# Patient Record
Sex: MALE | Race: WHITE | NOT HISPANIC OR LATINO | Employment: FULL TIME | URBAN - METROPOLITAN AREA
[De-identification: names, ages, dates, MRNs, and addresses within clinical notes are randomized per-mention and may not be internally consistent; named-entity substitution may affect disease eponyms.]

---

## 2017-02-01 ENCOUNTER — ALLSCRIPTS OFFICE VISIT (OUTPATIENT)
Dept: OTHER | Facility: OTHER | Age: 57
End: 2017-02-01

## 2017-02-17 ENCOUNTER — GENERIC CONVERSION - ENCOUNTER (OUTPATIENT)
Dept: OTHER | Facility: OTHER | Age: 57
End: 2017-02-17

## 2017-02-17 ENCOUNTER — APPOINTMENT (OUTPATIENT)
Dept: LAB | Facility: CLINIC | Age: 57
End: 2017-02-17
Payer: COMMERCIAL

## 2017-02-17 DIAGNOSIS — I10 ESSENTIAL (PRIMARY) HYPERTENSION: ICD-10-CM

## 2017-02-17 DIAGNOSIS — E78.5 HYPERLIPIDEMIA: ICD-10-CM

## 2017-02-17 DIAGNOSIS — Z12.5 ENCOUNTER FOR SCREENING FOR MALIGNANT NEOPLASM OF PROSTATE: ICD-10-CM

## 2017-02-17 LAB
ALBUMIN SERPL BCP-MCNC: 4.3 G/DL (ref 3.5–5)
ALP SERPL-CCNC: 81 U/L (ref 46–116)
ALT SERPL W P-5'-P-CCNC: 117 U/L (ref 12–78)
ANION GAP SERPL CALCULATED.3IONS-SCNC: 8 MMOL/L (ref 4–13)
AST SERPL W P-5'-P-CCNC: 96 U/L (ref 5–45)
BILIRUB SERPL-MCNC: 0.95 MG/DL (ref 0.2–1)
BUN SERPL-MCNC: 8 MG/DL (ref 5–25)
CALCIUM SERPL-MCNC: 8.9 MG/DL (ref 8.3–10.1)
CHLORIDE SERPL-SCNC: 92 MMOL/L (ref 100–108)
CO2 SERPL-SCNC: 30 MMOL/L (ref 21–32)
CREAT SERPL-MCNC: 0.7 MG/DL (ref 0.6–1.3)
CREAT UR-MCNC: 21.5 MG/DL
GFR SERPL CREATININE-BSD FRML MDRD: >60 ML/MIN/1.73SQ M
GLUCOSE SERPL-MCNC: 119 MG/DL (ref 65–140)
LDLC SERPL DIRECT ASSAY-MCNC: 117 MG/DL (ref 0–100)
MICROALBUMIN UR-MCNC: 10 MG/L (ref 0–20)
MICROALBUMIN/CREAT 24H UR: 47 MG/G CREATININE (ref 0–30)
POTASSIUM SERPL-SCNC: 3.9 MMOL/L (ref 3.5–5.3)
PROT SERPL-MCNC: 7.6 G/DL (ref 6.4–8.2)
PSA SERPL-MCNC: 0.9 NG/ML (ref 0–4)
SODIUM SERPL-SCNC: 130 MMOL/L (ref 136–145)
TRIGL SERPL-MCNC: 149 MG/DL

## 2017-02-17 PROCEDURE — 36415 COLL VENOUS BLD VENIPUNCTURE: CPT

## 2017-02-17 PROCEDURE — 84478 ASSAY OF TRIGLYCERIDES: CPT

## 2017-02-17 PROCEDURE — 82570 ASSAY OF URINE CREATININE: CPT

## 2017-02-17 PROCEDURE — 83721 ASSAY OF BLOOD LIPOPROTEIN: CPT

## 2017-02-17 PROCEDURE — 80053 COMPREHEN METABOLIC PANEL: CPT

## 2017-02-17 PROCEDURE — G0103 PSA SCREENING: HCPCS

## 2017-02-17 PROCEDURE — 82043 UR ALBUMIN QUANTITATIVE: CPT

## 2017-02-21 ENCOUNTER — GENERIC CONVERSION - ENCOUNTER (OUTPATIENT)
Dept: OTHER | Facility: OTHER | Age: 57
End: 2017-02-21

## 2017-02-27 DIAGNOSIS — E87.1 HYPO-OSMOLALITY AND HYPONATREMIA: ICD-10-CM

## 2017-02-27 DIAGNOSIS — I10 ESSENTIAL (PRIMARY) HYPERTENSION: ICD-10-CM

## 2017-02-27 DIAGNOSIS — R74.8 ABNORMAL LEVELS OF OTHER SERUM ENZYMES: ICD-10-CM

## 2017-02-27 DIAGNOSIS — I95.9 HYPOTENSION: ICD-10-CM

## 2017-02-27 DIAGNOSIS — Z11.59 ENCOUNTER FOR SCREENING FOR OTHER VIRAL DISEASES: ICD-10-CM

## 2017-03-17 ENCOUNTER — TRANSCRIBE ORDERS (OUTPATIENT)
Dept: SLEEP CENTER | Facility: HOSPITAL | Age: 57
End: 2017-03-17

## 2017-03-17 DIAGNOSIS — G47.33 OBSTRUCTIVE SLEEP APNEA (ADULT) (PEDIATRIC): Primary | ICD-10-CM

## 2017-03-22 ENCOUNTER — APPOINTMENT (OUTPATIENT)
Dept: LAB | Facility: CLINIC | Age: 57
End: 2017-03-22
Payer: COMMERCIAL

## 2017-03-22 ENCOUNTER — ALLSCRIPTS OFFICE VISIT (OUTPATIENT)
Dept: OTHER | Facility: OTHER | Age: 57
End: 2017-03-22

## 2017-03-22 DIAGNOSIS — E87.1 HYPO-OSMOLALITY AND HYPONATREMIA: ICD-10-CM

## 2017-03-22 DIAGNOSIS — R74.8 ABNORMAL LEVELS OF OTHER SERUM ENZYMES: ICD-10-CM

## 2017-03-22 DIAGNOSIS — I95.9 HYPOTENSION: ICD-10-CM

## 2017-03-22 DIAGNOSIS — Z11.59 ENCOUNTER FOR SCREENING FOR OTHER VIRAL DISEASES: ICD-10-CM

## 2017-03-22 LAB
ALBUMIN SERPL BCP-MCNC: 4.1 G/DL (ref 3.5–5)
ALP SERPL-CCNC: 79 U/L (ref 46–116)
ALT SERPL W P-5'-P-CCNC: 85 U/L (ref 12–78)
ANION GAP SERPL CALCULATED.3IONS-SCNC: 8 MMOL/L (ref 4–13)
AST SERPL W P-5'-P-CCNC: 75 U/L (ref 5–45)
BILIRUB SERPL-MCNC: 0.6 MG/DL (ref 0.2–1)
BUN SERPL-MCNC: 13 MG/DL (ref 5–25)
CALCIUM SERPL-MCNC: 9.4 MG/DL (ref 8.3–10.1)
CHLORIDE SERPL-SCNC: 87 MMOL/L (ref 100–108)
CO2 SERPL-SCNC: 34 MMOL/L (ref 21–32)
CREAT SERPL-MCNC: 0.85 MG/DL (ref 0.6–1.3)
GFR SERPL CREATININE-BSD FRML MDRD: >60 ML/MIN/1.73SQ M
GLUCOSE P FAST SERPL-MCNC: 105 MG/DL (ref 65–99)
GLUCOSE SERPL-MCNC: 109 MG/DL
HCT VFR BLD AUTO: 47.4 % (ref 36.5–49.3)
HGB BLD-MCNC: 17.4 G/DL (ref 12–17)
POTASSIUM SERPL-SCNC: 4.2 MMOL/L (ref 3.5–5.3)
PROT SERPL-MCNC: 7.4 G/DL (ref 6.4–8.2)
SODIUM SERPL-SCNC: 129 MMOL/L (ref 136–145)

## 2017-03-22 PROCEDURE — 86803 HEPATITIS C AB TEST: CPT

## 2017-03-22 PROCEDURE — 36415 COLL VENOUS BLD VENIPUNCTURE: CPT

## 2017-03-22 PROCEDURE — 80053 COMPREHEN METABOLIC PANEL: CPT

## 2017-03-22 PROCEDURE — 85014 HEMATOCRIT: CPT

## 2017-03-22 PROCEDURE — 85018 HEMOGLOBIN: CPT

## 2017-03-23 ENCOUNTER — GENERIC CONVERSION - ENCOUNTER (OUTPATIENT)
Dept: OTHER | Facility: OTHER | Age: 57
End: 2017-03-23

## 2017-03-23 LAB — HCV AB SER QL: NORMAL

## 2017-03-29 ENCOUNTER — TRANSCRIBE ORDERS (OUTPATIENT)
Dept: LAB | Facility: CLINIC | Age: 57
End: 2017-03-29

## 2017-03-29 ENCOUNTER — APPOINTMENT (OUTPATIENT)
Dept: LAB | Facility: CLINIC | Age: 57
End: 2017-03-29
Payer: COMMERCIAL

## 2017-03-29 DIAGNOSIS — I10 ESSENTIAL (PRIMARY) HYPERTENSION: ICD-10-CM

## 2017-03-29 LAB
ALBUMIN SERPL BCP-MCNC: 3.8 G/DL (ref 3.5–5)
ALP SERPL-CCNC: 67 U/L (ref 46–116)
ALT SERPL W P-5'-P-CCNC: 79 U/L (ref 12–78)
ANION GAP SERPL CALCULATED.3IONS-SCNC: 8 MMOL/L (ref 4–13)
AST SERPL W P-5'-P-CCNC: 75 U/L (ref 5–45)
BILIRUB SERPL-MCNC: 0.48 MG/DL (ref 0.2–1)
BUN SERPL-MCNC: 8 MG/DL (ref 5–25)
CALCIUM SERPL-MCNC: 8.8 MG/DL (ref 8.3–10.1)
CHLORIDE SERPL-SCNC: 103 MMOL/L (ref 100–108)
CO2 SERPL-SCNC: 29 MMOL/L (ref 21–32)
CREAT SERPL-MCNC: 0.67 MG/DL (ref 0.6–1.3)
GFR SERPL CREATININE-BSD FRML MDRD: >60 ML/MIN/1.73SQ M
GLUCOSE SERPL-MCNC: 101 MG/DL (ref 65–140)
POTASSIUM SERPL-SCNC: 4.8 MMOL/L (ref 3.5–5.3)
PROT SERPL-MCNC: 7 G/DL (ref 6.4–8.2)
SODIUM SERPL-SCNC: 140 MMOL/L (ref 136–145)

## 2017-03-29 PROCEDURE — 36415 COLL VENOUS BLD VENIPUNCTURE: CPT

## 2017-03-29 PROCEDURE — 80053 COMPREHEN METABOLIC PANEL: CPT

## 2017-03-30 ENCOUNTER — GENERIC CONVERSION - ENCOUNTER (OUTPATIENT)
Dept: OTHER | Facility: OTHER | Age: 57
End: 2017-03-30

## 2017-04-14 ENCOUNTER — HOSPITAL ENCOUNTER (OUTPATIENT)
Dept: SLEEP CENTER | Facility: HOSPITAL | Age: 57
Discharge: HOME | End: 2017-04-14
Attending: INTERNAL MEDICINE
Payer: COMMERCIAL

## 2017-04-14 DIAGNOSIS — G47.33 OBSTRUCTIVE SLEEP APNEA (ADULT) (PEDIATRIC): ICD-10-CM

## 2017-04-14 PROCEDURE — 95811 POLYSOM 6/>YRS CPAP 4/> PARM: CPT

## 2017-05-02 ENCOUNTER — ALLSCRIPTS OFFICE VISIT (OUTPATIENT)
Dept: OTHER | Facility: OTHER | Age: 57
End: 2017-05-02

## 2017-05-02 ENCOUNTER — TRANSCRIBE ORDERS (OUTPATIENT)
Dept: SLEEP CENTER | Facility: HOSPITAL | Age: 57
End: 2017-05-02

## 2017-05-02 DIAGNOSIS — G47.33 OBSTRUCTIVE SLEEP APNEA (ADULT) (PEDIATRIC): Primary | ICD-10-CM

## 2017-05-18 ENCOUNTER — HOSPITAL ENCOUNTER (OUTPATIENT)
Dept: SLEEP CENTER | Facility: HOSPITAL | Age: 57
Discharge: HOME | End: 2017-05-18
Attending: INTERNAL MEDICINE
Payer: COMMERCIAL

## 2017-05-18 DIAGNOSIS — G47.33 OBSTRUCTIVE SLEEP APNEA (ADULT) (PEDIATRIC): ICD-10-CM

## 2017-06-30 ENCOUNTER — TRANSCRIBE ORDERS (OUTPATIENT)
Dept: LAB | Facility: CLINIC | Age: 57
End: 2017-06-30

## 2017-06-30 ENCOUNTER — APPOINTMENT (OUTPATIENT)
Dept: LAB | Facility: CLINIC | Age: 57
End: 2017-06-30
Payer: COMMERCIAL

## 2017-06-30 DIAGNOSIS — Z00.8 HEALTH EXAMINATION IN POPULATION SURVEY: ICD-10-CM

## 2017-06-30 DIAGNOSIS — Z00.8 HEALTH EXAMINATION IN POPULATION SURVEY: Primary | ICD-10-CM

## 2017-06-30 LAB
CHOLEST SERPL-MCNC: 195 MG/DL (ref 50–200)
EST. AVERAGE GLUCOSE BLD GHB EST-MCNC: 103 MG/DL
HBA1C MFR BLD: 5.2 % (ref 4.2–6.3)
HDLC SERPL-MCNC: 45 MG/DL (ref 40–60)
LDLC SERPL CALC-MCNC: 97 MG/DL (ref 0–100)
TRIGL SERPL-MCNC: 265 MG/DL

## 2017-06-30 PROCEDURE — 36415 COLL VENOUS BLD VENIPUNCTURE: CPT

## 2017-06-30 PROCEDURE — 80061 LIPID PANEL: CPT

## 2017-06-30 PROCEDURE — 83036 HEMOGLOBIN GLYCOSYLATED A1C: CPT

## 2017-07-20 ENCOUNTER — TRANSCRIBE ORDERS (OUTPATIENT)
Dept: SLEEP CENTER | Facility: HOSPITAL | Age: 57
End: 2017-07-20

## 2017-07-20 ENCOUNTER — HOSPITAL ENCOUNTER (OUTPATIENT)
Dept: SLEEP CENTER | Facility: HOSPITAL | Age: 57
Discharge: HOME | End: 2017-07-20
Attending: INTERNAL MEDICINE
Payer: COMMERCIAL

## 2017-07-20 DIAGNOSIS — G47.33 OBSTRUCTIVE SLEEP APNEA (ADULT) (PEDIATRIC): ICD-10-CM

## 2017-07-20 DIAGNOSIS — G47.33 OBSTRUCTIVE SLEEP APNEA (ADULT) (PEDIATRIC): Primary | ICD-10-CM

## 2017-09-19 ENCOUNTER — TRANSCRIBE ORDERS (OUTPATIENT)
Dept: SLEEP CENTER | Facility: HOSPITAL | Age: 57
End: 2017-09-19

## 2017-09-19 DIAGNOSIS — G47.33 OBSTRUCTIVE SLEEP APNEA (ADULT) (PEDIATRIC): Primary | ICD-10-CM

## 2017-09-21 ENCOUNTER — HOSPITAL ENCOUNTER (OUTPATIENT)
Dept: SLEEP CENTER | Facility: HOSPITAL | Age: 57
Discharge: HOME | End: 2017-09-21
Attending: INTERNAL MEDICINE
Payer: COMMERCIAL

## 2017-09-21 DIAGNOSIS — G47.33 OBSTRUCTIVE SLEEP APNEA (ADULT) (PEDIATRIC): ICD-10-CM

## 2017-09-26 ENCOUNTER — TRANSCRIBE ORDERS (OUTPATIENT)
Dept: SLEEP CENTER | Facility: HOSPITAL | Age: 57
End: 2017-09-26

## 2017-09-26 DIAGNOSIS — G47.33 OBSTRUCTIVE SLEEP APNEA (ADULT) (PEDIATRIC): Primary | ICD-10-CM

## 2017-11-13 ENCOUNTER — TRANSCRIBE ORDERS (OUTPATIENT)
Dept: LAB | Facility: CLINIC | Age: 57
End: 2017-11-13

## 2017-11-13 ENCOUNTER — APPOINTMENT (OUTPATIENT)
Dept: LAB | Facility: CLINIC | Age: 57
End: 2017-11-13
Payer: COMMERCIAL

## 2017-11-13 ENCOUNTER — GENERIC CONVERSION - ENCOUNTER (OUTPATIENT)
Dept: OTHER | Facility: OTHER | Age: 57
End: 2017-11-13

## 2017-11-13 ENCOUNTER — APPOINTMENT (OUTPATIENT)
Dept: RADIOLOGY | Facility: CLINIC | Age: 57
End: 2017-11-13
Payer: COMMERCIAL

## 2017-11-13 DIAGNOSIS — R79.9 ABNORMAL FINDING OF BLOOD CHEMISTRY: ICD-10-CM

## 2017-11-13 DIAGNOSIS — R79.89 OTHER SPECIFIED ABNORMAL FINDINGS OF BLOOD CHEMISTRY: ICD-10-CM

## 2017-11-13 DIAGNOSIS — I10 ESSENTIAL (PRIMARY) HYPERTENSION: ICD-10-CM

## 2017-11-13 DIAGNOSIS — M23.91 INTERNAL DERANGEMENT OF RIGHT KNEE: ICD-10-CM

## 2017-11-13 DIAGNOSIS — R74.8 ABNORMAL LEVELS OF OTHER SERUM ENZYMES: ICD-10-CM

## 2017-11-13 LAB
ALBUMIN SERPL BCP-MCNC: 3.8 G/DL (ref 3.5–5)
ALP SERPL-CCNC: 85 U/L (ref 46–116)
ALT SERPL W P-5'-P-CCNC: 94 U/L (ref 12–78)
ANION GAP SERPL CALCULATED.3IONS-SCNC: 6 MMOL/L (ref 4–13)
AST SERPL W P-5'-P-CCNC: 108 U/L (ref 5–45)
BASOPHILS # BLD AUTO: 0.02 THOUSANDS/ΜL (ref 0–0.1)
BASOPHILS NFR BLD AUTO: 0 % (ref 0–1)
BILIRUB SERPL-MCNC: 0.66 MG/DL (ref 0.2–1)
BUN SERPL-MCNC: 8 MG/DL (ref 5–25)
CALCIUM SERPL-MCNC: 9.2 MG/DL (ref 8.3–10.1)
CHLORIDE SERPL-SCNC: 102 MMOL/L (ref 100–108)
CO2 SERPL-SCNC: 28 MMOL/L (ref 21–32)
CREAT SERPL-MCNC: 0.76 MG/DL (ref 0.6–1.3)
EOSINOPHIL # BLD AUTO: 0.24 THOUSAND/ΜL (ref 0–0.61)
EOSINOPHIL NFR BLD AUTO: 4 % (ref 0–6)
ERYTHROCYTE [DISTWIDTH] IN BLOOD BY AUTOMATED COUNT: 12.8 % (ref 11.6–15.1)
GFR SERPL CREATININE-BSD FRML MDRD: 100 ML/MIN/1.73SQ M
GLUCOSE SERPL-MCNC: 127 MG/DL (ref 65–140)
HCT VFR BLD AUTO: 46.3 % (ref 36.5–49.3)
HGB BLD-MCNC: 16.2 G/DL (ref 12–17)
LYMPHOCYTES # BLD AUTO: 1.04 THOUSANDS/ΜL (ref 0.6–4.47)
LYMPHOCYTES NFR BLD AUTO: 18 % (ref 14–44)
MCH RBC QN AUTO: 35.3 PG (ref 26.8–34.3)
MCHC RBC AUTO-ENTMCNC: 35 G/DL (ref 31.4–37.4)
MCV RBC AUTO: 101 FL (ref 82–98)
MONOCYTES # BLD AUTO: 0.71 THOUSAND/ΜL (ref 0.17–1.22)
MONOCYTES NFR BLD AUTO: 12 % (ref 4–12)
NEUTROPHILS # BLD AUTO: 3.75 THOUSANDS/ΜL (ref 1.85–7.62)
NEUTS SEG NFR BLD AUTO: 66 % (ref 43–75)
NRBC BLD AUTO-RTO: 0 /100 WBCS
PLATELET # BLD AUTO: 150 THOUSANDS/UL (ref 149–390)
PMV BLD AUTO: 10.4 FL (ref 8.9–12.7)
POTASSIUM SERPL-SCNC: 4.6 MMOL/L (ref 3.5–5.3)
PROT SERPL-MCNC: 7.3 G/DL (ref 6.4–8.2)
RBC # BLD AUTO: 4.59 MILLION/UL (ref 3.88–5.62)
SODIUM SERPL-SCNC: 136 MMOL/L (ref 136–145)
TSH SERPL DL<=0.05 MIU/L-ACNC: 2.11 UIU/ML (ref 0.36–3.74)
WBC # BLD AUTO: 5.8 THOUSAND/UL (ref 4.31–10.16)

## 2017-11-13 PROCEDURE — 85025 COMPLETE CBC W/AUTO DIFF WBC: CPT

## 2017-11-13 PROCEDURE — 80053 COMPREHEN METABOLIC PANEL: CPT

## 2017-11-13 PROCEDURE — 84443 ASSAY THYROID STIM HORMONE: CPT

## 2017-11-13 PROCEDURE — 73562 X-RAY EXAM OF KNEE 3: CPT

## 2017-11-13 PROCEDURE — 36415 COLL VENOUS BLD VENIPUNCTURE: CPT

## 2017-11-14 ENCOUNTER — GENERIC CONVERSION - ENCOUNTER (OUTPATIENT)
Dept: OTHER | Facility: OTHER | Age: 57
End: 2017-11-14

## 2017-11-15 ENCOUNTER — GENERIC CONVERSION - ENCOUNTER (OUTPATIENT)
Dept: OTHER | Facility: OTHER | Age: 57
End: 2017-11-15

## 2017-11-29 ENCOUNTER — GENERIC CONVERSION - ENCOUNTER (OUTPATIENT)
Dept: OTHER | Facility: OTHER | Age: 57
End: 2017-11-29

## 2017-11-29 ENCOUNTER — TRANSCRIBE ORDERS (OUTPATIENT)
Dept: LAB | Facility: CLINIC | Age: 57
End: 2017-11-29

## 2017-11-29 ENCOUNTER — APPOINTMENT (OUTPATIENT)
Dept: LAB | Facility: CLINIC | Age: 57
End: 2017-11-29
Payer: COMMERCIAL

## 2017-11-29 DIAGNOSIS — R74.8 ABNORMAL LEVELS OF OTHER SERUM ENZYMES: ICD-10-CM

## 2017-11-29 LAB
FERRITIN SERPL-MCNC: 714 NG/ML (ref 8–388)
FOLATE SERPL-MCNC: >20 NG/ML (ref 3.1–17.5)
IRON SERPL-MCNC: 197 UG/DL (ref 65–175)
TIBC SERPL-MCNC: 353 UG/DL (ref 250–450)
VIT B12 SERPL-MCNC: 565 PG/ML (ref 100–900)

## 2017-11-29 PROCEDURE — 82728 ASSAY OF FERRITIN: CPT

## 2017-11-29 PROCEDURE — 86430 RHEUMATOID FACTOR TEST QUAL: CPT

## 2017-11-29 PROCEDURE — 80074 ACUTE HEPATITIS PANEL: CPT

## 2017-11-29 PROCEDURE — 83540 ASSAY OF IRON: CPT

## 2017-11-29 PROCEDURE — 82746 ASSAY OF FOLIC ACID SERUM: CPT

## 2017-11-29 PROCEDURE — 82390 ASSAY OF CERULOPLASMIN: CPT

## 2017-11-29 PROCEDURE — 82607 VITAMIN B-12: CPT

## 2017-11-29 PROCEDURE — 86038 ANTINUCLEAR ANTIBODIES: CPT

## 2017-11-29 PROCEDURE — 36415 COLL VENOUS BLD VENIPUNCTURE: CPT

## 2017-11-29 PROCEDURE — 86704 HEP B CORE ANTIBODY TOTAL: CPT

## 2017-11-29 PROCEDURE — 83550 IRON BINDING TEST: CPT

## 2017-11-30 ENCOUNTER — GENERIC CONVERSION - ENCOUNTER (OUTPATIENT)
Dept: OTHER | Facility: OTHER | Age: 57
End: 2017-11-30

## 2017-11-30 LAB
CERULOPLASMIN SERPL-MCNC: 21.7 MG/DL (ref 16–31)
HAV IGM SER QL: NORMAL
HBV CORE AB SER QL: NORMAL
HBV CORE IGM SER QL: NORMAL
HBV SURFACE AG SER QL: NORMAL
HCV AB SER QL: NORMAL
RHEUMATOID FACT SER QL LA: NEGATIVE

## 2017-12-01 ENCOUNTER — GENERIC CONVERSION - ENCOUNTER (OUTPATIENT)
Dept: OTHER | Facility: OTHER | Age: 57
End: 2017-12-01

## 2017-12-01 LAB — RYE IGE QN: NEGATIVE

## 2017-12-11 ENCOUNTER — ALLSCRIPTS OFFICE VISIT (OUTPATIENT)
Dept: OTHER | Facility: OTHER | Age: 57
End: 2017-12-11

## 2017-12-12 ENCOUNTER — APPOINTMENT (OUTPATIENT)
Dept: LAB | Facility: CLINIC | Age: 57
End: 2017-12-12
Payer: COMMERCIAL

## 2017-12-12 ENCOUNTER — TRANSCRIBE ORDERS (OUTPATIENT)
Dept: LAB | Facility: CLINIC | Age: 57
End: 2017-12-12

## 2017-12-12 DIAGNOSIS — R79.89 OTHER SPECIFIED ABNORMAL FINDINGS OF BLOOD CHEMISTRY: ICD-10-CM

## 2017-12-12 DIAGNOSIS — R74.8 ABNORMAL LEVELS OF OTHER SERUM ENZYMES: ICD-10-CM

## 2017-12-12 LAB
INR PPP: 0.99 (ref 0.86–1.16)
PROTHROMBIN TIME: 13.1 SECONDS (ref 12.1–14.4)

## 2017-12-12 PROCEDURE — 86235 NUCLEAR ANTIGEN ANTIBODY: CPT

## 2017-12-12 PROCEDURE — 85610 PROTHROMBIN TIME: CPT

## 2017-12-12 PROCEDURE — 82103 ALPHA-1-ANTITRYPSIN TOTAL: CPT

## 2017-12-12 PROCEDURE — 84165 PROTEIN E-PHORESIS SERUM: CPT

## 2017-12-12 PROCEDURE — 36415 COLL VENOUS BLD VENIPUNCTURE: CPT

## 2017-12-12 PROCEDURE — 81256 HFE GENE: CPT

## 2017-12-12 PROCEDURE — 87389 HIV-1 AG W/HIV-1&-2 AB AG IA: CPT

## 2017-12-13 LAB — A1AT SERPL-MCNC: 161 MG/DL (ref 90–200)

## 2017-12-14 ENCOUNTER — GENERIC CONVERSION - ENCOUNTER (OUTPATIENT)
Dept: OTHER | Facility: OTHER | Age: 57
End: 2017-12-14

## 2017-12-14 LAB
ACTIN IGG SERPL-ACNC: 6 UNITS (ref 0–19)
ALBUMIN SERPL ELPH-MCNC: 5.05 G/DL (ref 3.5–5)
ALBUMIN SERPL ELPH-MCNC: 62.4 % (ref 52–65)
ALPHA1 GLOB SERPL ELPH-MCNC: 0.35 G/DL (ref 0.1–0.4)
ALPHA1 GLOB SERPL ELPH-MCNC: 4.3 % (ref 2.5–5)
ALPHA2 GLOB SERPL ELPH-MCNC: 0.79 G/DL (ref 0.4–1.2)
ALPHA2 GLOB SERPL ELPH-MCNC: 9.7 % (ref 7–13)
BETA GLOB ABNORMAL SERPL ELPH-MCNC: 0.49 G/DL (ref 0.4–0.8)
BETA1 GLOB SERPL ELPH-MCNC: 6 % (ref 5–13)
BETA2 GLOB SERPL ELPH-MCNC: 4.6 % (ref 2–8)
BETA2+GAMMA GLOB SERPL ELPH-MCNC: 0.37 G/DL (ref 0.2–0.5)
GAMMA GLOB ABNORMAL SERPL ELPH-MCNC: 1.05 G/DL (ref 0.5–1.6)
GAMMA GLOB SERPL ELPH-MCNC: 13 % (ref 12–22)
HIV 1+2 AB+HIV1 P24 AG SERPL QL IA: NORMAL
IGG/ALB SER: 1.66 {RATIO} (ref 1.1–1.8)
PROT PATTERN SERPL ELPH-IMP: ABNORMAL
PROT SERPL-MCNC: 8.1 G/DL (ref 6.4–8.2)

## 2017-12-16 NOTE — CONSULTS
Assessment  1  Abnormal liver function test (790 6) (R79 89)  2  Elevated liver enzymes (790 5) (R74 8)  3  Iron overload (275 09) (E83 19)    Plan   Abnormal liver function test    · (1) SMOOTH MUSCLE ANTIBODY; Status:Resulted - Requires Verification;   Done:47Prs2986 08:23AM  Due:11Dec2018; Ordered; For:Abnormal liver function test; Ordered By:Ghislaine Cortez;   · (1) SPECIAL TEST; Status:Active; Requested for:39Xeg2376;   Perform:[facility] - CSF; Order Comments:HFE gene mutation; Due:11Dec2018; Ordered; For:Abnormal liver function test; Ordered By:Shannon Cortez;   · Follow-up visit in 3 months Evaluation and Treatment  Follow-up with doc  Status: HoldFor - Scheduling  Requested for: 64PNV8315  Ordered; For: Abnormal liver function test;  Ordered By: Tena Briceno  Performed:   Due: 81ULG4248   · *1- [facility] INTERVENTIONAL RADIOLOGY Co-Management  please do percutaneous liverbiopsy and assess for iron content; concern for hemochromatosis  Status:Active  Requested for: 21VYH5876  Ordered; For: Abnormal liver function test;  Ordered By: Tena Briceno  Performed:   Due: 96QUL6242; Last Updated By: Rafael Ratliff; 12/11/2017 1:39:09 PM  () Care Summary provided  : Yes  (1) ALPHA 1 ANTITRYPSIN; Status:Resulted - Requires Verification;   Done: 31KOA2647 12:00AM Due:11Dec2018; Ordered; For:Abnormal liver function test; Ordered By:Ghislaine Cortez;  (1) PROTEIN ELECTRO, SERUM; Status:Resulted - Requires Verification;   Done: 59PZV0000 12:00AM Due:38Sst2574; Ordered; For:Abnormal liver function test; Ordered By:Ghislaine Cortez;  (1) PT WITH INR; Status:Resulted - Requires Verification;   Done: 96HUW8074 12:00AM Due:54Lsl0083; Ordered;   For:Elevated liver enzymes; Ordered By:Shannon Cortez;  (1) HIV AG/AB COMBO, 4TH GEN; [Do Not Release]; Status:Resulted - Requires Verification;   Done: 91NIB5064 12:00AM Due:55Lys5416; Ordered;    For:Abnormal liver function test; Ordered By:Shannon Cortez; Discussion/Summary  Discussion Summary:   61-year-old male referred by primary care due to elevated LFTs and an iron saturation of over 55%1  transaminitis with an iron saturation over 55% and a ferritin elevation as well; complete liver lab work up was negative; will just add smooth muscle ab for completeness sake- I have discussed with the patient that this is likely due to an iron overload state, I recommend an HFE gene mutation and a liver biopsy, if indeed evidence of hereditary hemochromatosis confirmed on genetic testing and liver biopsy will recommend phlebotomy with a goal ferritin of 50- pt advised for alcohol cessation2  Colon polyps- last colonoscopy 4 years ago; will repeat in one yearfollow up in a few months time  Chief Complaint  Chief Complaint Free Text Note Form: pt consult for elevated liver enzymes, diarrhea, change in bowel habits, GERD, and loss of appetite  referred by Dr Marlene Lacey      History of Present Illness  HPI: 59year old male referred by Dr Artur Montalvo for elevated LFT's  He reports some fatigue as well as 40 lb weight gain int he past year  He has had an echocardiogram and has been told he is not diabetic  Mom  of melanoma in her 42's  He reports a prior colonoscopy about 4 years ago  Has had polyps in the past Denies any rectal bleeding  He does have loose stools with a BM 1-2 times daily  Review of Systems  Complete-Male GI Adult:  Constitutional: feeling poorly-- and-- feeling tired, but-- no fever,-- no recent weight gain,-- no chills-- and-- no recent weight loss  Eyes: No complaints of eye pain, no red eyes, no discharge from eyes, no itchy eyes  ENT: no complaints of earache, no hearing loss, no nosebleeds, no nasal discharge, no sore throat, no hoarseness  Cardiovascular: No complaints of slow heart rate, no fast heart rate, no chest pain, no palpitations, no leg claudication, no lower extremity    Respiratory: shortness of breath, but-- no cough,-- no orthopnea,-- no wheezing,-- no shortness of breath during exertion-- and-- no PND  Gastrointestinal: diarrhea-- and-- change in bowel habits, loss of appetite, GERD, but-- no abdominal pain,-- no nausea,-- no vomiting,-- no constipation-- and-- no blood in stools  Genitourinary: No complaints of dysuria, no incontinence, no hesitancy, no nocturia, no genital lesion, no testicular pain  Musculoskeletal: No complaints of arthralgia, no myalgias, no joint swelling or stiffness, no limb pain or swelling  Integumentary: No complaints of skin rash or skin lesions, no itching, no skin wound, no dry skin  Neurological: No compliants of headache, no confusion, no convulsions, no numbness or tingling, no dizziness or fainting, no limb weakness, no difficulty walking  Psychiatric: Is not suicidal, no sleep disturbances, no anxiety or depression, no change in personality, no emotional problems  Endocrine: No complaints of proptosis, no hot flashes, no muscle weakness, no erectile dysfunction, no deepening of the voice, no feelings of weakness  Hematologic/Lymphatic: No complaints of swollen glands, no swollen glands in the neck, does not bleed easily, no easy bruising  ROS Reviewed:   ROS reviewed  Active Problems  1  Abnormal blood chemistry (790 6) (R79 9)  2  Allergic rhinitis (477 9) (J30 9)  3  Benign colon polyp (211 3) (K63 5)  4  Benign essential hypertension (401 1) (I10)  5  Carpal tunnel syndrome, unspecified laterality (354 0) (G56 00)  6  Chronic fatigue (780 79) (R53 82)  7  COPD (chronic obstructive pulmonary disease) (496) (J44 9)  8  Edema (782 3) (R60 9)  9  Elevated liver enzymes (790 5) (R74 8)  10  Gout (274 9) (M10 9)  11  Herpes zoster (053 9) (B02 9)  12  Hyperlipidemia (272 4) (E78 5)  13  Hyponatremia (276 1) (E87 1)  14  Influenza vaccine needed (V04 81) (Z23)  15  Internal derangement of right knee (717 9) (M23 91)  16  Intervertebral disc herniation (722 2)  17   Left atrial enlargement (429 3) (I51 7)  18  Left ventricular hypertrophy (429 3) (I51 7)  19  Macrocytosis (289 89) (D75 89)  20  Mild mitral regurgitation (424 0) (I34 0)  21  Obstructive sleep apnea (327 23) (G47 33)  22  Pain syndrome, chronic (338 4) (G89 4)  23  Polyosteoarthritis (715 80) (M15 9)  24  Renal cyst, right (753 10) (N28 1)  25  Screening for prostate cancer (V76 44) (Z12 5)  26  Special screening examination for neoplasm of prostate (V76 44) (Z12 5)  27  Tobacco use (305 1) (Z72 0)  28  Tobacco use disorder (305 1) (F17 200)  29  Weight gain (783 1) (R63 5)    Past Medical History  1  History of Abnormal kidney function (593 9) (N28 9)  2  History of Abrasion Of The Right Thumb (915 0)  3  History of Cervical radiculopathy (723 4) (M54 12)  4  History of acute sinusitis (V12 69) (Z87 09)  5  History of blood loss (V12 59) (Z87 898)  6  History of hypotension (V12 59) (Z86 79)  7  History of Left leg swelling (729 81) (M79 89)  8  History of Near syncope (780 2) (R55)  9  History of Need for hepatitis C screening test (V73 89) (Z11 59)  Active Problems And Past Medical History Reviewed: The active problems and past medical history were reviewed and updated today  Surgical History  1  History of Hemorrhoidectomy  2  Denied: History Of Prior Surgery  3  History of Neuroplasty Decompression Median Nerve At Carpal Tunnel  Surgical History Reviewed: The surgical history was reviewed and updated today  Family History  Mother   1  Family history of Cancer  Family History Reviewed: The family history was reviewed and updated today  Social History   · Being A Social Drinker   · Current smoker (305 1) (F17 200)   · Marital History - Currently    · Tobacco use (305 1) (Z72 0)   · Working Full Time  Social History Reviewed: The social history was reviewed and updated today  Current Meds  1  B Complex CAPS; Therapy: (Recorded:14Gtv1945) to Recorded  2   Fish Oil 1000 MG Oral Capsule; TAKE 2 CAPSULE Twice daily; Therapy: 56Kqf7359 to (Algis Spittle)  Requested for: 19Czr6335; Last Rx:34Ehq9630 Ordered  3  Metoprolol Succinate ER 50 MG Oral Tablet Extended Release 24 Hour; TAKE 1 TABLET DAILY; Therapy: 85TWY1037 to (Prudy Duty)  Requested for: 78WOU6614; Last Rx:55Drm1389 Ordered  4  Multi Vitamin/Minerals Oral Tablet; TAKE 1 TABLET DAILY; Therapy: 09QEC6467 to (Evaluate:70Daz9780) Recorded  5  Valsartan 320 MG Oral Tablet; TAKE 1 TABLET DAILY; Therapy: 79Jxa5808 to ( Backer)  Requested for: 32ITN9620; Last Rx:97Zkl7093 Ordered  Medication List Reviewed: The medication list was reviewed and updated today  Allergies  1  No Known Drug Allergies    Vitals  Vital Signs    Recorded: 11Dec2017 12:48PM   Temperature 97 4 F, Tympanic   Heart Rate 87   Systolic 846, RUE, Sitting   Diastolic 80, RUE, Sitting   Height 5 ft 10 in   Weight 260 lb 4 0 oz   BMI Calculated 37 34   BSA Calculated 2 33   O2 Saturation 97, RA     Physical Exam   Constitutional  General appearance: Abnormal  -- dianne complexion  Eyes  Conjunctiva and lids: No swelling, erythema, or discharge  Pupils and irises: Equal, round and reactive to light  Ears, Nose, Mouth, and Throat  External inspection of ears and nose: Normal    Nasal mucosa, septum, and turbinates: Normal without edema or erythema  Oropharynx: Normal with no erythema, edema, exudate or lesions  Pulmonary  Respiratory effort: No increased work of breathing or signs of respiratory distress  Auscultation of lungs: Clear to auscultation, equal breath sounds bilaterally, no wheezes, no rales, no rhonci  Cardiovascular  Auscultation of heart: Normal rate and rhythm, normal S1 and S2, without murmurs  Examination of extremities for edema and/or varicosities: Normal    Abdomen  Abdomen: Abnormal  -- obese  Liver and spleen: No hepatomegaly or splenomegaly  Lymphatic  Palpation of lymph nodes in neck: No lymphadenopathy     Musculoskeletal  Gait and station: Normal    Digits and nails: Normal without clubbing or cyanosis  Inspection/palpation of joints, bones, and muscles: Normal    Skin  Skin and subcutaneous tissue: Normal without rashes or lesions  Psychiatric  Orientation to person, place and time: Normal    Mood and affect: Normal          Results/Data  (1) SMOOTH MUSCLE ANTIBODY 98Qsi3369 08:23AM [facility] Order Number: [id number]     Test Name Result Flag Reference   SMOOTH MUS AB 6 Units  0 - 19   Negative                     0 - 19                  Weak positive               20 - 30                  Moderate to strong positive     >30  Actin Antibodies are found in 52-85% of patients with  autoimmune hepatitis or chronic active hepatitis and  in 22% of patients with primary biliary cirrhosis  Performed at:  [address] [place] [address]  [id number] : Yusuf Leos MD, Phone:  [phone]     Diagnostic Studies Reviewed:  Lab Review  Iron sat over 55 percent  Future Appointments    Date/Time Provider Specialty Site   03/30/2018 08:30 AM MARANDA Brink   Internal Medicine [address] [facility]     Signatures   Electronically signed by : Sue Velarde DO; Dec 15 2017 10:30AM EST                       (Author)    Electronically signed by : Sue Velarde DO; Dec 15 2017 10:30AM EST                       (Author)    Electronically signed by : Sue Velarde DO; Dec 15 2017 10:30AM EST                       (Author)

## 2017-12-18 ENCOUNTER — GENERIC CONVERSION - ENCOUNTER (OUTPATIENT)
Dept: OTHER | Facility: OTHER | Age: 57
End: 2017-12-18

## 2017-12-18 ENCOUNTER — HOSPITAL ENCOUNTER (OUTPATIENT)
Dept: MRI IMAGING | Facility: HOSPITAL | Age: 57
Discharge: HOME | End: 2017-12-18
Attending: INTERNAL MEDICINE
Payer: COMMERCIAL

## 2017-12-18 ENCOUNTER — HOSPITAL ENCOUNTER (OUTPATIENT)
Dept: ULTRASOUND IMAGING | Facility: HOSPITAL | Age: 57
Discharge: HOME | End: 2017-12-18
Attending: INTERNAL MEDICINE
Payer: COMMERCIAL

## 2017-12-18 DIAGNOSIS — M23.91 INTERNAL DERANGEMENT OF RIGHT KNEE: ICD-10-CM

## 2017-12-18 DIAGNOSIS — R74.8 ABNORMAL LEVELS OF OTHER SERUM ENZYMES: ICD-10-CM

## 2017-12-18 LAB — HFE GENE MUT ANL BLD/T: NORMAL

## 2017-12-18 PROCEDURE — 73721 MRI JNT OF LWR EXTRE W/O DYE: CPT

## 2017-12-18 PROCEDURE — 76705 ECHO EXAM OF ABDOMEN: CPT

## 2017-12-21 ENCOUNTER — GENERIC CONVERSION - ENCOUNTER (OUTPATIENT)
Dept: OTHER | Facility: OTHER | Age: 57
End: 2017-12-21

## 2017-12-27 ENCOUNTER — GENERIC CONVERSION - ENCOUNTER (OUTPATIENT)
Dept: OTHER | Facility: OTHER | Age: 57
End: 2017-12-27

## 2017-12-28 ENCOUNTER — TELEPHONE (OUTPATIENT)
Dept: RADIOLOGY | Facility: HOSPITAL | Age: 57
End: 2017-12-28

## 2017-12-28 RX ORDER — SODIUM CHLORIDE 9 MG/ML
75 INJECTION, SOLUTION INTRAVENOUS CONTINUOUS
Status: CANCELLED | OUTPATIENT
Start: 2017-12-28

## 2018-01-03 ENCOUNTER — HOSPITAL ENCOUNTER (OUTPATIENT)
Dept: RADIOLOGY | Facility: HOSPITAL | Age: 58
Discharge: HOME | End: 2018-01-03
Attending: INTERNAL MEDICINE | Admitting: RADIOLOGY
Payer: COMMERCIAL

## 2018-01-03 ENCOUNTER — GENERIC CONVERSION - ENCOUNTER (OUTPATIENT)
Dept: OTHER | Facility: OTHER | Age: 58
End: 2018-01-03

## 2018-01-03 VITALS
TEMPERATURE: 98.4 F | BODY MASS INDEX: 36.4 KG/M2 | WEIGHT: 260 LBS | OXYGEN SATURATION: 95 % | HEART RATE: 76 BPM | DIASTOLIC BLOOD PRESSURE: 68 MMHG | HEIGHT: 71 IN | RESPIRATION RATE: 16 BRPM | SYSTOLIC BLOOD PRESSURE: 140 MMHG

## 2018-01-03 DIAGNOSIS — R79.89 ABNORMAL LIVER FUNCTION TESTS: ICD-10-CM

## 2018-01-03 LAB
ALBUMIN SERPL BCP-MCNC: 4.2 G/DL (ref 3.5–5)
ALP SERPL-CCNC: 70 U/L (ref 46–116)
ALT SERPL W P-5'-P-CCNC: 85 U/L (ref 12–78)
ANION GAP SERPL CALCULATED.3IONS-SCNC: 9 MMOL/L (ref 4–13)
AST SERPL W P-5'-P-CCNC: 82 U/L (ref 5–45)
BILIRUB SERPL-MCNC: 0.79 MG/DL (ref 0.2–1)
BUN SERPL-MCNC: 10 MG/DL (ref 5–25)
CALCIUM SERPL-MCNC: 9.8 MG/DL (ref 8.3–10.1)
CHLORIDE SERPL-SCNC: 100 MMOL/L (ref 100–108)
CO2 SERPL-SCNC: 28 MMOL/L (ref 21–32)
CREAT SERPL-MCNC: 0.75 MG/DL (ref 0.6–1.3)
ERYTHROCYTE [DISTWIDTH] IN BLOOD BY AUTOMATED COUNT: 12.7 % (ref 11.6–15.1)
GFR SERPL CREATININE-BSD FRML MDRD: 100 ML/MIN/1.73SQ M
GLUCOSE P FAST SERPL-MCNC: 115 MG/DL (ref 65–99)
GLUCOSE SERPL-MCNC: 115 MG/DL (ref 65–140)
HCT VFR BLD AUTO: 46.8 % (ref 36.5–49.3)
HGB BLD-MCNC: 16.8 G/DL (ref 12–17)
MCH RBC QN AUTO: 37.2 PG (ref 26.8–34.3)
MCHC RBC AUTO-ENTMCNC: 35.9 G/DL (ref 31.4–37.4)
MCV RBC AUTO: 104 FL (ref 82–98)
PLATELET # BLD AUTO: 137 THOUSANDS/UL (ref 149–390)
PMV BLD AUTO: 10.6 FL (ref 8.9–12.7)
POTASSIUM SERPL-SCNC: 4.2 MMOL/L (ref 3.5–5.3)
PROT SERPL-MCNC: 7.6 G/DL (ref 6.4–8.2)
RBC # BLD AUTO: 4.52 MILLION/UL (ref 3.88–5.62)
SODIUM SERPL-SCNC: 137 MMOL/L (ref 136–145)
WBC # BLD AUTO: 6.18 THOUSAND/UL (ref 4.31–10.16)

## 2018-01-03 PROCEDURE — 80053 COMPREHEN METABOLIC PANEL: CPT | Performed by: RADIOLOGY

## 2018-01-03 PROCEDURE — 47000 NEEDLE BIOPSY OF LIVER PERQ: CPT

## 2018-01-03 PROCEDURE — 99153 MOD SED SAME PHYS/QHP EA: CPT

## 2018-01-03 PROCEDURE — 83540 ASSAY OF IRON: CPT | Performed by: INTERNAL MEDICINE

## 2018-01-03 PROCEDURE — 85027 COMPLETE CBC AUTOMATED: CPT | Performed by: RADIOLOGY

## 2018-01-03 PROCEDURE — 88307 TISSUE EXAM BY PATHOLOGIST: CPT | Performed by: INTERNAL MEDICINE

## 2018-01-03 PROCEDURE — 88313 SPECIAL STAINS GROUP 2: CPT | Performed by: INTERNAL MEDICINE

## 2018-01-03 PROCEDURE — 99152 MOD SED SAME PHYS/QHP 5/>YRS: CPT

## 2018-01-03 RX ORDER — CHLORAL HYDRATE 500 MG
1000 CAPSULE ORAL
COMMUNITY

## 2018-01-03 RX ORDER — DIPHENOXYLATE HYDROCHLORIDE AND ATROPINE SULFATE 2.5; .025 MG/1; MG/1
1 TABLET ORAL DAILY
COMMUNITY

## 2018-01-03 RX ORDER — SODIUM CHLORIDE 9 MG/ML
75 INJECTION, SOLUTION INTRAVENOUS CONTINUOUS
Status: DISCONTINUED | OUTPATIENT
Start: 2018-01-03 | End: 2018-01-04 | Stop reason: HOSPADM

## 2018-01-03 RX ORDER — MIDAZOLAM HYDROCHLORIDE 1 MG/ML
INJECTION INTRAMUSCULAR; INTRAVENOUS CODE/TRAUMA/SEDATION MEDICATION
Status: COMPLETED | OUTPATIENT
Start: 2018-01-03 | End: 2018-01-03

## 2018-01-03 RX ORDER — VALSARTAN 320 MG/1
320 TABLET ORAL DAILY
COMMUNITY
End: 2018-07-20

## 2018-01-03 RX ORDER — FENTANYL CITRATE 50 UG/ML
INJECTION, SOLUTION INTRAMUSCULAR; INTRAVENOUS CODE/TRAUMA/SEDATION MEDICATION
Status: COMPLETED | OUTPATIENT
Start: 2018-01-03 | End: 2018-01-03

## 2018-01-03 RX ORDER — METOPROLOL SUCCINATE 50 MG/1
50 TABLET, EXTENDED RELEASE ORAL DAILY
COMMUNITY
End: 2019-01-23

## 2018-01-03 RX ADMIN — MIDAZOLAM 1 MG: 1 INJECTION INTRAMUSCULAR; INTRAVENOUS at 14:05

## 2018-01-03 RX ADMIN — MIDAZOLAM 1 MG: 1 INJECTION INTRAMUSCULAR; INTRAVENOUS at 14:21

## 2018-01-03 RX ADMIN — FENTANYL CITRATE 50 MCG: 50 INJECTION INTRAMUSCULAR; INTRAVENOUS at 14:05

## 2018-01-03 RX ADMIN — MIDAZOLAM 1 MG: 1 INJECTION INTRAMUSCULAR; INTRAVENOUS at 14:16

## 2018-01-03 RX ADMIN — FENTANYL CITRATE 50 MCG: 50 INJECTION INTRAMUSCULAR; INTRAVENOUS at 14:16

## 2018-01-03 RX ADMIN — FENTANYL CITRATE 50 MCG: 50 INJECTION INTRAMUSCULAR; INTRAVENOUS at 14:21

## 2018-01-03 NOTE — BRIEF OP NOTE (RAD/CATH)
IR IMAGE GUIDED BIOPSY/ASPIRATION LIVER  Procedure Note    PATIENT NAME: Kye Gleason  : 1960  MRN: [id number]     Pre-op Diagnosis:   1  Abnormal liver function tests      Post-op Diagnosis:   1  Abnormal liver function tests        Surgeon:   Nishi Reyes MD  Assistants:     No qualified resident was available  Estimated Blood Loss: None  Findings: Left lobe amenable  Specimens: 2 x 18 G cores of live  1 placed in metal free container and the second placed in formalin  Complications:  None      Anesthesia: Conscious sedation and Abdiel Pierre MD     Date: 1/3/2018  Time: 2:46 PM

## 2018-01-03 NOTE — DISCHARGE INSTRUCTIONS
Percutaneous Liver Biopsy   WHAT YOU NEED TO KNOW:   A PLB is a procedure to remove a sample of tissue from your liver  The sample can be sent to a lab and tested for liver disease, cancer, or infection  After the procedure you may have pain and bruising at the biopsy site  You may also have pain in your right shoulder  These symptoms should get better in 48 to 72 hours  DISCHARGE INSTRUCTIONS:     [address] patients,  Contact Interventional Radiology at [phone] PATIENTS: Contact Interventional Radiology at [phone] [address] PATIENTS: Contact Interventional Radiology at [phone] if:    · Fever greater than 101 or chills  · You have severe pain in your abdomen  · Your abdomen is larger than usual and feels hard  · Your neck is more swollen and you have trouble swallowing  · You feel weak or dizzy  · Your heart is beating faster than usual    · Your pain does not get better after you take pain medicine  · Your wound is red, swollen, or draining pus  · You have nausea or are vomiting  · Your skin is itchy, swollen, or you have a rash  · You have questions or concerns about your condition or care  Medicines:   · Acetaminophen decreases pain and fever  It is available without a doctor's order  Acetaminophen can cause liver damage if not taken correctly  · Take your home medicine as directed  · Resume your normal diet  Small sips of flat soda will help with mild nausea  Self-care:   · Rest as directed  Do not play sports, exercise, or lift anything heavier than 5 pounds for up to 1 week  · Apply firm, steady pressure if bleeding occurs  A small amount of bleeding from your wound is possible  Apply pressure with a clean gauze or towel for 5 to 10 minutes  Call 911 if bleeding becomes heavy or does not stop  · Ask your healthcare provider when to take your blood thinner or antiplatelet medicine   You may need to wait 24 to 72 hours to take your medicine  This will prevent bleeding  Follow up with your healthcare provider as directed: Write down your questions so you remember to ask them during your visits

## 2018-01-03 NOTE — PROGRESS NOTES
59 M, abnormal liver enzymes and iron overload  H&P reviewed, labs checked  Stable for liver biopsy

## 2018-01-09 LAB — SCAN RESULT: NORMAL

## 2018-01-10 NOTE — RESULT NOTES
Verified Results  (1) RHEUMATOID FACTOR SCREEN 28Wdb1696 09:01AM Gloria Dover Order Number: [id number]     Test Name Result Flag Reference   RHEUMATOID FACTOR Negative  Negative

## 2018-01-11 NOTE — RESULT NOTES
Verified Results  ECHO COMPLETE WITH CONTRAST IF INDICATED 49Pnt0400 02:44PM Viridiana Fields     Test Name Result Flag Reference   ECHO COMPLETE WITH CONTRAST IF INDICATED (Report)      [place]   [address]   [phone]     Transthoracic Echocardiogram   2D, M-mode, Doppler, and Color Doppler     Study date: 26-Aug-2016     Patient: Akin Ewing   MR number: [id number]   Account number: [de-identified]   : 1960   Age: 60 years   Gender: Male   Status: Outpatient   Location: Echo lab   Height: 69 in   Weight: 239 6 lb   BP: 146/ 84 mmHg     Diagnoses: R60 9 - Edema, unspecified     Sonographer: Silverio Slade RDCS   Primary Physician: Luis M Diaz   Referring Physician: Adrianne Sheth DO   Group: [facility]   Interpreting Physician: Tyrell Hull MD     SUMMARY     LEFT VENTRICLE:   Size was normal    Systolic function was normal  Ejection fraction was estimated to be 65 %  There were no regional wall motion abnormalities  Wall thickness was increased  There was mild concentric hypertrophy  LEFT ATRIUM:   The atrium was mildly dilated  MITRAL VALVE:   There was mild regurgitation  HISTORY: PRIOR HISTORY: HYPERTENSION, HYPERLIPIDEMIA, POSITIVE SMOKER,   OBSTRUCTIVE SLEEP APNEA     PROCEDURE: The procedure was performed in the echo lab  This was a routine   study  The transthoracic approach was used  The study included complete 2D   imaging, M-mode, complete spectral Doppler, and color Doppler  Images were   obtained from the parasternal, apical, subcostal, and suprasternal notch   acoustic windows  Image quality was adequate  LEFT VENTRICLE: Size was normal  Systolic function was normal  Ejection   fraction was estimated to be 65 %  There were no regional wall motion   abnormalities  Wall thickness was increased  There was mild concentric   hypertrophy       RIGHT VENTRICLE: The size was normal  Systolic function was normal  Wall   thickness was normal      LEFT ATRIUM: The atrium was mildly dilated  RIGHT ATRIUM: Size was normal      MITRAL VALVE: Valve structure was normal  There was normal leaflet separation  DOPPLER: The transmitral velocity was within the normal range  There was no   evidence for stenosis  There was mild regurgitation  AORTIC VALVE: The valve was trileaflet  Leaflets exhibited normal thickness and   normal cuspal separation  DOPPLER: Transaortic velocity was within the normal   range  There was no evidence for stenosis  There was no regurgitation  TRICUSPID VALVE: The valve structure was normal  There was normal leaflet   separation  DOPPLER: The transtricuspid velocity was within the normal range  There was no evidence for stenosis  There was no regurgitation  PULMONIC VALVE: Leaflets exhibited normal thickness, no calcification, and   normal cuspal separation  DOPPLER: The transpulmonic velocity was within the   normal range  There was no regurgitation  PERICARDIUM: There was no pericardial effusion  The pericardium was normal in   appearance  AORTA: The root exhibited normal size       SYSTEM MEASUREMENT TABLES     2D   %FS: 46 87 %   AV Diam: 3 46 cm   EDV(Teich): 95 89 ml   EF(Teich): 78 35 %   ESV(Teich): 20 76 ml   IVSd: 1 38 cm   LA Area: 19 69 cm2   LA Diam: 3 61 cm   LVEDV MOD A4C: 120 72 ml   LVEF MOD A4C: 60 79 %   LVESV MOD A4C: 47 33 ml   LVIDd: 4 57 cm   LVIDs: 2 43 cm   LVLd A4C: 8 87 cm   LVLs A4C: 7 47 cm   LVPWd: 1 44 cm   RA Area: 21 11 cm2   RV DIAM: 3 32 cm   SV MOD A4C: 73 39 ml   SV(Teich): 75 13 ml     CW   AV Vmax: 1 27 m/s   AV maxP 43 mmHg   PV Vmax: 1 15 m/s   PV maxP 29 mmHg     MM   TAPSE: 2 89 cm     PW   E': 0 11 m/s   E/E': 7 16   LVOT Vmax: 1 08 m/s   LVOT maxP 66 mmHg   MV A Darren: 0 67 m/s   MV Dec Dickens: 3 31 m/s2   MV DecT: 244 19 ms   MV E Darren: 0 81 m/s   MV E/A Ratio: 1 2   RVOT Vmax: 0 85 m/s   RVOT maxP 9 mmHg     [facility] Accredited Echocardiography Laboratory     Prepared and electronically signed by     Erin Esteves MD   Signed [id number] 10:29:47

## 2018-01-11 NOTE — RESULT NOTES
Verified Results  (1) COMPREHENSIVE METABOLIC PANEL 97KET6573 19:75BE Heidy Young Order Number: [id number]     Test Name Result Flag Reference   GLUCOSE,RANDM 101 mg/dL     If the patient is fasting, the ADA then defines impaired fasting glucose as > 100 mg/dL and diabetes as > or equal to 123 mg/dL  SODIUM 140 mmol/L  136-145   POTASSIUM 4 8 mmol/L  3 5-5 3   CHLORIDE 103 mmol/L  100-108   CARBON DIOXIDE 29 mmol/L  21-32   ANION GAP (CALC) 8 mmol/L  4-13   BLOOD UREA NITROGEN 8 mg/dL  5-25   CREATININE 0 67 mg/dL  0 60-1 30   Standardized to IDMS reference method   CALCIUM 8 8 mg/dL  8 3-10 1   BILI, TOTAL 0 48 mg/dL  0 20-1 00   ALK PHOSPHATAS 67 U/L     ALT (SGPT) 79 U/L H 12-78   AST(SGOT) 75 U/L H 5-45   ALBUMIN 3 8 g/dL  3 5-5 0   TOTAL PROTEIN 7 0 g/dL  6 4-8 2   eGFR Non-African American      >60 0 ml/min/1 73sq [facility] Disease Education Program recommendations are as follows:  GFR calculation is accurate only with a steady state creatinine  Chronic Kidney disease less than 60 ml/min/1 73 sq  meters  Kidney failure less than 15 ml/min/1 73 sq  meters

## 2018-01-12 ENCOUNTER — GENERIC CONVERSION - ENCOUNTER (OUTPATIENT)
Dept: OTHER | Facility: OTHER | Age: 58
End: 2018-01-12

## 2018-01-12 NOTE — RESULT NOTES
Verified Results  (1) COMPREHENSIVE METABOLIC PANEL [id number] 08:86VM Montana Beck Order Number: [id number]     Test Name Result Flag Reference   SODIUM 129 mmol/L L 136-145   POTASSIUM 4 2 mmol/L  3 5-5 3   CHLORIDE 87 mmol/L L 100-108   CARBON DIOXIDE 34 mmol/L H 21-32   ANION GAP (CALC) 8 mmol/L  4-13   BLOOD UREA NITROGEN 13 mg/dL  5-25   CREATININE 0 85 mg/dL  0 60-1 30   Standardized to IDMS reference method   CALCIUM 9 4 mg/dL  8 3-10 1   BILI, TOTAL 0 60 mg/dL  0 20-1 00   ALK PHOSPHATAS 79 U/L     ALT (SGPT) 85 U/L H 12-78   AST(SGOT) 75 U/L H 5-45   ALBUMIN 4 1 g/dL  3 5-5 0   TOTAL PROTEIN 7 4 g/dL  6 4-8 2   eGFR Non-African American      >60 0 ml/min/1 73sq [facility] Disease Education Program recommendations are as follows:  GFR calculation is accurate only with a steady state creatinine  Chronic Kidney disease less than 60 ml/min/1 73 sq  meters  Kidney failure less than 15 ml/min/1 73 sq  meters     GLUCOSE FASTING 105 mg/dL H 65-99     (1) HGB AND HCT, BLOOD 22Mar2017 08:36AM Montana Beck Order Number: [id number]     Test Name Result Flag Reference   HEMATOCRIT 47 4 %  36 5-49 3   HEMOGLOBIN 17 4 g/dL H 12 0-17 0     (1) HEP C ANTIBODY 22Mar2017 08:36AM Homeroroseanna Ebony Order Number: [id number]     Test Name Result Flag Reference   HEPATITIS C ANTIBODY Non-reactive  Non-reactive

## 2018-01-12 NOTE — RESULT NOTES
Verified Results  (1) FOLATE [id number] 09:01AM Glendy Velasquez Order Number: [id number]     Test Name Result Flag Reference   FOLATE >20 0 ng/mL H 3 1-17 5     (1) FERRITIN [id number] 09:01AM Glendy Velasquez Order Number: [id number]     Test Name Result Flag Reference   FERRITIN 714 ng/mL H 8-388     (1) IRON 24QBE8034 09:01AM Glendy Velasquez Order Number: [id number]     Test Name Result Flag Reference   IRON 197 ug/dL H    Patients treated with metal-binding drugs (ie  Deferoxamine) may have depressed iron values       (1) VITAMIN B12 05EYW9743 09:01AM Glendy Velasquez Order Number: [id number]     Test Name Result Flag Reference   VITAMIN B12 565 pg/mL  100-900     (1) TIBC [id number] 09:01AM Glendy Velasquez Order Number: [id number]     Test Name Result Flag Reference   TOTAL IRON BINDING CAPACITY 353 ug/dL  250-450     (1) CHRONIC HEPATITIS PANEL [id number] 09:01AM Glendy Velasquez Order Number: [id number]     Test Name Result Flag Reference   HEPATITIS B SURFACE ANTIGEN Non-reactive  Non-reactive, NonReactive - Confirmed   HEPATITIS C ANTIBODY Non-reactive  Non-reactive   HEPATITIS B CORE IGM ANTIBODY Non-reactive  Non-reactive   HEPATITIS B CORE TOTAL ANTIBODY Non-reactive  Non-reactive

## 2018-01-12 NOTE — RESULT NOTES
Verified Results  (1) CBC/PLT/DIFF 19Oct2016 07:32AM Margie Del Real Order Number: [id number]  TW Order Number: [id number]     Test Name Result Flag Reference   WBC COUNT 4 63 Thousand/uL  4 31-10 16   RBC COUNT 4 73 Million/uL  3 88-5 62   HEMOGLOBIN 16 4 g/dL  12 0-17 0   HEMATOCRIT 46 5 %  36 5-49 3   MCV 98 fL  82-98   MCH 34 7 pg H 26 8-34 3   MCHC 35 3 g/dL  31 4-37 4   RDW 12 5 %  11 6-15 1   MPV 11 1 fL  8 9-12 7   PLATELET COUNT 199 Thousands/uL  149-390   nRBC AUTOMATED 0 /100 WBCs     NEUTROPHILS RELATIVE PERCENT 48 %  43-75   LYMPHOCYTES RELATIVE PERCENT 29 %  14-44   MONOCYTES RELATIVE PERCENT 16 % H 4-12   EOSINOPHILS RELATIVE PERCENT 6 %  0-6   BASOPHILS RELATIVE PERCENT 1 %  0-1   NEUTROPHILS ABSOLUTE COUNT 2 27 Thousands/?L  1 85-7 62   LYMPHOCYTES ABSOLUTE COUNT 1 32 Thousands/?L  0 60-4 47   MONOCYTES ABSOLUTE COUNT 0 72 Thousand/?L  0 17-1 22   EOSINOPHILS ABSOLUTE COUNT 0 28 Thousand/?L  0 00-0 61   BASOPHILS ABSOLUTE COUNT 0 03 Thousands/?L  0 00-0 10   - Patient Instructions: This bloodwork is non-fasting  Please drink two glasses of water morning of bloodwork  - Patient Instructions: This bloodwork is non-fasting  Please drink two glasses of water morning of bloodwork  - Patient Instructions: This bloodwork is non-fasting  Please drink two glasses of water morning of bloodwork  - Patient Instructions: This bloodwork is non-fasting  Please drink two glasses of water morning of bloodwork  (1) COMPREHENSIVE METABOLIC PANEL [id number] 84:18YS Margie Del Real Order Number: [id number]  TW Order Number: [id number]     Test Name Result Flag Reference   GLUCOSE,RANDM 106 mg/dL     If the patient is fasting, the ADA then defines impaired fasting glucose as > 100 mg/dL and diabetes as > or equal to 123 mg/dL     SODIUM 135 mmol/L L 136-145   POTASSIUM 4 4 mmol/L  3 5-5 3   CHLORIDE 102 mmol/L  100-108   CARBON DIOXIDE 27 mmol/L  21-32   ANION GAP (CALC) 6 mmol/L  4-13   BLOOD UREA NITROGEN 12 mg/dL  5-25   CREATININE 0 94 mg/dL  0 60-1 30   Standardized to IDMS reference method   CALCIUM 8 4 mg/dL  8 3-10 1   BILI, TOTAL 0 59 mg/dL  0 20-1 00   ALK PHOSPHATAS 62 U/L     ALT (SGPT) 39 U/L  12-78   AST(SGOT) 32 U/L  5-45   ALBUMIN 3 8 g/dL  3 5-5 0   TOTAL PROTEIN 7 1 g/dL  6 4-8 2   eGFR Non-African American      >60 0 ml/min/1 73sq m   - Patient Instructions: This bloodwork is non-fasting  Please drink two glasses of water morning of bloodwork  - Patient Instructions: This bloodwork is non-fasting  Please drink two glasses of water morning of bloodwork  National Kidney Disease Education Program recommendations are as follows:  GFR calculation is accurate only with a steady state creatinine  Chronic Kidney disease less than 60 ml/min/1 73 sq  meters  Kidney failure less than 15 ml/min/1 73 sq  meters  (1) NT- BNP [facility], [facility]) [id number] 07:32AM [facility] Order Number: [id number]   Order Number: [id number]     Test Name Result Flag Reference   NT-PRO BNP 16 pg/mL  <125   - Patient Instructions: This bloodwork is non-fasting  Please drink two glasses of water morning of bloodwork  - Patient Instructions: This bloodwork is non-fasting  Please drink two glasses of water morning of bloodwork  (1) TSH [id number] 07:32AM [facility] Order Number: [id number]   Order Number: [id number]     Test Name Result Flag Reference   TSH 1 490 uIU/mL  0 358-3 740   - Patient Instructions: This bloodwork is non-fasting  Please drink two glasses of water morning of bloodwork  - Patient Instructions: This bloodwork is non-fasting  Please drink two glasses of water morning of bloodwork  - Patient Instructions: This bloodwork is non-fasting  Please drink two glasses of water morning of bloodwork  - Patient Instructions: This bloodwork is non-fasting  Please drink two glasses of water morning of bloodwork  Patients undergoing fluorescein dye angiography may retain small amounts of fluorescein in the body for 48-72 hours post procedure  Samples containing fluorescein can produce falsely depressed TSH values  If the patient had this procedure,a specimen should be resubmitted post fluorescein clearance  (1) URINALYSIS w URINE C/S REFLEX (will reflex a microscopy if leukocytes, occult blood, or nitrites are not within normal limits) 19Oct2016 07:32AM Rock Apa Order Number: [id number]   Order Number: [id number]     Test Name Result Flag Reference   COLOR Yellow     CLARITY Clear     PH UA 6 0  4 5-8 0   LEUKOCYTE ESTERASE UA Negative  Negative   NITRITE UA Negative  Negative   PROTEIN UA Negative mg/dl  Negative   GLUCOSE UA Negative mg/dl  Negative   KETONES UA Negative mg/dl  Negative   UROBILINOGEN UA 0 2 E U /dl  0 2, 1 0 E U /dl   BILIRUBIN UA Negative  Negative   BLOOD UA Negative  Negative   SPECIFIC GRAVITY UA 1 012  1 003-1 030     (1) D-DIMER 19Oct2016 07:32AM Grace Marin    Order Number: [id number]   Order Number: [id number]     Test Name Result Flag Reference   D-DIMER 270 ng/ml (FEU)  0-424   Reference and upper limits to exclude DVT and PE are the same  Do not use to exclude if clinical symptoms are present

## 2018-01-12 NOTE — RESULT NOTES
Verified Results  (1) URIC ACID [id number] 09:17AM [facility] Order Number: [id number]     Test Name Result Flag Reference   URIC ACID 5 0 mg/dL  4 2-8 0   Specimen collection should occur prior to Metamizole administration due to the potential for falsely depressed results  (1) BASIC METABOLIC PROFILE [id number] 09:17AM [facility] Order Number: [id number]     Test Name Result Flag Reference   GLUCOSE,RANDM 111 mg/dL     If the patient is fasting, the ADA then defines impaired fasting glucose as > 100 mg/dL and diabetes as > or equal to 123 mg/dL  SODIUM 138 mmol/L  136-145   POTASSIUM 5 3 mmol/L  3 5-5 3   CHLORIDE 101 mmol/L  100-108   CARBON DIOXIDE 30 mmol/L  21-32   ANION GAP (CALC) 7 mmol/L  4-13   BLOOD UREA NITROGEN 9 mg/dL  5-25   CREATININE 0 71 mg/dL  0 60-1 30   Standardized to IDMS reference method   CALCIUM 9 2 mg/dL  8 3-10 1   eGFR Non-African American      >60 0 ml/min/1 73sq [facility] Disease Education Program recommendations are as follows:  GFR calculation is accurate only with a steady state creatinine  Chronic Kidney disease less than 60 ml/min/1 73 sq  meters  Kidney failure less than 15 ml/min/1 73 sq  meters  (1) [id number] [facility]: [id number]     Test Name Result Flag Reference   D-DIMER 482 ng/ml (FEU) H 0-424   Reference and upper limits to exclude DVT and PE are the same  Do not use to exclude if clinical symptoms are present

## 2018-01-13 NOTE — RESULT NOTES
Verified Results  (1) CERULOPLASMIN [id number] 09:01AM Gloria Dover Order Number: [id number]     Test Name Result Flag Reference   CERULOPLASMIN 21 7 mg/dL  16 0 - 31 0   Performed at:  [address]  [id number]  : Radha Juarez MD, Phone:  [phone]     (1) HEP A AB, IGM [id number] 09:01AM Newton Berg     Test Name Result Flag Reference   HEPATITIS A IGM ANTIBODY Non-reactive  Non-reactive, Equivocal-Suggest Recollect

## 2018-01-14 VITALS
TEMPERATURE: 97.4 F | DIASTOLIC BLOOD PRESSURE: 82 MMHG | SYSTOLIC BLOOD PRESSURE: 132 MMHG | RESPIRATION RATE: 16 BRPM | WEIGHT: 249 LBS | HEART RATE: 80 BPM | HEIGHT: 70 IN | BODY MASS INDEX: 35.65 KG/M2

## 2018-01-14 VITALS
WEIGHT: 250 LBS | DIASTOLIC BLOOD PRESSURE: 100 MMHG | HEIGHT: 70 IN | HEART RATE: 84 BPM | SYSTOLIC BLOOD PRESSURE: 160 MMHG | RESPIRATION RATE: 16 BRPM | TEMPERATURE: 97.7 F | BODY MASS INDEX: 35.79 KG/M2

## 2018-01-14 VITALS
TEMPERATURE: 98.2 F | RESPIRATION RATE: 16 BRPM | BODY MASS INDEX: 35.22 KG/M2 | HEIGHT: 70 IN | HEART RATE: 84 BPM | DIASTOLIC BLOOD PRESSURE: 86 MMHG | WEIGHT: 246 LBS | SYSTOLIC BLOOD PRESSURE: 162 MMHG

## 2018-01-15 ENCOUNTER — GENERIC CONVERSION - ENCOUNTER (OUTPATIENT)
Dept: OTHER | Facility: OTHER | Age: 58
End: 2018-01-15

## 2018-01-16 NOTE — RESULT NOTES
Verified Results  * XR KNEE 3 VW RIGHT NON INJURY [id number] 08:19AM Curtis Johnson Order Number: [id number]     Test Name Result Flag Reference   XR KNEE 3 VW RIGHT (Report)     RIGHT KNEE     INDICATION: Patient reports medial knee pain and clicking  COMPARISON: None     VIEWS: 3     IMAGES: 3     FINDINGS:     There is no acute fracture or dislocation  There is no joint effusion  Mild patellofemoral degenerative changes  No lytic or blastic lesions are seen  Chronic thickening of the distal patellar tendon with osseous hypertrophy of the tibial tuberosity  IMPRESSION:     Mild patellofemoral degenerative changes         Workstation performed: [id number]     Signed by:   Vanessa Girard MD   11/15/17

## 2018-01-16 NOTE — RESULT NOTES
Verified Results  (1) COMPREHENSIVE METABOLIC PANEL [id number] 20:39RK Tomas Tanner Order Number: [id number]      National Kidney Disease Education Program recommendations are as follows:  GFR calculation is accurate only with a steady state creatinine  Chronic Kidney disease less than 60 ml/min/1 73 sq  meters  Kidney failure less than 15 ml/min/1 73 sq  meters  Test Name Result Flag Reference   GLUCOSE,RANDM 118 mg/dL     If the patient is fasting, the ADA then defines impaired fasting glucose as > 100 mg/dL and diabetes as > or equal to 123 mg/dL  SODIUM 139 mmol/L  136-145   POTASSIUM 4 7 mmol/L  3 5-5 3   CHLORIDE 101 mmol/L  100-108   CARBON DIOXIDE 25 mmol/L  21-32   ANION GAP (CALC) 13 mmol/L  4-13   BLOOD UREA NITROGEN 12 mg/dL  5-25   CREATININE 0 63 mg/dL  0 60-1 30   Standardized to IDMS reference method   CALCIUM 9 2 mg/dL  8 3-10 1   BILI, TOTAL 0 40 mg/dL  0 20-1 00   ALK PHOSPHATAS 55 U/L     ALT (SGPT) 99 U/L H 12-78   AST(SGOT) 66 U/L H 5-45   ALBUMIN 4 5 g/dL  3 5-5 0   TOTAL PROTEIN 7 4 g/dL  6 4-8 2   eGFR Non-African American      >60 0 ml/min/1 73sq m     (1) LIPID PANEL FASTING W DIRECT LDL REFLEX [id number] 09:19AM Tomas Hart Order Number: [id number]      Triglyceride:         Normal              <150 mg/dl       Borderline High    150-199 mg/dl       High               200-499 mg/dl       Very High          >499 mg/dl  Cholesterol:         Desirable        <200 mg/dl      Borderline High  200-239 mg/dl      High             >239 mg/dl  HDL Cholesterol:        High    >59 mg/dL      Low     <41 mg/dL  LDL Cholesterol:        Optimal          <100 mg/dl         Near Optimal     100-129 mg/dl        Above Optimal          Borderline High   130-159 mg/dl          High              160-189 mg/dl          Very High        >189 mg/dl  LDL CALCULATED:    This screening LDL is a calculated result    It does not have the accuracy of the Direct Measured LDL in the monitoring of patients with hyperlipidemia and/or statin therapy  Direct Measure LDL (UJI013) must be ordered separately in these patients  Test Name Result Flag Reference   CHOLESTEROL 205 mg/dL H    LDL CHOLESTEROL CALCULATED 120 mg/dL H 0-100   TRIGLYCERIDES 60 mg/dL  <=150   HDL,DIRECT 73 mg/dL H 40-60     (1) TSH [id number] 09:19AM Alondra Silva Order Number: [id number]    Patients undergoing fluorescein dye angiography may retain small amounts of fluorescein in the body for 48-72 hours post procedure  Samples containing fluorescein can produce falsely depressed TSH values  If the patient had this procedure,a specimen should be resubmitted post fluorescein clearance       Test Name Result Flag Reference   TSH 0 999 uIU/mL  0 358-3 740     (1) URIC ACID [id number] 09:19AM Alondra Silva Order Number: [id number]     Test Name Result Flag Reference   URIC ACID 4 9 mg/dL  4 2-8 0     (1) CBC/PLT/DIFF [id number] 09:19AM Alondra Silva Order Number: [id number]     Order Number: [id number]     Test Name Result Flag Reference   WBC COUNT 3 44 Thousand/uL L 4 31-10 16   RBC COUNT 4 66 Million/uL  3 88-5 62   HEMOGLOBIN 16 0 g/dL  12 0-17 0   HEMATOCRIT 45 8 %  36 5-49 3   MCV 98 fL  82-98   MCH 34 3 pg  26 8-34 3   MCHC 34 9 g/dL  31 4-37 4   RDW 13 2 %  11 6-15 1   MPV 10 6 fL  8 9-12 7   PLATELET COUNT 108 Thousands/uL  149-390   NEUTROPHILS RELATIVE PERCENT 63 %  43-75   LYMPHOCYTES RELATIVE PERCENT 18 %  14-44   MONOCYTES RELATIVE PERCENT 15 % H 4-12   EOSINOPHILS RELATIVE PERCENT 3 %  0-6   BASOPHILS RELATIVE PERCENT 1 %  0-1   NEUTROPHILS ABSOLUTE COUNT 2 16 Thousands/µL  1 85-7 62   LYMPHOCYTES ABSOLUTE COUNT 0 63 Thousands/µL  0 60-4 47   MONOCYTES ABSOLUTE COUNT 0 51 Thousand/µL  0 17-1 22   EOSINOPHILS ABSOLUTE COUNT 0 11 Thousand/µL  0 00-0 61   BASOPHILS ABSOLUTE COUNT 0 03 Thousands/µL  0 00-0 10     (1) MICROALBUMIN CREATININE RATIO, RANDOM URINE [id number] 09:19AM Abrahan Mac Bachelor Order Number: [id number]     Test Name Result Flag Reference   MICROALBUMIN/ CREAT R 67 mg/g creatinine H 0-30   MICROALBUMIN,URINE 67 2 mg/L H 0 0-20 0   CREATININE URINE 99 7 mg/dL       (1) PSA (SCREEN) (Dx V76 44 Screen for Prostate Cancer) [id number] 09:19AM Renettamaggie Farley Order Number: [id number]     Order Number: [id number]     Test Name Result Flag Reference   PROSTATE SPECIFIC ANTIGEN 1 1 ng/mL  0 0-4 0       Plan  Health Maintenance    · (1) PSA (SCREEN) (Dx V76 44 Screen for Prostate Cancer) ; every 1 year;  Last  [id number]; Status:Active

## 2018-01-17 NOTE — RESULT NOTES
Verified Results  (1) HEPATIC FUNCTION PANEL [id number] 07:06AM Art Hilary Order Number: [id number]   Order Number: [id number]     Test Name Result Flag Reference   ALBUMIN 3 9 g/dL  3 5-5 0   ALK PHOSPHATAS 64 U/L     ALT (SGPT) 43 U/L  12-78   AST(SGOT) 36 U/L  5-45   BILI, DIRECT 0 13 mg/dL  0 00-0 20   BILI, TOTAL 0 50 mg/dL  0 20-1 00   TOTAL PROTEIN 6 9 g/dL  6 4-8 2

## 2018-01-17 NOTE — RESULT NOTES
Verified Results  (1) COMPREHENSIVE METABOLIC PANEL [id number] 18:78ZM Heidy Urbanean Order Number: [id number]     Test Name Result Flag Reference   GLUCOSE,RANDM 119 mg/dL     If the patient is fasting, the ADA then defines impaired fasting glucose as > 100 mg/dL and diabetes as > or equal to 123 mg/dL  SODIUM 130 mmol/L L 136-145   POTASSIUM 3 9 mmol/L  3 5-5 3   CHLORIDE 92 mmol/L L 100-108   CARBON DIOXIDE 30 mmol/L  21-32   ANION GAP (CALC) 8 mmol/L  4-13   BLOOD UREA NITROGEN 8 mg/dL  5-25   CREATININE 0 70 mg/dL  0 60-1 30   Standardized to IDMS reference method   CALCIUM 8 9 mg/dL  8 3-10 1   BILI, TOTAL 0 95 mg/dL  0 20-1 00   ALK PHOSPHATAS 81 U/L     ALT (SGPT) 117 U/L H 12-78   AST(SGOT) 96 U/L H 5-45   ALBUMIN 4 3 g/dL  3 5-5 0   TOTAL PROTEIN 7 6 g/dL  6 4-8 2   eGFR Non-African American      >60 0 ml/min/1 73sq m   - Patient Instructions: This is a fasting blood test  Water,black tea or black  coffee only after 9:00pm the night before test Drink 2 glasses of water the morning of test   National Kidney Disease Education Program recommendations are as follows:  GFR calculation is accurate only with a steady state creatinine  Chronic Kidney disease less than 60 ml/min/1 73 sq  meters  Kidney failure less than 15 ml/min/1 73 sq  meters  (1) MICROALBUMIN CREATININE RATIO, RANDOM URINE [id number] 08:06AM Heidy Mclean Order Number: [id number]     Test Name Result Flag Reference   MICROALBUMIN/ CREAT R 47 mg/g creatinine H 0-30   MICROALBUMIN,URINE 10 0 mg/L  0 0-20 0   CREATININE URINE 21 5 mg/dL       (1) LDL,DIRECT [id number] 08:06AM Heidy Mclean Order Number: [id number]     Test Name Result Flag Reference   LDL, DIRECT 117 mg/dl H 0-100   - Patient Instructions:  This is a fasting blood test  Water,black tea or black  coffee only after 9:00pm the night before test   Drink 2 glasses of water the morning of test     - Patient Instructions: This is a fasting blood test  Water,black tea or black  coffee only after 9:00pm the night before test Drink 2 glasses of water the morning of test   LDL Cholesterol:        Optimal          <100 mg/dl        Near Optimal     100-129 mg/dl        Above Optimal          Borderline High   130-159 mg/dl          High              160-189 mg/dl          Very High        >189 mg/dl     (1) TRIGLYCERIDE [id number] 08:06AM [facility] Order Number: [id number]     Test Name Result Flag Reference   TRIGLYCERIDES 149 mg/dL  <=150   Specimen collection should occur prior to N-Acetylcysteine or Metamizole administration due to the potential for falsely depressed results  - Patient Instructions: This is a fasting blood test  Water,black tea or black  coffee only after 9:00pm the night before test Drink 2 glasses of water the morning of test   Triglyceride:         Normal              <150 mg/dl       Borderline High    150-199 mg/dl       High               200-499 mg/dl       Very High          >499 mg/dl     (1) PSA (SCREEN) (Dx V76 44 Screen for Prostate Cancer) [id number] 08:06AM [facility] Order Number: [id number]     Test Name Result Flag Reference   PROSTATE SPECIFIC ANTIGEN 0 9 ng/mL  0 0-4 0   American Urological Association Guidelines define biochemical recurrence of prostate cancer as a detectable or rising PSA value post-radical prostatectomy that is greater than or equal to 0 2 ng/mL with a second confirmatory level of greater than or equal to 0 2 ng/mL  - Patient Instructions: This test is non-fasting  Please drink two glasses of water morning of bloodwork  - Patient Instructions: This test is non-fasting  Please drink two glasses of water morning of bloodwork  Plan  Health Maintenance    · (1) PSA (SCREEN) (Dx V76 44 Screen for Prostate Cancer) ; every 1 year;  Last  [id number]; Status:Active

## 2018-01-17 NOTE — RESULT NOTES
Verified Results  VAS LOWER LIMB VENOUS DUPLEX STUDY, UNILATERAL/LIMITED [id number] 03:42PM Donya Carpio Order Number: [id number]    - Patient Instructions: To schedule this appointment, please contact Central Scheduling at [phone]   Order Number: [id number]    - Patient Instructions: To schedule this appointment, please contact Central Scheduling at [phone]   Order Number: [id number]    - Patient Instructions: To schedule this appointment, please contact Central Scheduling at [phone]  Test Name Result Flag Reference   VAS LOWER LIMB VENOUS DUPLEX STUDY, UNILATERAL/LIMITED (Report)     [facility] REPORT   CLINICAL:   Indications: Left Localized edema [R60 0]  Patient has had unexplained swelling of his left ankle for about 1 week  Risk Factors: The patient has history of smoking (current) 1 ppd  He has no   history of DVT, limb trauma or malignancy  Clinical:   Left Lower Limb   There is edema  There is no complaint of pain  FINDINGS:      Segment Right      Left          Impression    Impression       CFV   Normal (Patent) Normal (Patent)             CONCLUSION:      Impression:   RIGHT LOWER LIMB LIMITED: NORMAL   Evaluation shows no evidence of thrombus in the common femoral vein  Doppler evaluation shows a normal response to augmentation maneuvers  LEFT LOWER LIMB: NORMAL   No evidence of acute or chronic deep vein thrombosis   No evidence of superficial thrombophlebitis noted  Doppler evaluation shows a normal response to augmentation maneuvers  Popliteal, posterior tibial and anterior tibial arterial Doppler waveforms are   triphasic        SIGNATURE:   Electronically Signed by: Audie Torres on 2016-08-23 05:36:26 PM

## 2018-01-18 NOTE — RESULT NOTES
Verified Results  (1) CBC/PLT/DIFF [id number] 08:20AM Sandee Butler Order Number: [id number]     Test Name Result Flag Reference   WBC COUNT 5 80 Thousand/uL  4 31-10 16   RBC COUNT 4 59 Million/uL  3 88-5 62   HEMOGLOBIN 16 2 g/dL  12 0-17 0   HEMATOCRIT 46 3 %  36 5-49 3    fL H 82-98   MCH 35 3 pg H 26 8-34 3   MCHC 35 0 g/dL  31 4-37 4   RDW 12 8 %  11 6-15 1   MPV 10 4 fL  8 9-12 7   PLATELET COUNT 287 Thousands/uL  149-390   nRBC AUTOMATED 0 /100 WBCs     NEUTROPHILS RELATIVE PERCENT 66 %  43-75   LYMPHOCYTES RELATIVE PERCENT 18 %  14-44   MONOCYTES RELATIVE PERCENT 12 %  4-12   EOSINOPHILS RELATIVE PERCENT 4 %  0-6   BASOPHILS RELATIVE PERCENT 0 %  0-1   NEUTROPHILS ABSOLUTE COUNT 3 75 Thousands/? ??L  1 85-7 62   LYMPHOCYTES ABSOLUTE COUNT 1 04 Thousands/? ??L  0 60-4 47   MONOCYTES ABSOLUTE COUNT 0 71 Thousand/? ??L  0 17-1 22   EOSINOPHILS ABSOLUTE COUNT 0 24 Thousand/? ??L  0 00-0 61   BASOPHILS ABSOLUTE COUNT 0 02 Thousands/? ??L  0 00-0 10     (1) COMPREHENSIVE METABOLIC PANEL [id number] 81:88UZ Sandee Butler Order Number: [id number]     Test Name Result Flag Reference   GLUCOSE,RANDM 127 mg/dL     If the patient is fasting, the ADA then defines impaired fasting glucose as > 100 mg/dL and diabetes as > or equal to 123 mg/dL  Specimen collection should occur prior to Sulfasalazine administration due to the potential for falsely depressed results  Specimen collection should occur prior to Sulfapyridine administration due to the potential for falsely elevated results     SODIUM 136 mmol/L  136-145   POTASSIUM 4 6 mmol/L  3 5-5 3   CHLORIDE 102 mmol/L  100-108   CARBON DIOXIDE 28 mmol/L  21-32   ANION GAP (CALC) 6 mmol/L  4-13   BLOOD UREA NITROGEN 8 mg/dL  5-25   CREATININE 0 76 mg/dL  0 60-1 30   Standardized to IDMS reference method   CALCIUM 9 2 mg/dL  8 3-10 1   BILI, TOTAL 0 66 mg/dL  0 20-1 00   ALK PHOSPHATAS 85 U/L     ALT (SGPT) 94 U/L H 12-78 Specimen collection should occur prior to Sulfasalazine and/or Sulfapyridine administration due to the potential for falsely depressed results  AST(SGOT) 108 U/L H 5-45   Specimen collection should occur prior to Sulfasalazine administration due to the potential for falsely depressed results  ALBUMIN 3 8 g/dL  3 5-5 0   TOTAL PROTEIN 7 3 g/dL  6 4-8 2   eGFR 100 ml/min/1 73sq m     National Kidney Disease Education Program recommendations are as follows:  GFR calculation is accurate only with a steady state creatinine  Chronic Kidney disease less than 60 ml/min/1 73 sq  meters  Kidney failure less than 15 ml/min/1 73 sq  meters  (1) TSH [id number] 08:20AM March Fast Order Number: [id number]     Test Name Result Flag Reference   TSH 2 110 uIU/mL  0 358-3 740   Patients undergoing fluorescein dye angiography may retain small amounts of fluorescein in the body for 48-72 hours post procedure  Samples containing fluorescein can produce falsely depressed TSH values  If the patient had this procedure,a specimen should be resubmitted post fluorescein clearance

## 2018-01-19 ENCOUNTER — GENERIC CONVERSION - ENCOUNTER (OUTPATIENT)
Dept: OTHER | Facility: OTHER | Age: 58
End: 2018-01-19

## 2018-01-22 VITALS
BODY MASS INDEX: 37.51 KG/M2 | WEIGHT: 262 LBS | OXYGEN SATURATION: 99 % | HEART RATE: 96 BPM | HEIGHT: 70 IN | SYSTOLIC BLOOD PRESSURE: 152 MMHG | TEMPERATURE: 99 F | RESPIRATION RATE: 16 BRPM | DIASTOLIC BLOOD PRESSURE: 86 MMHG

## 2018-01-22 VITALS
TEMPERATURE: 98.1 F | SYSTOLIC BLOOD PRESSURE: 138 MMHG | RESPIRATION RATE: 18 BRPM | WEIGHT: 260 LBS | DIASTOLIC BLOOD PRESSURE: 80 MMHG | BODY MASS INDEX: 37.22 KG/M2 | HEART RATE: 86 BPM | HEIGHT: 70 IN

## 2018-01-22 VITALS
HEIGHT: 70 IN | DIASTOLIC BLOOD PRESSURE: 80 MMHG | OXYGEN SATURATION: 97 % | WEIGHT: 260.25 LBS | HEART RATE: 87 BPM | BODY MASS INDEX: 37.26 KG/M2 | TEMPERATURE: 97.4 F | SYSTOLIC BLOOD PRESSURE: 146 MMHG

## 2018-01-23 NOTE — RESULT NOTES
Verified Results  (1) RACHEL SCREEN W/REFLEX TO TITER/PATTERN [id number] 09:01AM Tamie Belts Order Number: [id number]     Test Name Result Flag Reference   RACHEL SCREEN   Negative  Negative

## 2018-01-23 NOTE — RESULT NOTES
Verified Results  * MRI KNEE RIGHT  WO CONTRAST [id number] 07:33AM [facility] Order Number: [id number]    - Patient Instructions: To schedule this appointment, please contact Central Scheduling at [phone]  Test Name Result Flag Reference   MRI KNEE RIGHT  WO CONTRAST (Report)     MRI RIGHT KNEE     INDICATION: Status post injury with medial right knee pain for 2 months  COMPARISON: None  TECHNIQUE:  MR sequences were obtained of the right knee including: Localizer, axial T2 fat sat, coronal T1/T2 fat sat, sagittal PD/T2 fat sat  Images were acquired on a 1 5 Marilee unit  Gadolinium was not used  FINDINGS:     SUBCUTANEOUS TISSUES: Normal     JOINT EFFUSION: There is a trace joint effusion  There is a small medial patellar plica  BAKER'S CYST: None  MENISCI: There is no evidence of meniscal tear  Patient has a medial oblique meniscomeniscal ligament, which connects the anterior horn of the medial meniscus to the posterior horn of the lateral meniscus  This is a normal variant  (Series 6 images 6    through 13 )     CRUCIATE LIGAMENTS: Intact  EXTENSOR APPARATUS: Intact  COLLATERAL LIGAMENTS: Intact  ARTICULAR SURFACES: There is mild patellofemoral compartment osteoarthritis, with full-thickness cartilage loss over the superior aspect of the medial patellar facet (series 2 image 5 )     BONE MARROW: Normal      MUSCULATURE: Intact  IMPRESSION:     Mild patellofemoral compartment osteoarthritis  There is no evidence of meniscal tear         Workstation performed: [id number]     Signed by:   Estella Cm MD   12/18/17

## 2018-01-23 NOTE — MISCELLANEOUS
Message  GI Reminder Recall Donal Lovell:   Date: 12/27/2017   Dear Debby Ly:     Review of our records shows you are due for the following: Our office has tried to contact you  Please call us at [phone] for your lab results  Please call the following office to schedule your appointment:     We look forward to hearing from you!      Sincerely,     [facility]      Signatures   Electronically signed by : Olivier Mosley, ; Dec 27 2017 12:16PM EST                       (Author)

## 2018-01-23 NOTE — MISCELLANEOUS
Message  GI Reminder Recall [facility]:   Date: 01/19/2018   Dear Jackie Cool:     Review of our records shows you are due for the following: Follow Up Visit  Please call the following office to schedule your appointment:   [address], [address], [place], [place], [address] [facility] [phone]  We look forward to hearing from you!      Sincerely,     [facility]      Signatures   Electronically signed by : Nathan Gallo, ; Jan 19 2018  9:13AM EST                       (Author)

## 2018-01-23 NOTE — RESULT NOTES
Discussion/Summary   labs are normal     Verified Results  (1) ALPHA 1 ANTITRYPSIN [id number] 08:23AM Fan Mccabea Order Number: [id number]     Test Name Result Flag Reference   ALPHA 1 ANTITRY 161 mg/dL  90 - 200   Performed at:  [facility] [place] [address] [place] [address]  [id number]  : Deniz Velázquez MD, Phone:  [phone]

## 2018-01-23 NOTE — RESULT NOTES
Verified Results  US LIVER 47Kxs2736 07:33AM Robe Jalloh Order Number: [id number]    - Patient Instructions: To schedule this appointment, please contact Central Scheduling at [phone]  Test Name Result Flag Reference   US LIVER (Report)     RIGHT UPPER QUADRANT ULTRASOUND     INDICATION: R74 8: Abnormal levels of other serum enzymes  History taken directly from the electronic ordering system  Elevated iron levels  COMPARISON: None  TECHNIQUE:  Real-time ultrasound of the right upper quadrant was performed with a curvilinear transducer with both volumetric sweeps and still imaging techniques  FINDINGS:     PANCREAS: Visualized portions of the pancreas are within normal limits  AORTA AND IVC: Visualized portions are normal for patient age  LIVER:   Size: Moderately enlarged  The liver measures 23 1 cm in the midclavicular line  Contour: Surface contour is smooth  Parenchyma: There is moderate diffuse increased echogenicity with smooth echotexture and acoustic beam attenuation  Most consistent with moderate hepatic steatosis  No evidence of suspicious mass  Limited imaging of the main portal vein shows it to be patent and hepatopetal       BILIARY:   The gallbladder is normal in caliber  No wall thickening or pericholecystic fluid  No stones or sludge identified  No sonographic Galvin's sign  No intrahepatic biliary dilatation  CBD measures 2 mm  No choledocholithiasis  KIDNEY:    Right kidney measures 13 4 x 5 5 cm  Within normal limits  ASCITES:  None  IMPRESSION:     Hepatomegaly and hepatic steatosis  Workstation performed: [id number]     Signed by:   Maddie Lopez MD   12/18/17

## 2018-01-23 NOTE — CONSULTS
Chief Complaint  pt consult for elevated liver enzymes, diarrhea, change in bowel habits, GERD, and loss of appetite  referred by Dr Jessica Nolasco      History of Present Illness  59year old male referred by Dr Cezar De Oliveira for elevated LFT's  He reports some fatigue as well as 40 lb weight gain int he past year  He has had an echocardiogram and has been told he is not diabetic  Mom  of melanoma in her 42's  He reports a prior colonoscopy about 4 years ago  Has had polyps in the past     Denies any rectal bleeding  He does have loose stools with a BM 1-2 times daily  Review of Systems    Constitutional: feeling poorly and feeling tired, but no fever, no recent weight gain, no chills and no recent weight loss  Eyes: No complaints of eye pain, no red eyes, no discharge from eyes, no itchy eyes  ENT: no complaints of earache, no hearing loss, no nosebleeds, no nasal discharge, no sore throat, no hoarseness  Cardiovascular: No complaints of slow heart rate, no fast heart rate, no chest pain, no palpitations, no leg claudication, no lower extremity  Respiratory: shortness of breath, but no cough, no orthopnea, no wheezing, no shortness of breath during exertion and no PND  Gastrointestinal: diarrhea and change in bowel habits, loss of appetite, GERD, but no abdominal pain, no nausea, no vomiting, no constipation and no blood in stools  Genitourinary: No complaints of dysuria, no incontinence, no hesitancy, no nocturia, no genital lesion, no testicular pain  Musculoskeletal: No complaints of arthralgia, no myalgias, no joint swelling or stiffness, no limb pain or swelling  Integumentary: No complaints of skin rash or skin lesions, no itching, no skin wound, no dry skin  Neurological: No compliants of headache, no confusion, no convulsions, no numbness or tingling, no dizziness or fainting, no limb weakness, no difficulty walking     Psychiatric: Is not suicidal, no sleep disturbances, no anxiety or depression, no change in personality, no emotional problems  Endocrine: No complaints of proptosis, no hot flashes, no muscle weakness, no erectile dysfunction, no deepening of the voice, no feelings of weakness  Hematologic/Lymphatic: No complaints of swollen glands, no swollen glands in the neck, does not bleed easily, no easy bruising  ROS reviewed  Active Problems    1  Abnormal blood chemistry (790 6) (R79 9)   2  Allergic rhinitis (477 9) (J30 9)   3  Benign colon polyp (211 3) (K63 5)   4  Benign essential hypertension (401 1) (I10)   5  Carpal tunnel syndrome, unspecified laterality (354 0) (G56 00)   6  Chronic fatigue (780 79) (R53 82)   7  COPD (chronic obstructive pulmonary disease) (496) (J44 9)   8  Edema (782 3) (R60 9)   9  Elevated liver enzymes (790 5) (R74 8)   10  Gout (274 9) (M10 9)   11  Herpes zoster (053 9) (B02 9)   12  Hyperlipidemia (272 4) (E78 5)   13  Hyponatremia (276 1) (E87 1)   14  Influenza vaccine needed (V04 81) (Z23)   15  Internal derangement of right knee (717 9) (M23 91)   16  Intervertebral disc herniation (722 2)   17  Left atrial enlargement (429 3) (I51 7)   18  Left ventricular hypertrophy (429 3) (I51 7)   19  Macrocytosis (289 89) (D75 89)   20  Mild mitral regurgitation (424 0) (I34 0)   21  Obstructive sleep apnea (327 23) (G47 33)   22  Pain syndrome, chronic (338 4) (G89 4)   23  Polyosteoarthritis (715 80) (M15 9)   24  Renal cyst, right (753 10) (N28 1)   25  Screening for prostate cancer (V76 44) (Z12 5)   26  Special screening examination for neoplasm of prostate (V76 44) (Z12 5)   27  Tobacco use (305 1) (Z72 0)   28  Tobacco use disorder (305 1) (F17 200)   29   Weight gain (783 1) (R63 5)    Past Medical History    · History of Abnormal kidney function (593 9) (N28 9)   · History of Abrasion Of The Right Thumb (915 0)   · History of Cervical radiculopathy (723 4) (M54 12)   · History of acute sinusitis (V12 69) (Z87 09)   · History of blood loss (V12 59) (Z87 898)   · History of hypotension (V12 59) (Z86 79)   · History of Left leg swelling (729 81) (M79 89)   · History of Near syncope (780 2) (R55)   · History of Need for hepatitis C screening test (V73 89) (Z11 59)    The active problems and past medical history were reviewed and updated today  Surgical History    · History of Hemorrhoidectomy   · Denied: History Of Prior Surgery   · History of Neuroplasty Decompression Median Nerve At Carpal Tunnel    The surgical history was reviewed and updated today  Family History    · Family history of Cancer    The family history was reviewed and updated today  Social History    · Being A Social Drinker   · Current smoker (305 1) (F17 200)   · Marital History - Currently    · Tobacco use (305 1) (Z72 0)   · Given [employer] "how to quit " info  · Working Full Time  The social history was reviewed and updated today  Current Meds   1  B Complex CAPS; Therapy: (Recorded:15Djr1435) to Recorded   2  Fish Oil 1000 MG Oral Capsule; TAKE 2 CAPSULE Twice daily; Therapy: 12Wtl2479 to (Shakira Cohn)  Requested for: 61Rfv1552; Last   Rx:32Nra3049 Ordered   3  Metoprolol Succinate ER 50 MG Oral Tablet Extended Release 24 Hour; TAKE 1 TABLET   DAILY; Therapy: 33VZD3011 to (Julissa Thompson)  Requested for: 51SQM6216; Last   Rx:12Eqs2691 Ordered   4  Multi Vitamin/Minerals Oral Tablet; TAKE 1 TABLET DAILY; Therapy: 63YKK0200 to (Evaluate:50Bsw4979) Recorded   5  Valsartan 320 MG Oral Tablet; TAKE 1 TABLET DAILY; Therapy: 71Pyu7287 to (Joey Lopez)  Requested for: 34DBH9218; Last   Rx:94Lsk7495 Ordered    The medication list was reviewed and updated today  Allergies    1   No Known Drug Allergies    Vitals   Recorded: 58XEC8674 12:48PM   Temperature 97 4 F, Tympanic   Heart Rate 87   Systolic 149, RUE, Sitting   Diastolic 80, RUE, Sitting   Height 5 ft 10 in   Weight 260 lb 4 0 oz   BMI Calculated 37 34   BSA Calculated 2 33   O2 Saturation 97, RA     Physical Exam    Constitutional   General appearance: Abnormal   dianne complexion  Eyes   Conjunctiva and lids: No swelling, erythema, or discharge  Pupils and irises: Equal, round and reactive to light  Ears, Nose, Mouth, and Throat   External inspection of ears and nose: Normal     Nasal mucosa, septum, and turbinates: Normal without edema or erythema  Oropharynx: Normal with no erythema, edema, exudate or lesions  Pulmonary   Respiratory effort: No increased work of breathing or signs of respiratory distress  Auscultation of lungs: Clear to auscultation, equal breath sounds bilaterally, no wheezes, no rales, no rhonci  Cardiovascular   Auscultation of heart: Normal rate and rhythm, normal S1 and S2, without murmurs  Examination of extremities for edema and/or varicosities: Normal     Abdomen   Abdomen: Abnormal   obese  Liver and spleen: No hepatomegaly or splenomegaly  Lymphatic   Palpation of lymph nodes in neck: No lymphadenopathy  Musculoskeletal   Gait and station: Normal     Digits and nails: Normal without clubbing or cyanosis  Inspection/palpation of joints, bones, and muscles: Normal     Skin   Skin and subcutaneous tissue: Normal without rashes or lesions  Psychiatric   Orientation to person, place and time: Normal     Mood and affect: Normal          Results/Data  (1) SMOOTH MUSCLE ANTIBODY 28Udm7979 08:23AM [facility] Order Number: [id number]     Test Name Result Flag Reference   SMOOTH MUS AB 6 Units  0 - 19   Negative                     0 - 19                   Weak positive               20 - 30                   Moderate to strong positive     >30   Actin Antibodies are found in 52-85% of patients with   autoimmune hepatitis or chronic active hepatitis and   in 22% of patients with primary biliary cirrhosis      Performed at:  [address] [place] [address]  [id number]  Lab Director: Hiram Saba MD, Phone:  [phone]       Lab Review  Iron sat over 55 percent  Assessment    1  Abnormal liver function test (790 6) (R79 89)   2  Elevated liver enzymes (790 5) (R74 8)   3  Iron overload (275 09) (E83 19)    Plan   Abnormal liver function test    · (1) SMOOTH MUSCLE ANTIBODY; Status:Resulted - Requires Verification;   Done:  64ZZS0660 08:23AM   Due:78Amh9082; Ordered; For:Abnormal liver function test; Ordered By:Ghislaine Cortez;   · (1) SPECIAL TEST; Status:Active; Requested for:84Hwb9873;    Perform:[facility]; Order Comments:HFE gene mutation; Due:37Lmg2172; Ordered; For:Abnormal liver function test; Ordered By:Bob Cortez;   · Follow-up visit in 3 months Evaluation and Treatment  Follow-up with doc  Status: Hold  For - Scheduling  Requested for: 33KJT0399   Ordered; For: Abnormal liver function test; Ordered By: Vivian Mendez Performed:  Due: 93QCD4553   · *1- SL INTERVENTIONAL RADIOLOGY Co-Management  please do percutaneous liver  biopsy and assess for iron content; concern for hemochromatosis  Status:  Active  Requested for: 62IKL0332   Ordered; For: Abnormal liver function test; Ordered By: Vivian Mendez Performed:  Due: 47DMB9393; Last Updated By: Christine Andres; 12/11/2017 1:39:09 PM  Care Summary provided  : Yes    (1) ALPHA 1 ANTITRYPSIN; Status:Resulted - Requires Verification;   Done: 30SFQ6790 12:00AM  Due:00Ofh1950; Ordered; For:Abnormal liver function test; Ordered By:Ghislaine Cortez;   (1) PROTEIN ELECTRO, SERUM; Status:Resulted - Requires Verification;   Done: 70QDN5289 12:00AM  Due:68Nyt4747; Ordered; For:Abnormal liver function test; Ordered By:Ghislaine Cortez;   (1) PT WITH INR; Status:Resulted - Requires Verification;   Done: 86MIO7816 12:00AM  Due:19Zct4117; Ordered;    For:Elevated liver enzymes; Ordered By:Bob Cortez;   (1) HIV AG/AB COMBO, 4TH GEN; [Do Not Release];  Status:Resulted - Requires Verification;   Done: 14LQE9960 12:00AM  Due:26Few7809; Ordered; For:Abnormal liver function test; Ordered By:Antonieta Cortez; Discussion/Summary    22-year-old male referred by primary care due to elevated LFTs and an iron saturation of over 55%    1  transaminitis with an iron saturation over 55% and a ferritin elevation as well; complete liver lab work up was negative; will just add smooth muscle ab for completeness sake  - I have discussed with the patient that this is likely due to an iron overload state, I recommend an HFE gene mutation and a liver biopsy, if indeed evidence of hereditary hemochromatosis confirmed on genetic testing and liver biopsy will recommend phlebotomy with a goal ferritin of 50   - pt advised for alcohol cessation     2  Colon polyps- last colonoscopy 4 years ago; will repeat in one year    follow up in a few months time        Future Appointments    Signatures   Electronically signed by : Louretta Apgar, DO; Dec 15 2017 10:30AM EST                       (Author)

## 2018-01-23 NOTE — RESULT NOTES
Discussion/Summary   discussed biopsy results with pt, plan is to stop alcohol     Verified Results  (1) TISSUE EXAM 09VIH7602 02:36PM Sammy Calle     Test Name Result Flag Reference   LAB AP CASE REPORT (Report)     Surgical Pathology Report             Case: [id number]                   Authorizing Provider: Kat Wood DO    Collected:      01/03/2018 1436        Ordering Location:   [facility]    Received:      01/03/2018 [address] [place] [address] Interventional                                        Radiology                                   Pathologist:      Georgette Banuelos MD                                 Specimen:  Liver   LAB AP FINAL DIAGNOSIS (Report)     A  Liver (core needle biopsy):  - Steatohepatitis   - Zone 3 perisinusoidal fibrosis    Comment: The patient's history of alcohol use is noted  The features are   compatible with alcoholic steatohepatitis  No significant iron deposition   is noted on iron stain  Suggest correlation with quantitative iron   studies, noted to have been submitted  Electronically signed by Georgette Banuelos MD on 1/8/2018 at 8:29 AM   LAB AP MICROSCOPIC DESCRIPTION (Report)     Histologic sections show one (1) liver core with alternating portal tracts   and central veins  Mild small and large droplet steatosis comprising   10-20% of the cores is present  Greater than 10 portal tracts are present   for evaluation, each demonstrating a minimal mixed infiltrate of   neutrophils and lymphocytes with interspersed eosinophils which spill over   the limiting plate in a minority of portal tracts to involve adjacent   hepatocytes  Bile ducts are identified in the majority of portal tracts   and show no destructive lesions or cholestasis  The majority of the   inflammatory infiltrate is present in lobules with associated balloon   degeneration of hepatocytes as well as [place] bodies  The iron stain   shows no significant hemosiderin deposition   The trichrome stain shows   zone 3 perisinusoidal fibrosis  The reticulin stain shows hepatic plates   of 1-2 cells in thickness  LAB AP SURGICAL ADDITIONAL INFORMATION (Report)     All controls performed with the immunohistochemical stains reported above   reacted appropriately  These tests were developed and their performance   characteristics determined by [facility] or   [facility]  They may not be cleared or approved by the [place]  Food and Drug Administration  The FDA has determined that such clearance   or approval is not necessary  These tests are used for clinical purposes  They should not be regarded as investigational or for research  This   laboratory has been approved by [facility], designated as a high-complexity   laboratory and is qualified to perform these tests  LAB AP GROSS DESCRIPTION (Report)     A  The specimen is received in formalin, labeled with the patient's name   and medical record number, and is designated liver biopsy  The specimen   consists of a single tan to colorless soft tissue core measuring 2   centimeters in length by less than 0 1 centimeters in diameter  Entirely   submitted  One cassette  Note: The estimated total formalin fixation time based upon information   provided by the submitting clinician and the standard processing schedule   is under 72 hours      MCrites   LAB AP CLINICAL INFORMATION      abnormal LFTs, iron overload, alcohol use, smoker

## 2018-01-23 NOTE — RESULT NOTES
Discussion/Summary   genetic test for iron overload state called hemochromatosis was negative, good news, but I still recommend liver biopsy     Verified Results  (1) HEMOCHROMATOSIS MUTATION 93Yvd6391 08:23AM Chon Cortez Hunter     Test Name Result Flag Reference   HEREDITARY HEMOCHROMATOSIS Comment     NO MUTATION IDENTIFIED  Interpretation: This patient's sample was analyzed for the hereditary  hemochromatosis (HH) mutations C282Y, H63D, S65C  No  mutation was identified  The mutations analyzed by [facility]  are most common in the  population, and up to 90%  of affected Caucasians will have a positive test result  Because this panel does not identify rare HH mutations or  HH mutations found in other ethnic groups, there are a  small number of people who may have a negative test but may  actually be affected  The diagnosis of HH should include  clinical findings and other test results such as  transferrin-iron saturation and/or serum ferritin studies  and/or liver biopsy  If this patient has a history of HH,  in many cases a specific carrier risk can be determined  based on this negative result  Methodology:  DNA Analysis of the HFE gene was performed by PCR  amplification followed by restriction enzyme digestion  analyses  Reference:  Day Mathias and Isidro HOOD  (2000)  Robyn Test 4:  [place] et al  (1999)  AM J Prev Med 16:134-140  Bojustina BARROW (2002)  Lancet 360(1340):1670-56  Greygonzalo Veloz et al  (2002)  Blood Cells, Molecules  and  Diseases   293):418-432  Rossi LEVIN et al  (2003)  Robyn Med  5(1):1-8  Samia JHA et al  (2003)  Robyn Med  5(4):304-10  This test was developed and its performance characteristics determined  by [employer]  It has not been cleared or approved by the Food and Drug  Administration  Genetic counselors are available for health care providers to discuss  results at [phone]    Darlin Benson, PhD, Juan M Winter, PhD, [facility] PhD, MARANDA Betancourt S , PhD, Suraj Jiménez, PhD, Ani Bay, PhD, Sobeida Roy, PhD, [facility], [facility]   Performed at:  [address] [place] [address]  [id number]  : Marisela Oden MD, Phone:  [phone]

## 2018-02-26 NOTE — MISCELLANEOUS
Message  GI Reminder Recall [facility]:   Date: 01/12/2018   Dear Tramaine Garay:     Review of our records shows you are due for the following: Follow Up Visit  Please call the following office to schedule your appointment:   [address], [address], [place], [place], [address] [facility] [phone]  We look forward to hearing from you! Sincerely,     [facility]      Signatures   Electronically signed by :  Latoya Nava, ; Jan 12 2018  2:09PM EST                       (Author)

## 2018-03-29 PROBLEM — R79.89 ABNORMAL LIVER FUNCTION TEST: Status: ACTIVE | Noted: 2017-12-11

## 2018-03-29 PROBLEM — R53.82 CHRONIC FATIGUE: Status: ACTIVE | Noted: 2017-11-29

## 2018-03-29 PROBLEM — E87.1 HYPONATREMIA: Status: ACTIVE | Noted: 2017-03-22

## 2018-03-29 PROBLEM — J44.9 COPD (CHRONIC OBSTRUCTIVE PULMONARY DISEASE) (HCC): Status: ACTIVE | Noted: 2017-02-21

## 2018-03-29 PROBLEM — F17.200 TOBACCO USE DISORDER: Status: ACTIVE | Noted: 2017-11-13

## 2018-03-29 PROBLEM — E83.19 IRON OVERLOAD: Status: ACTIVE | Noted: 2017-12-11

## 2018-03-30 ENCOUNTER — OFFICE VISIT (OUTPATIENT)
Dept: INTERNAL MEDICINE CLINIC | Facility: CLINIC | Age: 58
End: 2018-03-30
Payer: COMMERCIAL

## 2018-03-30 VITALS
SYSTOLIC BLOOD PRESSURE: 148 MMHG | HEIGHT: 71 IN | WEIGHT: 263.4 LBS | OXYGEN SATURATION: 98 % | HEART RATE: 83 BPM | TEMPERATURE: 98.6 F | BODY MASS INDEX: 36.88 KG/M2 | RESPIRATION RATE: 18 BRPM | DIASTOLIC BLOOD PRESSURE: 78 MMHG

## 2018-03-30 DIAGNOSIS — E78.2 MIXED HYPERLIPIDEMIA: ICD-10-CM

## 2018-03-30 DIAGNOSIS — I10 BENIGN ESSENTIAL HYPERTENSION: Primary | ICD-10-CM

## 2018-03-30 DIAGNOSIS — M17.11 PRIMARY OSTEOARTHRITIS OF RIGHT KNEE: ICD-10-CM

## 2018-03-30 DIAGNOSIS — F17.200 TOBACCO USE DISORDER: ICD-10-CM

## 2018-03-30 DIAGNOSIS — R79.89 ABNORMAL LIVER FUNCTION TEST: ICD-10-CM

## 2018-03-30 DIAGNOSIS — J44.9 CHRONIC OBSTRUCTIVE PULMONARY DISEASE, UNSPECIFIED COPD TYPE (HCC): ICD-10-CM

## 2018-03-30 PROCEDURE — 99214 OFFICE O/P EST MOD 30 MIN: CPT | Performed by: INTERNAL MEDICINE

## 2018-03-30 NOTE — PATIENT INSTRUCTIONS

## 2018-03-30 NOTE — PROGRESS NOTES
Assessment/Plan:    No problem-specific Assessment & Plan notes found for this encounter  Diagnoses and all orders for this visit:    Benign essential hypertension  -     Comprehensive metabolic panel; Future  -     Microalbumin / creatinine urine ratio; Future    Abnormal liver function test    Chronic obstructive pulmonary disease, unspecified COPD type ([facility] [address] )    Mixed hyperlipidemia  -     LDL cholesterol, direct; Future  -     Triglycerides; Future    Tobacco use disorder    Primary osteoarthritis of right knee  -     Ambulatory referral to Orthopedic Surgery; Future      A/P: Doing better  BP is up, but reports just taking his meds and OP readings are good  Check labs  Wean tobacco  Will refer to ortho to see if pt a candidate for injections  Work on the [employer]  Continue current treatment otherwise  RTC four months for routine  Subjective:      Patient ID: Pooja Montana is a 59 y o  male  WM RTC for f/u htn, COPD, etc  Doing ok and only c/o is continued right knee pain with recent xray showing DJD  Little relief from NSAID's  Still smoking  Due for labs  Had a liver bx due to increase iron and LFT's and results were negative  Remains as active as possible  No falls  Colon cancer screen and vaccines are up to date  The following portions of the patient's history were reviewed and updated as appropriate:   He  has a past medical history of Abnormal kidney function; Allergic rhinitis; Benign colon polyp; Carpal tunnel syndrome; Cervical radiculopathy; Chronic fatigue; Chronic pain disorder; COPD (chronic obstructive pulmonary disease) (HCC); CPAP (continuous positive airway pressure) dependence; Edema; Elevated liver enzymes; Gout; Hyperlipidemia; Hypertension; Hypotension; Left ventricular hypertrophy; and Sleep apnea    He   Patient Active Problem List    Diagnosis Date Noted    Abnormal liver function test 12/11/2017    Iron overload 12/11/2017    Chronic fatigue 11/29/2017    Tobacco use disorder 11/13/2017    Hyponatremia 03/22/2017    COPD (chronic obstructive pulmonary disease) ([facility] [address] ) 02/21/2017    Renal cyst, right 10/12/2016    Left atrial enlargement 09/06/2016    Left ventricular hypertrophy 09/06/2016    Mild mitral regurgitation 09/06/2016    Pain syndrome, chronic 02/11/2015    Allergic rhinitis 04/23/2014    Benign colon polyp 09/17/2012    Hyperlipidemia 09/17/2012    Herniation of intervertebral disc 09/17/2012    Benign essential hypertension 08/30/2012    Gout 08/30/2012    Macrocytosis 08/30/2012    Obstructive sleep apnea 08/30/2012    Edema 06/27/2012     He  has a past surgical history that includes Hemorrhoid surgery; Carpal tunnel release (Bilateral); and Liver biopsy  His family history includes Cancer in his mother; No Known Problems in his brother, father, maternal grandfather, maternal grandmother, paternal grandfather, paternal grandmother, and sister  He  reports that he has been smoking  He has been smoking about 1 00 pack per day  He has never used smokeless tobacco  He reports that he drinks about 3 6 oz of alcohol per week   He reports that he does not use drugs  Current Outpatient Prescriptions   Medication Sig Dispense Refill    metoprolol succinate (TOPROL-XL) 50 mg 24 hr tablet Take 50 mg by mouth daily      multivitamin (THERAGRAN) TABS Take 1 tablet by mouth daily      Omega-3 Fatty Acids (FISH OIL) 1,000 mg Take 1,000 mg by mouth daily      valsartan (DIOVAN) 320 MG tablet Take 320 mg by mouth daily       No current facility-administered medications for this visit        Current Outpatient Prescriptions on File Prior to Visit   Medication Sig    metoprolol succinate (TOPROL-XL) 50 mg 24 hr tablet Take 50 mg by mouth daily    multivitamin (THERAGRAN) TABS Take 1 tablet by mouth daily    Omega-3 Fatty Acids (FISH OIL) 1,000 mg Take 1,000 mg by mouth daily    valsartan (DIOVAN) 320 MG tablet Take 320 mg by mouth daily     No current facility-administered medications on file prior to visit  He has No Known Allergies       Review of Systems   Constitutional: Negative for activity change, chills, diaphoresis, fatigue and fever  HENT: Positive for congestion, postnasal drip and rhinorrhea  Respiratory: Negative for cough, chest tightness, shortness of breath and wheezing  Cardiovascular: Negative for chest pain, palpitations and leg swelling  Gastrointestinal: Negative for abdominal pain, constipation, diarrhea, nausea and vomiting  Genitourinary: Negative for difficulty urinating, dysuria and frequency  Musculoskeletal: Positive for arthralgias and gait problem  Negative for myalgias  Neurological: Negative for light-headedness and headaches  Psychiatric/Behavioral: Negative for confusion and dysphoric mood  The patient is not nervous/anxious  Objective:      /78 (BP Location: Left arm, Patient Position: Sitting, Cuff Size: Standard)   Pulse 83   Temp 98 6 °F (37 °C) (Tympanic)   Resp 18   Ht 5' 10 5" (1 791 m)   Wt 119 kg (263 lb 6 4 oz)   SpO2 98%   BMI 37 26 kg/m²          Physical Exam   Constitutional: He is oriented to person, place, and time  He appears well-developed and well-nourished  No distress  HENT:   Head: Normocephalic and atraumatic  Mouth/Throat: Oropharynx is clear and moist    Eyes: Conjunctivae and EOM are normal  Pupils are equal, round, and reactive to light  Neck: Neck supple  No JVD present  Cardiovascular: Normal rate, regular rhythm and normal heart sounds  Pulmonary/Chest: Effort normal  He has no wheezes  Abdominal: Soft  Bowel sounds are normal  There is no tenderness  Musculoskeletal: Normal range of motion  He exhibits no edema  Neurological: He is alert and oriented to person, place, and time  Psychiatric: He has a normal mood and affect  His behavior is normal  Judgment and thought content normal    Nursing note and vitals reviewed

## 2018-07-20 ENCOUNTER — TELEPHONE (OUTPATIENT)
Dept: INTERNAL MEDICINE CLINIC | Facility: CLINIC | Age: 58
End: 2018-07-20

## 2018-07-20 DIAGNOSIS — I10 BENIGN ESSENTIAL HYPERTENSION: Primary | ICD-10-CM

## 2018-07-20 RX ORDER — OLMESARTAN MEDOXOMIL 20 MG/1
20 TABLET ORAL DAILY
Qty: 90 TABLET | Refills: 0 | Status: SHIPPED | OUTPATIENT
Start: 2018-07-20 | End: 2018-10-18 | Stop reason: SDUPTHER

## 2018-07-20 NOTE — TELEPHONE ENCOUNTER
Patient called stating that his valsartan is being recalled and needs a replacement sent over to his pharmacy, [facility]

## 2018-07-27 ENCOUNTER — OFFICE VISIT (OUTPATIENT)
Dept: INTERNAL MEDICINE CLINIC | Facility: CLINIC | Age: 58
End: 2018-07-27
Payer: COMMERCIAL

## 2018-07-27 VITALS
DIASTOLIC BLOOD PRESSURE: 80 MMHG | WEIGHT: 266 LBS | HEART RATE: 76 BPM | TEMPERATURE: 98.2 F | BODY MASS INDEX: 37.24 KG/M2 | SYSTOLIC BLOOD PRESSURE: 138 MMHG | HEIGHT: 71 IN | RESPIRATION RATE: 18 BRPM

## 2018-07-27 DIAGNOSIS — Z23 ENCOUNTER FOR VACCINATION: ICD-10-CM

## 2018-07-27 DIAGNOSIS — E78.2 MIXED HYPERLIPIDEMIA: ICD-10-CM

## 2018-07-27 DIAGNOSIS — F17.200 TOBACCO USE DISORDER: ICD-10-CM

## 2018-07-27 DIAGNOSIS — J44.9 CHRONIC OBSTRUCTIVE PULMONARY DISEASE, UNSPECIFIED COPD TYPE (HCC): ICD-10-CM

## 2018-07-27 DIAGNOSIS — I10 BENIGN ESSENTIAL HYPERTENSION: Primary | ICD-10-CM

## 2018-07-27 DIAGNOSIS — M1A.9XX0 CHRONIC GOUT WITHOUT TOPHUS, UNSPECIFIED CAUSE, UNSPECIFIED SITE: ICD-10-CM

## 2018-07-27 DIAGNOSIS — Z12.5 SCREENING FOR PROSTATE CANCER: ICD-10-CM

## 2018-07-27 DIAGNOSIS — E83.19 IRON OVERLOAD: ICD-10-CM

## 2018-07-27 DIAGNOSIS — Z02.89 ENCOUNTER FOR PHYSICAL EXAMINATION RELATED TO EMPLOYMENT: ICD-10-CM

## 2018-07-27 PROCEDURE — 99214 OFFICE O/P EST MOD 30 MIN: CPT | Performed by: INTERNAL MEDICINE

## 2018-07-27 PROCEDURE — 3075F SYST BP GE 130 - 139MM HG: CPT | Performed by: INTERNAL MEDICINE

## 2018-07-27 PROCEDURE — 90715 TDAP VACCINE 7 YRS/> IM: CPT

## 2018-07-27 PROCEDURE — 3008F BODY MASS INDEX DOCD: CPT | Performed by: INTERNAL MEDICINE

## 2018-07-27 PROCEDURE — 90471 IMMUNIZATION ADMIN: CPT | Performed by: INTERNAL MEDICINE

## 2018-07-27 PROCEDURE — 3079F DIAST BP 80-89 MM HG: CPT | Performed by: INTERNAL MEDICINE

## 2018-07-27 NOTE — PROGRESS NOTES
Assessment/Plan:    No problem-specific Assessment & Plan notes found for this encounter  Diagnoses and all orders for this visit:    Benign essential hypertension  -     CBC and differential; Future  -     Comprehensive metabolic panel; Future  -     Microalbumin / creatinine urine ratio; Future    Chronic obstructive pulmonary disease, unspecified COPD type ([facility] [address] )    Mixed hyperlipidemia  -     Lipid Panel with Direct LDL reflex; Future  -     TSH, 3rd generation; Future    Chronic gout without tophus, unspecified cause, unspecified site    Iron overload  -     Ferritin; Future  -     Iron; Future  -     TIBC; Future    Tobacco use disorder    Encounter for physical examination related to employment  -     Hemoglobin A1C; Future    Screening for prostate cancer  -     PSA Total, Diagnostic; Future    Encounter for vaccination  -     TDAP VACCINE GREATER THAN OR EQUAL TO 6YO IM      A/P: Doing ok  Suspect DE LEÓN is multifactorial due to wt gain, deconditioning, COPD, and smoking  Defers CXR  Will check labs and if negative, consider MDI if pt allows  Update Td  Wean tobacco  Continue current treatment and RTC four months for routine  Subjective:      Patient ID: Merlyn Oliveira is a 59 y o  male  WM RTC for f/u htn, hyperlipidemia, etc  Doing well and no new issues  Remains active w/o difficulty and no falls reported  Still smoking  Edema unchanged  Some DE LEÓN w/o any associated CP or palpitations  No gouty attacks  Due for labs and vaccines  No wheezing and sleep is stable  The following portions of the patient's history were reviewed and updated as appropriate:   He  has a past medical history of Abnormal kidney function; Allergic rhinitis; Benign colon polyp;  Carpal tunnel syndrome; Cervical radiculopathy; Chronic fatigue; Chronic pain disorder; COPD (chronic obstructive pulmonary disease) (HCC); CPAP (continuous positive airway pressure) dependence; Edema; Elevated liver enzymes; Gout; Hyperlipidemia; Hypertension; Hypotension; Left ventricular hypertrophy; and Sleep apnea  He   Patient Active Problem List    Diagnosis Date Noted    Abnormal liver function test 12/11/2017    Iron overload 12/11/2017    Chronic fatigue 11/29/2017    Tobacco use disorder 11/13/2017    Hyponatremia 03/22/2017    COPD (chronic obstructive pulmonary disease) ([facility] [address] ) 02/21/2017    Renal cyst, right 10/12/2016    Left atrial enlargement 09/06/2016    Left ventricular hypertrophy 09/06/2016    Mild mitral regurgitation 09/06/2016    Pain syndrome, chronic 02/11/2015    Allergic rhinitis 04/23/2014    Benign colon polyp 09/17/2012    Hyperlipidemia 09/17/2012    Herniation of intervertebral disc 09/17/2012    Benign essential hypertension 08/30/2012    Gout 08/30/2012    Macrocytosis 08/30/2012    Obstructive sleep apnea 08/30/2012    Edema 06/27/2012     He  has a past surgical history that includes Hemorrhoid surgery; Carpal tunnel release (Bilateral); and Liver biopsy  His family history includes Cancer in his mother; No Known Problems in his brother, father, maternal grandfather, maternal grandmother, paternal grandfather, paternal grandmother, and sister  He  reports that he has been smoking  He has been smoking about 1 00 pack per day  He has never used smokeless tobacco  He reports that he drinks about 3 6 oz of alcohol per week   He reports that he does not use drugs  Current Outpatient Prescriptions   Medication Sig Dispense Refill    metoprolol succinate (TOPROL-XL) 50 mg 24 hr tablet Take 50 mg by mouth daily      multivitamin (THERAGRAN) TABS Take 1 tablet by mouth daily      olmesartan (BENICAR) 20 mg tablet Take 1 tablet (20 mg total) by mouth daily 90 tablet 0    Omega-3 Fatty Acids (FISH OIL) 1,000 mg Take 1,000 mg by mouth daily       No current facility-administered medications for this visit        Current Outpatient Prescriptions on File Prior to Visit   Medication Sig    metoprolol succinate (TOPROL-XL) 50 mg 24 hr tablet Take 50 mg by mouth daily    multivitamin (THERAGRAN) TABS Take 1 tablet by mouth daily    olmesartan (BENICAR) 20 mg tablet Take 1 tablet (20 mg total) by mouth daily    Omega-3 Fatty Acids (FISH OIL) 1,000 mg Take 1,000 mg by mouth daily     No current facility-administered medications on file prior to visit  He has No Known Allergies       Review of Systems   Constitutional: Negative for activity change, chills, diaphoresis, fatigue and fever  HENT: Negative  Eyes: Negative for visual disturbance  Respiratory: Positive for shortness of breath  Negative for cough, chest tightness and wheezing  Cardiovascular: Positive for leg swelling  Negative for chest pain and palpitations  Gastrointestinal: Negative for abdominal pain, constipation, diarrhea, nausea and vomiting  Endocrine: Negative for cold intolerance and heat intolerance  Genitourinary: Negative for difficulty urinating, dysuria and frequency  Musculoskeletal: Negative for arthralgias, gait problem and myalgias  Neurological: Negative for light-headedness and headaches  Psychiatric/Behavioral: Negative for confusion, dysphoric mood and sleep disturbance  The patient is not nervous/anxious  Objective:      /80   Pulse 76   Temp 98 2 °F (36 8 °C) (Tympanic)   Resp 18   Ht 5' 10 5" (1 791 m)   Wt 121 kg (266 lb)   BMI 37 63 kg/m²          Physical Exam   Constitutional: He is oriented to person, place, and time  He appears well-developed and well-nourished  No distress  HENT:   Head: Normocephalic and atraumatic  Mouth/Throat: Oropharynx is clear and moist    Eyes: Conjunctivae and EOM are normal  Pupils are equal, round, and reactive to light  Neck: Neck supple  No JVD present  Cardiovascular: Normal rate, regular rhythm and normal heart sounds  No murmur heard  Pulmonary/Chest: Effort normal and breath sounds normal  No respiratory distress   He has no wheezes  Abdominal: Soft  Bowel sounds are normal  He exhibits no distension  There is no tenderness  Musculoskeletal: He exhibits edema (bilat legs 2/4 edema  )  Neurological: He is alert and oriented to person, place, and time  Psychiatric: He has a normal mood and affect  His behavior is normal  Judgment and thought content normal    Nursing note and vitals reviewed

## 2018-07-27 NOTE — PATIENT INSTRUCTIONS

## 2018-08-11 ENCOUNTER — TRANSCRIBE ORDERS (OUTPATIENT)
Dept: ADMINISTRATIVE | Facility: HOSPITAL | Age: 58
End: 2018-08-11

## 2018-08-11 ENCOUNTER — APPOINTMENT (OUTPATIENT)
Dept: LAB | Facility: HOSPITAL | Age: 58
End: 2018-08-11
Payer: COMMERCIAL

## 2018-08-11 ENCOUNTER — APPOINTMENT (OUTPATIENT)
Dept: LAB | Facility: HOSPITAL | Age: 58
End: 2018-08-11
Attending: INTERNAL MEDICINE
Payer: COMMERCIAL

## 2018-08-11 DIAGNOSIS — I10 BENIGN ESSENTIAL HYPERTENSION: ICD-10-CM

## 2018-08-11 DIAGNOSIS — Z00.8 HEALTH EXAMINATION IN POPULATION SURVEYS: ICD-10-CM

## 2018-08-11 DIAGNOSIS — Z00.8 HEALTH EXAMINATION IN POPULATION SURVEYS: Primary | ICD-10-CM

## 2018-08-11 DIAGNOSIS — Z12.5 SCREENING FOR PROSTATE CANCER: ICD-10-CM

## 2018-08-11 DIAGNOSIS — E83.19 IRON OVERLOAD: ICD-10-CM

## 2018-08-11 LAB
CHOLEST SERPL-MCNC: 189 MG/DL (ref 50–200)
CREAT UR-MCNC: 111 MG/DL
EST. AVERAGE GLUCOSE BLD GHB EST-MCNC: 108 MG/DL
HBA1C MFR BLD: 5.4 % (ref 4.2–6.3)
HDLC SERPL-MCNC: 44 MG/DL (ref 40–60)
LDLC SERPL CALC-MCNC: 118 MG/DL (ref 0–100)
MICROALBUMIN UR-MCNC: 12.8 MG/L (ref 0–20)
MICROALBUMIN/CREAT 24H UR: 12 MG/G CREATININE (ref 0–30)
NONHDLC SERPL-MCNC: 145 MG/DL
PSA SERPL-MCNC: 0.9 NG/ML (ref 0–4)
TIBC SERPL-MCNC: 383 UG/DL (ref 250–450)
TRIGL SERPL-MCNC: 137 MG/DL

## 2018-08-11 PROCEDURE — 84153 ASSAY OF PSA TOTAL: CPT

## 2018-08-11 PROCEDURE — 36415 COLL VENOUS BLD VENIPUNCTURE: CPT

## 2018-08-11 PROCEDURE — 82043 UR ALBUMIN QUANTITATIVE: CPT

## 2018-08-11 PROCEDURE — 80061 LIPID PANEL: CPT

## 2018-08-11 PROCEDURE — 83550 IRON BINDING TEST: CPT

## 2018-08-11 PROCEDURE — 83036 HEMOGLOBIN GLYCOSYLATED A1C: CPT

## 2018-08-11 PROCEDURE — 82570 ASSAY OF URINE CREATININE: CPT

## 2018-08-22 ENCOUNTER — TELEPHONE (OUTPATIENT)
Dept: INTERNAL MEDICINE CLINIC | Facility: CLINIC | Age: 58
End: 2018-08-22

## 2018-08-22 NOTE — TELEPHONE ENCOUNTER
----- Message from Liza Palacios sent at 8/22/2018  1:39 PM EDT -----  Lm to call back  ----- Message -----  From: Brenna Orr DO  Sent: 7/27/2018   7:43 AM  To: [address] Clinical    Call pt in three weeks and get update on the DE LEÓN  See if he wants to try an inhaler

## 2018-09-20 ENCOUNTER — OFFICE VISIT (OUTPATIENT)
Dept: SLEEP CENTER | Facility: CLINIC | Age: 58
End: 2018-09-20
Attending: INTERNAL MEDICINE
Payer: COMMERCIAL

## 2018-09-20 VITALS
WEIGHT: 262.2 LBS | DIASTOLIC BLOOD PRESSURE: 86 MMHG | HEIGHT: 71 IN | SYSTOLIC BLOOD PRESSURE: 140 MMHG | BODY MASS INDEX: 36.71 KG/M2 | OXYGEN SATURATION: 98 %

## 2018-09-20 DIAGNOSIS — I10 BENIGN ESSENTIAL HYPERTENSION: ICD-10-CM

## 2018-09-20 DIAGNOSIS — J42 CHRONIC BRONCHITIS, UNSPECIFIED CHRONIC BRONCHITIS TYPE (HCC): ICD-10-CM

## 2018-09-20 DIAGNOSIS — F17.200 TOBACCO USE DISORDER: ICD-10-CM

## 2018-09-20 DIAGNOSIS — J31.0 CHRONIC RHINITIS: ICD-10-CM

## 2018-09-20 DIAGNOSIS — G47.33 OBSTRUCTIVE SLEEP APNEA: Primary | ICD-10-CM

## 2018-09-20 DIAGNOSIS — E66.9 OBESITY (BMI 30-39.9): ICD-10-CM

## 2018-09-20 PROCEDURE — 99214 OFFICE O/P EST MOD 30 MIN: CPT | Performed by: INTERNAL MEDICINE

## 2018-09-20 NOTE — PROGRESS NOTES
Review of Systems      Genitourinary none   Cardiology ankle/leg swelling   Gastrointestinal none   Neurology none   Constitutional none   Integumentary none   Psychiatry none   Musculoskeletal none   Pulmonary snoring   ENT none   Endocrine none   Hematological none

## 2018-09-20 NOTE — PROGRESS NOTES
Follow-Up Note - Sleep Center   Sarah Randhawa  59 y o  male  :1960  EKP:[id number]    CC: I saw this patient for follow-up in clinic today for his Sleep Disordered Breathing, Coexisting Sleep and Medical Problems  PFSH, Problem List, Medications & Allergies were reviewed in EMR  Interval changes: [none reported ]   He  has a past medical history of Abnormal kidney function; Allergic rhinitis; Benign colon polyp; Carpal tunnel syndrome; Cervical radiculopathy; Chronic fatigue; Chronic pain disorder; COPD (chronic obstructive pulmonary disease) (HCC); CPAP (continuous positive airway pressure) dependence; Edema; Elevated liver enzymes; Gout; Hyperlipidemia; Hypertension; Hypotension; Left ventricular hypertrophy; Obesity (BMI 30-39 9) (2018); and Sleep apnea  He has a current medication list which includes the following prescription(s): multivitamin, fish oil, metoprolol succinate, and olmesartan  ROS: Reviewed (see attached)  He has nasal symptoms that are worse on awakening  He reported no other respiratory or cardiac symptoms  HPI:  Aletha Moody reports  · no  difficulty tolerating PAP;   · no  adverse effects:   He is no longer experiencing aerophagia  · Benefitting from use: sleeping better and no longer snoring / having breathing difficulties   With respect to compliance, data download shows:  using PAP > 4 hours/night 84% of the time  JOSE (estimated) 2 9/hour at [90th percentile] pressure of 18 5cm H2O  Sleep Routine: He reports getting 7 hr sleep on week nights and more on weekends; he has no difficulty initiating or maintaining sleep   He awakens spontaneously feeling refreshed  He denied excessive drowsiness[ ]  He rated himself at Total score: 4 /24 on the [place] sleepiness scale  [  ]  Habits:[ reports that he has been smoking  He has been smoking about 1 00 pack per day   He has never used smokeless tobacco ], [ reports that he drinks about 3 6 oz of alcohol per week  ], [ reports that he does not use drugs  ], Caffeine use: limited[ ], Exercise routine: none[ ]  EXAM: /86   Ht 5' 10 5" (1 791 m)   Wt 119 kg (262 lb 3 2 oz)   SpO2 98%   BMI 37 09 kg/m²      Patient is alert, orientated, cooperative [and in no distress]  Mental state [appears normal]  Craniofacial anatomy normal  There are [no] facial pressure marks or rashes  Neck Circumference: 52 cm  There are no abnormal neck masses  Nasal airway is [patent ]  Mucous membranes appeared normal  The oral airway [is crowded ] [Apart from truncal obesity,] the rest of exam (Heart, Lungs, Abdomen, CNS and Musculoskeletal systems) was unremarkable   IMPRESSION: Primary/secondary Sleep conditions (to Medical or psychiatric) & comorbidities   1  Obstructive sleep apnea  Sleep F/U  - established patient    PAP DME Resupply/Reorder   2  Chronic bronchitis, unspecified chronic bronchitis type ([facility] [address] )     3  Chronic rhinitis     4  Tobacco use disorder     5  Benign essential hypertension     6  Obesity (BMI 30-39  9)         PLAN:  1  Treatment with  PAP is medically necessary and Rodolfo Navarro is agreable to continue use  2  We discussed methods to improve comfort using PAP, care of equipment, and importance of compliance with therapy  3  Pressure setting:  Continue 17-20 cm H2O     4  Rx provided to replace supplies and Care coordinated with DME provider  5  Strategies for weight reduction were discussed  6  He has reduced his tobacco intake and is trying to stop smoking  7  Follow-up is advised in [1 Tio Hartman [or sooner if needed] [to monitor progress, compliance and to adjust therapy]  Thank you for allowing me to participate in the care of this patient      Sincerely,    Authenticated electronically by Calista Bobby MD on 95/64/05   Board Certified Specialist

## 2018-10-18 DIAGNOSIS — I10 BENIGN ESSENTIAL HYPERTENSION: ICD-10-CM

## 2018-10-18 RX ORDER — OLMESARTAN MEDOXOMIL 20 MG/1
20 TABLET ORAL DAILY
Qty: 90 TABLET | Refills: 1 | Status: SHIPPED | OUTPATIENT
Start: 2018-10-18 | End: 2018-10-23 | Stop reason: SDUPTHER

## 2018-10-23 DIAGNOSIS — I10 BENIGN ESSENTIAL HYPERTENSION: ICD-10-CM

## 2018-10-23 RX ORDER — OLMESARTAN MEDOXOMIL 20 MG/1
20 TABLET ORAL DAILY
Qty: 90 TABLET | Refills: 3 | Status: SHIPPED | OUTPATIENT
Start: 2018-10-23 | End: 2019-03-04

## 2019-01-23 ENCOUNTER — APPOINTMENT (OUTPATIENT)
Dept: LAB | Facility: CLINIC | Age: 59
End: 2019-01-23
Payer: COMMERCIAL

## 2019-01-23 ENCOUNTER — OFFICE VISIT (OUTPATIENT)
Dept: INTERNAL MEDICINE CLINIC | Facility: CLINIC | Age: 59
End: 2019-01-23
Payer: COMMERCIAL

## 2019-01-23 VITALS
SYSTOLIC BLOOD PRESSURE: 140 MMHG | DIASTOLIC BLOOD PRESSURE: 74 MMHG | RESPIRATION RATE: 18 BRPM | HEART RATE: 80 BPM | WEIGHT: 258 LBS | BODY MASS INDEX: 36.12 KG/M2 | TEMPERATURE: 98.1 F | HEIGHT: 71 IN

## 2019-01-23 DIAGNOSIS — F17.200 TOBACCO USE DISORDER: ICD-10-CM

## 2019-01-23 DIAGNOSIS — I10 BENIGN ESSENTIAL HYPERTENSION: ICD-10-CM

## 2019-01-23 DIAGNOSIS — E66.9 OBESITY (BMI 30-39.9): ICD-10-CM

## 2019-01-23 DIAGNOSIS — I10 BENIGN ESSENTIAL HYPERTENSION: Primary | ICD-10-CM

## 2019-01-23 DIAGNOSIS — E78.2 MIXED HYPERLIPIDEMIA: ICD-10-CM

## 2019-01-23 DIAGNOSIS — Z23 ENCOUNTER FOR VACCINATION: ICD-10-CM

## 2019-01-23 DIAGNOSIS — G47.33 OBSTRUCTIVE SLEEP APNEA: ICD-10-CM

## 2019-01-23 DIAGNOSIS — J42 CHRONIC BRONCHITIS, UNSPECIFIED CHRONIC BRONCHITIS TYPE (HCC): ICD-10-CM

## 2019-01-23 DIAGNOSIS — M1A.9XX0 CHRONIC GOUT WITHOUT TOPHUS, UNSPECIFIED CAUSE, UNSPECIFIED SITE: ICD-10-CM

## 2019-01-23 LAB
ALBUMIN SERPL BCP-MCNC: 4.1 G/DL (ref 3.5–5)
ALP SERPL-CCNC: 77 U/L (ref 46–116)
ALT SERPL W P-5'-P-CCNC: 100 U/L (ref 12–78)
ANION GAP SERPL CALCULATED.3IONS-SCNC: 6 MMOL/L (ref 4–13)
AST SERPL W P-5'-P-CCNC: 124 U/L (ref 5–45)
BILIRUB SERPL-MCNC: 0.72 MG/DL (ref 0.2–1)
BUN SERPL-MCNC: 8 MG/DL (ref 5–25)
CALCIUM SERPL-MCNC: 8.9 MG/DL (ref 8.3–10.1)
CHLORIDE SERPL-SCNC: 100 MMOL/L (ref 100–108)
CO2 SERPL-SCNC: 27 MMOL/L (ref 21–32)
CREAT SERPL-MCNC: 0.68 MG/DL (ref 0.6–1.3)
GFR SERPL CREATININE-BSD FRML MDRD: 104 ML/MIN/1.73SQ M
GLUCOSE P FAST SERPL-MCNC: 129 MG/DL (ref 65–99)
LDLC SERPL DIRECT ASSAY-MCNC: 117 MG/DL (ref 0–100)
POTASSIUM SERPL-SCNC: 4 MMOL/L (ref 3.5–5.3)
PROT SERPL-MCNC: 7.4 G/DL (ref 6.4–8.2)
SODIUM SERPL-SCNC: 133 MMOL/L (ref 136–145)
TRIGL SERPL-MCNC: 91 MG/DL

## 2019-01-23 PROCEDURE — 80053 COMPREHEN METABOLIC PANEL: CPT

## 2019-01-23 PROCEDURE — 90686 IIV4 VACC NO PRSV 0.5 ML IM: CPT | Performed by: INTERNAL MEDICINE

## 2019-01-23 PROCEDURE — 84478 ASSAY OF TRIGLYCERIDES: CPT

## 2019-01-23 PROCEDURE — 36415 COLL VENOUS BLD VENIPUNCTURE: CPT

## 2019-01-23 PROCEDURE — 99214 OFFICE O/P EST MOD 30 MIN: CPT | Performed by: INTERNAL MEDICINE

## 2019-01-23 PROCEDURE — 3008F BODY MASS INDEX DOCD: CPT | Performed by: INTERNAL MEDICINE

## 2019-01-23 PROCEDURE — 90471 IMMUNIZATION ADMIN: CPT | Performed by: INTERNAL MEDICINE

## 2019-01-23 PROCEDURE — 83721 ASSAY OF BLOOD LIPOPROTEIN: CPT

## 2019-01-23 NOTE — PATIENT INSTRUCTIONS

## 2019-01-23 NOTE — PROGRESS NOTES
Assessment/Plan:    No problem-specific Assessment & Plan notes found for this encounter  Diagnoses and all orders for this visit:    Benign essential hypertension  -     Comprehensive metabolic panel; Future    Chronic bronchitis, unspecified chronic bronchitis type ([address] )    Obstructive sleep apnea    Mixed hyperlipidemia  -     LDL cholesterol, direct; Future  -     Triglycerides; Future    Chronic gout without tophus, unspecified cause, unspecified site    Obesity (BMI 30-39  9)    Tobacco use disorder    Encounter for vaccination  -     influenza vaccine, 3729-1309, quadrivalent, 0 5 mL, preservative-free (SYRINGE, SINGLE-DOSE VIAL), for adult and pediatric patients 3 yr+ (AFLURIA, FLUARIX, FLULAVAL, FLUZONE)      A/P: Doing well and will check labs  Update the flu vaccine  Wean tobacco  Suspect AM cough due to tobacco, COPD, and allergies  If loose stools worsen, consider colonoscopy  Continue current treatment and RTC four months for routine  Subjective:      Patient ID: Tha Pearson is a 59 y o  male  WM RTC for f/u htn, COPD, etc  Doing well and no new issues  Remains active w/o difficulty and no falls  Still smoking  Weight down and so is his edema  Breathing no worse  AM cough unchanged  Using CPAP daily  Due for labs and vaccines  The following portions of the patient's history were reviewed and updated as appropriate:   He  has a past medical history of Abnormal kidney function; Allergic rhinitis; Benign colon polyp; Carpal tunnel syndrome; Cervical radiculopathy; Chronic fatigue; Chronic pain disorder; COPD (chronic obstructive pulmonary disease) (HCC); CPAP (continuous positive airway pressure) dependence; Edema; Elevated liver enzymes; Gout; Hyperlipidemia; Hypertension; Hypotension; Left ventricular hypertrophy; Obesity (BMI 30-39 9) (9/20/2018); and Sleep apnea    He   Patient Active Problem List    Diagnosis Date Noted    Obesity (BMI 30-39 9) 09/20/2018    Chronic rhinitis 09/20/2018    Abnormal liver function test 12/11/2017    Iron overload 12/11/2017    Chronic fatigue 11/29/2017    Tobacco use disorder 11/13/2017    Hyponatremia 03/22/2017    COPD (chronic obstructive pulmonary disease) ([facility] [address] ) 02/21/2017    Renal cyst, right 10/12/2016    Left atrial enlargement 09/06/2016    Left ventricular hypertrophy 09/06/2016    Mild mitral regurgitation 09/06/2016    Pain syndrome, chronic 02/11/2015    Allergic rhinitis 04/23/2014    Benign colon polyp 09/17/2012    Hyperlipidemia 09/17/2012    Herniation of intervertebral disc 09/17/2012    Benign essential hypertension 08/30/2012    Gout 08/30/2012    Macrocytosis 08/30/2012    Obstructive sleep apnea 08/30/2012    Edema 06/27/2012     He  has a past surgical history that includes Hemorrhoid surgery; Carpal tunnel release (Bilateral); and Liver biopsy  His family history includes Cancer in his mother; No Known Problems in his brother, father, maternal grandfather, maternal grandmother, paternal grandfather, paternal grandmother, and sister  He  reports that he has been smoking  He has been smoking about 1 00 pack per day  He has never used smokeless tobacco  He reports that he drinks about 3 6 oz of alcohol per week   He reports that he does not use drugs  Current Outpatient Prescriptions   Medication Sig Dispense Refill    multivitamin (THERAGRAN) TABS Take 1 tablet by mouth daily      olmesartan (BENICAR) 20 mg tablet Take 1 tablet (20 mg total) by mouth daily 90 tablet 3    Omega-3 Fatty Acids (FISH OIL) 1,000 mg Take 1,000 mg by mouth daily       No current facility-administered medications for this visit        Current Outpatient Prescriptions on File Prior to Visit   Medication Sig    multivitamin (THERAGRAN) TABS Take 1 tablet by mouth daily    olmesartan (BENICAR) 20 mg tablet Take 1 tablet (20 mg total) by mouth daily    Omega-3 Fatty Acids (FISH OIL) 1,000 mg Take 1,000 mg by mouth daily    [DISCONTINUED] metoprolol succinate (TOPROL-XL) 50 mg 24 hr tablet Take 50 mg by mouth daily     No current facility-administered medications on file prior to visit  He has No Known Allergies       Review of Systems   Constitutional: Negative for activity change, chills, diaphoresis, fatigue and fever  HENT: Negative  Eyes: Negative for visual disturbance  Respiratory: Positive for cough  Negative for chest tightness, shortness of breath and wheezing  Cardiovascular: Negative for chest pain, palpitations and leg swelling  Gastrointestinal: Negative for abdominal pain, constipation, diarrhea, nausea and vomiting  Loose stools  Endocrine: Negative for cold intolerance and heat intolerance  Genitourinary: Negative for difficulty urinating, dysuria and frequency  Musculoskeletal: Negative for arthralgias, gait problem and myalgias  Neurological: Negative for dizziness, seizures, syncope, weakness, light-headedness and headaches  Psychiatric/Behavioral: Negative for confusion, dysphoric mood and sleep disturbance  The patient is not nervous/anxious  Objective:      /74   Pulse 80   Temp 98 1 °F (36 7 °C) (Tympanic)   Resp 18   Ht 5' 10 5" (1 791 m)   Wt 117 kg (258 lb)   BMI 36 50 kg/m²          Physical Exam   Constitutional: He is oriented to person, place, and time  He appears well-developed and well-nourished  No distress  HENT:   Head: Normocephalic and atraumatic  Mouth/Throat: Oropharynx is clear and moist    Eyes: Pupils are equal, round, and reactive to light  Conjunctivae and EOM are normal    Neck: Neck supple  No JVD present  Cardiovascular: Normal rate, regular rhythm and normal heart sounds  No murmur heard  Pulmonary/Chest: Effort normal and breath sounds normal  No respiratory distress  He has no wheezes  He has no rales  Abdominal: Soft  Bowel sounds are normal  He exhibits no distension  There is no tenderness  There is no rebound  Musculoskeletal: He exhibits no edema  Neurological: He is alert and oriented to person, place, and time  Psychiatric: He has a normal mood and affect  His behavior is normal  Judgment and thought content normal    Nursing note and vitals reviewed

## 2019-01-24 DIAGNOSIS — R73.9 ELEVATED BLOOD SUGAR LEVEL: ICD-10-CM

## 2019-01-24 DIAGNOSIS — R79.89 ELEVATED LFTS: Primary | ICD-10-CM

## 2019-03-04 ENCOUNTER — TELEPHONE (OUTPATIENT)
Dept: INTERNAL MEDICINE CLINIC | Facility: CLINIC | Age: 59
End: 2019-03-04

## 2019-03-04 DIAGNOSIS — I10 BENIGN ESSENTIAL HYPERTENSION: Primary | ICD-10-CM

## 2019-03-04 RX ORDER — LISINOPRIL 10 MG/1
10 TABLET ORAL DAILY
Qty: 90 TABLET | Refills: 3 | Status: SHIPPED | OUTPATIENT
Start: 2019-03-04 | End: 2019-06-05

## 2019-03-04 NOTE — TELEPHONE ENCOUNTER
Patient called stating that he recently received an rx for olmesartan for his bp  Patient has always been on brand necessary "Benicar" but recently received generic and now his ankles are swelling up  Olmesartan is on back order indefinitely though, so im not sure if you want to prescribe something different, or what you would like to do?

## 2019-06-05 ENCOUNTER — TELEPHONE (OUTPATIENT)
Dept: INTERNAL MEDICINE CLINIC | Facility: CLINIC | Age: 59
End: 2019-06-05

## 2019-06-05 DIAGNOSIS — I10 BENIGN ESSENTIAL HYPERTENSION: ICD-10-CM

## 2019-06-05 DIAGNOSIS — I10 BENIGN ESSENTIAL HYPERTENSION: Primary | ICD-10-CM

## 2019-06-05 RX ORDER — OLMESARTAN MEDOXOMIL 20 MG/1
20 TABLET ORAL DAILY
Qty: 90 TABLET | Refills: 1 | Status: SHIPPED | OUTPATIENT
Start: 2019-06-05 | End: 2019-06-05 | Stop reason: SDUPTHER

## 2019-06-05 RX ORDER — OLMESARTAN MEDOXOMIL 20 MG/1
20 TABLET ORAL DAILY
Qty: 90 TABLET | Refills: 3 | Status: SHIPPED | OUTPATIENT
Start: 2019-06-05 | End: 2020-01-23

## 2019-06-26 ENCOUNTER — OFFICE VISIT (OUTPATIENT)
Dept: INTERNAL MEDICINE CLINIC | Facility: CLINIC | Age: 59
End: 2019-06-26
Payer: COMMERCIAL

## 2019-06-26 VITALS
TEMPERATURE: 98 F | DIASTOLIC BLOOD PRESSURE: 76 MMHG | BODY MASS INDEX: 36.54 KG/M2 | SYSTOLIC BLOOD PRESSURE: 138 MMHG | HEIGHT: 71 IN | HEART RATE: 80 BPM | RESPIRATION RATE: 18 BRPM | WEIGHT: 261 LBS

## 2019-06-26 DIAGNOSIS — H61.23 BILATERAL IMPACTED CERUMEN: Primary | ICD-10-CM

## 2019-06-26 DIAGNOSIS — E66.9 OBESITY (BMI 35.0-39.9 WITHOUT COMORBIDITY): ICD-10-CM

## 2019-06-26 PROCEDURE — 3008F BODY MASS INDEX DOCD: CPT | Performed by: INTERNAL MEDICINE

## 2019-06-26 PROCEDURE — 99212 OFFICE O/P EST SF 10 MIN: CPT | Performed by: INTERNAL MEDICINE

## 2019-06-26 PROCEDURE — 69209 REMOVE IMPACTED EAR WAX UNI: CPT | Performed by: INTERNAL MEDICINE

## 2019-07-23 ENCOUNTER — OFFICE VISIT (OUTPATIENT)
Dept: INTERNAL MEDICINE CLINIC | Facility: CLINIC | Age: 59
End: 2019-07-23
Payer: COMMERCIAL

## 2019-07-23 VITALS
HEIGHT: 71 IN | BODY MASS INDEX: 36.75 KG/M2 | DIASTOLIC BLOOD PRESSURE: 80 MMHG | OXYGEN SATURATION: 98 % | WEIGHT: 262.5 LBS | SYSTOLIC BLOOD PRESSURE: 126 MMHG | HEART RATE: 81 BPM | TEMPERATURE: 98 F

## 2019-07-23 DIAGNOSIS — E78.2 MIXED HYPERLIPIDEMIA: ICD-10-CM

## 2019-07-23 DIAGNOSIS — Z86.010 HISTORY OF COLON POLYPS: ICD-10-CM

## 2019-07-23 DIAGNOSIS — Z12.11 SCREEN FOR COLON CANCER: ICD-10-CM

## 2019-07-23 DIAGNOSIS — I10 BENIGN ESSENTIAL HYPERTENSION: Primary | ICD-10-CM

## 2019-07-23 DIAGNOSIS — Z13.1 SCREENING FOR DIABETES MELLITUS (DM): ICD-10-CM

## 2019-07-23 DIAGNOSIS — M1A.9XX0 CHRONIC GOUT WITHOUT TOPHUS, UNSPECIFIED CAUSE, UNSPECIFIED SITE: ICD-10-CM

## 2019-07-23 DIAGNOSIS — E83.19 IRON OVERLOAD: ICD-10-CM

## 2019-07-23 DIAGNOSIS — Z12.11 SCREENING FOR COLON CANCER: ICD-10-CM

## 2019-07-23 DIAGNOSIS — F17.200 TOBACCO USE DISORDER: ICD-10-CM

## 2019-07-23 DIAGNOSIS — Z13.29 SCREENING FOR THYROID DISORDER: ICD-10-CM

## 2019-07-23 DIAGNOSIS — J42 CHRONIC BRONCHITIS, UNSPECIFIED CHRONIC BRONCHITIS TYPE (HCC): ICD-10-CM

## 2019-07-23 DIAGNOSIS — G47.33 OBSTRUCTIVE SLEEP APNEA: ICD-10-CM

## 2019-07-23 PROBLEM — R79.89 ABNORMAL LIVER FUNCTION TEST: Status: RESOLVED | Noted: 2017-12-11 | Resolved: 2019-07-23

## 2019-07-23 PROCEDURE — 3074F SYST BP LT 130 MM HG: CPT | Performed by: INTERNAL MEDICINE

## 2019-07-23 PROCEDURE — 3008F BODY MASS INDEX DOCD: CPT | Performed by: INTERNAL MEDICINE

## 2019-07-23 PROCEDURE — 99214 OFFICE O/P EST MOD 30 MIN: CPT | Performed by: INTERNAL MEDICINE

## 2019-07-23 NOTE — PROGRESS NOTES
Assessment/Plan:  Problem List Items Addressed This Visit        Respiratory    COPD (chronic obstructive pulmonary disease) ([facility] )    Obstructive sleep apnea       Cardiovascular and Mediastinum    Benign essential hypertension - Primary    Relevant Orders    CBC and differential    Comprehensive metabolic panel    Microalbumin / creatinine urine ratio       Other    Gout    Hyperlipidemia    Relevant Orders    Lipid Panel with Direct LDL reflex    TSH, 3rd generation with Free T4 reflex    Iron overload    Relevant Orders    CBC and differential    Ferritin    Iron    TIBC    Tobacco use disorder      Other Visit Diagnoses     Screening for diabetes mellitus (DM)        Relevant Orders    Hemoglobin A1C    Screening for thyroid disorder        Relevant Orders    TSH, 3rd generation with Free T4 reflex    Screening for colon cancer        Relevant Orders    Ambulatory referral to Gastroenterology    History of colon polyps        Relevant Orders    Ambulatory referral to Gastroenterology    Screen for colon cancer               Diagnoses and all orders for this visit:    Benign essential hypertension  -     CBC and differential; Future  -     Comprehensive metabolic panel; Future  -     Microalbumin / creatinine urine ratio    Chronic bronchitis, unspecified chronic bronchitis type ([facility] )    Obstructive sleep apnea    Chronic gout without tophus, unspecified cause, unspecified site    Mixed hyperlipidemia  -     Lipid Panel with Direct LDL reflex; Future  -     TSH, 3rd generation with Free T4 reflex; Future    Iron overload  -     CBC and differential; Future  -     Ferritin; Future  -     Iron; Future  -     TIBC; Future    Tobacco use disorder    Screening for diabetes mellitus (DM)  -     Hemoglobin A1C; Future    Screening for thyroid disorder  -     TSH, 3rd generation with Free T4 reflex; Future    Screening for colon cancer  -     Ambulatory referral to Gastroenterology;  Future    History of colon polyps  - Ambulatory referral to Gastroenterology; Future    Screen for colon cancer        No problem-specific Assessment & Plan notes found for this encounter  A/P: Doing well and will check labs  Order colonoscopy  Continue current treatment and RTC six months for routine  Subjective:      Patient ID: Unruly Lynch is a 59 y o  male  WM RTC for f/u htn, hyperlipidemia, etc  Doing well and no new issues  Remains active w/o difficulty and no falls  Breathing is good and continues to use CPAP  Still smoking  No gout attacks and chronic pain is controlled  Due for labs and CRC  The following portions of the patient's history were reviewed and updated as appropriate:   He has a past medical history of Abnormal kidney function, Allergic rhinitis, Benign colon polyp, Carpal tunnel syndrome, Cervical radiculopathy, Chronic fatigue, Chronic pain disorder, COPD (chronic obstructive pulmonary disease) ([facility] ), CPAP (continuous positive airway pressure) dependence, Edema, Elevated liver enzymes, Gout, Hyperlipidemia, Hypertension, Hypotension, Left ventricular hypertrophy, Obesity (BMI 30-39 9) (9/20/2018), and Sleep apnea  ,  does not have any pertinent problems on file  ,   has a past surgical history that includes Hemorrhoid surgery; Carpal tunnel release (Bilateral); and Liver biopsy  ,  family history includes Cancer in his mother; No Known Problems in his brother, father, maternal grandfather, maternal grandmother, paternal grandfather, paternal grandmother, and sister  ,   reports that he has been smoking  He has been smoking about 1 00 pack per day  He has never used smokeless tobacco  He reports that he drinks about 6 0 standard drinks of alcohol per week  He reports that he does not use drugs  ,  has No Known Allergies     Current Outpatient Medications   Medication Sig Dispense Refill    multivitamin (THERAGRAN) TABS Take 1 tablet by mouth daily      olmesartan (BENICAR) 20 mg tablet Take 1 tablet (20 mg total) by mouth daily 90 tablet 3    Omega-3 Fatty Acids (FISH OIL) 1,000 mg Take 1,000 mg by mouth daily       No current facility-administered medications for this visit  Review of Systems   Constitutional: Negative for activity change, chills, diaphoresis, fatigue and fever  HENT: Negative  Eyes: Negative for visual disturbance  Respiratory: Negative for cough, chest tightness, shortness of breath and wheezing  Cardiovascular: Negative for chest pain, palpitations and leg swelling  Gastrointestinal: Negative for abdominal pain, constipation, diarrhea, nausea and vomiting  Endocrine: Negative for cold intolerance and heat intolerance  Genitourinary: Negative for difficulty urinating, dysuria and frequency  Musculoskeletal: Negative for arthralgias, gait problem and myalgias  Neurological: Negative for dizziness, seizures, syncope, weakness, light-headedness and headaches  Psychiatric/Behavioral: Negative for confusion and dysphoric mood  The patient is not nervous/anxious  Objective:  Vitals:    07/23/19 0736   BP: 126/80   Pulse: 81   Temp: 98 °F (36 7 °C)   SpO2: 98%   Weight: 119 kg (262 lb 8 oz)   Height: 5' 10 5" (1 791 m)     Body mass index is 37 13 kg/m²  Physical Exam   Constitutional: He is oriented to person, place, and time  He appears well-developed and well-nourished  No distress  HENT:   Head: Normocephalic and atraumatic  Mouth/Throat: Oropharynx is clear and moist    Eyes: Pupils are equal, round, and reactive to light  Conjunctivae and EOM are normal    Neck: Neck supple  No JVD present  Cardiovascular: Normal rate, regular rhythm and normal heart sounds  Pulmonary/Chest: Effort normal and breath sounds normal  No respiratory distress  He has no wheezes  He has no rales  Abdominal: Soft  Bowel sounds are normal  He exhibits no distension  There is no tenderness  Musculoskeletal: He exhibits edema (bilat leg edema 1/4)     Neurological: He is alert and oriented to person, place, and time  Psychiatric: He has a normal mood and affect  His behavior is normal  Judgment and thought content normal    Nursing note and vitals reviewed

## 2019-07-23 NOTE — PATIENT INSTRUCTIONS

## 2019-08-06 ENCOUNTER — APPOINTMENT (OUTPATIENT)
Dept: LAB | Facility: CLINIC | Age: 59
End: 2019-08-06
Payer: COMMERCIAL

## 2019-08-06 DIAGNOSIS — E83.19 IRON OVERLOAD: ICD-10-CM

## 2019-08-06 DIAGNOSIS — I10 BENIGN ESSENTIAL HYPERTENSION: ICD-10-CM

## 2019-08-06 DIAGNOSIS — E78.2 MIXED HYPERLIPIDEMIA: ICD-10-CM

## 2019-08-06 DIAGNOSIS — Z13.1 SCREENING FOR DIABETES MELLITUS (DM): ICD-10-CM

## 2019-08-06 DIAGNOSIS — Z13.29 SCREENING FOR THYROID DISORDER: ICD-10-CM

## 2019-08-06 LAB
ALBUMIN SERPL BCP-MCNC: 4.1 G/DL (ref 3.5–5)
ALP SERPL-CCNC: 70 U/L (ref 46–116)
ALT SERPL W P-5'-P-CCNC: 66 U/L (ref 12–78)
ANION GAP SERPL CALCULATED.3IONS-SCNC: 7 MMOL/L (ref 4–13)
AST SERPL W P-5'-P-CCNC: 89 U/L (ref 5–45)
BASOPHILS # BLD AUTO: 0.04 THOUSANDS/ΜL (ref 0–0.1)
BASOPHILS NFR BLD AUTO: 1 % (ref 0–1)
BILIRUB SERPL-MCNC: 0.7 MG/DL (ref 0.2–1)
BUN SERPL-MCNC: 4 MG/DL (ref 5–25)
CALCIUM SERPL-MCNC: 8.9 MG/DL (ref 8.3–10.1)
CHLORIDE SERPL-SCNC: 100 MMOL/L (ref 100–108)
CHOLEST SERPL-MCNC: 182 MG/DL (ref 50–200)
CO2 SERPL-SCNC: 27 MMOL/L (ref 21–32)
CREAT SERPL-MCNC: 0.66 MG/DL (ref 0.6–1.3)
CREAT UR-MCNC: 91.9 MG/DL
EOSINOPHIL # BLD AUTO: 0.24 THOUSAND/ΜL (ref 0–0.61)
EOSINOPHIL NFR BLD AUTO: 6 % (ref 0–6)
ERYTHROCYTE [DISTWIDTH] IN BLOOD BY AUTOMATED COUNT: 13 % (ref 11.6–15.1)
EST. AVERAGE GLUCOSE BLD GHB EST-MCNC: 114 MG/DL
FERRITIN SERPL-MCNC: 213 NG/ML (ref 8–388)
GFR SERPL CREATININE-BSD FRML MDRD: 105 ML/MIN/1.73SQ M
GLUCOSE P FAST SERPL-MCNC: 118 MG/DL (ref 65–99)
HBA1C MFR BLD: 5.6 % (ref 4.2–6.3)
HCT VFR BLD AUTO: 48.6 % (ref 36.5–49.3)
HDLC SERPL-MCNC: 43 MG/DL (ref 40–60)
HGB BLD-MCNC: 16.4 G/DL (ref 12–17)
IMM GRANULOCYTES # BLD AUTO: 0.03 THOUSAND/UL (ref 0–0.2)
IMM GRANULOCYTES NFR BLD AUTO: 1 % (ref 0–2)
IRON SERPL-MCNC: 164 UG/DL (ref 65–175)
LDLC SERPL CALC-MCNC: 104 MG/DL (ref 0–100)
LYMPHOCYTES # BLD AUTO: 1.31 THOUSANDS/ΜL (ref 0.6–4.47)
LYMPHOCYTES NFR BLD AUTO: 33 % (ref 14–44)
MCH RBC QN AUTO: 35.3 PG (ref 26.8–34.3)
MCHC RBC AUTO-ENTMCNC: 33.7 G/DL (ref 31.4–37.4)
MCV RBC AUTO: 105 FL (ref 82–98)
MICROALBUMIN UR-MCNC: 58.9 MG/L (ref 0–20)
MICROALBUMIN/CREAT 24H UR: 64 MG/G CREATININE (ref 0–30)
MONOCYTES # BLD AUTO: 0.51 THOUSAND/ΜL (ref 0.17–1.22)
MONOCYTES NFR BLD AUTO: 13 % (ref 4–12)
NEUTROPHILS # BLD AUTO: 1.87 THOUSANDS/ΜL (ref 1.85–7.62)
NEUTS SEG NFR BLD AUTO: 46 % (ref 43–75)
NRBC BLD AUTO-RTO: 0 /100 WBCS
PLATELET # BLD AUTO: 132 THOUSANDS/UL (ref 149–390)
PMV BLD AUTO: 11 FL (ref 8.9–12.7)
POTASSIUM SERPL-SCNC: 4.1 MMOL/L (ref 3.5–5.3)
PROT SERPL-MCNC: 7.5 G/DL (ref 6.4–8.2)
RBC # BLD AUTO: 4.64 MILLION/UL (ref 3.88–5.62)
SODIUM SERPL-SCNC: 134 MMOL/L (ref 136–145)
TIBC SERPL-MCNC: 358 UG/DL (ref 250–450)
TRIGL SERPL-MCNC: 173 MG/DL
TSH SERPL DL<=0.05 MIU/L-ACNC: 1.93 UIU/ML (ref 0.36–3.74)
WBC # BLD AUTO: 4 THOUSAND/UL (ref 4.31–10.16)

## 2019-08-06 PROCEDURE — 83550 IRON BINDING TEST: CPT

## 2019-08-06 PROCEDURE — 36415 COLL VENOUS BLD VENIPUNCTURE: CPT

## 2019-08-06 PROCEDURE — 82728 ASSAY OF FERRITIN: CPT

## 2019-08-06 PROCEDURE — 82570 ASSAY OF URINE CREATININE: CPT | Performed by: INTERNAL MEDICINE

## 2019-08-06 PROCEDURE — 80061 LIPID PANEL: CPT

## 2019-08-06 PROCEDURE — 83540 ASSAY OF IRON: CPT

## 2019-08-06 PROCEDURE — 82043 UR ALBUMIN QUANTITATIVE: CPT | Performed by: INTERNAL MEDICINE

## 2019-08-06 PROCEDURE — 84443 ASSAY THYROID STIM HORMONE: CPT

## 2019-08-06 PROCEDURE — 80053 COMPREHEN METABOLIC PANEL: CPT

## 2019-08-06 PROCEDURE — 85025 COMPLETE CBC W/AUTO DIFF WBC: CPT

## 2019-08-06 PROCEDURE — 83036 HEMOGLOBIN GLYCOSYLATED A1C: CPT

## 2020-01-18 PROBLEM — Z72.0 TOBACCO USE: Status: ACTIVE | Noted: 2017-11-13

## 2020-01-23 ENCOUNTER — OFFICE VISIT (OUTPATIENT)
Dept: INTERNAL MEDICINE CLINIC | Facility: CLINIC | Age: 60
End: 2020-01-23
Payer: COMMERCIAL

## 2020-01-23 VITALS
HEART RATE: 104 BPM | HEIGHT: 71 IN | SYSTOLIC BLOOD PRESSURE: 150 MMHG | OXYGEN SATURATION: 96 % | DIASTOLIC BLOOD PRESSURE: 82 MMHG | BODY MASS INDEX: 38.67 KG/M2 | WEIGHT: 276.2 LBS | TEMPERATURE: 98.3 F | RESPIRATION RATE: 18 BRPM

## 2020-01-23 DIAGNOSIS — Z12.5 SCREENING FOR PROSTATE CANCER: ICD-10-CM

## 2020-01-23 DIAGNOSIS — Z13.1 SCREENING FOR DIABETES MELLITUS (DM): ICD-10-CM

## 2020-01-23 DIAGNOSIS — Z72.0 TOBACCO USE: ICD-10-CM

## 2020-01-23 DIAGNOSIS — Z13.29 SCREENING FOR THYROID DISORDER: ICD-10-CM

## 2020-01-23 DIAGNOSIS — E78.2 MIXED HYPERLIPIDEMIA: ICD-10-CM

## 2020-01-23 DIAGNOSIS — Z13.220 SCREENING FOR LIPID DISORDERS: ICD-10-CM

## 2020-01-23 DIAGNOSIS — E53.8 VITAMIN B12 DEFICIENCY: ICD-10-CM

## 2020-01-23 DIAGNOSIS — M1A.9XX0 CHRONIC GOUT WITHOUT TOPHUS, UNSPECIFIED CAUSE, UNSPECIFIED SITE: ICD-10-CM

## 2020-01-23 DIAGNOSIS — Z13.0 SCREENING FOR DEFICIENCY ANEMIA: ICD-10-CM

## 2020-01-23 DIAGNOSIS — E66.01 SEVERE OBESITY (BMI 35.0-39.9) WITH COMORBIDITY (HCC): ICD-10-CM

## 2020-01-23 DIAGNOSIS — J42 CHRONIC BRONCHITIS, UNSPECIFIED CHRONIC BRONCHITIS TYPE (HCC): ICD-10-CM

## 2020-01-23 DIAGNOSIS — I10 BENIGN ESSENTIAL HYPERTENSION: Primary | ICD-10-CM

## 2020-01-23 DIAGNOSIS — L91.8 SKIN TAGS, MULTIPLE ACQUIRED: ICD-10-CM

## 2020-01-23 PROCEDURE — 3008F BODY MASS INDEX DOCD: CPT | Performed by: INTERNAL MEDICINE

## 2020-01-23 PROCEDURE — 17110 DESTRUCTION B9 LES UP TO 14: CPT | Performed by: INTERNAL MEDICINE

## 2020-01-23 PROCEDURE — 99214 OFFICE O/P EST MOD 30 MIN: CPT | Performed by: INTERNAL MEDICINE

## 2020-01-23 PROCEDURE — 4004F PT TOBACCO SCREEN RCVD TLK: CPT | Performed by: INTERNAL MEDICINE

## 2020-01-23 PROCEDURE — 11200 RMVL SKIN TAGS UP TO&INC 15: CPT | Performed by: INTERNAL MEDICINE

## 2020-01-23 RX ORDER — OLMESARTAN MEDOXOMIL 40 MG/1
40 TABLET, FILM COATED ORAL DAILY
Qty: 90 TABLET | Refills: 1 | Status: SHIPPED | OUTPATIENT
Start: 2020-01-23 | End: 2020-08-03 | Stop reason: SDUPTHER

## 2020-01-23 RX ORDER — OLMESARTAN MEDOXOMIL 20 MG/1
20 TABLET, FILM COATED ORAL DAILY
Qty: 90 TABLET | Refills: 1 | Status: SHIPPED | OUTPATIENT
Start: 2020-01-23 | End: 2020-01-23

## 2020-01-23 NOTE — PROGRESS NOTES
BMI Counseling: There is no height or weight on file to calculate BMI  The BMI is above normal  Nutrition recommendations include decreasing portion sizes, increasing intake of lean protein and reducing intake of saturated and trans fat  Exercise recommendations include moderate physical activity 150 minutes/week  No pharmacotherapy was ordered  Tobacco Cessation Counseling: Tobacco cessation counseling was provided  The patient is sincerely urged to quit consumption of tobacco  He is not ready to quit tobacco  Medication options discussed  Side effects of medication not discussed  Patient refused medication  Assessment/Plan:  Problem List Items Addressed This Visit        Respiratory    COPD (chronic obstructive pulmonary disease) ([facility] )       Cardiovascular and Mediastinum    Benign essential hypertension - Primary    Relevant Medications    BENICAR 40 MG tablet    Other Relevant Orders    Comprehensive metabolic panel       Other    Gout    Relevant Orders    Uric acid    Hyperlipidemia    Relevant Orders    LDL cholesterol, direct    Triglycerides    Tobacco use      Other Visit Diagnoses     Screening for prostate cancer        Relevant Orders    PSA, Total Screen    Screening for lipid disorders        Screening for diabetes mellitus (DM)        Screening for thyroid disorder        Screening for deficiency anemia        Severe obesity (BMI 35 0-39  9) with comorbidity (HCC)        BMI 37 0-37 9, adult        Vitamin B12 deficiency        Relevant Orders    Folate    Vitamin B12    Skin tags, multiple acquired        Relevant Orders    Skin tag removal (Completed)           Diagnoses and all orders for this visit:    Benign essential hypertension  -     Comprehensive metabolic panel; Future  -     Discontinue: BENICAR 20 MG tablet; Take 1 tablet (20 mg total) by mouth daily  -     BENICAR 40 MG tablet;  Take 1 tablet (40 mg total) by mouth daily    Chronic bronchitis, unspecified chronic bronchitis type ([facility] )    Chronic gout without tophus, unspecified cause, unspecified site  -     Uric acid; Future    Mixed hyperlipidemia  -     LDL cholesterol, direct; Future  -     Triglycerides; Future    Tobacco use    Screening for prostate cancer  -     PSA, Total Screen; Future    Screening for lipid disorders    Screening for diabetes mellitus (DM)    Screening for thyroid disorder    Screening for deficiency anemia    Severe obesity (BMI 35 0-39  9) with comorbidity (HCC)    BMI 37 0-37 9, adult    Vitamin B12 deficiency  -     Folate; Future  -     Vitamin B12; Future    Skin tags, multiple acquired  -     Skin tag removal        No problem-specific Assessment & Plan notes found for this encounter  Tobacco Cessation Counseling: Tobacco cessation counseling and education was provided  The patient is sincerely urged to quit consumption of tobacco  He is not ready to quit tobacco  The numerous health risks of tobacco consumption were discussed  If he decides to quit, there are a number of helpful adjunctive aids, and he can see me to discuss nicotine replacement therapy, chantix, or bupropion anytime in the future  A/P: Doing well, but BP is elevated and will increase the ARB  Will check labs  Discussed BMI and will give information on diet and exercise  Had his FLu vaccine  Several skin tags frozen  Wean tobacco  Continue current treatment and RTC six months for routine  Subjective:      Patient ID: Israel Barry is a 62 y o  male  WM RTC for f/u htn, COPD, etc  Doing well and no new issues  Remains active w/o difficulty and no falls  Still smoking  Breathing is ok  No gout attacks  Due for labs and vaccines  Has several neck lesions increasing in size with intermittent bleeding due to shaving and getting stuck on jewelry        The following portions of the patient's history were reviewed and updated as appropriate:   He has a past medical history of Abnormal kidney function, Allergic rhinitis, Benign colon polyp, Carpal tunnel syndrome, Cervical radiculopathy, Chronic fatigue, Chronic pain disorder, COPD (chronic obstructive pulmonary disease) (HCC), CPAP (continuous positive airway pressure) dependence, Edema, Elevated liver enzymes, Gout, Hyperlipidemia, Hypertension, Hypotension, Left ventricular hypertrophy, Obesity (BMI 30-39 9) (9/20/2018), and Sleep apnea  ,  does not have any pertinent problems on file  ,   has a past surgical history that includes Hemorrhoid surgery; Carpal tunnel release (Bilateral); and Liver biopsy  ,  family history includes Cancer in his mother; No Known Problems in his brother, father, maternal grandfather, maternal grandmother, paternal grandfather, paternal grandmother, and sister  ,   reports that he has been smoking  He has been smoking about 1 00 pack per day  He has never used smokeless tobacco  He reports that he drinks about 6 0 standard drinks of alcohol per week  He reports that he does not use drugs  ,  has No Known Allergies     Current Outpatient Medications   Medication Sig Dispense Refill    multivitamin (THERAGRAN) TABS Take 1 tablet by mouth daily      Omega-3 Fatty Acids (FISH OIL) 1,000 mg Take 1,000 mg by mouth daily      BENICAR 40 MG tablet Take 1 tablet (40 mg total) by mouth daily 90 tablet 1     No current facility-administered medications for this visit  Review of Systems   Constitutional: Negative for activity change, chills, diaphoresis, fatigue and fever  HENT: Negative  Eyes: Negative for visual disturbance  Respiratory: Negative for cough, chest tightness, shortness of breath and wheezing  Cardiovascular: Negative for chest pain, palpitations and leg swelling  Gastrointestinal: Negative for abdominal pain, constipation, diarrhea, nausea and vomiting  Endocrine: Negative for cold intolerance and heat intolerance  Genitourinary: Negative for difficulty urinating, dysuria and frequency     Musculoskeletal: Negative for arthralgias, gait problem and myalgias  Neurological: Negative for dizziness, seizures, syncope, weakness, light-headedness and headaches  Psychiatric/Behavioral: Negative for confusion, dysphoric mood and sleep disturbance  The patient is not nervous/anxious  PHQ-9 Depression Screening    PHQ-9:    Frequency of the following problems over the past two weeks:             Objective:  Vitals:    01/23/20 0747   BP: 150/82   BP Location: Left arm   Patient Position: Sitting   Cuff Size: Large   Pulse: 104   Resp: 18   Temp: 98 3 °F (36 8 °C)   SpO2: 96%   Weight: 125 kg (276 lb 3 2 oz)   Height: 5' 10 5" (1 791 m)     Body mass index is 39 07 kg/m²  Physical Exam   Constitutional: He is oriented to person, place, and time  He appears well-developed and well-nourished  No distress  HENT:   Head: Normocephalic and atraumatic  Mouth/Throat: Oropharynx is clear and moist    Eyes: Pupils are equal, round, and reactive to light  Conjunctivae and EOM are normal    Neck: Neck supple  No JVD present  Cardiovascular: Normal rate, regular rhythm and normal heart sounds  Pulmonary/Chest: Effort normal and breath sounds normal  No respiratory distress  He has no wheezes  He has no rales  Abdominal: Soft  Bowel sounds are normal  He exhibits no distension  There is no tenderness  Musculoskeletal: He exhibits edema (bilat leg edema 2/4)  Neurological: He is alert and oriented to person, place, and time  Skin:   Several pedunculated pigmented lesion around the front base of the neck  Psychiatric: He has a normal mood and affect  His behavior is normal  Judgment and thought content normal    Nursing note and vitals reviewed  BMI Counseling: Body mass index is 39 07 kg/m²  The BMI is above normal  Nutrition recommendations include reducing portion sizes, decreasing overall calorie intake, reducing intake of saturated fat and trans fat and reducing intake of cholesterol   Exercise recommendations include moderate aerobic physical activity for 150 minutes/week  Skin tag removal  Date/Time: 1/23/2020 8:06 AM  Performed by: Taiwo Barber DO  Authorized by: Taiwo Barber DO     Procedure Details - Skin Tag Destruction:     Up to 15      Body area:  [address]    Head/neck location:  Neck    Malignancy: benign lesion      Destruction method: cryotherapy    Lesion 6:      Nine similar lesions cryo'd

## 2020-01-23 NOTE — PATIENT INSTRUCTIONS
Chronic Hypertension   AMBULATORY CARE:   Hypertension  is high blood pressure (BP)  Your BP is the force of your blood moving against the walls of your arteries  Normal BP is less than 120/80  Prehypertension is between 120/80 and 139/89  Hypertension is 140/90 or higher  Hypertension causes your BP to get so high that your heart has to work much harder than normal  This can damage your heart  Chronic hypertension is a long-term condition that you can control with a healthy lifestyle or medicines  A controlled blood pressure helps protect your organs, such as your heart, lungs, brain, and kidneys  Common symptoms include the following:   · Headache     · Blurred vision    · Chest pain     · Dizziness or weakness     · Trouble breathing     · Nosebleeds  Call 911 for any of the following:   · You have discomfort in your chest that feels like squeezing, pressure, fullness, or pain  · You become confused or have difficulty speaking  · You suddenly feel lightheaded or have trouble breathing  · You have pain or discomfort in your back, neck, jaw, stomach, or arm  Seek care immediately if:   · You have a severe headache or vision loss  · You have weakness in an arm or leg  Contact your healthcare provider if:   · You feel faint, dizzy, confused, or drowsy  · You have been taking your BP medicine and your BP is still higher than your healthcare provider says it should be  · You have questions or concerns about your condition or care  Treatment for chronic hypertension  may include medicine to lower your BP and lower your cholesterol level  A low cholesterol level helps prevent heart disease and makes it easier to control your blood pressure  Heart disease can make your blood pressure harder to control  You may also need to make lifestyle changes  Take your medicine exactly as directed    Manage chronic hypertension:  Talk with your healthcare provider about these and other ways to manage hypertension:  · Take your BP at home  Sit and rest for 5 minutes before you take your BP  Extend your arm and support it on a flat surface  Your arm should be at the same level as your heart  Follow the directions that came with your BP monitor  If possible, take at least 2 BP readings each time  Take your BP at least twice a day at the same times each day, such as morning and evening  Keep a record of your BP readings and bring it to your follow-up visits  Ask your healthcare provider what your blood pressure should be  · Limit sodium (salt) as directed  Too much sodium can affect your fluid balance  Check labels to find low-sodium or no-salt-added foods  Some low-sodium foods use potassium salts for flavor  Too much potassium can also cause health problems  Your healthcare provider will tell you how much sodium and potassium are safe for you to have in a day  He or she may recommend that you limit sodium to 2,300 mg a day  · Follow the meal plan recommended by your healthcare provider  A dietitian or your provider can give you more information on low-sodium plans or the DASH (Dietary Approaches to Stop Hypertension) eating plan  The DASH plan is low in sodium, unhealthy fats, and total fat  It is high in potassium, calcium, and fiber  · Exercise to maintain a healthy weight  Exercise at least 30 minutes per day, on most days of the week  This will help decrease your blood pressure  Ask about the best exercise plan for you  · Decrease stress  This may help lower your BP  Learn ways to relax, such as deep breathing or listening to music  · Limit alcohol  Women should limit alcohol to 1 drink a day  Men should limit alcohol to 2 drinks a day  A drink of alcohol is 12 ounces of beer, 5 ounces of wine, or 1½ ounces of liquor  · Do not smoke  Nicotine and other chemicals in cigarettes and cigars can increase your BP and also cause lung damage   Ask your healthcare provider for information if you currently smoke and need help to quit  E-cigarettes or smokeless tobacco still contain nicotine  Talk to your healthcare provider before you use these products  Follow up with your healthcare provider as directed: You will need to return to have your BP checked and to have other lab tests done  Write down your questions so you remember to ask them during your visits  © 2017 [address] [employer] Information is for End User's use only and may not be sold, redistributed or otherwise used for commercial purposes  All illustrations and images included in CareNotes® are the copyrighted property of A D A M [employer] Inc  or Dmitri Arroyo  The above information is an  only  It is not intended as medical advice for individual conditions or treatments  Talk to your doctor, nurse or pharmacist before following any medical regimen to see if it is safe and effective for you  Cigarette Smoking and Your Health   AMBULATORY CARE:   Risks to your health if you smoke:  Nicotine and other chemicals found in tobacco damage every cell in your body  Even if you are a light smoker, you have an increased risk for cancer, heart disease, and lung disease  If you are pregnant or have diabetes, smoking increases your risk for complications  Benefits to your health if you stop smoking:   · You decrease respiratory symptoms such as coughing, wheezing, and shortness of breath  · You reduce your risk for cancers of the lung, mouth, throat, kidney, bladder, pancreas, stomach, and cervix  If you already have cancer, you increase the benefits of chemotherapy  You also reduce your risk for cancer returning or a second cancer from developing  · You reduce your risk for heart disease, blood clots, heart attack, and stroke  · You reduce your risk for lung infections, and diseases such as pneumonia, asthma, chronic bronchitis, and emphysema  · Your circulation improves   More oxygen can be delivered to your body  If you have diabetes, you lower your risk for complications, such as kidney, artery, and eye diseases  You also lower your risk for nerve damage  Nerve damage can lead to amputations, poor vision, and blindness  · You improve your body's ability to heal and to fight infections  Benefits to the health of others if you stop smoking:  Tobacco is harmful to nonsmokers who breathe in your secondhand smoke  The following are ways the health of others around you may improve when you stop smoking:  · You lower the risks for lung cancer and heart disease in nonsmoking adults  · If you are pregnant, you lower the risk for miscarriage, early delivery, low birth weight, and stillbirth  You also lower your baby's risk for SIDS, obesity, developmental delay, and neurobehavioral problems, such as ADHD  · If you have children, you lower their risk for ear infections, colds, pneumonia, bronchitis, and asthma  For more information and support to stop smoking:   · [employer]  Phone: 7- 935 - 116-9901  Web Address: www eMar  Follow up with your healthcare provider as directed:  Write down your questions so you remember to ask them during your visits  © 2017 [address]  Information is for End User's use only and may not be sold, redistributed or otherwise used for commercial purposes  All illustrations and images included in CareNotes® are the copyrighted property of [facility] [employer] [facility] [employer]  or Dmitri Arroyo  The above information is an  only  It is not intended as medical advice for individual conditions or treatments  Talk to your doctor, nurse or pharmacist before following any medical regimen to see if it is safe and effective for you  Obesity   AMBULATORY CARE:   Obesity  is when your body mass index (BMI) is greater than 30  Your healthcare provider will use your height and weight to measure your BMI    The risks of obesity include many health problems, such as injuries or physical disability  You may need tests to check for the following:  · Diabetes     · High blood pressure or high cholesterol     · Heart disease     · Gallbladder or liver disease     · Cancer of the colon, breast, prostate, liver, or kidney     · Sleep apnea     · Arthritis or gout  Seek care immediately if:   · You have a severe headache, confusion, or difficulty speaking  · You have weakness on one side of your body  · You have chest pain, sweating, or shortness of breath  Contact your healthcare provider if:   · You have symptoms of gallbladder or liver disease, such as pain in your upper abdomen  · You have knee or hip pain and discomfort while walking  · You have symptoms of diabetes, such as intense hunger and thirst, and frequent urination  · You have symptoms of sleep apnea, such as snoring or daytime sleepiness  · You have questions or concerns about your condition or care  Treatment for obesity  focuses on helping you lose weight to improve your health  Even a small decrease in BMI can reduce the risk for many health problems  Your healthcare provider will help you set a weight-loss goal   · Lifestyle changes  are the first step in treating obesity  These include making healthy food choices and getting regular physical activity  Your healthcare provider may suggest a weight-loss program that involves coaching, education, and therapy  · Medicine  may help you lose weight when it is used with a healthy diet and physical activity  · Surgery  can help you lose weight if you are very obese and have other health problems  There are several types of weight-loss surgery  Ask your healthcare provider for more information  Be successful losing weight:   · Set small, realistic goals  An example of a small goal is to walk for 20 minutes 5 days a week  Anther goal is to lose 5% of your body weight      · Tell friends, family members, and coworkers about your goals  and ask for their support  Ask a friend to lose weight with you, or join a weight-loss support group  · Identify foods or triggers that may cause you to overeat , and find ways to avoid them  Remove tempting high-calorie foods from your home and workplace  Place a bowl of fresh fruit on your kitchen counter  If stress causes you to eat, then find other ways to cope with stress  · Keep a diary to track what you eat and drink  Also write down how many minutes of physical activity you do each day  Weigh yourself once a week and record it in your diary  Eating changes: You will need to eat 500 to 1,000 fewer calories each day than you currently eat to lose 1 to 2 pounds a week  The following changes will help you cut calories:  · Eat smaller portions  Use small plates, no larger than 9 inches in diameter  Fill your plate half full of fruits and vegetables  Measure your food using measuring cups until you know what a serving size looks like  · Eat 3 meals and 1 or 2 snacks each day  Plan your meals in advance  Candido Zhu and eat at home most of the time  Eat slowly  · Eat fruits and vegetables at every meal   They are low in calories and high in fiber, which makes you feel full  Do not add butter, margarine, or cream sauce to vegetables  Use herbs to season steamed vegetables  · Eat less fat and fewer fried foods  Eat more baked or grilled chicken and fish  These protein sources are lower in calories and fat than red meat  Limit fast food  Dress your salads with olive oil and vinegar instead of bottled dressing  · Limit the amount of sugar you eat  Do not drink sugary beverages  Limit alcohol  Activity changes:  Physical activity is good for your body in many ways  It helps you burn calories and build strong muscles  It decreases stress and depression, and improves your mood  It can also help you sleep better   Talk to your healthcare provider before you begin an exercise program   · Exercise for at least 30 minutes 5 days a week  Start slowly  Set aside time each day for physical activity that you enjoy and that is convenient for you  It is best to do both weight training and an activity that increases your heart rate, such as walking, bicycling, or swimming  · Find ways to be more active  Do yard work and housecleaning  Walk up the stairs instead of using elevators  Spend your leisure time going to events that require walking, such as outdoor festivals or fairs  This extra physical activity can help you lose weight and keep it off  Follow up with your healthcare provider as directed: You may need to meet with a dietitian  Write down your questions so you remember to ask them during your visits  © 2017 [address] [facility] Information is for End User's use only and may not be sold, redistributed or otherwise used for commercial purposes  All illustrations and images included in CareNotes® are the copyrighted property of A D A M [employer] Inc  or Dmitri Arroyo  The above information is an  only  It is not intended as medical advice for individual conditions or treatments  Talk to your doctor, nurse or pharmacist before following any medical regimen to see if it is safe and effective for you  Low Fat Diet   AMBULATORY CARE:   A low-fat diet  is an eating plan that is low in total fat, unhealthy fat, and cholesterol  You may need to follow a low-fat diet if you have trouble digesting or absorbing fat  You may also need to follow this diet if you have high cholesterol  You can also lower your cholesterol by increasing the amount of fiber in your diet  Soluble fiber is a type of fiber that helps to decrease cholesterol levels  Different types of fat in food:   · Limit unhealthy fats  A diet that is high in cholesterol, saturated fat, and trans fat may cause unhealthy cholesterol levels   Unhealthy cholesterol levels increase your risk of heart disease  ¨ Cholesterol:  Limit intake of cholesterol to less than 200 mg per day  Cholesterol is found in meat, eggs, and dairy  ¨ Saturated fat:  Limit saturated fat to less than 7% of your total daily calories  Ask your dietitian how many calories you need each day  Saturated fat is found in butter, cheese, ice cream, whole milk, and palm oil  Saturated fat is also found in meat, such as beef, pork, chicken skin, and processed meats  Processed meats include sausage, hot dogs, and bologna  ¨ Trans fat:  Avoid trans fat as much as possible  Trans fat is used in fried and baked foods  Foods that say trans fat free on the label may still have up to 0 5 grams of trans fat per serving  · Include healthy fats  Replace foods that are high in saturated and trans fat with foods high in healthy fats  This may help to decrease high cholesterol levels  ¨ Monounsaturated fats: These are found in avocados, nuts, and vegetable oils, such as olive, canola, and sunflower oil  ¨ Polyunsaturated fats: These can be found in vegetable oils, such as soybean or corn oil  Omega-3 fats can help to decrease the risk of heart disease  Omega-3 fats are found in fish, such as salmon, herring, trout, and tuna  Omega-3 fats can also be found in plant foods, such as walnuts, flaxseed, soybeans, and canola oil    Foods to limit or avoid:   · Grains:      ¨ Snacks that are made with partially hydrogenated oils, such as chips, regular crackers, and butter-flavored popcorn    ¨ High-fat baked goods, such as biscuits, croissants, doughnuts, pies, cookies, and pastries    · Dairy:      ¨ Whole milk, 2% milk, and yogurt and ice cream made with whole milk    ¨ Half and half creamer, heavy cream, and whipping cream    ¨ Cheese, cream cheese, and sour cream    · Meats and proteins:      ¨ High-fat cuts of meat (T-bone steak, regular hamburger, and ribs)    ¨ WhenSoon, poultry (turkey and chicken), and fish    [employer] (chicken and turkey) with skin    ¨ Cold cuts (salami or bologna), hot dogs, stone, and sausage    ¨ Whole eggs and egg yolks    · Vegetables and fruits with added fat:      ¨ Fried vegetables or vegetables in butter or high-fat sauces, such as cream or cheese sauces    ¨ Fried fruit or fruit served with butter or cream    · Fats:      ¨ Butter, stick margarine, and shortening    ¨ Coconut, palm oil, and palm kernel oil  Foods to include:   · Grains:      ¨ Whole-grain breads, cereals, pasta, and brown rice    ¨ Low-fat crackers and pretzels    · Vegetables and fruits:      ¨ Fresh, frozen, or canned vegetables (no salt or low-sodium)    ¨ Fresh, frozen, dried, or canned fruit (canned in light syrup or fruit juice)    ¨ Avocado    · Low-fat dairy products:      ¨ Nonfat (skim) or 1% milk    ¨ Nonfat or low-fat cheese, yogurt, and cottage cheese    · Meats and proteins:      ¨ Chicken or turkey with no skin    ¨ Baked or broiled fish    ¨ Lean beef and pork (loin, round, extra lean hamburger)    ¨ Beans and peas, unsalted nuts, soy products    ¨ Egg whites and substitutes    ¨ Seeds and nuts    · Fats:      ¨ Unsaturated oil, such as canola, olive, peanut, soybean, or sunflower oil    ¨ Soft or liquid margarine and vegetable oil spread    ¨ Low-fat salad dressing  Other ways to decrease fat:   · Read food labels before you buy foods  Choose foods that have less than 30% of calories from fat  Choose low-fat or fat-free dairy products  Remember that fat free does not mean calorie free  These foods still contain calories, and too many calories can lead to weight gain  · Trim fat from meat and avoid fried food  Trim all visible fat from meat before you cook it  Remove the skin from poultry  Do not guzman meat, fish, or poultry  Bake, roast, boil, or broil these foods instead  Avoid fried foods  Eat a baked potato instead of [place] fries  Steam vegetables instead of sautéing them in butter  · Add less fat to foods  Use imitation stnoe bits on salads and baked potatoes instead of regular stone bits  Use fat-free or low-fat salad dressings instead of regular dressings  Use low-fat or nonfat butter-flavored topping instead of regular butter or margarine on popcorn and other foods  Ways to decrease fat in recipes:  Replace high-fat ingredients with low-fat or nonfat ones  This may cause baked goods to be drier than usual  You may need to use nonfat cooking spray on pans to prevent food from sticking  You also may need to change the amount of other ingredients, such as water, in the recipe  Try the following:  · Use low-fat or light margarine instead of regular margarine or shortening  · Use lean ground turkey breast or chicken, or lean ground beef (less than 5% fat) instead of hamburger  · Add 1 teaspoon of canola oil to 8 ounces of skim milk instead of using cream or half and half  · Use grated zucchini, carrots, or apples in breads instead of coconut  · Use blenderized, low-fat cottage cheese, plain tofu, or low-fat ricotta cheese instead of cream cheese  · Use 1 egg white and 1 teaspoon of canola oil, or use ¼ cup (2 ounces) of fat-free egg substitute instead of a whole egg  · Replace half of the oil that is called for in a recipe with applesauce when you bake  Use 3 tablespoons of cocoa powder and 1 tablespoon of canola oil instead of a square of baking chocolate  How to increase fiber:  Eat enough high-fiber foods to get 20 to 30 grams of fiber every day  Slowly increase your fiber intake to avoid stomach cramps, gas, and other problems  · Eat 3 ounces of whole-grain foods each day  An ounce is about 1 slice of bread  Eat whole-grain breads, such as whole-wheat bread  Whole wheat, whole-wheat flour, or other whole grains should be listed as the first ingredient on the food label  Replace white flour with whole-grain flour or use half of each in recipes   Whole-grain flour is heavier than white flour, so you may have to add more yeast or baking powder  · Eat a high-fiber cereal for breakfast   Oatmeal is a good source of soluble fiber  Look for cereals that have bran or fiber in the name  Choose whole-grain products, such as brown rice, barley, and whole-wheat pasta  · Eat more beans, peas, and lentils  For example, add beans to soups or salads  Eat at least 5 cups of fruits and vegetables each day  Eat fruits and vegetables with the peel because the peel is high in fiber  © 2017 [address] Information is for End User's use only and may not be sold, redistributed or otherwise used for commercial purposes  All illustrations and images included in CareNotes® are the copyrighted property of [employer]  or Dmitri Arroyo  The above information is an  only  It is not intended as medical advice for individual conditions or treatments  Talk to your doctor, nurse or pharmacist before following any medical regimen to see if it is safe and effective for you  Heart Healthy Diet   AMBULATORY CARE:   A heart healthy diet  is an eating plan low in total fat, unhealthy fats, and sodium (salt)  A heart healthy diet helps decrease your risk for heart disease and stroke  Limit the amount of fat you eat to 25% to 35% of your total daily calories  Limit sodium to less than 2,300 mg each day  Healthy fats:  Healthy fats can help improve cholesterol levels  The risk for heart disease is decreased when cholesterol levels are normal  Choose healthy fats, such as the following:  · Unsaturated fat  is found in foods such as soybean, canola, olive, corn, and safflower oils  It is also found in soft tub margarine that is made with liquid vegetable oil  · Omega-3 fat  is found in certain fish, such as salmon, tuna, and trout, and in walnuts and flaxseed    Unhealthy fats:  Unhealthy fats can cause unhealthy cholesterol levels in your blood and increase your risk of heart disease  Limit unhealthy fats, such as the following:  · Cholesterol  is found in animal foods, such as eggs and lobster, and in dairy products made from whole milk  Limit cholesterol to less than 300 milligrams (mg) each day  You may need to limit cholesterol to 200 mg each day if you have heart disease  · Saturated fat  is found in meats, such as stone and hamburger  It is also found in chicken or turkey skin, whole milk, and butter  Limit saturated fat to less than 7% of your total daily calories  Limit saturated fat to less than 6% if you have heart disease or are at increased risk for it  · Trans fat  is found in packaged foods, such as potato chips and cookies  It is also in hard margarine, some fried foods, and shortening  Avoid trans fats as much as possible    Heart healthy foods and drinks to include:  Ask your dietitian or healthcare provider how many servings to have from each of the following food groups:  · Grains:      ¨ Whole-wheat breads, cereals, and pastas, and brown rice    ¨ Low-fat, low-sodium crackers and chips    · Vegetables:      ¨ Broccoli, green beans, green peas, and spinach    ¨ Collards, kale, and lima beans    ¨ Carrots, sweet potatoes, tomatoes, and peppers    ¨ Canned vegetables with no salt added    · Fruits:      ¨ Bananas, peaches, pears, and pineapple    ¨ Grapes, raisins, and dates    ¨ Oranges, tangerines, grapefruit, orange juice, and grapefruit juice    ¨ Apricots, mangoes, melons, and papaya    ¨ Raspberries and strawberries    ¨ Canned fruit with no added sugar    · Low-fat dairy products:      ¨ Nonfat (skim) milk, 1% milk, and low-fat almond, cashew, or soy milks fortified with calcium    ¨ Low-fat cheese, regular or frozen yogurt, and cottage cheese    · Meats and proteins , such as lean cuts of beef and pork (loin, leg, round), skinless chicken and turkey, legumes, soy products, egg whites, and nuts  Foods and drinks to limit or avoid:  Ask your dietitian or healthcare provider about these and other foods that are high in unhealthy fat, sodium, and sugar:  · Snack or packaged foods , such as frozen dinners, cookies, macaroni and cheese, and cereals with more than 300 mg of sodium per serving    · Canned or dry mixes  for cakes, soups, sauces, or gravies    · Vegetables with added sodium , such as instant potatoes, vegetables with added sauces, or regular canned vegetables    · Other foods high in sodium , such as ketchup, barbecue sauce, salad dressing, pickles, olives, soy sauce, and miso    · High-fat dairy foods  such as whole or 2% milk, cream cheese, or sour cream, and cheeses     · High-fat protein foods  such as high-fat cuts of beef (T-bone steaks, ribs), chicken or turkey with skin, and organ meats, such as liver    · Cured or smoked meats , such as hot dogs, stone, and sausage    · Unhealthy fats and oils , such as butter, stick margarine, shortening, and cooking oils such as coconut or palm oil    · Food and drinks high in sugar , such as soft drinks (soda), sports drinks, sweetened tea, candy, cake, cookies, pies, and doughnuts  Other diet guidelines to follow:   · Eat more foods containing omega-3 fats  Eat fish high in omega-3 fats at least 2 times a week  · Limit alcohol  Too much alcohol can damage your heart and raise your blood pressure  Women should limit alcohol to 1 drink a day  Men should limit alcohol to 2 drinks a day  A drink of alcohol is 12 ounces of beer, 5 ounces of wine, or 1½ ounces of liquor  · Choose low-sodium foods  High-sodium foods can lead to high blood pressure  Add little or no salt to food you prepare  Use herbs and spices in place of salt  · Eat more fiber  to help lower cholesterol levels  Eat at least 5 servings of fruits and vegetables each day  Eat 3 ounces of whole-grain foods each day  Legumes (beans) are also a good source of fiber  Lifestyle guidelines:   · Do not smoke    Nicotine and other chemicals in cigarettes and cigars can cause lung and heart damage  Ask your healthcare provider for information if you currently smoke and need help to quit  E-cigarettes or smokeless tobacco still contain nicotine  Talk to your healthcare provider before you use these products  · Exercise regularly  to help you maintain a healthy weight and improve your blood pressure and cholesterol levels  Ask your healthcare provider about the best exercise plan for you  Do not start an exercise program without asking your healthcare provider  Follow up with your healthcare provider as directed:  Write down your questions so you remember to ask them during your visits  © 2017 [facility] Information is for End User's use only and may not be sold, redistributed or otherwise used for commercial purposes  All illustrations and images included in CareNotes® are the copyrighted property of A D A M , Inc  or Dmitri Arroyo  The above information is an  only  It is not intended as medical advice for individual conditions or treatments  Talk to your doctor, nurse or pharmacist before following any medical regimen to see if it is safe and effective for you

## 2020-02-04 ENCOUNTER — APPOINTMENT (OUTPATIENT)
Dept: LAB | Facility: CLINIC | Age: 60
End: 2020-02-04
Payer: COMMERCIAL

## 2020-02-04 DIAGNOSIS — E53.8 VITAMIN B12 DEFICIENCY: ICD-10-CM

## 2020-02-04 DIAGNOSIS — Z12.5 SCREENING FOR PROSTATE CANCER: ICD-10-CM

## 2020-02-04 DIAGNOSIS — I10 BENIGN ESSENTIAL HYPERTENSION: ICD-10-CM

## 2020-02-04 DIAGNOSIS — M1A.9XX0 CHRONIC GOUT WITHOUT TOPHUS, UNSPECIFIED CAUSE, UNSPECIFIED SITE: ICD-10-CM

## 2020-02-04 DIAGNOSIS — E78.2 MIXED HYPERLIPIDEMIA: ICD-10-CM

## 2020-02-04 LAB
ALBUMIN SERPL BCP-MCNC: 4 G/DL (ref 3.5–5)
ALP SERPL-CCNC: 74 U/L (ref 46–116)
ALT SERPL W P-5'-P-CCNC: 70 U/L (ref 12–78)
ANION GAP SERPL CALCULATED.3IONS-SCNC: 5 MMOL/L (ref 4–13)
AST SERPL W P-5'-P-CCNC: 70 U/L (ref 5–45)
BILIRUB SERPL-MCNC: 0.66 MG/DL (ref 0.2–1)
BUN SERPL-MCNC: 9 MG/DL (ref 5–25)
CALCIUM SERPL-MCNC: 9.4 MG/DL (ref 8.3–10.1)
CHLORIDE SERPL-SCNC: 107 MMOL/L (ref 100–108)
CO2 SERPL-SCNC: 27 MMOL/L (ref 21–32)
CREAT SERPL-MCNC: 0.78 MG/DL (ref 0.6–1.3)
FOLATE SERPL-MCNC: >20 NG/ML (ref 3.1–17.5)
GFR SERPL CREATININE-BSD FRML MDRD: 97 ML/MIN/1.73SQ M
GLUCOSE P FAST SERPL-MCNC: 134 MG/DL (ref 65–99)
LDLC SERPL DIRECT ASSAY-MCNC: 154 MG/DL (ref 0–100)
POTASSIUM SERPL-SCNC: 4.3 MMOL/L (ref 3.5–5.3)
PROT SERPL-MCNC: 7.5 G/DL (ref 6.4–8.2)
PSA SERPL-MCNC: 1.1 NG/ML (ref 0–4)
SODIUM SERPL-SCNC: 139 MMOL/L (ref 136–145)
TRIGL SERPL-MCNC: 201 MG/DL
URATE SERPL-MCNC: 7 MG/DL (ref 4.2–8)
VIT B12 SERPL-MCNC: 505 PG/ML (ref 100–900)

## 2020-02-04 PROCEDURE — G0103 PSA SCREENING: HCPCS

## 2020-02-04 PROCEDURE — 84550 ASSAY OF BLOOD/URIC ACID: CPT

## 2020-02-04 PROCEDURE — 84478 ASSAY OF TRIGLYCERIDES: CPT

## 2020-02-04 PROCEDURE — 82746 ASSAY OF FOLIC ACID SERUM: CPT

## 2020-02-04 PROCEDURE — 82607 VITAMIN B-12: CPT

## 2020-02-04 PROCEDURE — 36415 COLL VENOUS BLD VENIPUNCTURE: CPT

## 2020-02-04 PROCEDURE — 83721 ASSAY OF BLOOD LIPOPROTEIN: CPT

## 2020-02-04 PROCEDURE — 80053 COMPREHEN METABOLIC PANEL: CPT

## 2020-02-05 DIAGNOSIS — R73.9 ELEVATED BLOOD SUGAR LEVEL: Primary | ICD-10-CM

## 2020-05-23 ENCOUNTER — OFFICE VISIT (OUTPATIENT)
Dept: URGENT CARE | Facility: CLINIC | Age: 60
End: 2020-05-23
Payer: COMMERCIAL

## 2020-05-23 VITALS
DIASTOLIC BLOOD PRESSURE: 60 MMHG | RESPIRATION RATE: 18 BRPM | HEART RATE: 87 BPM | BODY MASS INDEX: 38.64 KG/M2 | WEIGHT: 276 LBS | TEMPERATURE: 98 F | OXYGEN SATURATION: 99 % | SYSTOLIC BLOOD PRESSURE: 130 MMHG | HEIGHT: 71 IN

## 2020-05-23 DIAGNOSIS — S61.452A CAT BITE OF LEFT HAND, INITIAL ENCOUNTER: Primary | ICD-10-CM
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
## 2020-05-23 PROCEDURE — G0382 LEV 3 HOSP TYPE B ED VISIT: HCPCS | Performed by: PHYSICIAN ASSISTANT

## 2020-05-23 RX ORDER — AMOXICILLIN AND CLAVULANATE POTASSIUM 875; 125 MG/1; MG/1
1 TABLET, FILM COATED ORAL EVERY 12 HOURS SCHEDULED
Qty: 20 TABLET | Refills: 0 | Status: SHIPPED | OUTPATIENT
Start: 2020-05-23 | End: 2020-06-02

## 2020-07-23 ENCOUNTER — OFFICE VISIT (OUTPATIENT)
Dept: INTERNAL MEDICINE CLINIC | Facility: CLINIC | Age: 60
End: 2020-07-23
Payer: COMMERCIAL

## 2020-07-23 ENCOUNTER — APPOINTMENT (OUTPATIENT)
Dept: LAB | Facility: CLINIC | Age: 60
End: 2020-07-23
Payer: COMMERCIAL

## 2020-07-23 VITALS
OXYGEN SATURATION: 97 % | SYSTOLIC BLOOD PRESSURE: 130 MMHG | RESPIRATION RATE: 18 BRPM | WEIGHT: 274.6 LBS | DIASTOLIC BLOOD PRESSURE: 80 MMHG | BODY MASS INDEX: 38.44 KG/M2 | TEMPERATURE: 98.4 F | HEART RATE: 86 BPM | HEIGHT: 71 IN

## 2020-07-23 DIAGNOSIS — E83.19 IRON OVERLOAD: ICD-10-CM

## 2020-07-23 DIAGNOSIS — I10 BENIGN ESSENTIAL HYPERTENSION: ICD-10-CM

## 2020-07-23 DIAGNOSIS — L91.8 SKIN TAGS, MULTIPLE ACQUIRED: ICD-10-CM

## 2020-07-23 DIAGNOSIS — D75.89 MACROCYTOSIS: ICD-10-CM

## 2020-07-23 DIAGNOSIS — E78.2 MIXED HYPERLIPIDEMIA: ICD-10-CM

## 2020-07-23 DIAGNOSIS — M1A.9XX0 CHRONIC GOUT WITHOUT TOPHUS, UNSPECIFIED CAUSE, UNSPECIFIED SITE: ICD-10-CM

## 2020-07-23 DIAGNOSIS — R73.9 ELEVATED BLOOD SUGAR LEVEL: ICD-10-CM

## 2020-07-23 DIAGNOSIS — J42 CHRONIC BRONCHITIS, UNSPECIFIED CHRONIC BRONCHITIS TYPE (HCC): ICD-10-CM

## 2020-07-23 DIAGNOSIS — Z72.0 TOBACCO USE: ICD-10-CM

## 2020-07-23 DIAGNOSIS — Z12.11 SCREEN FOR COLON CANCER: ICD-10-CM

## 2020-07-23 DIAGNOSIS — I10 BENIGN ESSENTIAL HYPERTENSION: Primary | ICD-10-CM

## 2020-07-23 LAB
ALBUMIN SERPL BCP-MCNC: 3.9 G/DL (ref 3.5–5)
ALP SERPL-CCNC: 77 U/L (ref 46–116)
ALT SERPL W P-5'-P-CCNC: 100 U/L (ref 12–78)
ANION GAP SERPL CALCULATED.3IONS-SCNC: 7 MMOL/L (ref 4–13)
AST SERPL W P-5'-P-CCNC: 136 U/L (ref 5–45)
BASOPHILS # BLD AUTO: 0.04 THOUSANDS/ΜL (ref 0–0.1)
BASOPHILS NFR BLD AUTO: 1 % (ref 0–1)
BILIRUB SERPL-MCNC: 0.68 MG/DL (ref 0.2–1)
BUN SERPL-MCNC: 6 MG/DL (ref 5–25)
CALCIUM SERPL-MCNC: 9.8 MG/DL (ref 8.3–10.1)
CHLORIDE SERPL-SCNC: 101 MMOL/L (ref 100–108)
CHOLEST SERPL-MCNC: 166 MG/DL (ref 50–200)
CO2 SERPL-SCNC: 25 MMOL/L (ref 21–32)
CREAT SERPL-MCNC: 0.7 MG/DL (ref 0.6–1.3)
CREAT UR-MCNC: 258 MG/DL
EOSINOPHIL # BLD AUTO: 0.25 THOUSAND/ΜL (ref 0–0.61)
EOSINOPHIL NFR BLD AUTO: 5 % (ref 0–6)
ERYTHROCYTE [DISTWIDTH] IN BLOOD BY AUTOMATED COUNT: 13.3 % (ref 11.6–15.1)
EST. AVERAGE GLUCOSE BLD GHB EST-MCNC: 120 MG/DL
FERRITIN SERPL-MCNC: 562 NG/ML (ref 8–388)
GFR SERPL CREATININE-BSD FRML MDRD: 102 ML/MIN/1.73SQ M
GLUCOSE P FAST SERPL-MCNC: 110 MG/DL (ref 65–99)
HBA1C MFR BLD: 5.8 %
HCT VFR BLD AUTO: 42.9 % (ref 36.5–49.3)
HDLC SERPL-MCNC: 27 MG/DL
HGB BLD-MCNC: 14.9 G/DL (ref 12–17)
IMM GRANULOCYTES # BLD AUTO: 0.02 THOUSAND/UL (ref 0–0.2)
IMM GRANULOCYTES NFR BLD AUTO: 0 % (ref 0–2)
IRON SATN MFR SERPL: 37 %
IRON SERPL-MCNC: 113 UG/DL (ref 65–175)
LDLC SERPL CALC-MCNC: 104 MG/DL (ref 0–100)
LYMPHOCYTES # BLD AUTO: 1.21 THOUSANDS/ΜL (ref 0.6–4.47)
LYMPHOCYTES NFR BLD AUTO: 25 % (ref 14–44)
MCH RBC QN AUTO: 36 PG (ref 26.8–34.3)
MCHC RBC AUTO-ENTMCNC: 34.7 G/DL (ref 31.4–37.4)
MCV RBC AUTO: 104 FL (ref 82–98)
MICROALBUMIN UR-MCNC: 201 MG/L (ref 0–20)
MICROALBUMIN/CREAT 24H UR: 78 MG/G CREATININE (ref 0–30)
MONOCYTES # BLD AUTO: 0.51 THOUSAND/ΜL (ref 0.17–1.22)
MONOCYTES NFR BLD AUTO: 11 % (ref 4–12)
NEUTROPHILS # BLD AUTO: 2.83 THOUSANDS/ΜL (ref 1.85–7.62)
NEUTS SEG NFR BLD AUTO: 58 % (ref 43–75)
NRBC BLD AUTO-RTO: 0 /100 WBCS
PLATELET # BLD AUTO: 113 THOUSANDS/UL (ref 149–390)
PMV BLD AUTO: 10.8 FL (ref 8.9–12.7)
POTASSIUM SERPL-SCNC: 3.9 MMOL/L (ref 3.5–5.3)
PROT SERPL-MCNC: 7.3 G/DL (ref 6.4–8.2)
RBC # BLD AUTO: 4.14 MILLION/UL (ref 3.88–5.62)
SODIUM SERPL-SCNC: 133 MMOL/L (ref 136–145)
TIBC SERPL-MCNC: 304 UG/DL (ref 250–450)
TRIGL SERPL-MCNC: 175 MG/DL
TSH SERPL DL<=0.05 MIU/L-ACNC: 2.45 UIU/ML (ref 0.36–3.74)
URATE SERPL-MCNC: 6.4 MG/DL (ref 4.2–8)
WBC # BLD AUTO: 4.86 THOUSAND/UL (ref 4.31–10.16)

## 2020-07-23 PROCEDURE — 82570 ASSAY OF URINE CREATININE: CPT | Performed by: INTERNAL MEDICINE

## 2020-07-23 PROCEDURE — 3075F SYST BP GE 130 - 139MM HG: CPT | Performed by: INTERNAL MEDICINE

## 2020-07-23 PROCEDURE — 4004F PT TOBACCO SCREEN RCVD TLK: CPT | Performed by: INTERNAL MEDICINE

## 2020-07-23 PROCEDURE — 84550 ASSAY OF BLOOD/URIC ACID: CPT

## 2020-07-23 PROCEDURE — 85025 COMPLETE CBC W/AUTO DIFF WBC: CPT

## 2020-07-23 PROCEDURE — 80053 COMPREHEN METABOLIC PANEL: CPT

## 2020-07-23 PROCEDURE — 82728 ASSAY OF FERRITIN: CPT

## 2020-07-23 PROCEDURE — 84443 ASSAY THYROID STIM HORMONE: CPT

## 2020-07-23 PROCEDURE — 3079F DIAST BP 80-89 MM HG: CPT | Performed by: INTERNAL MEDICINE

## 2020-07-23 PROCEDURE — 83550 IRON BINDING TEST: CPT

## 2020-07-23 PROCEDURE — 83036 HEMOGLOBIN GLYCOSYLATED A1C: CPT

## 2020-07-23 PROCEDURE — 80061 LIPID PANEL: CPT

## 2020-07-23 PROCEDURE — 3008F BODY MASS INDEX DOCD: CPT | Performed by: INTERNAL MEDICINE

## 2020-07-23 PROCEDURE — 82043 UR ALBUMIN QUANTITATIVE: CPT | Performed by: INTERNAL MEDICINE

## 2020-07-23 PROCEDURE — 83540 ASSAY OF IRON: CPT

## 2020-07-23 PROCEDURE — 36415 COLL VENOUS BLD VENIPUNCTURE: CPT

## 2020-07-23 PROCEDURE — 99214 OFFICE O/P EST MOD 30 MIN: CPT | Performed by: INTERNAL MEDICINE

## 2020-07-23 NOTE — PATIENT INSTRUCTIONS

## 2020-07-23 NOTE — PROGRESS NOTES
Assessment/Plan:  Problem List Items Addressed This Visit        Respiratory    COPD (chronic obstructive pulmonary disease) ([facility] [address] )       Cardiovascular and Mediastinum    Benign essential hypertension - Primary    Relevant Orders    Comprehensive metabolic panel (Completed)    CBC and differential (Completed)    Microalbumin / creatinine urine ratio (Completed)       Other    Gout    Relevant Orders    Uric acid (Completed)    Hyperlipidemia    Relevant Orders    TSH, 3rd generation with Free T4 reflex (Completed)    Lipid Panel with Direct LDL reflex (Completed)    Iron overload    Relevant Orders    Iron Panel (Includes Ferritin, Iron Sat%, Iron, and TIBC) (Completed)    Macrocytosis    Tobacco use      Other Visit Diagnoses     Screen for colon cancer        Relevant Orders    Cologuard    Skin tags, multiple acquired               Diagnoses and all orders for this visit:    Benign essential hypertension  -     Comprehensive metabolic panel; Future  -     CBC and differential; Future  -     Microalbumin / creatinine urine ratio    Chronic bronchitis, unspecified chronic bronchitis type (HCC)    Chronic gout without tophus, unspecified cause, unspecified site  -     Uric acid; Future    Mixed hyperlipidemia  -     TSH, 3rd generation with Free T4 reflex; Future  -     Lipid Panel with Direct LDL reflex; Future    Iron overload  -     Iron Panel (Includes Ferritin, Iron Sat%, Iron, and TIBC); Future    Macrocytosis    Tobacco use    Screen for colon cancer  -     Cologuard    Skin tags, multiple acquired        No problem-specific Assessment & Plan notes found for this encounter  A/P: Doing well and will check labs  Wean tobacco  Discussed CRC  Continue current treatment  RTC four months for routine  Addendum 8/26: Pt is w/o his meds for htn as the insurance company denied Brand Necessary  Pt has tried generic ARB's and has experienced significant edema of the LE  Tolerated the brand w/o problems  Subjective:      Patient ID: Elena Barber is a 62 y o  male  WM RTC for f/u htn, hyperlipidemia, etc  Doing well and no new issues  Remains active w/o diffculty and no falls  Breathing is good  Still smoking  No gout attacks  Chronid pain is manageable  Due for labs and CRC  The following portions of the patient's history were reviewed and updated as appropriate:   He has a past medical history of Abnormal kidney function, Allergic rhinitis, Benign colon polyp, Carpal tunnel syndrome, Cervical radiculopathy, Chronic fatigue, Chronic pain disorder, COPD (chronic obstructive pulmonary disease) ([facility] [address] ), CPAP (continuous positive airway pressure) dependence, Edema, Elevated liver enzymes, Gout, Hyperlipidemia, Hypertension, Hypotension, Left ventricular hypertrophy, Obesity (BMI 30-39 9) (9/20/2018), and Sleep apnea  ,  does not have any pertinent problems on file  ,   has a past surgical history that includes Hemorrhoid surgery; Carpal tunnel release (Bilateral); and Liver biopsy  ,  family history includes Cancer in his mother; No Known Problems in his brother, father, maternal grandfather, maternal grandmother, paternal grandfather, paternal grandmother, and sister  ,   reports that he has been smoking  He has been smoking about 1 00 pack per day  He has never used smokeless tobacco  He reports current alcohol use of about 6 0 standard drinks of alcohol per week  He reports that he does not use drugs  ,  has No Known Allergies     Current Outpatient Medications   Medication Sig Dispense Refill    multivitamin (THERAGRAN) TABS Take 1 tablet by mouth daily      Omega-3 Fatty Acids (FISH OIL) 1,000 mg Take 1,000 mg by mouth daily      Benicar 40 MG tablet Take 1 tablet (40 mg total) by mouth daily 14 tablet 0     No current facility-administered medications for this visit  Review of Systems   Constitutional: Negative for activity change, chills, diaphoresis, fatigue and fever  HENT: Negative      Eyes: Negative for visual disturbance  Respiratory: Negative for cough, chest tightness, shortness of breath and wheezing  Cardiovascular: Negative for chest pain, palpitations and leg swelling  Gastrointestinal: Negative for abdominal pain, constipation, diarrhea, nausea and vomiting  Endocrine: Negative for cold intolerance and heat intolerance  Genitourinary: Negative for difficulty urinating, dysuria and frequency  Musculoskeletal: Negative for arthralgias, gait problem and myalgias  Neurological: Negative for dizziness, seizures, syncope, weakness, light-headedness and headaches  Psychiatric/Behavioral: Negative for confusion, dysphoric mood and sleep disturbance  The patient is not nervous/anxious  PHQ-9 Depression Screening    PHQ-9:    Frequency of the following problems over the past two weeks:       Little interest or pleasure in doing things:  0 - not at all  Feeling down, depressed, or hopeless:  0 - not at all  PHQ-2 Score:  0        Objective:  Vitals:    07/23/20 0739   BP: 130/80   BP Location: Left arm   Patient Position: Sitting   Cuff Size: Large   Pulse: 86   Resp: 18   Temp: 98 4 °F (36 9 °C)   SpO2: 97%   Weight: 125 kg (274 lb 9 6 oz)   Height: 5' 10 5" (1 791 m)     Body mass index is 38 84 kg/m²  Physical Exam   Constitutional: He is oriented to person, place, and time  He appears well-developed and well-nourished  No distress  HENT:   Head: Normocephalic and atraumatic  Mouth/Throat: Oropharynx is clear and moist    Eyes: Pupils are equal, round, and reactive to light  Conjunctivae and EOM are normal    Neck: Neck supple  No JVD present  Cardiovascular: Normal rate, regular rhythm and normal heart sounds  Pulmonary/Chest: Effort normal and breath sounds normal  No respiratory distress  He has no wheezes  He has no rales  Abdominal: Soft  Bowel sounds are normal  He exhibits no distension  There is no tenderness     Musculoskeletal: He exhibits edema (bilat LE edema 2/4)  Neurological: He is alert and oriented to person, place, and time  Psychiatric: He has a normal mood and affect  His behavior is normal  Judgment and thought content normal    Nursing note reviewed

## 2020-07-24 DIAGNOSIS — R79.9 ABNORMAL BLOOD CHEMISTRY LEVEL: Primary | ICD-10-CM

## 2020-08-03 DIAGNOSIS — I10 BENIGN ESSENTIAL HYPERTENSION: ICD-10-CM

## 2020-08-03 RX ORDER — OLMESARTAN MEDOXOMIL 40 MG/1
40 TABLET, FILM COATED ORAL DAILY
Qty: 90 TABLET | Refills: 1 | Status: SHIPPED | OUTPATIENT
Start: 2020-08-03 | End: 2020-08-25 | Stop reason: SDUPTHER

## 2020-08-25 DIAGNOSIS — I10 BENIGN ESSENTIAL HYPERTENSION: ICD-10-CM

## 2020-08-25 RX ORDER — OLMESARTAN MEDOXOMIL 40 MG/1
40 TABLET, FILM COATED ORAL DAILY
Qty: 14 TABLET | Refills: 0 | Status: SHIPPED | OUTPATIENT
Start: 2020-08-25 | End: 2020-08-28 | Stop reason: SDUPTHER

## 2020-08-25 NOTE — TELEPHONE ENCOUNTER
Put in a new rx for 14 days to [address] [place] [address]  We did not receive the authorization results back as of yet

## 2020-08-25 NOTE — TELEPHONE ENCOUNTER
Pt's wife called re the rx for his blood pressure med-he needs the brand benicar-she asked about the prior author-if it went thru  He has been out for 2 wks-they are still waiting-    She wants to know if we can send in a 2 wks supply to [facility] because he is taking nothing  His legs are both swollen-from the foot all the way up to his knee      Please call her with any Jerl Manifold

## 2020-08-26 ENCOUNTER — TELEPHONE (OUTPATIENT)
Dept: INTERNAL MEDICINE CLINIC | Facility: CLINIC | Age: 60
End: 2020-08-26

## 2020-08-26 NOTE — TELEPHONE ENCOUNTER
Patients insurance company has denied the patients Brand Necessary Benicar twice now because he has never tried at least two of the generic medications with include: olmesartan,losartan,valsartan,telmisartan or irbesartan  He has tried Olmesartan but it caused his legs to swell  Also there is no documentation in his office notes that state patient has tried generic but made legs swell, must be on brand necessary  Maybe you can addend his last office visit note to put that in? He has now been out of this medication for two weeks and his legs are starting to swell

## 2020-08-28 DIAGNOSIS — I10 BENIGN ESSENTIAL HYPERTENSION: ICD-10-CM

## 2020-08-28 RX ORDER — OLMESARTAN MEDOXOMIL 40 MG/1
40 TABLET, FILM COATED ORAL DAILY
Qty: 90 TABLET | Refills: 3 | Status: SHIPPED | OUTPATIENT
Start: 2020-08-28 | End: 2021-01-28 | Stop reason: SDUPTHER

## 2020-09-03 ENCOUNTER — APPOINTMENT (OUTPATIENT)
Dept: LAB | Facility: CLINIC | Age: 60
End: 2020-09-03
Payer: COMMERCIAL

## 2020-09-03 DIAGNOSIS — R79.9 ABNORMAL BLOOD CHEMISTRY LEVEL: ICD-10-CM

## 2020-09-03 LAB
ALBUMIN SERPL BCP-MCNC: 3.8 G/DL (ref 3.5–5)
ALP SERPL-CCNC: 73 U/L (ref 46–116)
ALT SERPL W P-5'-P-CCNC: 68 U/L (ref 12–78)
AST SERPL W P-5'-P-CCNC: 109 U/L (ref 5–45)
BILIRUB DIRECT SERPL-MCNC: 0.33 MG/DL (ref 0–0.2)
BILIRUB SERPL-MCNC: 0.91 MG/DL (ref 0.2–1)
FOLATE SERPL-MCNC: >20 NG/ML (ref 3.1–17.5)
PROT SERPL-MCNC: 7.5 G/DL (ref 6.4–8.2)
VIT B12 SERPL-MCNC: 683 PG/ML (ref 100–900)

## 2020-09-03 PROCEDURE — 82607 VITAMIN B-12: CPT

## 2020-09-03 PROCEDURE — 82746 ASSAY OF FOLIC ACID SERUM: CPT

## 2020-09-03 PROCEDURE — 36415 COLL VENOUS BLD VENIPUNCTURE: CPT

## 2020-09-03 PROCEDURE — 80076 HEPATIC FUNCTION PANEL: CPT

## 2021-01-04 ENCOUNTER — NURSE TRIAGE (OUTPATIENT)
Dept: OTHER | Facility: OTHER | Age: 61
End: 2021-01-04

## 2021-01-04 NOTE — TELEPHONE ENCOUNTER
Pt didn't want care advice he just wanted an appointment and was angry when I couldn't make one  He will call the office

## 2021-01-04 NOTE — TELEPHONE ENCOUNTER
Regarding: Bile in Stool  ----- Message from [employer] sent at 1/4/2021  6:53 AM EST -----  "I have bile in my stool, no desire to eat and am consistently gaining weight "

## 2021-01-05 ENCOUNTER — TELEMEDICINE (OUTPATIENT)
Dept: INTERNAL MEDICINE CLINIC | Facility: CLINIC | Age: 61
End: 2021-01-05
Payer: COMMERCIAL

## 2021-01-05 VITALS — TEMPERATURE: 97.3 F | WEIGHT: 285 LBS | BODY MASS INDEX: 39.9 KG/M2 | HEIGHT: 71 IN

## 2021-01-05 DIAGNOSIS — R19.5 ABNORMAL STOOLS: Primary | ICD-10-CM

## 2021-01-05 DIAGNOSIS — R19.7 DIARRHEA, UNSPECIFIED TYPE: ICD-10-CM

## 2021-01-05 PROCEDURE — 99214 OFFICE O/P EST MOD 30 MIN: CPT | Performed by: PHYSICIAN ASSISTANT

## 2021-01-05 NOTE — PROGRESS NOTES
Virtual Regular Visit      Assessment/Plan:    Problem List Items Addressed This Visit        Other    Abnormal stools - Primary     Pt with soft stools with bile x 6 months and worsening  Hx of heavy ETOH use  Concern about pancreatic or liver issue  Order complete labs  Colonoscopy ordered as well  Treat according to results  Decrease alcohol use  Relevant Orders    Ambulatory referral to Gastroenterology      Other Visit Diagnoses     Diarrhea, unspecified type        Relevant Orders    Lipase    Amylase    Comprehensive metabolic panel    CBC and differential    Celiac Disease Antibody Profile               Reason for visit is   Chief Complaint   Patient presents with    Follow-up     Pt c/o bile in stool x 6 months intermittently, but becoming worse, increased weight gain   depression screen up date   Virtual Regular Visit        Encounter provider Emerald Paul PA-C    Provider located at [address] [place] [id number]      Recent Visits  No visits were found meeting these conditions  Showing recent visits within past 7 days and meeting all other requirements     Today's Visits  Date Type Provider Dept   01/05/21 Telemedicine ELIAZAR Luis Pg today's visits and meeting all other requirements     Future Appointments  No visits were found meeting these conditions  Showing future appointments within next 150 days and meeting all other requirements        The patient was identified by name and date of birth  Rashel Barakat was informed that this is a telemedicine visit and that the visit is being conducted through [facility] and patient was informed that this is a secure, HIPAA-compliant platform  He agrees to proceed     My office door was closed  No one else was in the room  He acknowledged consent and understanding of privacy and security of the video platform   The patient has agreed to participate and understands they can discontinue the visit at any time  Patient is aware this is a billable service  Subjective  Gold Rahman is a 56 y o  male    Diarrhea   This is a chronic problem  The current episode started more than 1 month ago  The problem occurs 2 to 4 times per day  The problem has been gradually worsening  The stool consistency is described as mucous  The patient states that diarrhea does not awaken him from sleep  Pertinent negatives include no abdominal pain, arthralgias, chills, coughing, fever, headaches, myalgias or vomiting  Nothing aggravates the symptoms  Risk factors: alcohol use  He has tried nothing for the symptoms  Past Medical History:   Diagnosis Date    Abnormal kidney function     last assessed: Feb 25, 2016    Allergic rhinitis     Benign colon polyp     Carpal tunnel syndrome     Cervical radiculopathy     last assessed: Feb 11, 2015    Chronic fatigue     Chronic pain disorder     COPD (chronic obstructive pulmonary disease) (HCC)     CPAP (continuous positive airway pressure) dependence     Edema     Elevated liver enzymes     Gout     Hyperlipidemia     Hypertension     Hypotension     last assessed: March 22, 2017    Left ventricular hypertrophy     Obesity (BMI 30-39 9) 9/20/2018    Sleep apnea        Past Surgical History:   Procedure Laterality Date    CARPAL TUNNEL RELEASE Bilateral     HEMORRHOID SURGERY      LIVER BIOPSY         Current Outpatient Medications   Medication Sig Dispense Refill    Benicar 40 MG tablet Take 1 tablet (40 mg total) by mouth daily 90 tablet 3    multivitamin (THERAGRAN) TABS Take 1 tablet by mouth daily      Omega-3 Fatty Acids (FISH OIL) 1,000 mg Take 1,000 mg by mouth daily       No current facility-administered medications for this visit  No Known Allergies    Review of Systems   Constitutional: Negative for chills and fever     HENT: Negative for congestion, ear pain, hearing loss, postnasal drip, rhinorrhea, sinus pressure, sinus pain, sore throat and trouble swallowing  Eyes: Negative for pain and visual disturbance  Respiratory: Negative for cough, chest tightness, shortness of breath and wheezing  Cardiovascular: Negative  Negative for chest pain, palpitations and leg swelling  Gastrointestinal: Positive for diarrhea  Negative for abdominal pain, blood in stool, constipation, nausea and vomiting  Endocrine: Negative for cold intolerance, heat intolerance, polydipsia, polyphagia and polyuria  Genitourinary: Negative for difficulty urinating, dysuria, flank pain and urgency  Musculoskeletal: Negative for arthralgias, back pain, gait problem and myalgias  Skin: Negative for rash  Allergic/Immunologic: Negative  Neurological: Negative for dizziness, weakness, light-headedness and headaches  Hematological: Negative  Psychiatric/Behavioral: Negative for behavioral problems, dysphoric mood and sleep disturbance  The patient is not nervous/anxious  Video Exam    Vitals:    01/05/21 1430   Temp: (!) 97 3 °F (36 3 °C)   Weight: 129 kg (285 lb)   Height: 5' 10 5" (1 791 m)       Physical Exam  Constitutional:       Appearance: He is well-developed  HENT:      Nose: Nose normal    Eyes:      Conjunctiva/sclera: Conjunctivae normal    Neck:      Musculoskeletal: Normal range of motion  Pulmonary:      Effort: Pulmonary effort is normal    Musculoskeletal: Normal range of motion  Neurological:      Mental Status: He is alert  Psychiatric:         Behavior: Behavior normal          Thought Content: Thought content normal          Judgment: Judgment normal           I spent 15 minutes directly with the patient during this visit      VIRTUAL VISIT DISCLAIMER    Rashel Barakat acknowledges that he has consented to an online visit or consultation   He understands that the online visit is based solely on information provided by him, and that, in the absence of a face-to-face physical evaluation by the physician, the diagnosis he receives is both limited and provisional in terms of accuracy and completeness  This is not intended to replace a full medical face-to-face evaluation by the physician  Linda Mckeon understands and accepts these terms

## 2021-01-05 NOTE — ASSESSMENT & PLAN NOTE
Pt with soft stools with bile x 6 months and worsening  Hx of heavy ETOH use  Concern about pancreatic or liver issue  Order complete labs  Colonoscopy ordered as well  Treat according to results  Decrease alcohol use

## 2021-01-06 ENCOUNTER — LAB (OUTPATIENT)
Dept: LAB | Facility: CLINIC | Age: 61
End: 2021-01-06
Payer: COMMERCIAL

## 2021-01-06 DIAGNOSIS — R19.7 DIARRHEA, UNSPECIFIED TYPE: ICD-10-CM

## 2021-01-06 LAB
ALBUMIN SERPL BCP-MCNC: 4 G/DL (ref 3.5–5)
ALP SERPL-CCNC: 111 U/L (ref 46–116)
ALT SERPL W P-5'-P-CCNC: 65 U/L (ref 12–78)
AMYLASE SERPL-CCNC: 51 IU/L (ref 25–115)
ANION GAP SERPL CALCULATED.3IONS-SCNC: 5 MMOL/L (ref 4–13)
AST SERPL W P-5'-P-CCNC: 110 U/L (ref 5–45)
BASOPHILS # BLD AUTO: 0.08 THOUSANDS/ΜL (ref 0–0.1)
BASOPHILS NFR BLD AUTO: 1 % (ref 0–1)
BILIRUB SERPL-MCNC: 1.61 MG/DL (ref 0.2–1)
BUN SERPL-MCNC: 10 MG/DL (ref 5–25)
CALCIUM SERPL-MCNC: 9.4 MG/DL (ref 8.3–10.1)
CHLORIDE SERPL-SCNC: 104 MMOL/L (ref 100–108)
CO2 SERPL-SCNC: 27 MMOL/L (ref 21–32)
CREAT SERPL-MCNC: 0.74 MG/DL (ref 0.6–1.3)
EOSINOPHIL # BLD AUTO: 0.28 THOUSAND/ΜL (ref 0–0.61)
EOSINOPHIL NFR BLD AUTO: 3 % (ref 0–6)
ERYTHROCYTE [DISTWIDTH] IN BLOOD BY AUTOMATED COUNT: 14.2 % (ref 11.6–15.1)
GFR SERPL CREATININE-BSD FRML MDRD: 99 ML/MIN/1.73SQ M
GLUCOSE P FAST SERPL-MCNC: 122 MG/DL (ref 65–99)
HCT VFR BLD AUTO: 42.8 % (ref 36.5–49.3)
HGB BLD-MCNC: 14.2 G/DL (ref 12–17)
IMM GRANULOCYTES # BLD AUTO: 0.04 THOUSAND/UL (ref 0–0.2)
IMM GRANULOCYTES NFR BLD AUTO: 0 % (ref 0–2)
LIPASE SERPL-CCNC: 358 U/L (ref 73–393)
LYMPHOCYTES # BLD AUTO: 1.55 THOUSANDS/ΜL (ref 0.6–4.47)
LYMPHOCYTES NFR BLD AUTO: 16 % (ref 14–44)
MCH RBC QN AUTO: 35.1 PG (ref 26.8–34.3)
MCHC RBC AUTO-ENTMCNC: 33.2 G/DL (ref 31.4–37.4)
MCV RBC AUTO: 106 FL (ref 82–98)
MONOCYTES # BLD AUTO: 1.18 THOUSAND/ΜL (ref 0.17–1.22)
MONOCYTES NFR BLD AUTO: 12 % (ref 4–12)
NEUTROPHILS # BLD AUTO: 6.4 THOUSANDS/ΜL (ref 1.85–7.62)
NEUTS SEG NFR BLD AUTO: 68 % (ref 43–75)
NRBC BLD AUTO-RTO: 0 /100 WBCS
PLATELET # BLD AUTO: 156 THOUSANDS/UL (ref 149–390)
PMV BLD AUTO: 11 FL (ref 8.9–12.7)
POTASSIUM SERPL-SCNC: 4.6 MMOL/L (ref 3.5–5.3)
PROT SERPL-MCNC: 7.7 G/DL (ref 6.4–8.2)
RBC # BLD AUTO: 4.04 MILLION/UL (ref 3.88–5.62)
SODIUM SERPL-SCNC: 136 MMOL/L (ref 136–145)
WBC # BLD AUTO: 9.53 THOUSAND/UL (ref 4.31–10.16)

## 2021-01-06 PROCEDURE — 85025 COMPLETE CBC W/AUTO DIFF WBC: CPT

## 2021-01-06 PROCEDURE — 86255 FLUORESCENT ANTIBODY SCREEN: CPT

## 2021-01-06 PROCEDURE — 83690 ASSAY OF LIPASE: CPT

## 2021-01-06 PROCEDURE — 80053 COMPREHEN METABOLIC PANEL: CPT

## 2021-01-06 PROCEDURE — 82784 ASSAY IGA/IGD/IGG/IGM EACH: CPT

## 2021-01-06 PROCEDURE — 83516 IMMUNOASSAY NONANTIBODY: CPT

## 2021-01-06 PROCEDURE — 36415 COLL VENOUS BLD VENIPUNCTURE: CPT

## 2021-01-06 PROCEDURE — 82150 ASSAY OF AMYLASE: CPT

## 2021-01-08 LAB
ENDOMYSIUM IGA SER QL: NEGATIVE
GLIADIN PEPTIDE IGA SER-ACNC: 10 UNITS (ref 0–19)
GLIADIN PEPTIDE IGG SER-ACNC: 12 UNITS (ref 0–19)
IGA SERPL-MCNC: 380 MG/DL (ref 61–437)
TTG IGA SER-ACNC: <2 U/ML (ref 0–3)
TTG IGG SER-ACNC: <2 U/ML (ref 0–5)

## 2021-01-28 ENCOUNTER — OFFICE VISIT (OUTPATIENT)
Dept: INTERNAL MEDICINE CLINIC | Facility: CLINIC | Age: 61
End: 2021-01-28
Payer: COMMERCIAL

## 2021-01-28 VITALS
TEMPERATURE: 98.7 F | WEIGHT: 275.2 LBS | DIASTOLIC BLOOD PRESSURE: 76 MMHG | HEIGHT: 71 IN | RESPIRATION RATE: 14 BRPM | HEART RATE: 95 BPM | OXYGEN SATURATION: 97 % | BODY MASS INDEX: 38.53 KG/M2 | SYSTOLIC BLOOD PRESSURE: 138 MMHG

## 2021-01-28 DIAGNOSIS — R19.5 CHANGE IN STOOL: ICD-10-CM

## 2021-01-28 DIAGNOSIS — I10 BENIGN ESSENTIAL HYPERTENSION: Primary | ICD-10-CM

## 2021-01-28 DIAGNOSIS — Z12.5 SCREENING FOR PROSTATE CANCER: ICD-10-CM

## 2021-01-28 DIAGNOSIS — E78.2 MIXED HYPERLIPIDEMIA: ICD-10-CM

## 2021-01-28 DIAGNOSIS — M1A.9XX0 CHRONIC GOUT WITHOUT TOPHUS, UNSPECIFIED CAUSE, UNSPECIFIED SITE: ICD-10-CM

## 2021-01-28 DIAGNOSIS — Z13.1 SCREENING FOR DIABETES MELLITUS (DM): ICD-10-CM

## 2021-01-28 DIAGNOSIS — Z72.0 TOBACCO USE: ICD-10-CM

## 2021-01-28 DIAGNOSIS — J42 CHRONIC BRONCHITIS, UNSPECIFIED CHRONIC BRONCHITIS TYPE (HCC): ICD-10-CM

## 2021-01-28 DIAGNOSIS — Z13.220 SCREENING FOR LIPID DISORDERS: ICD-10-CM

## 2021-01-28 PROCEDURE — 99214 OFFICE O/P EST MOD 30 MIN: CPT | Performed by: INTERNAL MEDICINE

## 2021-01-28 RX ORDER — OLMESARTAN MEDOXOMIL 40 MG/1
40 TABLET, FILM COATED ORAL DAILY
Qty: 90 TABLET | Refills: 3 | Status: SHIPPED | OUTPATIENT
Start: 2021-01-28 | End: 2021-02-16 | Stop reason: SDUPTHER

## 2021-01-28 NOTE — PATIENT INSTRUCTIONS
Chronic Hypertension   AMBULATORY CARE:   Hypertension  is high blood pressure  Your blood pressure is the force of your blood moving against the walls of your arteries  Hypertension causes your blood pressure to get so high that your heart has to work much harder than normal  This can damage your heart  Even if you have hypertension for years, lifestyle changes, medicines, or both can help bring your blood pressure to normal   Call 911 for any of the following:   · You have chest pain  · You have any of the following signs of a heart attack:      ? Squeezing, pressure, or pain in your chest    ? You may  also have any of the following:     § Discomfort or pain in your back, neck, jaw, stomach, or arm    § Shortness of breath    § Nausea or vomiting    § Lightheadedness or a sudden cold sweat    · You become confused or have difficulty speaking  · You suddenly feel lightheaded or have trouble breathing  Seek care immediately if:   · You have a severe headache or vision loss  · You have weakness in an arm or leg  Contact your healthcare provider if:   · You feel faint, dizzy, confused, or drowsy  · You have been taking your blood pressure medicine but your pressure is higher than your provider says it should be  · You have questions or concerns about your condition or care  Treatment for chronic hypertension  may include medicine to lower your blood pressure and cholesterol levels  A low cholesterol level helps prevent heart disease and makes it easier to control your blood pressure  Heart disease can make your blood pressure harder to control  You may also need to make lifestyle changes  What you need to know about the stages of hypertension:       · Normal blood pressure is 119/79 or lower   Your healthcare provider may only check your blood pressure each year if it stays at a normal level  · Elevated blood pressure is 120/79 to 129/79   This is sometimes called prehypertension   Your healthcare provider may suggest lifestyle changes to help lower your blood pressure to a normal level  He or she may then check it again in 3 to 6 months  · Stage 1 hypertension is 130/80  to 139/89   Your provider may recommend lifestyle changes, medication, and checks every 3 to 6 months until your blood pressure is controlled  · Stage 2 hypertension is 140/90 or higher   Your provider will recommend lifestyle changes and have you take 2 kinds of hypertension medicines  You will also need to have your blood pressure checked monthly until it is controlled  Manage chronic hypertension:   · Check your blood pressure at home  Avoid smoking, caffeine, and exercise at least 30 minutes before checking your blood pressure  Sit and rest for 5 minutes before you take your blood pressure  Extend your arm and support it on a flat surface  Your arm should be at the same level as your heart  Follow the directions that came with your blood pressure monitor  Check your blood pressure 2 times, 1 minute apart, before you take your medicine in the morning  Also check your blood pressure before your evening meal  Keep a record of your readings and bring it to your follow-up visits  Ask your healthcare provider what your blood pressure should be  · Manage any other health conditions you have  Health conditions such as diabetes can increase your risk for hypertension  Follow your healthcare provider's instructions and take all your medicines as directed  Talk to your healthcare provider about any new health conditions you have recently developed  · Ask about all medicines  Certain medicines can increase your blood pressure  Examples include oral birth control pills, decongestants, herbal supplements, and NSAIDs, such as ibuprofen  Your healthcare provider can tell you which medicines are safe for you to take  This includes prescription and over-the-counter medicines      Lifestyle changes you can make to lower your blood pressure: Your provider may want you to make more lifestyle changes if you are having trouble controlling your blood pressure  This may feel difficult over time, especially if you think you are making good changes but your pressure is still high  It might help to focus on one new change at a time  For example, try to add 1 more day of exercise, or exercise for an extra 10 minutes on 2 days  Small changes can make a big difference  Your healthcare provider can also refer you to specialists such as a dietitian who can help you make small changes  · Limit sodium (salt) as directed  Too much sodium can affect your fluid balance  Check labels to find low-sodium or no-salt-added foods  Some low-sodium foods use potassium salts for flavor  Too much potassium can also cause health problems  Your healthcare provider will tell you how much sodium and potassium are safe for you to have in a day  He or she may recommend that you limit sodium to 2,300 mg a day  · Follow the meal plan recommended by your healthcare provider  A dietitian or your provider can give you more information on low-sodium plans or the DASH (Dietary Approaches to Stop Hypertension) eating plan  The DASH plan is low in sodium, unhealthy fats, and total fat  It is high in potassium, calcium, and fiber  · Exercise to maintain a healthy weight  Exercise at least 30 minutes per day, on most days of the week  This will help decrease your blood pressure  Ask your healthcare provider about the best exercise plan for you  · Decrease stress  This may help lower your blood pressure  Learn ways to relax, such as deep breathing or listening to music  · Limit alcohol as directed  Alcohol can increase your blood pressure  A drink of alcohol is 12 ounces of beer, 5 ounces of wine, or 1½ ounces of liquor  · Do not smoke    Nicotine and other chemicals in cigarettes and cigars can increase your blood pressure and also cause lung damage  Ask your healthcare provider for information if you currently smoke and need help to quit  E-cigarettes or smokeless tobacco still contain nicotine  Talk to your healthcare provider before you use these products  Follow up with your healthcare provider as directed: You will need to return to have your blood pressure checked and to have other lab tests done  Write down your questions so you remember to ask them during your visits  © Copyright [address] Information is for End User's use only and may not be sold, redistributed or otherwise used for commercial purposes  All illustrations and images included in CareNotes® are the copyrighted property of [employer] M [employer]  or [employer]   The above information is an  only  It is not intended as medical advice for individual conditions or treatments  Talk to your doctor, nurse or pharmacist before following any medical regimen to see if it is safe and effective for you  Obesity   AMBULATORY CARE:   Obesity  is when your body mass index (BMI) is greater than 30  Your healthcare provider will use your height and weight to measure your BMI  The risks of obesity include  many health problems, such as injuries or physical disability  You may need tests to check for the following:  · Diabetes    · High blood pressure or high cholesterol    · Heart disease    · Gallbladder or liver disease    · Cancer of the colon, breast, prostate, liver, or kidney    · Sleep apnea    · Arthritis or gout    Seek care immediately if:   · You have a severe headache, confusion, or difficulty speaking  · You have weakness on one side of your body  · You have chest pain, sweating, or shortness of breath  Contact your healthcare provider if:   · You have symptoms of gallbladder or liver disease, such as pain in your upper abdomen  · You have knee or hip pain and discomfort while walking      · You have symptoms of diabetes, such as intense hunger and thirst, and frequent urination  · You have symptoms of sleep apnea, such as snoring or daytime sleepiness  · You have questions or concerns about your condition or care  Treatment for obesity  focuses on helping you lose weight to improve your health  Even a small decrease in BMI can reduce the risk for many health problems  Your healthcare provider will help you set a weight-loss goal   · Lifestyle changes  are the first step in treating obesity  These include making healthy food choices and getting regular physical activity  Your healthcare provider may suggest a weight-loss program that involves coaching, education, and therapy  · Medicine  may help you lose weight when it is used with a healthy diet and physical activity  · Surgery  can help you lose weight if you are very obese and have other health problems  There are several types of weight-loss surgery  Ask your healthcare provider for more information  Be successful losing weight:   · Set small, realistic goals  An example of a small goal is to walk for 20 minutes 5 days a week  Anther goal is to lose 5% of your body weight  · Tell friends, family members, and coworkers about your goals  and ask for their support  Ask a friend to lose weight with you, or join a weight-loss support group  · Identify foods or triggers that may cause you to overeat , and find ways to avoid them  Remove tempting high-calorie foods from your home and workplace  Place a bowl of fresh fruit on your kitchen counter  If stress causes you to eat, then find other ways to cope with stress  · Keep a diary to track what you eat and drink  Also write down how many minutes of physical activity you do each day  Weigh yourself once a week and record it in your diary  Eating changes: You will need to eat 500 to 1,000 fewer calories each day than you currently eat to lose 1 to 2 pounds a week   The following changes will help you cut calories:  · Eat smaller portions  Use small plates, no larger than 9 inches in diameter  Fill your plate half full of fruits and vegetables  Measure your food using measuring cups until you know what a serving size looks like  · Eat 3 meals and 1 or 2 snacks each day  Plan your meals in advance  Gerardo Johnson and eat at home most of the time  Eat slowly  Do not skip meals  Skipping meals can lead to overeating later in the day  This can make it harder for you to lose weight  Talk with a dietitian to help you make a meal plan and schedule that is right for you  · Eat fruits and vegetables at every meal   They are low in calories and high in fiber, which makes you feel full  Do not add butter, margarine, or cream sauce to vegetables  Use herbs to season steamed vegetables  · Eat less fat and fewer fried foods  Eat more baked or grilled chicken and fish  These protein sources are lower in calories and fat than red meat  Limit fast food  Dress your salads with olive oil and vinegar instead of bottled dressing  · Limit the amount of sugar you eat  Do not drink sugary beverages  Limit alcohol  Activity changes:  Physical activity is good for your body in many ways  It helps you burn calories and build strong muscles  It decreases stress and depression, and improves your mood  It can also help you sleep better  Talk to your healthcare provider before you begin an exercise program   · Exercise for at least 30 minutes 5 days a week  Start slowly  Set aside time each day for physical activity that you enjoy and that is convenient for you  It is best to do both weight training and an activity that increases your heart rate, such as walking, bicycling, or swimming  · Find ways to be more active  Do yard work and housecleaning  Walk up the stairs instead of using elevators  Spend your leisure time going to events that require walking, such as outdoor festivals or fairs   This extra physical activity can help you lose weight and keep it off  Follow up with your healthcare provider as directed: You may need to meet with a dietitian  Write down your questions so you remember to ask them during your visits  © Copyright [address] Information is for End User's use only and may not be sold, redistributed or otherwise used for commercial purposes  All illustrations and images included in CareNotes® are the copyrighted property of [employer]  or [facility] [place] [facility] [place]   The above information is an  only  It is not intended as medical advice for individual conditions or treatments  Talk to your doctor, nurse or pharmacist before following any medical regimen to see if it is safe and effective for you  Low Fat Diet   AMBULATORY CARE:   A low-fat diet  is an eating plan that is low in total fat, unhealthy fat, and cholesterol  You may need to follow a low-fat diet if you have trouble digesting or absorbing fat  You may also need to follow this diet if you have high cholesterol  You can also lower your cholesterol by increasing the amount of fiber in your diet  Soluble fiber is a type of fiber that helps to decrease cholesterol levels  Different types of fat in food:   · Limit unhealthy fats  A diet that is high in cholesterol, saturated fat, and trans fat may cause unhealthy cholesterol levels  Unhealthy cholesterol levels increase your risk of heart disease  ? Cholesterol:  Limit intake of cholesterol to less than 200 mg per day  Cholesterol is found in meat, eggs, and dairy  ? Saturated fat:  Limit saturated fat to less than 7% of your total daily calories  Ask your dietitian how many calories you need each day  Saturated fat is found in butter, cheese, ice cream, whole milk, and palm oil  Saturated fat is also found in meat, such as beef, pork, chicken skin, and processed meats  Processed meats include sausage, hot dogs, and bologna  ? Trans fat:  Avoid trans fat as much as possible   Trans fat is used in fried and baked foods  Foods that say trans fat free on the label may still have up to 0 5 grams of trans fat per serving  · Include healthy fats  Replace foods that are high in saturated and trans fat with foods high in healthy fats  This may help to decrease high cholesterol levels  ? Monounsaturated fats: These are found in avocados, nuts, and vegetable oils, such as olive, canola, and sunflower oil  ? Polyunsaturated fats: These can be found in vegetable oils, such as soybean or corn oil  Omega-3 fats can help to decrease the risk of heart disease  Omega-3 fats are found in fish, such as salmon, herring, trout, and tuna  Omega-3 fats can also be found in plant foods, such as walnuts, flaxseed, soybeans, and canola oil  Foods to limit or avoid:   · Grains:      ? Snacks that are made with partially hydrogenated oils, such as chips, regular crackers, and butter-flavored popcorn    ? High-fat baked goods, such as biscuits, croissants, doughnuts, pies, cookies, and pastries    · Dairy:      ? Whole milk, 2% milk, and yogurt and ice cream made with whole milk    ? Half and half creamer, heavy cream, and whipping cream    ? Cheese, cream cheese, and sour cream    · Meats and proteins:      ? High-fat cuts of meat (T-bone steak, regular hamburger, and ribs)    ? Fried meat, poultry (turkey and chicken), and fish    ? Poultry (chicken and turkey) with skin    ? Cold cuts (salami or bologna), hot dogs, stone, and sausage    ? Whole eggs and egg yolks    · Vegetables and fruits with added fat:      ? Fried vegetables or vegetables in butter or high-fat sauces, such as cream or cheese sauces    ? Fried fruit or fruit served with butter or cream    · Fats:      ? Butter, stick margarine, and shortening    ? Coconut, palm oil, and palm kernel oil    Foods to include:   · Grains:      ? Whole-grain breads, cereals, pasta, and brown rice    ?  Low-fat crackers and pretzels    · Vegetables and fruits:      ? Fresh, frozen, or canned vegetables (no salt or low-sodium)    ? Fresh, frozen, dried, or canned fruit (canned in light syrup or fruit juice)    ? Avocado    · Low-fat dairy products:      ? Nonfat (skim) or 1% milk    ? Nonfat or low-fat cheese, yogurt, and cottage cheese    · Meats and proteins:      ? Chicken or turkey with no skin    ? Baked or broiled fish    ? Lean beef and pork (loin, round, extra lean hamburger)    ? Beans and peas, unsalted nuts, soy products    ? Egg whites and substitutes    ? Seeds and nuts    · Fats:      ? Unsaturated oil, such as canola, olive, peanut, soybean, or sunflower oil    ? Soft or liquid margarine and vegetable oil spread    ? Low-fat salad dressing    Other ways to decrease fat:   · Read food labels before you buy foods  Choose foods that have less than 30% of calories from fat  Choose low-fat or fat-free dairy products  Remember that fat free does not mean calorie free  These foods still contain calories, and too many calories can lead to weight gain  · Trim fat from meat and avoid fried food  Trim all visible fat from meat before you cook it  Remove the skin from poultry  Do not guzman meat, fish, or poultry  Bake, roast, boil, or broil these foods instead  Avoid fried foods  Eat a baked potato instead of [place] fries  Steam vegetables instead of sautéing them in butter  · Add less fat to foods  Use imitation stone bits on salads and baked potatoes instead of regular stone bits  Use fat-free or low-fat salad dressings instead of regular dressings  Use low-fat or nonfat butter-flavored topping instead of regular butter or margarine on popcorn and other foods  Ways to decrease fat in recipes:  Replace high-fat ingredients with low-fat or nonfat ones  This may cause baked goods to be drier than usual  You may need to use nonfat cooking spray on pans to prevent food from sticking   You also may need to change the amount of other ingredients, such as water, in the recipe  Try the following:  · Use low-fat or light margarine instead of regular margarine or shortening  · Use lean ground turkey breast or chicken, or lean ground beef (less than 5% fat) instead of hamburger  · Add 1 teaspoon of canola oil to 8 ounces of skim milk instead of using cream or half and half  · Use grated zucchini, carrots, or apples in breads instead of coconut  · Use blenderized, low-fat cottage cheese, plain tofu, or low-fat ricotta cheese instead of cream cheese  · Use 1 egg white and 1 teaspoon of canola oil, or use ¼ cup (2 ounces) of fat-free egg substitute instead of a whole egg  · Replace half of the oil that is called for in a recipe with applesauce when you bake  Use 3 tablespoons of cocoa powder and 1 tablespoon of canola oil instead of a square of baking chocolate  How to increase fiber:  Eat enough high-fiber foods to get 20 to 30 grams of fiber every day  Slowly increase your fiber intake to avoid stomach cramps, gas, and other problems  · Eat 3 ounces of whole-grain foods each day  An ounce is about 1 slice of bread  Eat whole-grain breads, such as whole-wheat bread  Whole wheat, whole-wheat flour, or other whole grains should be listed as the first ingredient on the food label  Replace white flour with whole-grain flour or use half of each in recipes  Whole-grain flour is heavier than white flour, so you may have to add more yeast or baking powder  · Eat a high-fiber cereal for breakfast   Oatmeal is a good source of soluble fiber  Look for cereals that have bran or fiber in the name  Choose whole-grain products, such as brown rice, barley, and whole-wheat pasta  · Eat more beans, peas, and lentils  For example, add beans to soups or salads  Eat at least 5 cups of fruits and vegetables each day  Eat fruits and vegetables with the peel because the peel is high in fiber      © Copyright [employer] 2020 Information is for End User's use only and may not be sold, redistributed or otherwise used for commercial purposes  All illustrations and images included in CareNotes® are the copyrighted property of [employer] M [employer] Inc  or Jose Christianson  The above information is an  only  It is not intended as medical advice for individual conditions or treatments  Talk to your doctor, nurse or pharmacist before following any medical regimen to see if it is safe and effective for you  Heart Healthy Diet   AMBULATORY CARE:   A heart healthy diet  is an eating plan low in unhealthy fats and sodium (salt)  The plan is high in healthy fats and fiber  A heart healthy diet helps improve your cholesterol levels and lowers your risk for heart disease and stroke  A dietitian will teach you how to read and understand food labels  Heart healthy diet guidelines to follow:   · Choose foods that contain healthy fats  ? Unsaturated fats  include monounsaturated and polyunsaturated fats  Unsaturated fat is found in foods such as soybean, canola, olive, corn, and safflower oils  It is also found in soft tub margarine that is made with liquid vegetable oil  ? Omega-3 fat  is found in certain fish, such as salmon, tuna, and trout, and in walnuts and flaxseed  Eat fish high in omega-3 fats at least 2 times a week  · Get 20 to 30 grams of fiber each day  Fruits, vegetables, whole-grain foods, and legumes (cooked beans) are good sources of fiber  · Limit or do not have unhealthy fats  ? Cholesterol  is found in animal foods, such as eggs and lobster, and in dairy products made from whole milk  Limit cholesterol to less than 200 mg each day  ? Saturated fat  is found in meats, such as stone and hamburger  It is also found in chicken or turkey skin, whole milk, and butter  Limit saturated fat to less than 7% of your total daily calories  ? Trans fat  is found in packaged foods, such as potato chips and cookies   It is also in hard margarine, some fried foods, and shortening  Do not eat foods that contain trans fats  · Limit sodium as directed  You may be told to limit sodium to 2,000 to 2,300 mg each day  Choose low-sodium or no-salt-added foods  Add little or no salt to food you prepare  Use herbs and spices in place of salt  Include the following in your heart healthy plan:  Ask your dietitian or healthcare provider how many servings to have from each of the following food groups:  · Grains:      ? Whole-wheat breads, cereals, and pastas, and brown rice    ? Low-fat, low-sodium crackers and chips    · Vegetables:      ? Broccoli, green beans, green peas, and spinach    ? Collards, kale, and lima beans    ? Carrots, sweet potatoes, tomatoes, and peppers    ? Canned vegetables with no salt added    · Fruits:      ? Bananas, peaches, pears, and pineapple    ? Grapes, raisins, and dates    ? Oranges, tangerines, grapefruit, orange juice, and grapefruit juice    ? Apricots, mangoes, melons, and papaya    ? Raspberries and strawberries    ? Canned fruit with no added sugar    · Low-fat dairy:      ? Nonfat (skim) milk, 1% milk, and low-fat almond, cashew, or soy milks fortified with calcium    ? Low-fat cheese, regular or frozen yogurt, and cottage cheese    · Meats and proteins:      ? Lean cuts of beef and pork (loin, leg, round), skinless chicken and turkey    ? Legumes, soy products, egg whites, or nuts    Limit or do not include the following in your heart healthy plan:   · Unhealthy fats and oils:      ? Whole or 2% milk, cream cheese, sour cream, or cheese    ? High-fat cuts of beef (T-bone steaks, ribs), chicken or turkey with skin, and organ meats such as liver    ?  Butter, stick margarine, shortening, and cooking oils such as coconut or palm oil    · Foods and liquids high in sodium:      ? Packaged foods, such as frozen dinners, cookies, macaroni and cheese, and cereals with more than 300 mg of sodium per serving    ? Vegetables with added sodium, such as instant potatoes, vegetables with added sauces, or regular canned vegetables    ? Cured or smoked meats, such as hot dogs, stone, and sausage    ? High-sodium ketchup, barbecue sauce, salad dressing, pickles, olives, soy sauce, or miso    · Foods and liquids high in sugar:      ? Candy, cake, cookies, pies, or doughnuts    ? Soft drinks (soda), sports drinks, or sweetened tea    ? Canned or dry mixes for cakes, soups, sauces, or gravies    Other healthy heart guidelines:   · Do not smoke  Nicotine and other chemicals in cigarettes and cigars can cause lung and heart damage  Ask your healthcare provider for information if you currently smoke and need help to quit  E-cigarettes or smokeless tobacco still contain nicotine  Talk to your healthcare provider before you use these products  · Limit or do not drink alcohol as directed  Alcohol can damage your heart and raise your blood pressure  Your healthcare provider may give you specific daily and weekly limits  The general recommended limit is 1 drink a day for women 21 or older and for men 72 or older  Do not have more than 3 drinks in a day or 7 in a week  The recommended limit is 2 drinks a day for men 24to 59years of age  Do not have more than 4 drinks in a day or 14 in a week  A drink of alcohol is 12 ounces of beer, 5 ounces of wine, or 1½ ounces of liquor  · Exercise regularly  Exercise can help you maintain a healthy weight and improve your blood pressure and cholesterol levels  Regular exercise can also decrease your risk for heart problems  Ask your healthcare provider about the best exercise plan for you  Do not start an exercise program without asking your healthcare provider  Follow up with your doctor or cardiologist as directed:  Write down your questions so you remember to ask them during your visits    © Copyright [employer] 2020 Information is for End User's use only and may not be sold, redistributed or otherwise used for commercial purposes  All illustrations and images included in CareNotes® are the copyrighted property of A D A M [employer]  or Jose Christianson  The above information is an  only  It is not intended as medical advice for individual conditions or treatments  Talk to your doctor, nurse or pharmacist before following any medical regimen to see if it is safe and effective for you

## 2021-01-28 NOTE — PROGRESS NOTES
BMI Counseling: Body mass index is 38 93 kg/m²  The BMI is above normal  Nutrition recommendations include decreasing portion sizes and encouraging healthy choices of fruits and vegetables  Exercise recommendations include moderate physical activity 150 minutes/week  No pharmacotherapy was ordered  Assessment/Plan:  Problem List Items Addressed This Visit        Respiratory    COPD (chronic obstructive pulmonary disease) ([facility] )       Cardiovascular and Mediastinum    Benign essential hypertension - Primary    Relevant Medications    Benicar 40 MG tablet       Other    Gout    Relevant Orders    Uric acid    Hyperlipidemia    Relevant Orders    LDL cholesterol, direct    Triglycerides    Tobacco use      Other Visit Diagnoses     Screening for prostate cancer        Relevant Orders    PSA, Total Screen    Screening for lipid disorders        Screening for diabetes mellitus (DM)        Change in stool               Diagnoses and all orders for this visit:    Benign essential hypertension  -     Benicar 40 MG tablet; Take 1 tablet (40 mg total) by mouth daily    Chronic bronchitis, unspecified chronic bronchitis type ([facility] )    Chronic gout without tophus, unspecified cause, unspecified site  -     Uric acid; Future    Mixed hyperlipidemia  -     LDL cholesterol, direct; Future  -     Triglycerides; Future    Tobacco use    Screening for prostate cancer  -     PSA, Total Screen; Future    Screening for lipid disorders    Screening for diabetes mellitus (DM)    Change in stool        No problem-specific Assessment & Plan notes found for this encounter  A/P: Doing ok except for the loose stools  ??cause  Recent labs ok  Has a colonoscopy coming up in two weeks  Will hold on US and obs til after the colonocopy  ??GB disease  ?lactose intolerance  Check labs  Discussed BMI and will give information on diet and exercise  Continue current treatment and RTC four months for routine       Subjective:      Patient ID: Delano Gold is a 56 y o  male   RTC for f/u HTN, COPD, etc  Doing well and no new issues, but is having loose stools  Remains active w/o difficulty and no falls  Breathing is good  Still smoking  No gout attacks  Due for labs  The following portions of the patient's history were reviewed and updated as appropriate:   He has a past medical history of Abnormal kidney function, Allergic rhinitis, Benign colon polyp, Carpal tunnel syndrome, Cervical radiculopathy, Chronic fatigue, Chronic pain disorder, COPD (chronic obstructive pulmonary disease) ([address] ), CPAP (continuous positive airway pressure) dependence, Edema, Elevated liver enzymes, Gout, Hyperlipidemia, Hypertension, Hypotension, Left ventricular hypertrophy, Obesity (BMI 30-39 9) (9/20/2018), and Sleep apnea  ,  does not have any pertinent problems on file  ,   has a past surgical history that includes Hemorrhoid surgery; Carpal tunnel release (Bilateral); and Liver biopsy  ,  family history includes Cancer in his mother; No Known Problems in his brother, father, maternal grandfather, maternal grandmother, paternal grandfather, paternal grandmother, and sister  ,   reports that he has been smoking cigarettes  He has a 40 00 pack-year smoking history  He has never used smokeless tobacco  He reports current alcohol use of about 6 0 standard drinks of alcohol per week  He reports that he does not use drugs  ,  has No Known Allergies     Current Outpatient Medications   Medication Sig Dispense Refill    Benicar 40 MG tablet Take 1 tablet (40 mg total) by mouth daily 90 tablet 3    multivitamin (THERAGRAN) TABS Take 1 tablet by mouth daily      Omega-3 Fatty Acids (FISH OIL) 1,000 mg Take 1,000 mg by mouth daily       No current facility-administered medications for this visit  Review of Systems   Constitutional: Negative for activity change, chills, diaphoresis, fatigue and fever  HENT: Negative  Eyes: Negative for visual disturbance  Respiratory: Negative for cough, chest tightness, shortness of breath and wheezing  Cardiovascular: Negative for chest pain, palpitations and leg swelling  Gastrointestinal: Negative for abdominal pain, constipation, diarrhea, nausea and vomiting  Loose stools  Endocrine: Negative for cold intolerance and heat intolerance  Genitourinary: Negative for difficulty urinating, dysuria and frequency  Musculoskeletal: Negative for arthralgias, gait problem and myalgias  Neurological: Negative for dizziness, seizures, syncope, weakness, light-headedness and headaches  Psychiatric/Behavioral: Negative for confusion, dysphoric mood and sleep disturbance  The patient is not nervous/anxious  PHQ-9 Depression Screening    PHQ-9:   Frequency of the following problems over the past two weeks:      Little interest or pleasure in doing things: 0 - not at all  Feeling down, depressed, or hopeless: 0 - not at all  PHQ-2 Score: 0        Objective:  Vitals:    01/28/21 0739   BP: 138/76   BP Location: Left arm   Patient Position: Sitting   Cuff Size: Standard   Pulse: 95   Resp: 14   Temp: 98 7 °F (37 1 °C)   SpO2: 97%   Weight: 125 kg (275 lb 3 2 oz)   Height: 5' 10 5" (1 791 m)     Body mass index is 38 93 kg/m²  Physical Exam  Vitals signs and nursing note reviewed  Constitutional:       General: He is not in acute distress  Appearance: Normal appearance  He is not ill-appearing  HENT:      Head: Normocephalic and atraumatic  Mouth/Throat:      Mouth: Mucous membranes are moist    Eyes:      Extraocular Movements: Extraocular movements intact  Conjunctiva/sclera: Conjunctivae normal       Pupils: Pupils are equal, round, and reactive to light  Neck:      Musculoskeletal: Neck supple  Vascular: No carotid bruit  Cardiovascular:      Rate and Rhythm: Normal rate and regular rhythm  Heart sounds: Normal heart sounds     Pulmonary:      Effort: Pulmonary effort is normal  No respiratory distress  Breath sounds: Normal breath sounds  No wheezing or rales  Abdominal:      General: Bowel sounds are normal  There is no distension  Palpations: Abdomen is soft  Tenderness: There is no abdominal tenderness  Musculoskeletal:      Right lower leg: No edema  Left lower leg: No edema  Neurological:      General: No focal deficit present  Mental Status: He is alert and oriented to person, place, and time  Mental status is at baseline  Psychiatric:         Mood and Affect: Mood normal          Behavior: Behavior normal          Thought Content: Thought content normal          Judgment: Judgment normal          BMI Counseling: Body mass index is 38 93 kg/m²  The BMI is above normal  Nutrition recommendations include reducing portion sizes, decreasing overall calorie intake, reducing intake of saturated fat and trans fat and reducing intake of cholesterol  Exercise recommendations include moderate aerobic physical activity for 150 minutes/week

## 2021-02-09 ENCOUNTER — LAB (OUTPATIENT)
Dept: LAB | Facility: CLINIC | Age: 61
End: 2021-02-09
Payer: COMMERCIAL

## 2021-02-09 DIAGNOSIS — Z12.5 SCREENING FOR PROSTATE CANCER: ICD-10-CM

## 2021-02-09 DIAGNOSIS — E78.2 MIXED HYPERLIPIDEMIA: ICD-10-CM

## 2021-02-09 DIAGNOSIS — M1A.9XX0 CHRONIC GOUT WITHOUT TOPHUS, UNSPECIFIED CAUSE, UNSPECIFIED SITE: ICD-10-CM

## 2021-02-09 LAB
LDLC SERPL DIRECT ASSAY-MCNC: 115 MG/DL (ref 0–100)
PSA SERPL-MCNC: 1 NG/ML (ref 0–4)
TRIGL SERPL-MCNC: 136 MG/DL
URATE SERPL-MCNC: 6.2 MG/DL (ref 4.2–8)

## 2021-02-09 PROCEDURE — 36415 COLL VENOUS BLD VENIPUNCTURE: CPT

## 2021-02-09 PROCEDURE — 84478 ASSAY OF TRIGLYCERIDES: CPT

## 2021-02-09 PROCEDURE — 83721 ASSAY OF BLOOD LIPOPROTEIN: CPT

## 2021-02-09 PROCEDURE — G0103 PSA SCREENING: HCPCS

## 2021-02-09 PROCEDURE — 84550 ASSAY OF BLOOD/URIC ACID: CPT

## 2021-02-10 ENCOUNTER — ANESTHESIA EVENT (OUTPATIENT)
Dept: GASTROENTEROLOGY | Facility: HOSPITAL | Age: 61
End: 2021-02-10

## 2021-02-10 ENCOUNTER — ANESTHESIA (OUTPATIENT)
Dept: GASTROENTEROLOGY | Facility: HOSPITAL | Age: 61
End: 2021-02-10

## 2021-02-10 ENCOUNTER — HOSPITAL ENCOUNTER (OUTPATIENT)
Dept: GASTROENTEROLOGY | Facility: HOSPITAL | Age: 61
Setting detail: OUTPATIENT SURGERY
Discharge: HOME | End: 2021-02-10
Attending: COLON & RECTAL SURGERY
Payer: COMMERCIAL

## 2021-02-10 VITALS
HEART RATE: 78 BPM | DIASTOLIC BLOOD PRESSURE: 85 MMHG | SYSTOLIC BLOOD PRESSURE: 132 MMHG | OXYGEN SATURATION: 98 % | TEMPERATURE: 97.5 F | RESPIRATION RATE: 18 BRPM | BODY MASS INDEX: 37.1 KG/M2 | HEIGHT: 71 IN | WEIGHT: 265 LBS

## 2021-02-10 VITALS — HEART RATE: 78 BPM

## 2021-02-10 DIAGNOSIS — Z12.11 COLON CANCER SCREENING: ICD-10-CM

## 2021-02-10 DIAGNOSIS — K63.5 BENIGN COLON POLYP: ICD-10-CM

## 2021-02-10 PROCEDURE — 45385 COLONOSCOPY W/LESION REMOVAL: CPT | Performed by: COLON & RECTAL SURGERY

## 2021-02-10 PROCEDURE — 99213 OFFICE O/P EST LOW 20 MIN: CPT | Performed by: COLON & RECTAL SURGERY

## 2021-02-10 PROCEDURE — 88305 TISSUE EXAM BY PATHOLOGIST: CPT | Performed by: PATHOLOGY

## 2021-02-10 RX ORDER — LIDOCAINE HYDROCHLORIDE 10 MG/ML
INJECTION, SOLUTION EPIDURAL; INFILTRATION; INTRACAUDAL; PERINEURAL AS NEEDED
Status: DISCONTINUED | OUTPATIENT
Start: 2021-02-10 | End: 2021-02-10

## 2021-02-10 RX ORDER — PROPOFOL 10 MG/ML
INJECTION, EMULSION INTRAVENOUS AS NEEDED
Status: DISCONTINUED | OUTPATIENT
Start: 2021-02-10 | End: 2021-02-10

## 2021-02-10 RX ORDER — SODIUM CHLORIDE 9 MG/ML
INJECTION, SOLUTION INTRAVENOUS CONTINUOUS PRN
Status: DISCONTINUED | OUTPATIENT
Start: 2021-02-10 | End: 2021-02-10

## 2021-02-10 RX ADMIN — PROPOFOL 50 MG: 10 INJECTION, EMULSION INTRAVENOUS at 09:36

## 2021-02-10 RX ADMIN — PROPOFOL 50 MG: 10 INJECTION, EMULSION INTRAVENOUS at 09:29

## 2021-02-10 RX ADMIN — LIDOCAINE HYDROCHLORIDE 50 MG: 10 INJECTION, SOLUTION EPIDURAL; INFILTRATION; INTRACAUDAL; PERINEURAL at 09:08

## 2021-02-10 RX ADMIN — PROPOFOL 100 MG: 10 INJECTION, EMULSION INTRAVENOUS at 09:08

## 2021-02-10 RX ADMIN — SODIUM CHLORIDE: 0.9 INJECTION, SOLUTION INTRAVENOUS at 09:03

## 2021-02-10 RX ADMIN — PROPOFOL 50 MG: 10 INJECTION, EMULSION INTRAVENOUS at 09:23

## 2021-02-10 RX ADMIN — PROPOFOL 50 MG: 10 INJECTION, EMULSION INTRAVENOUS at 09:32

## 2021-02-10 RX ADMIN — PROPOFOL 50 MG: 10 INJECTION, EMULSION INTRAVENOUS at 09:39

## 2021-02-10 RX ADMIN — PROPOFOL 50 MG: 10 INJECTION, EMULSION INTRAVENOUS at 09:12

## 2021-02-10 RX ADMIN — PROPOFOL 50 MG: 10 INJECTION, EMULSION INTRAVENOUS at 09:25

## 2021-02-10 RX ADMIN — PROPOFOL 50 MG: 10 INJECTION, EMULSION INTRAVENOUS at 09:09

## 2021-02-10 RX ADMIN — PROPOFOL 50 MG: 10 INJECTION, EMULSION INTRAVENOUS at 09:19

## 2021-02-10 RX ADMIN — PROPOFOL 50 MG: 10 INJECTION, EMULSION INTRAVENOUS at 09:42

## 2021-02-10 RX ADMIN — PROPOFOL 50 MG: 10 INJECTION, EMULSION INTRAVENOUS at 09:15

## 2021-02-10 NOTE — ANESTHESIA PREPROCEDURE EVALUATION
Procedure:  COLONOSCOPY    Relevant Problems   CARDIO   (+) Benign essential hypertension   (+) Hyperlipidemia   (+) Mild mitral regurgitation      /RENAL   (+) Renal cyst, right      MUSCULOSKELETAL   (+) Gout      NEURO/PSYCH   (+) Pain syndrome, chronic      PULMONARY   (+) COPD (chronic obstructive pulmonary disease) (HCC)   (+) Obstructive sleep apnea      Normal biventricular systolic function       Anesthesia Plan  ASA Score- 3     Anesthesia Type- IV sedation with anesthesia with ASA Monitors  Additional Monitors:   Airway Plan:     Comment: IV sedation,  standard ASA monitors  Risks and benefits discussed with patient; patient consented and agrees to proceed  I saw and evaluated the patient  If seen with CRNA, we have discussed the anesthetic plan and I am in agreement that the plan is appropriate for the patient          Plan Factors-    Induction- intravenous  Postoperative Plan-     Informed Consent- Anesthetic plan and risks discussed with patient  I personally reviewed this patient with the CRNA  Discussed and agreed on the Anesthesia Plan with the CRNA  Darletta Pac

## 2021-02-10 NOTE — H&P
History and Physical   Colon and Rectal Surgery   Octavio Dumont 56 y o  male MRN: [id number]  Unit/Bed#:  Encounter: [id number]  02/10/21   9:08 AM      CC:  History of polyps  History of Present Illness   HPI:  Octavio Dumont is a 56 y o  male with no GI symptoms or added colon cancer risk  Historical Information   Past Medical History:   Diagnosis Date    Abnormal kidney function     last assessed: Feb 25, 2016    Allergic rhinitis     Benign colon polyp     Carpal tunnel syndrome     Cervical radiculopathy     last assessed: Feb 11, 2015    Chronic fatigue     Chronic pain disorder     COPD (chronic obstructive pulmonary disease) (HCC)     CPAP (continuous positive airway pressure) dependence     Edema     Elevated liver enzymes     Gout     Hyperlipidemia     Hypertension     Hypotension     last assessed: March 22, 2017    Left ventricular hypertrophy     Obesity (BMI 30-39 9) 9/20/2018    Sleep apnea      Past Surgical History:   Procedure Laterality Date    CARPAL TUNNEL RELEASE Bilateral     HEMORRHOID SURGERY      LIVER BIOPSY         Meds/Allergies     (Not in a hospital admission)        Current Outpatient Medications:     Benicar 40 MG tablet, Take 1 tablet (40 mg total) by mouth daily, Disp: 90 tablet, Rfl: 3    multivitamin (THERAGRAN) TABS, Take 1 tablet by mouth daily, Disp: , Rfl:     Omega-3 Fatty Acids (FISH OIL) 1,000 mg, Take 1,000 mg by mouth daily, Disp: , Rfl:   No current facility-administered medications for this encounter       Facility-Administered Medications Ordered in Other Encounters:     sodium chloride 0 9 % infusion, , Intravenous, Continuous PRN, Karolyn Luz CRNA, New Bag at 02/10/21 1646    No Known Allergies      Social History   Social History     Substance and Sexual Activity   Alcohol Use Yes    Alcohol/week: 6 0 standard drinks    Types: 6 Cans of beer per week    Comment: social      Social History     Substance and Sexual Activity   Drug Use No     Social History     Tobacco Use   Smoking Status Current Every Day Smoker    Packs/day: 1 00    Years: 40 00    Pack years: 40 00    Types: Cigarettes   Smokeless Tobacco Never Used   Tobacco Comment    Given [employer] "how to quit" info          Family History:   Family History   Problem Relation Age of Onset    Cancer Mother     No Known Problems Father     No Known Problems Sister     No Known Problems Brother     No Known Problems Maternal Grandmother     No Known Problems Maternal Grandfather     No Known Problems Paternal Grandmother     No Known Problems Paternal Grandfather          Objective     Current Vitals:   Blood Pressure: 146/75 (02/10/21 0821)  Pulse: 89 (02/10/21 0821)  Temperature: 97 9 °F (36 6 °C) (02/10/21 0821)  Temp Source: Tympanic (02/10/21 0821)  Respirations: 18 (02/10/21 0821)  Height: 5' 10 5" (179 1 cm) (02/10/21 9005)  Weight - Scale: 120 kg (265 lb) (02/10/21 0821)  SpO2: 98 % (02/10/21 0821)  No intake or output data in the 24 hours ending 02/10/21 0908    Physical Exam:  General:  Well nourished, no distress  Neuro: Alert and oriented  Eyes:Sclera anicteric, conjunctiva pink  Pulm: Clear to auscultation bilaterally  No respiratory Distress  CV:  Regular rate and rhythm  No murmurs  Abdomen:  Soft, flat, non-tender, without masses or hepatosplenomegaly  Obesity limits the exam     Lab Results:       ASSESSMENT:  Sandy Ramirez is a 56 y o  male for surveillance  PLAN:  Colonoscopy  Risks , including, but not limited to, bleeding, perforation, missed lesions, and potential need for surgery, were reviewed  Alternatives to colonoscopy were discussed    Mayela Do MD

## 2021-02-10 NOTE — ANESTHESIA POSTPROCEDURE EVALUATION
Post-Op Assessment Note    CV Status:  Stable  Pain Score: 0    Pain management: adequate     Mental Status:  Alert and awake   Hydration Status:  Stable   PONV Controlled:  None   Airway Patency:  Patent      Post Op Vitals Reviewed: Yes      Staff: CRNA   Comments: Pt  to Pacu  Report given  Course uneventful; Airway patent  No c/o cp, dyspnea, or PONV  No complications documented      /58 (02/10/21 0958)    Temp 97 5 °F (36 4 °C) (02/10/21 0955)    Pulse 78 (02/10/21 0958)   Resp 20 (02/10/21 0958)    SpO2 96 % (02/10/21 0958)

## 2021-02-16 DIAGNOSIS — I10 BENIGN ESSENTIAL HYPERTENSION: ICD-10-CM

## 2021-02-16 RX ORDER — OLMESARTAN MEDOXOMIL 40 MG/1
40 TABLET, FILM COATED ORAL DAILY
Qty: 90 TABLET | Refills: 3 | Status: SHIPPED | OUTPATIENT
Start: 2021-02-16 | End: 2021-06-03

## 2021-03-10 DIAGNOSIS — Z23 ENCOUNTER FOR IMMUNIZATION: ICD-10-CM

## 2021-03-19 ENCOUNTER — IMMUNIZATIONS (OUTPATIENT)
Dept: FAMILY MEDICINE CLINIC | Facility: HOSPITAL | Age: 61
End: 2021-03-19

## 2021-03-19 DIAGNOSIS — Z23 ENCOUNTER FOR IMMUNIZATION: Primary | ICD-10-CM

## 2021-03-19 PROCEDURE — 0011A SARS-COV-2 / COVID-19 MRNA VACCINE (MODERNA) 100 MCG: CPT

## 2021-03-19 PROCEDURE — 91301 SARS-COV-2 / COVID-19 MRNA VACCINE (MODERNA) 100 MCG: CPT

## 2021-04-21 ENCOUNTER — IMMUNIZATIONS (OUTPATIENT)
Dept: FAMILY MEDICINE CLINIC | Facility: HOSPITAL | Age: 61
End: 2021-04-21

## 2021-04-21 DIAGNOSIS — Z23 ENCOUNTER FOR IMMUNIZATION: Primary | ICD-10-CM

## 2021-04-21 PROCEDURE — 91301 SARS-COV-2 / COVID-19 MRNA VACCINE (MODERNA) 100 MCG: CPT

## 2021-04-21 PROCEDURE — 0012A SARS-COV-2 / COVID-19 MRNA VACCINE (MODERNA) 100 MCG: CPT

## 2021-05-27 ENCOUNTER — TELEPHONE (OUTPATIENT)
Dept: INTERNAL MEDICINE CLINIC | Facility: CLINIC | Age: 61
End: 2021-05-27

## 2021-05-27 ENCOUNTER — OFFICE VISIT (OUTPATIENT)
Dept: INTERNAL MEDICINE CLINIC | Facility: CLINIC | Age: 61
End: 2021-05-27
Payer: COMMERCIAL

## 2021-05-27 VITALS
WEIGHT: 274.8 LBS | HEART RATE: 88 BPM | SYSTOLIC BLOOD PRESSURE: 132 MMHG | OXYGEN SATURATION: 97 % | TEMPERATURE: 99 F | DIASTOLIC BLOOD PRESSURE: 64 MMHG | HEIGHT: 71 IN | RESPIRATION RATE: 14 BRPM | BODY MASS INDEX: 38.47 KG/M2

## 2021-05-27 DIAGNOSIS — G89.4 PAIN SYNDROME, CHRONIC: ICD-10-CM

## 2021-05-27 DIAGNOSIS — I10 BENIGN ESSENTIAL HYPERTENSION: Primary | ICD-10-CM

## 2021-05-27 DIAGNOSIS — D75.89 MACROCYTOSIS: ICD-10-CM

## 2021-05-27 DIAGNOSIS — J42 CHRONIC BRONCHITIS, UNSPECIFIED CHRONIC BRONCHITIS TYPE (HCC): ICD-10-CM

## 2021-05-27 DIAGNOSIS — M1A.9XX0 CHRONIC GOUT WITHOUT TOPHUS, UNSPECIFIED CAUSE, UNSPECIFIED SITE: ICD-10-CM

## 2021-05-27 DIAGNOSIS — E78.2 MIXED HYPERLIPIDEMIA: ICD-10-CM

## 2021-05-27 DIAGNOSIS — Z72.0 TOBACCO USE: ICD-10-CM

## 2021-05-27 DIAGNOSIS — E83.19 IRON OVERLOAD: ICD-10-CM

## 2021-05-27 PROCEDURE — 99214 OFFICE O/P EST MOD 30 MIN: CPT | Performed by: INTERNAL MEDICINE

## 2021-05-27 NOTE — PROGRESS NOTES
Assessment/Plan:  Problem List Items Addressed This Visit        Respiratory    COPD (chronic obstructive pulmonary disease) ([facility] )       Cardiovascular and Mediastinum    Benign essential hypertension - Primary       Other    Tobacco use    Pain syndrome, chronic    Macrocytosis    Iron overload    Hyperlipidemia    Gout           Diagnoses and all orders for this visit:    Benign essential hypertension    Chronic bronchitis, unspecified chronic bronchitis type (HCC)    Chronic gout without tophus, unspecified cause, unspecified site    Mixed hyperlipidemia    Iron overload    Macrocytosis    Tobacco use    Pain syndrome, chronic        No problem-specific Assessment & Plan notes found for this encounter  A/P: Doing ok and labs are up to date  Recent colonoscopy was ok  Will try some pro biotics and see if it helps his stools  Wean tobacco  Continue current treatment and RTC four months for routine  Subjective:      Patient ID: Swetha Be is a 56 y o  male  WM RTC for f/u htn, hyperlipidemia, etc  Doing well and no new issues except for some foul smelling stools  No dietary or med changes    Remains active w/o difficulty and no falls  Breathing is good and limited rescue MDI use  Still smoking  Chronic pain is manageable  No gout attacks  Labs are up to date  The following portions of the patient's history were reviewed and updated as appropriate:   He has a past medical history of Abnormal kidney function, Allergic rhinitis, Benign colon polyp, Carpal tunnel syndrome, Cervical radiculopathy, Chronic fatigue, Chronic pain disorder, COPD (chronic obstructive pulmonary disease) ([facility] ), CPAP (continuous positive airway pressure) dependence, Edema, Elevated liver enzymes, Gout, Hyperlipidemia, Hypertension, Hypotension, Left ventricular hypertrophy, Obesity (BMI 30-39 9) (9/20/2018), and Sleep apnea  ,  does not have any pertinent problems on file  ,   has a past surgical history that includes Hemorrhoid surgery; Carpal tunnel release (Bilateral); and Liver biopsy  ,  family history includes Cancer in his mother; No Known Problems in his brother, father, maternal grandfather, maternal grandmother, paternal grandfather, paternal grandmother, and sister  ,   reports that he has been smoking cigarettes  He has a 40 00 pack-year smoking history  He has never used smokeless tobacco  He reports current alcohol use of about 6 0 standard drinks of alcohol per week  He reports that he does not use drugs  ,  has No Known Allergies     Current Outpatient Medications   Medication Sig Dispense Refill    Benicar 40 MG tablet Take 1 tablet (40 mg total) by mouth daily 90 tablet 3    multivitamin (THERAGRAN) TABS Take 1 tablet by mouth daily      Omega-3 Fatty Acids (FISH OIL) 1,000 mg Take 1,000 mg by mouth daily       No current facility-administered medications for this visit  Review of Systems   Constitutional: Negative for activity change, chills, diaphoresis, fatigue and fever  HENT: Negative  Eyes: Negative for visual disturbance  Respiratory: Negative for cough, chest tightness, shortness of breath and wheezing  Cardiovascular: Negative for chest pain, palpitations and leg swelling  Gastrointestinal: Negative for abdominal pain, constipation, diarrhea, nausea and vomiting  Endocrine: Negative for cold intolerance and heat intolerance  Genitourinary: Negative for difficulty urinating, dysuria and frequency  Musculoskeletal: Negative for arthralgias, gait problem and myalgias  Neurological: Negative for dizziness, seizures, syncope, weakness, light-headedness and headaches  Psychiatric/Behavioral: Negative for confusion, dysphoric mood and sleep disturbance  The patient is not nervous/anxious          PHQ-9 Depression Screening    PHQ-9:   Frequency of the following problems over the past two weeks:            Objective:  Vitals:    05/27/21 0728   BP: 132/64   BP Location: Left arm Patient Position: Sitting   Cuff Size: Large   Pulse: 88   Resp: 14   Temp: 99 °F (37 2 °C)   SpO2: 97%   Weight: 125 kg (274 lb 12 8 oz)   Height: 5' 10 5" (1 791 m)     Body mass index is 38 87 kg/m²  Physical Exam  Vitals signs and nursing note reviewed  Constitutional:       General: He is not in acute distress  Appearance: Normal appearance  He is not ill-appearing  HENT:      Head: Normocephalic and atraumatic  Mouth/Throat:      Mouth: Mucous membranes are moist    Eyes:      Extraocular Movements: Extraocular movements intact  Conjunctiva/sclera: Conjunctivae normal       Pupils: Pupils are equal, round, and reactive to light  Neck:      Musculoskeletal: Neck supple  Vascular: No carotid bruit  Cardiovascular:      Rate and Rhythm: Normal rate and regular rhythm  Heart sounds: Normal heart sounds  Pulmonary:      Effort: Pulmonary effort is normal  No respiratory distress  Breath sounds: Normal breath sounds  No wheezing or rales  Abdominal:      General: Bowel sounds are normal  There is no distension  Palpations: Abdomen is soft  Tenderness: There is no abdominal tenderness  Musculoskeletal:      Right lower leg: No edema  Left lower leg: No edema  Neurological:      General: No focal deficit present  Mental Status: He is alert and oriented to person, place, and time  Mental status is at baseline  Psychiatric:         Mood and Affect: Mood normal          Behavior: Behavior normal          Thought Content:  Thought content normal          Judgment: Judgment normal

## 2021-06-03 DIAGNOSIS — I10 BENIGN ESSENTIAL HYPERTENSION: Primary | ICD-10-CM

## 2021-06-03 RX ORDER — LOSARTAN POTASSIUM 100 MG/1
100 TABLET ORAL DAILY
Qty: 90 TABLET | Refills: 1 | Status: SHIPPED | OUTPATIENT
Start: 2021-06-03 | End: 2021-07-15 | Stop reason: SDUPTHER

## 2021-07-14 ENCOUNTER — TELEPHONE (OUTPATIENT)
Dept: INTERNAL MEDICINE CLINIC | Facility: CLINIC | Age: 61
End: 2021-07-14

## 2021-07-14 DIAGNOSIS — I10 BENIGN ESSENTIAL HYPERTENSION: ICD-10-CM

## 2021-07-14 NOTE — TELEPHONE ENCOUNTER
Pt states his ankles are swelling, he thinks its because of the change in medication, he was given the generic cozzar  He prefers brand, but would need a prior auth on it

## 2021-07-15 RX ORDER — LOSARTAN POTASSIUM 100 MG/1
100 TABLET ORAL DAILY
Qty: 90 TABLET | Refills: 1 | Status: SHIPPED | OUTPATIENT
Start: 2021-07-15 | End: 2021-07-26 | Stop reason: SDUPTHER

## 2021-07-15 NOTE — TELEPHONE ENCOUNTER
Pt was called backed, he does not want an appointment, he said its because he was given generic cozaar, so I called in name brand for him, he denies any other s/s  Besides swollen ankles

## 2021-07-26 ENCOUNTER — TELEPHONE (OUTPATIENT)
Dept: INTERNAL MEDICINE CLINIC | Facility: CLINIC | Age: 61
End: 2021-07-26

## 2021-07-26 DIAGNOSIS — I10 BENIGN ESSENTIAL HYPERTENSION: ICD-10-CM

## 2021-07-26 RX ORDER — LOSARTAN POTASSIUM 100 MG/1
100 TABLET ORAL DAILY
Qty: 90 TABLET | Refills: 1 | Status: SHIPPED | OUTPATIENT
Start: 2021-07-26 | End: 2021-08-27

## 2021-08-27 ENCOUNTER — OFFICE VISIT (OUTPATIENT)
Dept: INTERNAL MEDICINE CLINIC | Facility: CLINIC | Age: 61
End: 2021-08-27
Payer: COMMERCIAL

## 2021-08-27 VITALS
BODY MASS INDEX: 38.54 KG/M2 | HEART RATE: 89 BPM | SYSTOLIC BLOOD PRESSURE: 132 MMHG | HEIGHT: 71 IN | WEIGHT: 275.31 LBS | DIASTOLIC BLOOD PRESSURE: 74 MMHG | TEMPERATURE: 98.6 F | OXYGEN SATURATION: 97 %

## 2021-08-27 DIAGNOSIS — M20.41 HAMMERTOE OF RIGHT FOOT: ICD-10-CM

## 2021-08-27 DIAGNOSIS — E83.19 IRON OVERLOAD: ICD-10-CM

## 2021-08-27 DIAGNOSIS — K59.00 CONSTIPATION, UNSPECIFIED CONSTIPATION TYPE: ICD-10-CM

## 2021-08-27 DIAGNOSIS — E66.9 OBESITY (BMI 30-39.9): ICD-10-CM

## 2021-08-27 DIAGNOSIS — J42 CHRONIC BRONCHITIS, UNSPECIFIED CHRONIC BRONCHITIS TYPE (HCC): ICD-10-CM

## 2021-08-27 DIAGNOSIS — G47.33 OBSTRUCTIVE SLEEP APNEA: ICD-10-CM

## 2021-08-27 DIAGNOSIS — I10 BENIGN ESSENTIAL HYPERTENSION: Primary | ICD-10-CM

## 2021-08-27 DIAGNOSIS — R60.0 LOCALIZED EDEMA: ICD-10-CM

## 2021-08-27 DIAGNOSIS — Z72.0 TOBACCO USE: ICD-10-CM

## 2021-08-27 DIAGNOSIS — E78.2 MIXED HYPERLIPIDEMIA: ICD-10-CM

## 2021-08-27 DIAGNOSIS — D75.89 MACROCYTOSIS: ICD-10-CM

## 2021-08-27 DIAGNOSIS — G89.4 PAIN SYNDROME, CHRONIC: ICD-10-CM

## 2021-08-27 DIAGNOSIS — M1A.9XX0 CHRONIC GOUT WITHOUT TOPHUS, UNSPECIFIED CAUSE, UNSPECIFIED SITE: ICD-10-CM

## 2021-08-27 PROCEDURE — 99214 OFFICE O/P EST MOD 30 MIN: CPT | Performed by: INTERNAL MEDICINE

## 2021-08-27 RX ORDER — FUROSEMIDE 20 MG/1
20 TABLET ORAL DAILY
Qty: 90 TABLET | Refills: 3 | Status: SHIPPED | OUTPATIENT
Start: 2021-08-27

## 2021-08-27 RX ORDER — SACCHAROMYCES BOULARDII 250 MG
250 CAPSULE ORAL 2 TIMES DAILY
Qty: 180 CAPSULE | Refills: 3 | Status: SHIPPED | OUTPATIENT
Start: 2021-08-27

## 2021-08-27 RX ORDER — LOSARTAN POTASSIUM 100 MG
100 TABLET ORAL DAILY
Qty: 90 TABLET | Refills: 3 | Status: SHIPPED | OUTPATIENT
Start: 2021-08-27 | End: 2022-01-14 | Stop reason: SDUPTHER

## 2021-08-27 NOTE — PATIENT INSTRUCTIONS
Chronic Hypertension   AMBULATORY CARE:   Hypertension  is high blood pressure  Your blood pressure is the force of your blood moving against the walls of your arteries  Hypertension causes your blood pressure to get so high that your heart has to work much harder than normal  This can damage your heart  Even if you have hypertension for years, lifestyle changes, medicines, or both can help bring your blood pressure to normal   Call your local emergency number (911 in the [address] [facility]) or have someone call if:   · You have chest pain  · You have any of the following signs of a heart attack:      ? Squeezing, pressure, or pain in your chest    ? You may  also have any of the following:     § Discomfort or pain in your back, neck, jaw, stomach, or arm    § Shortness of breath    § Nausea or vomiting    § Lightheadedness or a sudden cold sweat    · You become confused or have difficulty speaking  · You suddenly feel lightheaded or have trouble breathing  Seek care immediately if:   · You have a severe headache or vision loss  · You have weakness in an arm or leg  Call your doctor if:   · You feel faint, dizzy, confused, or drowsy  · You have been taking your blood pressure medicine but your pressure is higher than your provider says it should be  · You have questions or concerns about your condition or care  Treatment for chronic hypertension  may include medicine to lower your blood pressure and cholesterol levels  A low cholesterol level helps prevent heart disease and makes it easier to control your blood pressure  Heart disease can make your blood pressure harder to control  You may also need to make lifestyle changes  What you need to know about the stages of hypertension:       · Normal blood pressure is 119/79 or lower   Your healthcare provider may only check your blood pressure each year if it stays at a normal level  · Elevated blood pressure is 120/79 to 129/79    This is sometimes called prehypertension  Your healthcare provider may suggest lifestyle changes to help lower your blood pressure to a normal level  He or she may then check it again in 3 to 6 months  · Stage 1 hypertension is 130/80  to 139/89   Your provider may recommend lifestyle changes, medication, and checks every 3 to 6 months until your blood pressure is controlled  · Stage 2 hypertension is 140/90 or higher   Your provider will recommend lifestyle changes and have you take 2 kinds of hypertension medicines  You will also need to have your blood pressure checked monthly until it is controlled  Manage chronic hypertension:   · Check your blood pressure at home  Avoid smoking, caffeine, and exercise at least 30 minutes before checking your blood pressure  Sit and rest for 5 minutes before you take your blood pressure  Extend your arm and support it on a flat surface  Your arm should be at the same level as your heart  Follow the directions that came with your blood pressure monitor  Check your blood pressure 2 times, 1 minute apart, before you take your medicine in the morning  Also check your blood pressure before your evening meal  Keep a record of your readings and bring it to your follow-up visits  Ask your healthcare provider what your blood pressure should be  · Manage any other health conditions you have  Health conditions such as diabetes can increase your risk for hypertension  Follow your healthcare provider's instructions and take all your medicines as directed  Talk to your healthcare provider about any new health conditions you have recently developed  · Ask about all medicines  Certain medicines can increase your blood pressure  Examples include oral birth control pills, decongestants, herbal supplements, and NSAIDs, such as ibuprofen  Your healthcare provider can tell you which medicines are safe for you to take  This includes prescription and over-the-counter medicines      Lifestyle changes you can make to lower your blood pressure: Your provider may want you to make more lifestyle changes if you are having trouble controlling your blood pressure  This may feel difficult over time, especially if you think you are making good changes but your pressure is still high  It might help to focus on one new change at a time  For example, try to add 1 more day of exercise, or exercise for an extra 10 minutes on 2 days  Small changes can make a big difference  Your healthcare provider can also refer you to specialists such as a dietitian who can help you make small changes  Your family members may be included in helping you learn to create lifestyle changes, such as the following:  · Limit sodium (salt) as directed  Too much sodium can affect your fluid balance  Check labels to find low-sodium or no-salt-added foods  Some low-sodium foods use potassium salts for flavor  Too much potassium can also cause health problems  Your healthcare provider will tell you how much sodium and potassium are safe for you to have in a day  He or she may recommend that you limit sodium to 2,300 mg a day  · Follow the meal plan recommended by your healthcare provider  A dietitian or your provider can give you more information on low-sodium plans or the DASH (Dietary Approaches to Stop Hypertension) eating plan  The DASH plan is low in sodium, processed sugar, unhealthy fats, and total fat  It is high in potassium, calcium, and fiber  These can be found in vegetables, fruit, and whole-grain foods  · Be physically active throughout the day  Physical activity, such as exercise, can help control your blood pressure and your weight  Be physically active for at least 30 minutes per day, on most days of the week  Include aerobic activity, such as walking or riding a bicycle  Also include strength training at least 2 times each week  Your healthcare providers can help you create a physical activity plan  · Decrease stress  This may help lower your blood pressure  Learn ways to relax, such as deep breathing or listening to music  · Limit alcohol as directed  Alcohol can increase your blood pressure  A drink of alcohol is 12 ounces of beer, 5 ounces of wine, or 1½ ounces of liquor  · Do not smoke  Nicotine and other chemicals in cigarettes and cigars can increase your blood pressure and also cause lung damage  Ask your healthcare provider for information if you currently smoke and need help to quit  E-cigarettes or smokeless tobacco still contain nicotine  Talk to your healthcare provider before you use these products  Follow up with your doctor as directed: You will need to return to have your blood pressure checked and to have other lab tests done  Write down your questions so you remember to ask them during your visits  © Copyright [address] 2021 Information is for End User's use only and may not be sold, redistributed or otherwise used for commercial purposes  All illustrations and images included in CareNotes® are the copyrighted property of [employer]  or [facility] [employer]   The above information is an  only  It is not intended as medical advice for individual conditions or treatments  Talk to your doctor, nurse or pharmacist before following any medical regimen to see if it is safe and effective for you

## 2021-08-27 NOTE — PROGRESS NOTES
Assessment/Plan:  Problem List Items Addressed This Visit        Respiratory    Obstructive sleep apnea    COPD (chronic obstructive pulmonary disease) (HCC)       Cardiovascular and Mediastinum    Benign essential hypertension - Primary    Relevant Medications    Cozaar 100 MG tablet    furosemide (LASIX) 20 mg tablet    Other Relevant Orders    CBC and differential    Comprehensive metabolic panel    Microalbumin / creatinine urine ratio       Other    Tobacco use    Relevant Orders    CT lung screening program    Pain syndrome, chronic    Obesity (BMI 30-39  9)    Macrocytosis    Iron overload    Relevant Orders    Iron Panel (Includes Ferritin, Iron Sat%, Iron, and TIBC)    Hyperlipidemia    Relevant Orders    Lipid Panel with Direct LDL reflex    TSH, 3rd generation with Free T4 reflex    Gout    Relevant Orders    Uric acid    Edema    Relevant Medications    furosemide (LASIX) 20 mg tablet    Other Relevant Orders    TSH, 3rd generation with Free T4 reflex    UA w Reflex to Microscopic w Reflex to Culture      Other Visit Diagnoses     Constipation, unspecified constipation type        Relevant Medications    saccharomyces boulardii (Florastor) 250 mg capsule    Hammertoe of right foot        Relevant Orders    Ambulatory referral to Podiatry           Diagnoses and all orders for this visit:    Benign essential hypertension  -     CBC and differential; Future  -     Comprehensive metabolic panel; Future  -     Microalbumin / creatinine urine ratio  -     Cozaar 100 MG tablet; Take 1 tablet (100 mg total) by mouth daily  -     furosemide (LASIX) 20 mg tablet; Take 1 tablet (20 mg total) by mouth daily    Chronic bronchitis, unspecified chronic bronchitis type ([address] )    Obstructive sleep apnea    Obesity (BMI 30-39  9)    Localized edema  -     TSH, 3rd generation with Free T4 reflex; Future  -     UA w Reflex to Microscopic w Reflex to Culture  -     furosemide (LASIX) 20 mg tablet;  Take 1 tablet (20 mg total) by mouth daily    Chronic gout without tophus, unspecified cause, unspecified site  -     Uric acid; Future    Mixed hyperlipidemia  -     Lipid Panel with Direct LDL reflex; Future  -     TSH, 3rd generation with Free T4 reflex; Future    Iron overload  -     Iron Panel (Includes Ferritin, Iron Sat%, Iron, and TIBC); Future    Macrocytosis    Pain syndrome, chronic    Tobacco use  -     CT lung screening program; Future    Constipation, unspecified constipation type  -     saccharomyces boulardii (Florastor) 250 mg capsule; Take 1 capsule (250 mg total) by mouth 2 (two) times a day    Hammertoe of right foot  -     Ambulatory referral to Podiatry; Future        No problem-specific Assessment & Plan notes found for this encounter  A/P: Doing ok, but edema is worse  ??cause  No s/s of CHF  Past work up and treatments w/o relief  Wt is up and could be contributing  Echo five years ago and may need to be repeated as he continues to smoke, has htn, and COPD  Will check labs for routine, but r/o other causing factors  Will retry lasix  Now wants to try probiotics for the constipation  Wean tobacco and discussed lung screening  Pt agreeable, but wants to see if his insurance will cover it  Will refer to [place] for hammertoe  Encouraged wt loss and wean tobacco further  Continue current treatment and RTC one month for f/u edema, labs, smoking, constipation, etc      Subjective:      Patient ID: Jonathon Victor is a 56 y o  male  WM RTC for f/u HTN, COPD, etd  Doing ok, but chronic edema is worse  No new meds or dietary changes  No CP, SOB, or palpitations  Remains active w/o difficulty and no falls  Breathing is stable, but still smoking  Slight chronic cough  Some constipation yet  Chronic pain is controlled  Due for labs         The following portions of the patient's history were reviewed and updated as appropriate:   He has a past medical history of Abnormal kidney function, Allergic rhinitis, Benign colon polyp, Carpal tunnel syndrome, Cervical radiculopathy, Chronic fatigue, Chronic pain disorder, COPD (chronic obstructive pulmonary disease) (HCC), CPAP (continuous positive airway pressure) dependence, Edema, Elevated liver enzymes, Gout, Hyperlipidemia, Hypertension, Hypotension, Left ventricular hypertrophy, Obesity (BMI 30-39 9) (9/20/2018), and Sleep apnea  ,  does not have any pertinent problems on file  ,   has a past surgical history that includes Hemorrhoid surgery; Carpal tunnel release (Bilateral); and Liver biopsy  ,  family history includes Cancer in his mother; No Known Problems in his brother, father, maternal grandfather, maternal grandmother, paternal grandfather, paternal grandmother, and sister  ,   reports that he has been smoking cigarettes  He has a 40 00 pack-year smoking history  He has never used smokeless tobacco  He reports current alcohol use of about 6 0 standard drinks of alcohol per week  He reports that he does not use drugs  ,  has No Known Allergies     Current Outpatient Medications   Medication Sig Dispense Refill    Cozaar 100 MG tablet Take 1 tablet (100 mg total) by mouth daily 90 tablet 3    furosemide (LASIX) 20 mg tablet Take 1 tablet (20 mg total) by mouth daily 90 tablet 3    multivitamin (THERAGRAN) TABS Take 1 tablet by mouth daily      Omega-3 Fatty Acids (FISH OIL) 1,000 mg Take 1,000 mg by mouth daily      saccharomyces boulardii (Florastor) 250 mg capsule Take 1 capsule (250 mg total) by mouth 2 (two) times a day 180 capsule 3     No current facility-administered medications for this visit  Review of Systems   Constitutional: Positive for activity change  Negative for chills, diaphoresis, fatigue and fever  HENT: Negative  Eyes: Negative for visual disturbance  Respiratory: Positive for cough  Negative for chest tightness, shortness of breath and wheezing  Cardiovascular: Positive for leg swelling  Negative for chest pain and palpitations     Gastrointestinal: Negative for abdominal pain, constipation, diarrhea, nausea and vomiting  Endocrine: Negative for cold intolerance and heat intolerance  Genitourinary: Negative for difficulty urinating, dysuria and frequency  Musculoskeletal: Negative for arthralgias, gait problem and myalgias  Neurological: Negative for dizziness, seizures, syncope, weakness, light-headedness and headaches  Psychiatric/Behavioral: Negative for confusion, dysphoric mood and sleep disturbance  The patient is not nervous/anxious  PHQ-9 Depression Screening    PHQ-9:   Frequency of the following problems over the past two weeks:            Objective:  Vitals:    08/27/21 1457   BP: 132/74   Pulse: 89   Temp: 98 6 °F (37 °C)   SpO2: 97%   Weight: 125 kg (275 lb 5 oz)   Height: 5' 10 5" (1 791 m)     Body mass index is 38 94 kg/m²  Physical Exam  Constitutional:       General: He is not in acute distress  Appearance: Normal appearance  He is not ill-appearing  HENT:      Head: Normocephalic and atraumatic  Mouth/Throat:      Mouth: Mucous membranes are moist    Eyes:      Extraocular Movements: Extraocular movements intact  Conjunctiva/sclera: Conjunctivae normal       Pupils: Pupils are equal, round, and reactive to light  Neck:      Vascular: No carotid bruit  Cardiovascular:      Rate and Rhythm: Normal rate and regular rhythm  Heart sounds: Normal heart sounds  Comments: No HJR/JVD  Pulmonary:      Effort: Pulmonary effort is normal  No respiratory distress  Breath sounds: Normal breath sounds  No wheezing or rales  Abdominal:      General: Bowel sounds are normal  There is no distension  Palpations: Abdomen is soft  Tenderness: There is no abdominal tenderness  Musculoskeletal:      Cervical back: Neck supple  Right lower leg: Edema present  Left lower leg: Edema present  Comments: bilat LE 2-3/4   Neurological:      General: No focal deficit present        Mental Status: He is alert and oriented to person, place, and time  Mental status is at baseline  Psychiatric:         Mood and Affect: Mood normal          Behavior: Behavior normal          Thought Content: Thought content normal          Judgment: Judgment normal      Tobacco Cessation Counseling: Tobacco cessation counseling and education was provided  The patient is sincerely urged to quit consumption of tobacco  He is not ready to quit tobacco  The numerous health risks of tobacco consumption were discussed  If he decides to quit, there are a number of helpful adjunctive aids, and he can see me to discuss nicotine replacement therapy, chantix, or bupropion anytime in the future

## 2021-09-01 ENCOUNTER — APPOINTMENT (OUTPATIENT)
Dept: LAB | Facility: CLINIC | Age: 61
End: 2021-09-01
Payer: COMMERCIAL

## 2021-09-01 DIAGNOSIS — M1A.9XX0 CHRONIC GOUT WITHOUT TOPHUS, UNSPECIFIED CAUSE, UNSPECIFIED SITE: ICD-10-CM

## 2021-09-01 DIAGNOSIS — R60.0 LOCALIZED EDEMA: ICD-10-CM

## 2021-09-01 DIAGNOSIS — E78.2 MIXED HYPERLIPIDEMIA: ICD-10-CM

## 2021-09-01 DIAGNOSIS — E83.19 IRON OVERLOAD: ICD-10-CM

## 2021-09-01 DIAGNOSIS — R73.01 ELEVATED FASTING BLOOD SUGAR: ICD-10-CM

## 2021-09-01 DIAGNOSIS — I10 BENIGN ESSENTIAL HYPERTENSION: ICD-10-CM

## 2021-09-01 LAB
ALBUMIN SERPL BCP-MCNC: 4.2 G/DL (ref 3.5–5)
ALP SERPL-CCNC: 77 U/L (ref 46–116)
ALT SERPL W P-5'-P-CCNC: 80 U/L (ref 12–78)
ANION GAP SERPL CALCULATED.3IONS-SCNC: 4 MMOL/L (ref 4–13)
AST SERPL W P-5'-P-CCNC: 110 U/L (ref 5–45)
BASOPHILS # BLD AUTO: 0.08 THOUSANDS/ΜL (ref 0–0.1)
BASOPHILS NFR BLD AUTO: 1 % (ref 0–1)
BILIRUB SERPL-MCNC: 1.1 MG/DL (ref 0.2–1)
BILIRUB UR QL STRIP: NEGATIVE
BUN SERPL-MCNC: 8 MG/DL (ref 5–25)
CALCIUM SERPL-MCNC: 9.4 MG/DL (ref 8.3–10.1)
CHLORIDE SERPL-SCNC: 103 MMOL/L (ref 100–108)
CHOLEST SERPL-MCNC: 185 MG/DL (ref 50–200)
CLARITY UR: NORMAL
CO2 SERPL-SCNC: 29 MMOL/L (ref 21–32)
COLOR UR: YELLOW
CREAT SERPL-MCNC: 0.69 MG/DL (ref 0.6–1.3)
CREAT UR-MCNC: 61.7 MG/DL
EOSINOPHIL # BLD AUTO: 0.33 THOUSAND/ΜL (ref 0–0.61)
EOSINOPHIL NFR BLD AUTO: 6 % (ref 0–6)
ERYTHROCYTE [DISTWIDTH] IN BLOOD BY AUTOMATED COUNT: 13.2 % (ref 11.6–15.1)
FERRITIN SERPL-MCNC: 222 NG/ML (ref 8–388)
GFR SERPL CREATININE-BSD FRML MDRD: 102 ML/MIN/1.73SQ M
GLUCOSE P FAST SERPL-MCNC: 132 MG/DL (ref 65–99)
GLUCOSE UR STRIP-MCNC: NEGATIVE MG/DL
HCT VFR BLD AUTO: 47.2 % (ref 36.5–49.3)
HDLC SERPL-MCNC: 31 MG/DL
HGB BLD-MCNC: 16.5 G/DL (ref 12–17)
HGB UR QL STRIP.AUTO: NEGATIVE
IMM GRANULOCYTES # BLD AUTO: 0.02 THOUSAND/UL (ref 0–0.2)
IMM GRANULOCYTES NFR BLD AUTO: 0 % (ref 0–2)
IRON SATN MFR SERPL: 53 %
IRON SERPL-MCNC: 189 UG/DL (ref 65–175)
KETONES UR STRIP-MCNC: NEGATIVE MG/DL
LDLC SERPL CALC-MCNC: 126 MG/DL (ref 0–100)
LEUKOCYTE ESTERASE UR QL STRIP: NEGATIVE
LYMPHOCYTES # BLD AUTO: 1.81 THOUSANDS/ΜL (ref 0.6–4.47)
LYMPHOCYTES NFR BLD AUTO: 30 % (ref 14–44)
MCH RBC QN AUTO: 35.9 PG (ref 26.8–34.3)
MCHC RBC AUTO-ENTMCNC: 35 G/DL (ref 31.4–37.4)
MCV RBC AUTO: 103 FL (ref 82–98)
MICROALBUMIN UR-MCNC: 19.7 MG/L (ref 0–20)
MICROALBUMIN/CREAT 24H UR: 32 MG/G CREATININE (ref 0–30)
MONOCYTES # BLD AUTO: 0.67 THOUSAND/ΜL (ref 0.17–1.22)
MONOCYTES NFR BLD AUTO: 11 % (ref 4–12)
NEUTROPHILS # BLD AUTO: 3.11 THOUSANDS/ΜL (ref 1.85–7.62)
NEUTS SEG NFR BLD AUTO: 52 % (ref 43–75)
NITRITE UR QL STRIP: NEGATIVE
NRBC BLD AUTO-RTO: 0 /100 WBCS
PH UR STRIP.AUTO: 6.5 [PH]
PLATELET # BLD AUTO: 127 THOUSANDS/UL (ref 149–390)
PMV BLD AUTO: 11.3 FL (ref 8.9–12.7)
POTASSIUM SERPL-SCNC: 4.8 MMOL/L (ref 3.5–5.3)
PROT SERPL-MCNC: 8.1 G/DL (ref 6.4–8.2)
PROT UR STRIP-MCNC: NEGATIVE MG/DL
RBC # BLD AUTO: 4.6 MILLION/UL (ref 3.88–5.62)
SODIUM SERPL-SCNC: 136 MMOL/L (ref 136–145)
SP GR UR STRIP.AUTO: 1.01 (ref 1–1.03)
TIBC SERPL-MCNC: 357 UG/DL (ref 250–450)
TRIGL SERPL-MCNC: 140 MG/DL
TSH SERPL DL<=0.05 MIU/L-ACNC: 3.24 UIU/ML (ref 0.36–3.74)
UROBILINOGEN UR QL STRIP.AUTO: 1 E.U./DL
WBC # BLD AUTO: 6.02 THOUSAND/UL (ref 4.31–10.16)

## 2021-09-01 PROCEDURE — 82570 ASSAY OF URINE CREATININE: CPT | Performed by: INTERNAL MEDICINE

## 2021-09-01 PROCEDURE — 83540 ASSAY OF IRON: CPT

## 2021-09-01 PROCEDURE — 80053 COMPREHEN METABOLIC PANEL: CPT

## 2021-09-01 PROCEDURE — 83550 IRON BINDING TEST: CPT

## 2021-09-01 PROCEDURE — 82043 UR ALBUMIN QUANTITATIVE: CPT | Performed by: INTERNAL MEDICINE

## 2021-09-01 PROCEDURE — 82728 ASSAY OF FERRITIN: CPT

## 2021-09-01 PROCEDURE — 84443 ASSAY THYROID STIM HORMONE: CPT

## 2021-09-01 PROCEDURE — 85025 COMPLETE CBC W/AUTO DIFF WBC: CPT

## 2021-09-01 PROCEDURE — 80061 LIPID PANEL: CPT

## 2021-09-01 PROCEDURE — 36415 COLL VENOUS BLD VENIPUNCTURE: CPT

## 2021-09-01 PROCEDURE — 83036 HEMOGLOBIN GLYCOSYLATED A1C: CPT

## 2021-09-01 PROCEDURE — 81003 URINALYSIS AUTO W/O SCOPE: CPT | Performed by: INTERNAL MEDICINE

## 2021-09-02 DIAGNOSIS — R73.01 ELEVATED FASTING BLOOD SUGAR: Primary | ICD-10-CM

## 2021-09-02 LAB
EST. AVERAGE GLUCOSE BLD GHB EST-MCNC: 117 MG/DL
HBA1C MFR BLD: 5.7 %

## 2021-09-27 ENCOUNTER — HOSPITAL ENCOUNTER (OUTPATIENT)
Dept: CT IMAGING | Facility: HOSPITAL | Age: 61
Discharge: HOME | End: 2021-09-27
Attending: INTERNAL MEDICINE
Payer: COMMERCIAL

## 2021-09-27 DIAGNOSIS — Z72.0 TOBACCO USE: ICD-10-CM

## 2021-09-27 PROCEDURE — 71271 CT THORAX LUNG CANCER SCR C-: CPT

## 2021-09-28 ENCOUNTER — OFFICE VISIT (OUTPATIENT)
Dept: INTERNAL MEDICINE CLINIC | Facility: CLINIC | Age: 61
End: 2021-09-28
Payer: COMMERCIAL

## 2021-09-28 VITALS
WEIGHT: 272.13 LBS | TEMPERATURE: 98.1 F | HEIGHT: 71 IN | DIASTOLIC BLOOD PRESSURE: 90 MMHG | HEART RATE: 81 BPM | OXYGEN SATURATION: 98 % | SYSTOLIC BLOOD PRESSURE: 160 MMHG | BODY MASS INDEX: 38.1 KG/M2

## 2021-09-28 DIAGNOSIS — K59.00 CONSTIPATION, UNSPECIFIED CONSTIPATION TYPE: ICD-10-CM

## 2021-09-28 DIAGNOSIS — R60.0 LOCALIZED EDEMA: Primary | ICD-10-CM

## 2021-09-28 DIAGNOSIS — E83.19 IRON OVERLOAD: ICD-10-CM

## 2021-09-28 DIAGNOSIS — Z23 ENCOUNTER FOR VACCINATION: ICD-10-CM

## 2021-09-28 DIAGNOSIS — R79.89 ELEVATED LFTS: ICD-10-CM

## 2021-09-28 DIAGNOSIS — M20.41 HAMMERTOE OF RIGHT FOOT: ICD-10-CM

## 2021-09-28 DIAGNOSIS — R80.9 MICROALBUMINURIA: ICD-10-CM

## 2021-09-28 DIAGNOSIS — I10 BENIGN ESSENTIAL HYPERTENSION: ICD-10-CM

## 2021-09-28 DIAGNOSIS — D75.89 MACROCYTOSIS: ICD-10-CM

## 2021-09-28 PROCEDURE — 90682 RIV4 VACC RECOMBINANT DNA IM: CPT | Performed by: INTERNAL MEDICINE

## 2021-09-28 PROCEDURE — 90471 IMMUNIZATION ADMIN: CPT | Performed by: INTERNAL MEDICINE

## 2021-09-28 PROCEDURE — 99214 OFFICE O/P EST MOD 30 MIN: CPT | Performed by: INTERNAL MEDICINE

## 2021-09-28 NOTE — PROGRESS NOTES
Assessment/Plan:  Problem List Items Addressed This Visit        Cardiovascular and Mediastinum    Benign essential hypertension       Other    Microalbuminuria    Macrocytosis    Relevant Orders    Ambulatory referral to Gastroenterology    Iron overload    Relevant Orders    Ambulatory referral to Gastroenterology    Edema - Primary    Relevant Orders    Ambulatory referral to Gastroenterology      Other Visit Diagnoses     Constipation, unspecified constipation type        Hammertoe of right foot        Relevant Orders    Ambulatory referral to Podiatry    Elevated LFTs        Relevant Orders    Ambulatory referral to Gastroenterology    Encounter for vaccination        Relevant Orders    influenza vaccine, quadrivalent, recombinant, PF, 0 5 mL           Diagnoses and all orders for this visit:    Localized edema  -     Ambulatory referral to Gastroenterology; Future    Constipation, unspecified constipation type    Hammertoe of right foot  -     Ambulatory referral to Podiatry; Future    Macrocytosis  -     Ambulatory referral to Gastroenterology; Future    Iron overload  -     Ambulatory referral to Gastroenterology; Future    Microalbuminuria    Elevated LFTs  -     Ambulatory referral to Gastroenterology; Future    Benign essential hypertension    Encounter for vaccination  -     influenza vaccine, quadrivalent, recombinant, PF, 0 5 mL        No problem-specific Assessment & Plan notes found for this encounter  A/P: Doing better and tolerating the meds  BP is up, but reports OP readings have been good until the past two days  Is asymptomatic  Will hold on med change and pt to check as an OP and if remains elevated adjust meds  Discussed labs  Still concerned about hemochromatosis  Will refer back to GI  Will re refer to [facility]  Will up date his flu vaccine  Continue current treatment and RTC in two months for routine and will need a BMP  Will call in two weeks for BP       Subjective:      Patient ID: Faith Joseph Mariann Valdovinos is a 56 y o  male  WM RTC for f/u labs, worsening LE edema after restarting his lasix, and constipation  Labs with elevated iron and MCV  Microalbinuria again seen, but better  LFT's remain elevated, but mildly and no change  Seen by GI several years ago with a liver bx due to concerns about hemochromatosis  Tolerating the lasix  Edema is better, but not gone  Back to baseline  Has not seen DPM yet due to not getting the referral        The following portions of the patient's history were reviewed and updated as appropriate:   He has a past medical history of Abnormal kidney function, Allergic rhinitis, Benign colon polyp, Carpal tunnel syndrome, Cervical radiculopathy, Chronic fatigue, Chronic pain disorder, COPD (chronic obstructive pulmonary disease) ([address] ), CPAP (continuous positive airway pressure) dependence, Edema, Elevated liver enzymes, Gout, Hyperlipidemia, Hypertension, Hypotension, Left ventricular hypertrophy, Obesity (BMI 30-39 9) (9/20/2018), and Sleep apnea  ,  does not have any pertinent problems on file  ,   has a past surgical history that includes Hemorrhoid surgery; Carpal tunnel release (Bilateral); and Liver biopsy  ,  family history includes Cancer in his mother; No Known Problems in his brother, father, maternal grandfather, maternal grandmother, paternal grandfather, paternal grandmother, and sister  ,   reports that he has been smoking cigarettes  He has a 40 00 pack-year smoking history  He has never used smokeless tobacco  He reports current alcohol use of about 6 0 standard drinks of alcohol per week  He reports that he does not use drugs  ,  has No Known Allergies     Current Outpatient Medications   Medication Sig Dispense Refill    Cozaar 100 MG tablet Take 1 tablet (100 mg total) by mouth daily 90 tablet 3    furosemide (LASIX) 20 mg tablet Take 1 tablet (20 mg total) by mouth daily 90 tablet 3    multivitamin (THERAGRAN) TABS Take 1 tablet by mouth daily      Omega-3 Fatty Acids (FISH OIL) 1,000 mg Take 1,000 mg by mouth daily      saccharomyces boulardii (Florastor) 250 mg capsule Take 1 capsule (250 mg total) by mouth 2 (two) times a day 180 capsule 3     No current facility-administered medications for this visit  Review of Systems   Constitutional: Negative for activity change, chills, diaphoresis, fatigue and fever  Respiratory: Negative for cough, chest tightness, shortness of breath and wheezing  Cardiovascular: Positive for leg swelling  Negative for chest pain and palpitations  Gastrointestinal: Negative for abdominal pain, constipation, diarrhea, nausea and vomiting  Genitourinary: Negative for difficulty urinating, dysuria and frequency  Musculoskeletal: Negative for arthralgias, gait problem and myalgias  Neurological: Negative for dizziness, seizures, syncope, weakness, light-headedness and headaches  Psychiatric/Behavioral: Negative for confusion  The patient is not nervous/anxious  PHQ-9 Depression Screening    PHQ-9:   Frequency of the following problems over the past two weeks:      Little interest or pleasure in doing things: 0 - not at all  Feeling down, depressed, or hopeless: 0 - not at all  PHQ-2 Score: 0        Objective:  Vitals:    09/28/21 1501   BP: 160/90   Pulse: 81   Temp: 98 1 °F (36 7 °C)   SpO2: 98%   Weight: 123 kg (272 lb 2 oz)   Height: 5' 10 5" (1 791 m)     Body mass index is 38 49 kg/m²  Physical Exam  Vitals and nursing note reviewed  Constitutional:       General: He is not in acute distress  Appearance: Normal appearance  He is not ill-appearing  HENT:      Head: Normocephalic and atraumatic  Mouth/Throat:      Mouth: Mucous membranes are moist    Eyes:      Extraocular Movements: Extraocular movements intact  Conjunctiva/sclera: Conjunctivae normal       Pupils: Pupils are equal, round, and reactive to light  Cardiovascular:      Rate and Rhythm: Regular rhythm        Heart sounds: Normal heart sounds  Pulmonary:      Effort: Pulmonary effort is normal  No respiratory distress  Breath sounds: Normal breath sounds  No wheezing  Abdominal:      General: Bowel sounds are normal  There is no distension  Palpations: Abdomen is soft  Tenderness: There is no abdominal tenderness  Musculoskeletal:      Right lower leg: Edema present  Left lower leg: Edema present  Comments: bilat LE edema 1/4   Neurological:      Mental Status: He is alert  Psychiatric:         Mood and Affect: Mood normal          Behavior: Behavior normal          Thought Content:  Thought content normal          Judgment: Judgment normal

## 2021-10-18 ENCOUNTER — TELEPHONE (OUTPATIENT)
Dept: INTERNAL MEDICINE CLINIC | Facility: CLINIC | Age: 61
End: 2021-10-18

## 2021-10-18 ENCOUNTER — CONSULT (OUTPATIENT)
Dept: GASTROENTEROLOGY | Facility: CLINIC | Age: 61
End: 2021-10-18
Payer: COMMERCIAL

## 2021-10-18 VITALS
WEIGHT: 272.2 LBS | TEMPERATURE: 99 F | BODY MASS INDEX: 38.97 KG/M2 | RESPIRATION RATE: 16 BRPM | HEART RATE: 82 BPM | HEIGHT: 70 IN | DIASTOLIC BLOOD PRESSURE: 90 MMHG | SYSTOLIC BLOOD PRESSURE: 148 MMHG

## 2021-10-18 DIAGNOSIS — Z86.010 HISTORY OF COLON POLYPS: Primary | ICD-10-CM

## 2021-10-18 DIAGNOSIS — R79.89 ELEVATED LFTS: ICD-10-CM

## 2021-10-18 DIAGNOSIS — E83.19 IRON OVERLOAD: ICD-10-CM

## 2021-10-18 PROCEDURE — 99243 OFF/OP CNSLTJ NEW/EST LOW 30: CPT | Performed by: STUDENT IN AN ORGANIZED HEALTH CARE EDUCATION/TRAINING PROGRAM

## 2021-11-22 ENCOUNTER — IMMUNIZATIONS (OUTPATIENT)
Dept: FAMILY MEDICINE CLINIC | Facility: HOSPITAL | Age: 61
End: 2021-11-22

## 2021-11-22 DIAGNOSIS — Z23 ENCOUNTER FOR IMMUNIZATION: Primary | ICD-10-CM

## 2021-11-22 PROCEDURE — 0064A COVID-19 MODERNA VACC 0.25 ML BOOSTER: CPT

## 2021-11-22 PROCEDURE — 91306 COVID-19 MODERNA VACC 0.25 ML BOOSTER: CPT

## 2021-11-23 ENCOUNTER — OFFICE VISIT (OUTPATIENT)
Dept: INTERNAL MEDICINE CLINIC | Facility: CLINIC | Age: 61
End: 2021-11-23
Payer: COMMERCIAL

## 2021-11-23 VITALS
TEMPERATURE: 98.9 F | OXYGEN SATURATION: 98 % | HEIGHT: 70 IN | RESPIRATION RATE: 14 BRPM | BODY MASS INDEX: 39.14 KG/M2 | WEIGHT: 273.4 LBS | HEART RATE: 86 BPM | SYSTOLIC BLOOD PRESSURE: 136 MMHG | DIASTOLIC BLOOD PRESSURE: 72 MMHG

## 2021-11-23 DIAGNOSIS — R80.9 MICROALBUMINURIA: ICD-10-CM

## 2021-11-23 DIAGNOSIS — D75.89 MACROCYTOSIS: ICD-10-CM

## 2021-11-23 DIAGNOSIS — R20.2 PARESTHESIA OF BOTH FEET: ICD-10-CM

## 2021-11-23 DIAGNOSIS — R60.0 LOCALIZED EDEMA: ICD-10-CM

## 2021-11-23 DIAGNOSIS — J42 CHRONIC BRONCHITIS, UNSPECIFIED CHRONIC BRONCHITIS TYPE (HCC): ICD-10-CM

## 2021-11-23 DIAGNOSIS — M1A.9XX0 CHRONIC GOUT WITHOUT TOPHUS, UNSPECIFIED CAUSE, UNSPECIFIED SITE: ICD-10-CM

## 2021-11-23 DIAGNOSIS — E66.9 OBESITY (BMI 30-39.9): ICD-10-CM

## 2021-11-23 DIAGNOSIS — R79.89 ELEVATED LFTS: ICD-10-CM

## 2021-11-23 DIAGNOSIS — Z23 ENCOUNTER FOR VACCINATION: ICD-10-CM

## 2021-11-23 DIAGNOSIS — I10 BENIGN ESSENTIAL HYPERTENSION: Primary | ICD-10-CM

## 2021-11-23 DIAGNOSIS — E83.19 IRON OVERLOAD: ICD-10-CM

## 2021-11-23 PROCEDURE — 99214 OFFICE O/P EST MOD 30 MIN: CPT | Performed by: INTERNAL MEDICINE

## 2021-12-03 ENCOUNTER — APPOINTMENT (OUTPATIENT)
Dept: LAB | Facility: CLINIC | Age: 61
End: 2021-12-03
Payer: COMMERCIAL

## 2021-12-03 DIAGNOSIS — R79.89 ELEVATED LFTS: ICD-10-CM

## 2021-12-03 DIAGNOSIS — E83.19 IRON OVERLOAD: ICD-10-CM

## 2021-12-03 DIAGNOSIS — I10 BENIGN ESSENTIAL HYPERTENSION: ICD-10-CM

## 2021-12-03 DIAGNOSIS — R20.2 PARESTHESIA OF BOTH FEET: ICD-10-CM

## 2021-12-03 LAB
ALBUMIN SERPL BCP-MCNC: 3.9 G/DL (ref 3.5–5)
ALP SERPL-CCNC: 68 U/L (ref 46–116)
ALT SERPL W P-5'-P-CCNC: 68 U/L (ref 12–78)
ANION GAP SERPL CALCULATED.3IONS-SCNC: 6 MMOL/L (ref 4–13)
AST SERPL W P-5'-P-CCNC: 82 U/L (ref 5–45)
BASOPHILS # BLD AUTO: 0.06 THOUSANDS/ΜL (ref 0–0.1)
BASOPHILS NFR BLD AUTO: 1 % (ref 0–1)
BILIRUB DIRECT SERPL-MCNC: 0.26 MG/DL (ref 0–0.2)
BILIRUB SERPL-MCNC: 1.03 MG/DL (ref 0.2–1)
BUN SERPL-MCNC: 10 MG/DL (ref 5–25)
CALCIUM SERPL-MCNC: 9.8 MG/DL (ref 8.3–10.1)
CHLORIDE SERPL-SCNC: 105 MMOL/L (ref 100–108)
CO2 SERPL-SCNC: 25 MMOL/L (ref 21–32)
CREAT SERPL-MCNC: 0.76 MG/DL (ref 0.6–1.3)
EOSINOPHIL # BLD AUTO: 0.27 THOUSAND/ΜL (ref 0–0.61)
EOSINOPHIL NFR BLD AUTO: 4 % (ref 0–6)
ERYTHROCYTE [DISTWIDTH] IN BLOOD BY AUTOMATED COUNT: 13.2 % (ref 11.6–15.1)
FERRITIN SERPL-MCNC: 286 NG/ML (ref 8–388)
FOLATE SERPL-MCNC: >20 NG/ML (ref 3.1–17.5)
GFR SERPL CREATININE-BSD FRML MDRD: 97 ML/MIN/1.73SQ M
GLUCOSE P FAST SERPL-MCNC: 112 MG/DL (ref 65–99)
HCT VFR BLD AUTO: 45.8 % (ref 36.5–49.3)
HGB BLD-MCNC: 15.6 G/DL (ref 12–17)
IMM GRANULOCYTES # BLD AUTO: 0.03 THOUSAND/UL (ref 0–0.2)
IMM GRANULOCYTES NFR BLD AUTO: 1 % (ref 0–2)
IRON SATN MFR SERPL: 28 % (ref 20–50)
IRON SERPL-MCNC: 103 UG/DL (ref 65–175)
LYMPHOCYTES # BLD AUTO: 1.74 THOUSANDS/ΜL (ref 0.6–4.47)
LYMPHOCYTES NFR BLD AUTO: 27 % (ref 14–44)
MCH RBC QN AUTO: 35.2 PG (ref 26.8–34.3)
MCHC RBC AUTO-ENTMCNC: 34.1 G/DL (ref 31.4–37.4)
MCV RBC AUTO: 103 FL (ref 82–98)
MONOCYTES # BLD AUTO: 0.65 THOUSAND/ΜL (ref 0.17–1.22)
MONOCYTES NFR BLD AUTO: 10 % (ref 4–12)
NEUTROPHILS # BLD AUTO: 3.63 THOUSANDS/ΜL (ref 1.85–7.62)
NEUTS SEG NFR BLD AUTO: 57 % (ref 43–75)
NRBC BLD AUTO-RTO: 0 /100 WBCS
PLATELET # BLD AUTO: 123 THOUSANDS/UL (ref 149–390)
PMV BLD AUTO: 11.2 FL (ref 8.9–12.7)
POTASSIUM SERPL-SCNC: 4.6 MMOL/L (ref 3.5–5.3)
PROT SERPL-MCNC: 8.1 G/DL (ref 6.4–8.2)
RBC # BLD AUTO: 4.43 MILLION/UL (ref 3.88–5.62)
SODIUM SERPL-SCNC: 136 MMOL/L (ref 136–145)
TIBC SERPL-MCNC: 373 UG/DL (ref 250–450)
TSH SERPL DL<=0.05 MIU/L-ACNC: 3.07 UIU/ML (ref 0.36–3.74)
URATE SERPL-MCNC: 5.2 MG/DL (ref 4.2–8)
VIT B12 SERPL-MCNC: 597 PG/ML (ref 100–900)
WBC # BLD AUTO: 6.38 THOUSAND/UL (ref 4.31–10.16)

## 2021-12-03 PROCEDURE — 86038 ANTINUCLEAR ANTIBODIES: CPT

## 2021-12-03 PROCEDURE — 82248 BILIRUBIN DIRECT: CPT

## 2021-12-03 PROCEDURE — 80053 COMPREHEN METABOLIC PANEL: CPT

## 2021-12-03 PROCEDURE — 84443 ASSAY THYROID STIM HORMONE: CPT

## 2021-12-03 PROCEDURE — 86617 LYME DISEASE ANTIBODY: CPT

## 2021-12-03 PROCEDURE — 86430 RHEUMATOID FACTOR TEST QUAL: CPT

## 2021-12-03 PROCEDURE — 85025 COMPLETE CBC W/AUTO DIFF WBC: CPT

## 2021-12-03 PROCEDURE — 86592 SYPHILIS TEST NON-TREP QUAL: CPT

## 2021-12-03 PROCEDURE — 82607 VITAMIN B-12: CPT

## 2021-12-03 PROCEDURE — 36415 COLL VENOUS BLD VENIPUNCTURE: CPT

## 2021-12-03 PROCEDURE — 84550 ASSAY OF BLOOD/URIC ACID: CPT

## 2021-12-03 PROCEDURE — 83540 ASSAY OF IRON: CPT

## 2021-12-03 PROCEDURE — 82746 ASSAY OF FOLIC ACID SERUM: CPT

## 2021-12-03 PROCEDURE — 86431 RHEUMATOID FACTOR QUANT: CPT

## 2021-12-03 PROCEDURE — 83550 IRON BINDING TEST: CPT

## 2021-12-03 PROCEDURE — 86618 LYME DISEASE ANTIBODY: CPT

## 2021-12-03 PROCEDURE — 82728 ASSAY OF FERRITIN: CPT

## 2021-12-04 LAB — B BURGDOR IGG+IGM SER-ACNC: 120

## 2021-12-06 DIAGNOSIS — R76.8 RHEUMATOID FACTOR POSITIVE: Primary | ICD-10-CM

## 2021-12-06 LAB
CRYOGLOB RF SER-ACNC: ABNORMAL [IU]/ML
RHEUMATOID FACT SER QL LA: POSITIVE
RPR SER QL: NORMAL
RYE IGE QN: NEGATIVE

## 2021-12-07 LAB
B BURGDOR IGG PATRN SER IB-IMP: NEGATIVE
B BURGDOR IGM PATRN SER IB-IMP: NEGATIVE
B BURGDOR18KD IGG SER QL IB: NORMAL
B BURGDOR23KD IGG SER QL IB: NORMAL
B BURGDOR23KD IGM SER QL IB: NORMAL
B BURGDOR28KD IGG SER QL IB: NORMAL
B BURGDOR30KD IGG SER QL IB: NORMAL
B BURGDOR39KD IGG SER QL IB: NORMAL
B BURGDOR39KD IGM SER QL IB: NORMAL
B BURGDOR41KD IGG SER QL IB: NORMAL
B BURGDOR41KD IGM SER QL IB: NORMAL
B BURGDOR45KD IGG SER QL IB: NORMAL
B BURGDOR58KD IGG SER QL IB: NORMAL
B BURGDOR66KD IGG SER QL IB: NORMAL
B BURGDOR93KD IGG SER QL IB: NORMAL

## 2021-12-14 ENCOUNTER — TELEPHONE (OUTPATIENT)
Dept: INTERNAL MEDICINE CLINIC | Facility: CLINIC | Age: 61
End: 2021-12-14

## 2021-12-14 DIAGNOSIS — R20.2 PARESTHESIA OF BOTH FEET: Primary | ICD-10-CM

## 2021-12-14 RX ORDER — GABAPENTIN 300 MG/1
CAPSULE ORAL
Qty: 270 CAPSULE | Refills: 1 | Status: SHIPPED | OUTPATIENT
Start: 2021-12-14

## 2022-01-14 ENCOUNTER — TELEPHONE (OUTPATIENT)
Dept: OTHER | Facility: OTHER | Age: 62
End: 2022-01-14

## 2022-01-14 DIAGNOSIS — I10 BENIGN ESSENTIAL HYPERTENSION: ICD-10-CM

## 2022-01-17 RX ORDER — LOSARTAN POTASSIUM 100 MG
100 TABLET ORAL DAILY
Qty: 90 TABLET | Refills: 0 | Status: SHIPPED | OUTPATIENT
Start: 2022-01-17 | End: 2022-05-24

## 2022-01-25 DIAGNOSIS — I10 BENIGN ESSENTIAL HYPERTENSION: Primary | ICD-10-CM

## 2022-01-25 RX ORDER — LOSARTAN POTASSIUM 100 MG/1
100 TABLET ORAL DAILY
Qty: 90 TABLET | Refills: 3 | Status: SHIPPED | OUTPATIENT
Start: 2022-01-25 | End: 2022-07-22 | Stop reason: SDUPTHER

## 2022-04-20 ENCOUNTER — APPOINTMENT (OUTPATIENT)
Dept: LAB | Facility: CLINIC | Age: 62
End: 2022-04-20

## 2022-04-20 DIAGNOSIS — Z00.8 ENCOUNTER FOR OTHER GENERAL EXAMINATION: ICD-10-CM

## 2022-04-20 DIAGNOSIS — Z00.8 HEALTH EXAMINATION IN POPULATION SURVEY: ICD-10-CM

## 2022-04-20 LAB
CHOLEST SERPL-MCNC: 184 MG/DL
EST. AVERAGE GLUCOSE BLD GHB EST-MCNC: 123 MG/DL
HBA1C MFR BLD: 5.9 %
HDLC SERPL-MCNC: 29 MG/DL
LDLC SERPL CALC-MCNC: 103 MG/DL (ref 0–100)
NONHDLC SERPL-MCNC: 155 MG/DL
TRIGL SERPL-MCNC: 262 MG/DL

## 2022-04-20 PROCEDURE — 80061 LIPID PANEL: CPT

## 2022-04-20 PROCEDURE — 36415 COLL VENOUS BLD VENIPUNCTURE: CPT

## 2022-04-20 PROCEDURE — 83036 HEMOGLOBIN GLYCOSYLATED A1C: CPT

## 2022-05-10 ENCOUNTER — APPOINTMENT (OUTPATIENT)
Dept: RADIOLOGY | Facility: MEDICAL CENTER | Age: 62
End: 2022-05-10
Payer: COMMERCIAL

## 2022-05-10 ENCOUNTER — OFFICE VISIT (OUTPATIENT)
Dept: RHEUMATOLOGY | Facility: CLINIC | Age: 62
End: 2022-05-10
Payer: COMMERCIAL

## 2022-05-10 ENCOUNTER — APPOINTMENT (OUTPATIENT)
Dept: LAB | Facility: MEDICAL CENTER | Age: 62
End: 2022-05-10
Payer: COMMERCIAL

## 2022-05-10 VITALS
HEIGHT: 70 IN | HEART RATE: 79 BPM | DIASTOLIC BLOOD PRESSURE: 83 MMHG | SYSTOLIC BLOOD PRESSURE: 159 MMHG | BODY MASS INDEX: 39.37 KG/M2 | WEIGHT: 275 LBS

## 2022-05-10 DIAGNOSIS — M19.90 INFLAMMATORY ARTHRITIS: ICD-10-CM

## 2022-05-10 DIAGNOSIS — R76.8 RHEUMATOID FACTOR POSITIVE: Primary | ICD-10-CM

## 2022-05-10 DIAGNOSIS — R79.89 LOW VITAMIN D LEVEL: ICD-10-CM

## 2022-05-10 LAB
25(OH)D3 SERPL-MCNC: 35.8 NG/ML (ref 30–100)
CRP SERPL QL: <3 MG/L
ERYTHROCYTE [SEDIMENTATION RATE] IN BLOOD: 11 MM/HOUR (ref 0–19)

## 2022-05-10 PROCEDURE — 82306 VITAMIN D 25 HYDROXY: CPT | Performed by: INTERNAL MEDICINE

## 2022-05-10 PROCEDURE — 85652 RBC SED RATE AUTOMATED: CPT | Performed by: INTERNAL MEDICINE

## 2022-05-10 PROCEDURE — 99244 OFF/OP CNSLTJ NEW/EST MOD 40: CPT | Performed by: INTERNAL MEDICINE

## 2022-05-10 PROCEDURE — 86200 CCP ANTIBODY: CPT | Performed by: INTERNAL MEDICINE

## 2022-05-10 PROCEDURE — 73130 X-RAY EXAM OF HAND: CPT

## 2022-05-10 PROCEDURE — 86140 C-REACTIVE PROTEIN: CPT | Performed by: INTERNAL MEDICINE

## 2022-05-10 PROCEDURE — 36415 COLL VENOUS BLD VENIPUNCTURE: CPT | Performed by: INTERNAL MEDICINE

## 2022-05-10 NOTE — PROGRESS NOTES
Assessment and Plan:   Fawn Corcoran is a 59 y o   male who presents as a Rheumatology consult referred by Isidra Horton DO for evaluation of possible inflammatory arthritis, but explained to patient that a RF is only slightly positive and more likely a false positive  Has significant osteoarthritis on hand x-rays likely contributing to his joint symptoms  Do labs  Do hand x-rays  Try diclofenac gel as needed for joint pain    Return to clinic in 6 months    Plan:  Diagnoses and all orders for this visit:    Rheumatoid factor positive  -     Ambulatory referral to Rheumatology    Inflammatory arthritis  -     Cyclic citrul peptide antibody, IgG  -     Sedimentation rate, automated  -     C-reactive protein  -     XR hand 3+ vw left; Future  -     XR hand 3+ vw right; Future  -     Diclofenac Sodium (VOLTAREN) 1 %; Apply 2 g topically 4 (four) times a day As needed for joint pain    Low vitamin D level  -     Vitamin D 25 hydroxy  Follow-up plan: RTC in 6 months        HPI  Fawn Corcoran is a 59 y o   male who presents as a Rheumatology consult referred by Isidra Horton DO for evaluation of possible inflammatory arthritis  Left thumb started swelling and becoming painful a couple of weeks ago, resolved on own  Wasn't able to move it  Feet have been numb and tingly for the past year  Right wrist had become swollen and painful a month ago, and improved on own; still feels pain when pushes on it  Smokes 1 PPD of cigarettes for 40 years  Had one gout attack in left hand 10 years ago, and didn't have recurrence since  Drinks 1-2 beers a day on average  Had carpal tunnel surgeries on both sides 7-8 years ago  Review of Systems  Review of Systems   Constitutional: Positive for fatigue  Negative for fever and unexpected weight change  HENT: Positive for sinus pressure and sinus pain  Negative for mouth sores  Eyes: Positive for discharge  Respiratory: Negative for cough and shortness of breath  Cardiovascular: Positive for leg swelling  Negative for chest pain  Gastrointestinal: Positive for constipation  Negative for abdominal pain and diarrhea  Musculoskeletal: Positive for arthralgias, back pain, joint swelling and neck pain  Negative for myalgias  Skin: Negative for color change and rash  Allergic/Immunologic: Positive for environmental allergies  Neurological: Positive for numbness  Negative for weakness  Hematological: Negative for adenopathy  Psychiatric/Behavioral: Negative for sleep disturbance  Reviewed and agree  Allergies  No Known Allergies    Home Medications    Current Outpatient Medications:     furosemide (LASIX) 20 mg tablet, Take 1 tablet (20 mg total) by mouth daily, Disp: 90 tablet, Rfl: 3    gabapentin (Neurontin) 300 mg capsule, One po q AM X 1 then one po bid x1 and then tid (Patient taking differently: 2 (two) times a day), Disp: 270 capsule, Rfl: 1    losartan (Cozaar) 100 MG tablet, Take 1 tablet (100 mg total) by mouth daily, Disp: 90 tablet, Rfl: 3    saccharomyces boulardii (Florastor) 250 mg capsule, Take 1 capsule (250 mg total) by mouth 2 (two) times a day, Disp: 180 capsule, Rfl: 3    sildenafil (VIAGRA) 100 mg tablet, Take 1 tablet (100 mg total) by mouth as needed in the morning for erectile dysfunction  , Disp: 10 tablet, Rfl: 0    Diclofenac Sodium (VOLTAREN) 1 %, Apply 2 g topically 4 (four) times a day As needed for joint pain, Disp: 100 g, Rfl: 6    multivitamin (THERAGRAN) TABS, Take 1 tablet by mouth daily, Disp: , Rfl:     Omega-3 Fatty Acids (FISH OIL) 1,000 mg, Take 1,000 mg by mouth Take one capsule twice a week, Disp: , Rfl:     Past Medical History  Past Medical History:   Diagnosis Date    Abnormal kidney function     last assessed: Feb 25, 2016    Allergic rhinitis     Benign colon polyp     Carpal tunnel syndrome     Cervical radiculopathy     last assessed: Feb 11, 2015    Chronic fatigue     Chronic pain disorder  COPD (chronic obstructive pulmonary disease) (HCC)     CPAP (continuous positive airway pressure) dependence     Edema     Elevated liver enzymes     Gout     Hyperlipidemia     Hypertension     Hypotension     last assessed: March 22, 2017    Left ventricular hypertrophy     Obesity (BMI 30-39 9) 9/20/2018    Sleep apnea        Past Surgical History   Past Surgical History:   Procedure Laterality Date    CARPAL TUNNEL RELEASE Bilateral     HEMORRHOID SURGERY      LIVER BIOPSY         Family History    Family History   Problem Relation Age of Onset    Cancer Mother     No Known Problems Father     No Known Problems Sister     No Known Problems Brother     No Known Problems Maternal Grandmother     No Known Problems Maternal Grandfather     No Known Problems Paternal Grandmother     No Known Problems Paternal Grandfather      No known family history of autoimmune or inflammatory diseases  Social History  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[address] , works with hands a lot  Social History     Substance and Sexual Activity   Alcohol Use Yes    Alcohol/week: 6 0 standard drinks    Types: 6 Cans of beer per week    Comment: social      Social History     Substance and Sexual Activity   Drug Use No     Social History     Tobacco Use   Smoking Status Current Every Day Smoker    Packs/day: 1 00    Years: 40 00    Pack years: 40 00    Types: Cigarettes   Smokeless Tobacco Never Used   Tobacco Comment    Given [facility] "how to quit" info        Objective:  Vitals:    05/10/22 0905   BP: 159/83   Pulse: 79   Weight: 125 kg (275 lb)   Height: 5' 10" (1 778 m)       Physical Exam  Constitutional:       General: He is not in acute distress  HENT:      Head: Normocephalic and atraumatic  Eyes:      Conjunctiva/sclera: Conjunctivae normal    Cardiovascular:      Rate and Rhythm: Normal rate and regular rhythm  Heart sounds: S1 normal and S2 normal      No friction rub     Pulmonary: Effort: Pulmonary effort is normal  No respiratory distress  Breath sounds: Normal breath sounds  No wheezing, rhonchi or rales  Musculoskeletal:      Cervical back: Neck supple  Skin:     General: Skin is warm and dry  Coloration: Skin is not pale  Findings: No rash  Neurological:      Mental Status: He is alert  Mental status is at baseline  Psychiatric:         Mood and Affect: Mood normal          Behavior: Behavior normal        Reviewed labs and imaging  Imaging:   CT lung screening problem 9/27/21   Noncalcified pulmonary nodules on the order of between 1 and 4 mm in size which are likely to be benign  And hepatomegaly and hepatic steatosis  Bilateral gynecomastia  Lung-RADS2, benign appearance or behavior  Continue annual screening with LDCT in 12 months  Labs:   Office Visit on 05/10/2022   Component Date Value Ref Range Status    Cyclic Citrullinated Peptide Ab  05/10/2022 1 5  See comment Final    Sed Rate 05/10/2022 11  0 - 19 mm/hour Final    CRP 05/10/2022 <3 0  <3 0 mg/L Final    Vit D, 25-Hydroxy 05/10/2022 35 8  30 0 - 100 0 ng/mL Final   Appointment on 04/20/2022   Component Date Value Ref Range Status    Hemoglobin A1C 04/20/2022 5 9 (A) Normal 3 8-5 6%; PreDiabetic 5 7-6 4%;  Diabetic >=6 5%; Glycemic control for adults with diabetes <7 0% % Final    EAG 04/20/2022 123  mg/dl Final    Cholesterol 04/20/2022 184  See Comment mg/dL Final    Cholesterol:         Pediatric <18 Years        Desirable          <170 mg/dL      Borderline High    170-199 mg/dL      High               >=200 mg/dL        Adult >=18 Years            Desirable         <200 mg/dL      Borderline High   200-239 mg/dL      High              >239 mg/dL      Triglycerides 04/20/2022 262 (A) See Comment mg/dL Final    Triglyceride:     0-9Y            <75mg/dL     10Y-17Y         <90 mg/dL       >=18Y     Normal          <150 mg/dL     Borderline High 150-199 mg/dL     High 200-499 mg/dL        Very High       >499 mg/dL    Specimen collection should occur prior to N-Acetylcysteine or Metamizole administration due to the potential for falsely depressed results   HDL, Direct 04/20/2022 29 (A) >=40 mg/dL Final    Specimen collection should occur prior to Metamizole administration due to the potential for falsley depressed results   LDL Calculated 04/20/2022 103 (A) 0 - 100 mg/dL Final    LDL Cholesterol:     Optimal           <100 mg/dl     Near Optimal      100-129 mg/dl     Above Optimal       Borderline High 130-159 mg/dl       High            160-189 mg/dl       Very High       >189 mg/dl         This screening LDL is a calculated result  It does not have the accuracy of the Direct Measured LDL in the monitoring of patients with hyperlipidemia and/or statin therapy  Direct Measure LDL (VZO949) must be ordered separately in these patients   Non-HDL-Chol (CHOL-HDL) 04/20/2022 155  mg/dl Final   Appointment on 12/03/2021   Component Date Value Ref Range Status    Sodium 12/03/2021 136  136 - 145 mmol/L Final    Potassium 12/03/2021 4 6  3 5 - 5 3 mmol/L Final    Chloride 12/03/2021 105  100 - 108 mmol/L Final    CO2 12/03/2021 25  21 - 32 mmol/L Final    ANION GAP 12/03/2021 6  4 - 13 mmol/L Final    BUN 12/03/2021 10  5 - 25 mg/dL Final    Creatinine 12/03/2021 0 76  0 60 - 1 30 mg/dL Final    Standardized to IDMS reference method    Glucose, Fasting 12/03/2021 112 (A) 65 - 99 mg/dL Final    Specimen collection should occur prior to Sulfasalazine administration due to the potential for falsely depressed results  Specimen collection should occur prior to Sulfapyridine administration due to the potential for falsely elevated results   Calcium 12/03/2021 9 8  8 3 - 10 1 mg/dL Final    AST 12/03/2021 82 (A) 5 - 45 U/L Final    Specimen collection should occur prior to Sulfasalazine administration due to the potential for falsely depressed results       ALT 12/03/2021 68  12 - 78 U/L Final    Specimen collection should occur prior to Sulfasalazine and/or Sulfapyridine administration due to the potential for falsely depressed results   Alkaline Phosphatase 12/03/2021 68  46 - 116 U/L Final    Total Protein 12/03/2021 8 1  6 4 - 8 2 g/dL Final    Albumin 12/03/2021 3 9  3 5 - 5 0 g/dL Final    Total Bilirubin 12/03/2021 1 03 (A) 0 20 - 1 00 mg/dL Final    Use of this assay is not recommended for patients undergoing treatment with eltrombopag due to the potential for falsely elevated results      eGFR 12/03/2021 97  ml/min/1 73sq m Final    RACHEL 12/03/2021 Negative  Negative Final    WBC 12/03/2021 6 38  4 31 - 10 16 Thousand/uL Final    RBC 12/03/2021 4 43  3 88 - 5 62 Million/uL Final    Hemoglobin 12/03/2021 15 6  12 0 - 17 0 g/dL Final    Hematocrit 12/03/2021 45 8  36 5 - 49 3 % Final    MCV 12/03/2021 103 (A) 82 - 98 fL Final    MCH 12/03/2021 35 2 (A) 26 8 - 34 3 pg Final    MCHC 12/03/2021 34 1  31 4 - 37 4 g/dL Final    RDW 12/03/2021 13 2  11 6 - 15 1 % Final    MPV 12/03/2021 11 2  8 9 - 12 7 fL Final    Platelets 52/38/5822 123 (A) 149 - 390 Thousands/uL Final    nRBC 12/03/2021 0  /100 WBCs Final    Neutrophils Relative 12/03/2021 57  43 - 75 % Final    Immat GRANS % 12/03/2021 1  0 - 2 % Final    Lymphocytes Relative 12/03/2021 27  14 - 44 % Final    Monocytes Relative 12/03/2021 10  4 - 12 % Final    Eosinophils Relative 12/03/2021 4  0 - 6 % Final    Basophils Relative 12/03/2021 1  0 - 1 % Final    Neutrophils Absolute 12/03/2021 3 63  1 85 - 7 62 Thousands/µL Final    Immature Grans Absolute 12/03/2021 0 03  0 00 - 0 20 Thousand/uL Final    Lymphocytes Absolute 12/03/2021 1 74  0 60 - 4 47 Thousands/µL Final    Monocytes Absolute 12/03/2021 0 65  0 17 - 1 22 Thousand/µL Final    Eosinophils Absolute 12/03/2021 0 27  0 00 - 0 61 Thousand/µL Final    Basophils Absolute 12/03/2021 0 06  0 00 - 0 10 Thousands/µL Final    Folate 12/03/2021 >20 0 (A) 3 1 - 17 5 ng/mL Final    E521    Vitamin B-12 12/03/2021 597  100 - 900 pg/mL Final    Rheumatoid Factor 12/03/2021 Positive (A) Negative Final    See RF Quantitation    TSH 3RD GENERATON 12/03/2021 3 070  0 358 - 3 740 uIU/mL Final    Lyme total antibody 12/03/2021 120 (A) 0 00 - 119 Final    Borderline (120-159) Current testing guidelines recommend that all borderline samples be supplemented by further testing  Sample forwarded to reference lab for Western blot assay   RPR 12/03/2021 Non-Reactive  Non-Reactive Final    Iron Saturation 12/03/2021 28  20 - 50 % Final    TIBC 12/03/2021 373  250 - 450 ug/dL Final    Iron 12/03/2021 103  65 - 175 ug/dL Final    Patients treated with metal-binding drugs (ie  Deferoxamine) may have depressed iron values   Ferritin 12/03/2021 286  8 - 388 ng/mL Final    Bilirubin, Direct 12/03/2021 0 26 (A) 0 00 - 0 20 mg/dL Final    Lyme 18 kD IgG 12/03/2021 Absent   Final    Lyme 23 kD IgG 12/03/2021 Absent   Final    Lyme 28 kD IgG 12/03/2021 Absent   Final    Lyme 30 kD IgG 12/03/2021 Absent   Final    Lyme 39 kD IgG 12/03/2021 Absent   Final    Lyme 41 kD IgG 12/03/2021 Absent   Final    Lyme 45 kD IgG 12/03/2021 Absent   Final    Lyme 58 kD IgG 12/03/2021 Absent   Final    Lyme 66 kD IgG 12/03/2021 Absent   Final    Lyme 93 kD IgG 12/03/2021 Absent   Final    Lyme 23 kD IgM 12/03/2021 Absent   Final    Lyme 39 kD IgM 12/03/2021 Absent   Final    Lyme 41 kD IgM 12/03/2021 Absent   Final    Lyme IgG WB Interp  12/03/2021 Negative   Final                         Positive: 5 of the following                                 Borrelia-specific bands:                                 18,23,28,30,39,41,45,58,                                 66, and 93  Negative: No bands or banding                                 patterns which do not                                 meet positive criteria   Lyme IgM WB Interp  12/03/2021 Negative   Final    Note: An equivocal or positive EIA result followed by a negative  Line Blot result is considered NEGATIVE  An equivocal or positive  EIA result followed by a positive Line Blot is considered POSITIVE  by the CDC  Positive: 2 of the following bands: 23,39 or 41  Negative: No bands or banding patterns which do not meet positive  criteria  Criteria for positivity are those recommended by CDC/ASTPHLD   p23=Osp C, f51=[id number]  Note:  Sera from individuals with the following may cross react in the  Lyme Line Blot assays: other spirochetal diseases (periodontal  disease, leptospirosis, relapsing fever, yaws, and pinta);  connective autoimmune (Rheumatoid Arthritis and Systemic Lupus  Erythematosus and also individuals with Antinuclear Antibody);  other infections COFFEE [facility] Spotted Fever; Mac-Barr Virus,  and Cytomegalovirus)      RF Quantitation 12/03/2021 10 IU/mL (A) (none) Final

## 2022-05-10 NOTE — LETTER
May 31, 2022     Se Edwards DO  [address]    Patient: Ramon Mcgrath   YOB: 1960   Date of Visit: 5/10/2022       Dear Dr Virginia Schlatter: Thank you for referring Ramon Mcgrath to me for evaluation  Below are my notes for this consultation  If you have questions, please do not hesitate to call me  I look forward to following your patient along with you  Sincerely,        Dee Verma MD        CC: No Recipients  Dee Verma MD  5/31/2022  7:03 AM  Signed  Assessment and Plan:   Ramon Mcgrath is a 59 y o   male who presents as a Rheumatology consult referred by Maia Cary DO for evaluation of possible inflammatory arthritis, but explained to patient that a RF is only slightly positive and more likely a false positive  Has significant osteoarthritis on hand x-rays likely contributing to his joint symptoms  Do labs  Do hand x-rays  Try diclofenac gel as needed for joint pain    Return to clinic in 6 months    Plan:  Diagnoses and all orders for this visit:    Rheumatoid factor positive  -     Ambulatory referral to Rheumatology    Inflammatory arthritis  -     Cyclic citrul peptide antibody, IgG  -     Sedimentation rate, automated  -     C-reactive protein  -     XR hand 3+ vw left; Future  -     XR hand 3+ vw right; Future  -     Diclofenac Sodium (VOLTAREN) 1 %; Apply 2 g topically 4 (four) times a day As needed for joint pain    Low vitamin D level  -     Vitamin D 25 hydroxy  Follow-up plan: RTC in 6 months        HPI  Ramon Mcgrath is a 59 y o   male who presents as a Rheumatology consult referred by Maia Cary DO for evaluation of possible inflammatory arthritis  Left thumb started swelling and becoming painful a couple of weeks ago, resolved on own  Wasn't able to move it  Feet have been numb and tingly for the past year   Right wrist had become swollen and painful a month ago, and improved on own; still feels pain when pushes on it  Smokes 1 PPD of cigarettes for 40 years  Had one gout attack in left hand 10 years ago, and didn't have recurrence since  Drinks 1-2 beers a day on average  Had carpal tunnel surgeries on both sides 7-8 years ago  Review of Systems  Review of Systems   Constitutional: Positive for fatigue  Negative for fever and unexpected weight change  HENT: Positive for sinus pressure and sinus pain  Negative for mouth sores  Eyes: Positive for discharge  Respiratory: Negative for cough and shortness of breath  Cardiovascular: Positive for leg swelling  Negative for chest pain  Gastrointestinal: Positive for constipation  Negative for abdominal pain and diarrhea  Musculoskeletal: Positive for arthralgias, back pain, joint swelling and neck pain  Negative for myalgias  Skin: Negative for color change and rash  Allergic/Immunologic: Positive for environmental allergies  Neurological: Positive for numbness  Negative for weakness  Hematological: Negative for adenopathy  Psychiatric/Behavioral: Negative for sleep disturbance  Reviewed and agree  Allergies  No Known Allergies    Home Medications    Current Outpatient Medications:     furosemide (LASIX) 20 mg tablet, Take 1 tablet (20 mg total) by mouth daily, Disp: 90 tablet, Rfl: 3    gabapentin (Neurontin) 300 mg capsule, One po q AM X 1 then one po bid x1 and then tid (Patient taking differently: 2 (two) times a day), Disp: 270 capsule, Rfl: 1    losartan (Cozaar) 100 MG tablet, Take 1 tablet (100 mg total) by mouth daily, Disp: 90 tablet, Rfl: 3    saccharomyces boulardii (Florastor) 250 mg capsule, Take 1 capsule (250 mg total) by mouth 2 (two) times a day, Disp: 180 capsule, Rfl: 3    sildenafil (VIAGRA) 100 mg tablet, Take 1 tablet (100 mg total) by mouth as needed in the morning for erectile dysfunction  , Disp: 10 tablet, Rfl: 0    Diclofenac Sodium (VOLTAREN) 1 %, Apply 2 g topically 4 (four) times a day As needed for joint pain, Disp: 100 g, Rfl: 6    multivitamin (THERAGRAN) TABS, Take 1 tablet by mouth daily, Disp: , Rfl:     Omega-3 Fatty Acids (FISH OIL) 1,000 mg, Take 1,000 mg by mouth Take one capsule twice a week, Disp: , Rfl:     Past Medical History  Past Medical History:   Diagnosis Date    Abnormal kidney function     last assessed: Feb 25, 2016    Allergic rhinitis     Benign colon polyp     Carpal tunnel syndrome     Cervical radiculopathy     last assessed: Feb 11, 2015    Chronic fatigue     Chronic pain disorder     COPD (chronic obstructive pulmonary disease) (HCC)     CPAP (continuous positive airway pressure) dependence     Edema     Elevated liver enzymes     Gout     Hyperlipidemia     Hypertension     Hypotension     last assessed: March 22, 2017    Left ventricular hypertrophy     Obesity (BMI 30-39 9) 9/20/2018    Sleep apnea        Past Surgical History   Past Surgical History:   Procedure Laterality Date    CARPAL TUNNEL RELEASE Bilateral     HEMORRHOID SURGERY      LIVER BIOPSY         Family History    Family History   Problem Relation Age of Onset    Cancer Mother     No Known Problems Father     No Known Problems Sister     No Known Problems Brother     No Known Problems Maternal Grandmother     No Known Problems Maternal Grandfather     No Known Problems Paternal Grandmother     No Known Problems Paternal Grandfather      No known family history of autoimmune or inflammatory diseases      Social History  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[address] , works with hands a lot  Social History     Substance and Sexual Activity   Alcohol Use Yes    Alcohol/week: 6 0 standard drinks    Types: 6 Cans of beer per week    Comment: social      Social History     Substance and Sexual Activity   Drug Use No     Social History     Tobacco Use   Smoking Status Current Every Day Smoker    Packs/day: 1 00    Years: 40 00    Pack years: 40 00    Types: Cigarettes Smokeless Tobacco Never Used   Tobacco Comment    [facility]        Objective:  Vitals:    05/10/22 0905   BP: 159/83   Pulse: 79   Weight: 125 kg (275 lb)   Height: 5' 10" (1 778 m)       Physical Exam  Constitutional:       General: He is not in acute distress  HENT:      Head: Normocephalic and atraumatic  Eyes:      Conjunctiva/sclera: Conjunctivae normal    Cardiovascular:      Rate and Rhythm: Normal rate and regular rhythm  Heart sounds: S1 normal and S2 normal      No friction rub  Pulmonary:      Effort: Pulmonary effort is normal  No respiratory distress  Breath sounds: Normal breath sounds  No wheezing, rhonchi or rales  Musculoskeletal:      Cervical back: Neck supple  Skin:     General: Skin is warm and dry  Coloration: Skin is not pale  Findings: No rash  Neurological:      Mental Status: He is alert  Mental status is at baseline  Psychiatric:         Mood and Affect: Mood normal          Behavior: Behavior normal        Reviewed labs and imaging  Imaging:   CT lung screening problem 9/27/21   Noncalcified pulmonary nodules on the order of between 1 and 4 mm in size which are likely to be benign  And hepatomegaly and hepatic steatosis  Bilateral gynecomastia  Lung-RADS2, benign appearance or behavior  Continue annual screening with LDCT in 12 months  Labs:   Office Visit on 05/10/2022   Component Date Value Ref Range Status    Cyclic Citrullinated Peptide Ab  05/10/2022 1 5  See comment Final    Sed Rate 05/10/2022 11  0 - 19 mm/hour Final    CRP 05/10/2022 <3 0  <3 0 mg/L Final    Vit D, 25-Hydroxy 05/10/2022 35 8  30 0 - 100 0 ng/mL Final   Appointment on 04/20/2022   Component Date Value Ref Range Status    Hemoglobin A1C 04/20/2022 5 9 (A) Normal 3 8-5 6%; PreDiabetic 5 7-6 4%;  Diabetic >=6 5%; Glycemic control for adults with diabetes <7 0% % Final    EAG 04/20/2022 123  mg/dl Final    Cholesterol 04/20/2022 184  See Comment mg/dL Final    Cholesterol:         Pediatric <18 Years        Desirable          <170 mg/dL      Borderline High    170-199 mg/dL      High               >=200 mg/dL        Adult >=18 Years            Desirable         <200 mg/dL      Borderline High   200-239 mg/dL      High              >239 mg/dL      Triglycerides 04/20/2022 262 (A) See Comment mg/dL Final    Triglyceride:     0-9Y            <75mg/dL     10Y-17Y         <90 mg/dL       >=18Y     Normal          <150 mg/dL     Borderline High 150-199 mg/dL     High            200-499 mg/dL        Very High       >499 mg/dL    Specimen collection should occur prior to N-Acetylcysteine or Metamizole administration due to the potential for falsely depressed results   HDL, Direct 04/20/2022 29 (A) >=40 mg/dL Final    Specimen collection should occur prior to Metamizole administration due to the potential for falsley depressed results   LDL Calculated 04/20/2022 103 (A) 0 - 100 mg/dL Final    LDL Cholesterol:     Optimal           <100 mg/dl     Near Optimal      100-129 mg/dl     Above Optimal       Borderline High 130-159 mg/dl       High            160-189 mg/dl       Very High       >189 mg/dl         This screening LDL is a calculated result  It does not have the accuracy of the Direct Measured LDL in the monitoring of patients with hyperlipidemia and/or statin therapy  Direct Measure LDL (ENV450) must be ordered separately in these patients      Non-HDL-Chol (CHOL-HDL) 04/20/2022 155  mg/dl Final   Appointment on 12/03/2021   Component Date Value Ref Range Status    Sodium 12/03/2021 136  136 - 145 mmol/L Final    Potassium 12/03/2021 4 6  3 5 - 5 3 mmol/L Final    Chloride 12/03/2021 105  100 - 108 mmol/L Final    CO2 12/03/2021 25  21 - 32 mmol/L Final    ANION GAP 12/03/2021 6  4 - 13 mmol/L Final    BUN 12/03/2021 10  5 - 25 mg/dL Final    Creatinine 12/03/2021 0 76  0 60 - 1 30 mg/dL Final    Standardized to IDMS reference method    Glucose, Fasting 12/03/2021 112 (A) 65 - 99 mg/dL Final    Specimen collection should occur prior to Sulfasalazine administration due to the potential for falsely depressed results  Specimen collection should occur prior to Sulfapyridine administration due to the potential for falsely elevated results   Calcium 12/03/2021 9 8  8 3 - 10 1 mg/dL Final    AST 12/03/2021 82 (A) 5 - 45 U/L Final    Specimen collection should occur prior to Sulfasalazine administration due to the potential for falsely depressed results   ALT 12/03/2021 68  12 - 78 U/L Final    Specimen collection should occur prior to Sulfasalazine and/or Sulfapyridine administration due to the potential for falsely depressed results   Alkaline Phosphatase 12/03/2021 68  46 - 116 U/L Final    Total Protein 12/03/2021 8 1  6 4 - 8 2 g/dL Final    Albumin 12/03/2021 3 9  3 5 - 5 0 g/dL Final    Total Bilirubin 12/03/2021 1 03 (A) 0 20 - 1 00 mg/dL Final    Use of this assay is not recommended for patients undergoing treatment with eltrombopag due to the potential for falsely elevated results      eGFR 12/03/2021 97  ml/min/1 73sq m Final    RACHEL 12/03/2021 Negative  Negative Final    WBC 12/03/2021 6 38  4 31 - 10 16 Thousand/uL Final    RBC 12/03/2021 4 43  3 88 - 5 62 Million/uL Final    Hemoglobin 12/03/2021 15 6  12 0 - 17 0 g/dL Final    Hematocrit 12/03/2021 45 8  36 5 - 49 3 % Final    MCV 12/03/2021 103 (A) 82 - 98 fL Final    MCH 12/03/2021 35 2 (A) 26 8 - 34 3 pg Final    MCHC 12/03/2021 34 1  31 4 - 37 4 g/dL Final    RDW 12/03/2021 13 2  11 6 - 15 1 % Final    MPV 12/03/2021 11 2  8 9 - 12 7 fL Final    Platelets 58/98/9032 123 (A) 149 - 390 Thousands/uL Final    nRBC 12/03/2021 0  /100 WBCs Final    Neutrophils Relative 12/03/2021 57  43 - 75 % Final    Immat GRANS % 12/03/2021 1  0 - 2 % Final    Lymphocytes Relative 12/03/2021 27  14 - 44 % Final    Monocytes Relative 12/03/2021 10  4 - 12 % Final    Eosinophils Relative 12/03/2021 4  0 - 6 % Final    Basophils Relative 12/03/2021 1  0 - 1 % Final    Neutrophils Absolute 12/03/2021 3 63  1 85 - 7 62 Thousands/µL Final    Immature Grans Absolute 12/03/2021 0 03  0 00 - 0 20 Thousand/uL Final    Lymphocytes Absolute 12/03/2021 1 74  0 60 - 4 47 Thousands/µL Final    Monocytes Absolute 12/03/2021 0 65  0 17 - 1 22 Thousand/µL Final    Eosinophils Absolute 12/03/2021 0 27  0 00 - 0 61 Thousand/µL Final    Basophils Absolute 12/03/2021 0 06  0 00 - 0 10 Thousands/µL Final    Folate 12/03/2021 >20 0 (A) 3 1 - 17 5 ng/mL Final    E521    Vitamin B-12 12/03/2021 597  100 - 900 pg/mL Final    Rheumatoid Factor 12/03/2021 Positive (A) Negative Final    See RF Quantitation    TSH 3RD GENERATON 12/03/2021 3 070  0 358 - 3 740 uIU/mL Final    Lyme total antibody 12/03/2021 120 (A) 0 00 - 119 Final    Borderline (120-159) Current testing guidelines recommend that all borderline samples be supplemented by further testing  Sample forwarded to reference lab for Western blot assay   RPR 12/03/2021 Non-Reactive  Non-Reactive Final    Iron Saturation 12/03/2021 28  20 - 50 % Final    TIBC 12/03/2021 373  250 - 450 ug/dL Final    Iron 12/03/2021 103  65 - 175 ug/dL Final    Patients treated with metal-binding drugs (ie  Deferoxamine) may have depressed iron values      Ferritin 12/03/2021 286  8 - 388 ng/mL Final    Bilirubin, Direct 12/03/2021 0 26 (A) 0 00 - 0 20 mg/dL Final    Lyme 18 kD IgG 12/03/2021 Absent   Final    Lyme 23 kD IgG 12/03/2021 Absent   Final    Lyme 28 kD IgG 12/03/2021 Absent   Final    Lyme 30 kD IgG 12/03/2021 Absent   Final    Lyme 39 kD IgG 12/03/2021 Absent   Final    Lyme 41 kD IgG 12/03/2021 Absent   Final    Lyme 45 kD IgG 12/03/2021 Absent   Final    Lyme 58 kD IgG 12/03/2021 Absent   Final    Lyme 66 kD IgG 12/03/2021 Absent   Final    Lyme 93 kD IgG 12/03/2021 Absent   Final    Lyme 23 kD IgM 12/03/2021 Absent   Final    Lyme 39 kD IgM 12/03/2021 Absent   Final    Lyme 41 kD IgM 12/03/2021 Absent   Final    Lyme IgG WB Interp  12/03/2021 Negative   Final                         Positive: 5 of the following                                 Borrelia-specific bands:                                 18,23,28,30,39,41,45,58,                                 66, and 93  Negative: No bands or banding                                 patterns which do not                                 meet positive criteria   Lyme IgM WB Interp  12/03/2021 Negative   Final    Note: An equivocal or positive EIA result followed by a negative  Line Blot result is considered NEGATIVE  An equivocal or positive  EIA result followed by a positive Line Blot is considered POSITIVE  by the CDC  Positive: 2 of the following bands: 23,39 or 41  Negative: No bands or banding patterns which do not meet positive  criteria  Criteria for positivity are those recommended by CDC/ASTPHLD   p23=Osp C, u29=[id number]  Note:  Sera from individuals with the following may cross react in the  Lyme Line Blot assays: other spirochetal diseases (periodontal  disease, leptospirosis, relapsing fever, yaws, and pinta);  connective autoimmune (Rheumatoid Arthritis and Systemic Lupus  Erythematosus and also individuals with Antinuclear Antibody);  other infections COFF[facility] Spotted Fever; Mac-Barr Virus,  and Cytomegalovirus)      RF Quantitation 12/03/2021 10 IU/mL (A) (none) Final

## 2022-05-10 NOTE — PATIENT INSTRUCTIONS
Do labs  Do hand x-rays  Try diclofenac gel as needed for joint pain    Return to clinic in 6 months    Pseudogout   AMBULATORY CARE:   Pseudogout  is a type of arthritis  It is also called calcium pyrophosphate deposition (CPPD)  Pseudogout most often affects the knees  You may also have symptoms in other large joints, including the hip or shoulder  Pseudogout causes calcium crystals to collect in fluid called synovial fluid that surrounds joints  The crystals damage the cartilage and can cause inflammation and pain  Common signs and symptoms of pseudogout:   · Sudden, severe pain in one or more joints    · Swollen, red, warm, painful joints    · Stiff joints in the morning that loosen as you move around    · Reduced range of motion in the joint    · Pain and swelling that lasts up to 2 weeks and that return after periods of no pain or swelling    · Fever    Seek care immediately if:   · You have severe joint pain that you cannot tolerate  · You have a fever or redness that spreads beyond the joint area  Call your doctor if:   · You have new symptoms, such as a rash, after you start treatment  · You have questions or concerns about your condition or care  Medicines: You may need any of the following:  · NSAIDs , such as ibuprofen, help decrease swelling, pain, and fever  This medicine is available with or without a doctor's order  NSAIDs can cause stomach bleeding or kidney problems in certain people  If you take blood thinner medicine, always ask your healthcare provider if NSAIDs are safe for you  Always read the medicine label and follow directions  · Steroids  reduce inflammation and can help your joint stiffness and pain during gout attacks  · Take your medicine as directed  Contact your healthcare provider if you think your medicine is not helping or if you have side effects  Tell him or her if you are allergic to any medicine  Keep a list of the medicines, vitamins, and herbs you take  Include the amounts, and when and why you take them  Bring the list or the pill bottles to follow-up visits  Carry your medicine list with you in case of an emergency  Manage your symptoms:   · Rest your painful joint so it can heal   Your healthcare provider may recommend crutches or a walker if the affected joint is in a leg  · Apply ice to your joint  Ice decreases pain and swelling  Use an ice pack, or put crushed ice in a plastic bag  Cover the ice pack or bag with a towel and apply it to your painful joint for 15 to 20 minutes every hour, or as directed  · Elevate your joint  Elevation helps reduce swelling and pain  Raise your joint above the level of your heart as often as you can  Prop your painful joint on pillows to keep it above your heart comfortably  · Go to physical or occupational therapy as directed  A physical therapist can teach you exercises to improve flexibility and range of motion  You may also be shown non-weight bearing exercises that are safe for your joints, such as swimming  Exercise can help keep your joints flexible and reduce pain  An occupational therapist can help you learn to do your daily activities when your joints are stiff or sore  Follow up with your doctor as directed: You may be referred to a rheumatologist or podiatrist  Write down your questions so you remember to ask them during your visits  © [employer] 2022 Information is for End User's use only and may not be sold, redistributed or otherwise used for commercial purposes  All illustrations and images included in CareNotes® are the copyrighted property of A D [employer] M [employer]  or Jose Christianson  The above information is an  only  It is not intended as medical advice for individual conditions or treatments  Talk to your doctor, nurse or pharmacist before following any medical regimen to see if it is safe and effective for you

## 2022-05-13 LAB — CCP AB SER IA-ACNC: 1.5

## 2022-05-24 ENCOUNTER — OFFICE VISIT (OUTPATIENT)
Dept: INTERNAL MEDICINE CLINIC | Facility: CLINIC | Age: 62
End: 2022-05-24
Payer: COMMERCIAL

## 2022-05-24 ENCOUNTER — TELEPHONE (OUTPATIENT)
Dept: INTERNAL MEDICINE CLINIC | Facility: CLINIC | Age: 62
End: 2022-05-24

## 2022-05-24 VITALS
TEMPERATURE: 97.9 F | BODY MASS INDEX: 38.51 KG/M2 | SYSTOLIC BLOOD PRESSURE: 128 MMHG | OXYGEN SATURATION: 98 % | DIASTOLIC BLOOD PRESSURE: 64 MMHG | WEIGHT: 269 LBS | RESPIRATION RATE: 14 BRPM | HEART RATE: 74 BPM | HEIGHT: 70 IN

## 2022-05-24 DIAGNOSIS — N52.9 ERECTILE DYSFUNCTION, UNSPECIFIED ERECTILE DYSFUNCTION TYPE: Primary | ICD-10-CM

## 2022-05-24 DIAGNOSIS — Z13.31 NEGATIVE DEPRESSION SCREENING: ICD-10-CM

## 2022-05-24 DIAGNOSIS — R80.9 MICROALBUMINURIA: ICD-10-CM

## 2022-05-24 DIAGNOSIS — E83.19 IRON OVERLOAD: ICD-10-CM

## 2022-05-24 DIAGNOSIS — J42 CHRONIC BRONCHITIS, UNSPECIFIED CHRONIC BRONCHITIS TYPE (HCC): ICD-10-CM

## 2022-05-24 DIAGNOSIS — E78.2 MIXED HYPERLIPIDEMIA: ICD-10-CM

## 2022-05-24 DIAGNOSIS — Z12.5 SCREENING FOR PROSTATE CANCER: ICD-10-CM

## 2022-05-24 DIAGNOSIS — Z72.0 TOBACCO USE: ICD-10-CM

## 2022-05-24 DIAGNOSIS — M1A.9XX0 CHRONIC GOUT WITHOUT TOPHUS, UNSPECIFIED CAUSE, UNSPECIFIED SITE: ICD-10-CM

## 2022-05-24 DIAGNOSIS — E66.9 OBESITY (BMI 30-39.9): ICD-10-CM

## 2022-05-24 DIAGNOSIS — G89.4 PAIN SYNDROME, CHRONIC: ICD-10-CM

## 2022-05-24 DIAGNOSIS — I10 BENIGN ESSENTIAL HYPERTENSION: Primary | ICD-10-CM

## 2022-05-24 DIAGNOSIS — L91.8 SKIN TAGS, MULTIPLE ACQUIRED: ICD-10-CM

## 2022-05-24 PROCEDURE — 11200 RMVL SKIN TAGS UP TO&INC 15: CPT | Performed by: INTERNAL MEDICINE

## 2022-05-24 PROCEDURE — 99214 OFFICE O/P EST MOD 30 MIN: CPT | Performed by: INTERNAL MEDICINE

## 2022-05-24 RX ORDER — SILDENAFIL 100 MG/1
100 TABLET, FILM COATED ORAL DAILY PRN
Qty: 10 TABLET | Refills: 0 | Status: SHIPPED | OUTPATIENT
Start: 2022-05-24

## 2022-05-24 NOTE — TELEPHONE ENCOUNTER
Pt would like a call back from you  He forgot to ask you something when he was here; said it was private

## 2022-05-24 NOTE — PROGRESS NOTES
BMI Counseling: There is no height or weight on file to calculate BMI  The BMI is above normal  Nutrition recommendations include decreasing portion sizes and encouraging healthy choices of fruits and vegetables  Exercise recommendations include moderate physical activity 150 minutes/week  No pharmacotherapy was ordered  Rationale for BMI follow-up plan is due to patient being overweight or obese  Assessment/Plan:  Problem List Items Addressed This Visit        Respiratory    COPD (chronic obstructive pulmonary disease) ([address] )       Cardiovascular and Mediastinum    Benign essential hypertension - Primary       Other    Tobacco use    Pain syndrome, chronic    Obesity (BMI 30-39  9)    Microalbuminuria    Iron overload    Hyperlipidemia    Gout      Other Visit Diagnoses     Screening for prostate cancer        Relevant Orders    PSA, Total Screen    Negative depression screening        Skin tags, multiple acquired        Relevant Orders    Skin tag removal (Completed)           Diagnoses and all orders for this visit:    Benign essential hypertension    Chronic bronchitis, unspecified chronic bronchitis type (HCC)    Tobacco use    Pain syndrome, chronic    Obesity (BMI 30-39  9)    Microalbuminuria    Iron overload    Mixed hyperlipidemia    Chronic gout without tophus, unspecified cause, unspecified site    Screening for prostate cancer  -     PSA, Total Screen; Future    Negative depression screening    Skin tags, multiple acquired  -     Skin tag removal      No problem-specific Assessment & Plan notes found for this encounter  A/P:Doing well and will check labs Skin tags cryo'd    Discussed BMI and will give info on diet and exercise  Wean tobacco  Continue current treatment and RTC four months for routine  Subjective:      Patient ID: Yamile Romero is a 59 y o  male      WM RTC For f/u HTN, COPD, etc  Doing well and no new issues,but reports several skin tags on the neck causing pain and bleeding with shaving    Remains active w/o difficulty and no falls  COPD is controlled and limited rescue MDI use  Still smoking  Chronic pain is manageable  No gout attacks  Due for labs  The following portions of the patient's history were reviewed and updated as appropriate:   He has a past medical history of Abnormal kidney function, Allergic rhinitis, Benign colon polyp, Carpal tunnel syndrome, Cervical radiculopathy, Chronic fatigue, Chronic pain disorder, COPD (chronic obstructive pulmonary disease) ([address] ), CPAP (continuous positive airway pressure) dependence, Edema, Elevated liver enzymes, Gout, Hyperlipidemia, Hypertension, Hypotension, Left ventricular hypertrophy, Obesity (BMI 30-39 9) (9/20/2018), and Sleep apnea  ,  does not have any pertinent problems on file  ,   has a past surgical history that includes Hemorrhoid surgery; Carpal tunnel release (Bilateral); and Liver biopsy  ,  family history includes Cancer in his mother; No Known Problems in his brother, father, maternal grandfather, maternal grandmother, paternal grandfather, paternal grandmother, and sister  ,   reports that he has been smoking cigarettes  He has a 40 00 pack-year smoking history  He has never used smokeless tobacco  He reports current alcohol use of about 6 0 standard drinks of alcohol per week  He reports that he does not use drugs  ,  has No Known Allergies     Current Outpatient Medications   Medication Sig Dispense Refill    Diclofenac Sodium (VOLTAREN) 1 % Apply 2 g topically 4 (four) times a day As needed for joint pain 100 g 6    furosemide (LASIX) 20 mg tablet Take 1 tablet (20 mg total) by mouth daily 90 tablet 3    gabapentin (Neurontin) 300 mg capsule One po q AM X 1 then one po bid x1 and then tid (Patient taking differently: 2 (two) times a day) 270 capsule 1    losartan (Cozaar) 100 MG tablet Take 1 tablet (100 mg total) by mouth daily 90 tablet 3    multivitamin (THERAGRAN) TABS Take 1 tablet by mouth daily      Omega-3 Fatty Acids (FISH OIL) 1,000 mg Take 1,000 mg by mouth Take one capsule twice a week      saccharomyces boulardii (Florastor) 250 mg capsule Take 1 capsule (250 mg total) by mouth 2 (two) times a day 180 capsule 3     No current facility-administered medications for this visit  Review of Systems   Constitutional: Negative for activity change, chills, diaphoresis, fatigue and fever  HENT: Negative  Eyes: Negative for visual disturbance  Respiratory: Negative for cough, chest tightness, shortness of breath and wheezing  Cardiovascular: Negative for chest pain, palpitations and leg swelling  Gastrointestinal: Negative for abdominal pain, constipation, diarrhea, nausea and vomiting  Endocrine: Negative for cold intolerance and heat intolerance  Genitourinary: Negative for difficulty urinating, dysuria and frequency  Musculoskeletal: Negative for arthralgias, gait problem and myalgias  Neurological: Negative for dizziness, seizures, syncope, weakness, light-headedness and headaches  Psychiatric/Behavioral: Negative for confusion, dysphoric mood and sleep disturbance  The patient is not nervous/anxious  PHQ-2/9 Depression Screening    Little interest or pleasure in doing things: 0 - not at all  Feeling down, depressed, or hopeless: 0 - not at all  PHQ-2 Score: 0  PHQ-2 Interpretation: Negative depression screen        Objective:  Vitals:    05/24/22 0731   BP: 128/64   BP Location: Left arm   Patient Position: Sitting   Cuff Size: Standard   Pulse: 74   Resp: 14   Temp: 97 9 °F (36 6 °C)   SpO2: 98%   Weight: 122 kg (269 lb)   Height: 5' 10" (1 778 m)     Body mass index is 38 6 kg/m²  Physical Exam  Vitals and nursing note reviewed  Constitutional:       General: He is not in acute distress  Appearance: Normal appearance  He is not ill-appearing  HENT:      Head: Normocephalic and atraumatic        Mouth/Throat:      Mouth: Mucous membranes are moist    Eyes: Extraocular Movements: Extraocular movements intact  Conjunctiva/sclera: Conjunctivae normal       Pupils: Pupils are equal, round, and reactive to light  Neck:      Vascular: No carotid bruit  Cardiovascular:      Rate and Rhythm: Normal rate and regular rhythm  Heart sounds: Normal heart sounds  Pulmonary:      Effort: Pulmonary effort is normal  No respiratory distress  Breath sounds: Normal breath sounds  No wheezing or rales  Abdominal:      General: Bowel sounds are normal  There is no distension  Palpations: Abdomen is soft  Tenderness: There is no abdominal tenderness  Musculoskeletal:      Cervical back: Neck supple  Right lower leg: No edema  Left lower leg: No edema  Skin:     Comments: Five moderate sized skin tags on the neck  Neurological:      General: No focal deficit present  Mental Status: He is alert and oriented to person, place, and time  Mental status is at baseline  Psychiatric:         Mood and Affect: Mood normal          Behavior: Behavior normal          Thought Content: Thought content normal          Judgment: Judgment normal          BMI Counseling: Body mass index is 38 6 kg/m²  The BMI is above normal  Nutrition recommendations include reducing portion sizes, decreasing overall calorie intake, reducing intake of saturated fat and trans fat and reducing intake of cholesterol  Skin tag removal    Date/Time: 5/24/2022 7:42 AM  Performed by: Argentina Germain DO  Authorized by: Argentina Germain DO   Universal Protocol:  Consent: Verbal consent obtained  Risks and benefits: risks, benefits and alternatives were discussed  Consent given by: patient  Time out: Immediately prior to procedure a "time out" was called to verify the correct patient, procedure, equipment, support staff and site/side marked as required    Patient understanding: patient states understanding of the procedure being performed  Patient identity confirmed: verbally with patient        Procedure Details - Skin Tag Destruction:     Up to 15      Body area:  [address]    Head/neck location:  Neck    Malignancy: benign lesion      Destruction method: cryotherapy    Lesion 6:      5 lesions

## 2022-06-24 ENCOUNTER — OFFICE VISIT (OUTPATIENT)
Dept: URGENT CARE | Facility: CLINIC | Age: 62
End: 2022-06-24
Payer: COMMERCIAL

## 2022-06-24 ENCOUNTER — APPOINTMENT (OUTPATIENT)
Dept: RADIOLOGY | Facility: CLINIC | Age: 62
End: 2022-06-24
Payer: COMMERCIAL

## 2022-06-24 VITALS — TEMPERATURE: 98.5 F | OXYGEN SATURATION: 99 % | HEART RATE: 80 BPM | RESPIRATION RATE: 18 BRPM

## 2022-06-24 DIAGNOSIS — S76.212A INGUINAL STRAIN, LEFT, INITIAL ENCOUNTER: ICD-10-CM

## 2022-06-24 DIAGNOSIS — J02.9 SORE THROAT: ICD-10-CM

## 2022-06-24 DIAGNOSIS — S93.402A SPRAIN OF LEFT ANKLE, UNSPECIFIED LIGAMENT, INITIAL ENCOUNTER: ICD-10-CM

## 2022-06-24 DIAGNOSIS — S92.352A DISPLACED FRACTURE OF FIFTH METATARSAL BONE, LEFT FOOT, INITIAL ENCOUNTER FOR CLOSED FRACTURE: Primary | ICD-10-CM

## 2022-06-24 LAB — S PYO AG THROAT QL: NEGATIVE

## 2022-06-24 PROCEDURE — 87880 STREP A ASSAY W/OPTIC: CPT | Performed by: PHYSICIAN ASSISTANT

## 2022-06-24 PROCEDURE — 73630 X-RAY EXAM OF FOOT: CPT

## 2022-06-24 PROCEDURE — 73610 X-RAY EXAM OF ANKLE: CPT

## 2022-06-24 PROCEDURE — 99213 OFFICE O/P EST LOW 20 MIN: CPT | Performed by: PHYSICIAN ASSISTANT

## 2022-06-24 PROCEDURE — 87070 CULTURE OTHR SPECIMN AEROBIC: CPT | Performed by: PHYSICIAN ASSISTANT

## 2022-06-24 RX ORDER — NAPROXEN 500 MG/1
500 TABLET ORAL 2 TIMES DAILY WITH MEALS
Qty: 30 TABLET | Refills: 0 | Status: SHIPPED | OUTPATIENT
Start: 2022-06-24

## 2022-06-24 RX ORDER — FLUTICASONE PROPIONATE 50 MCG
2 SPRAY, SUSPENSION (ML) NASAL DAILY
Qty: 18.2 ML | Refills: 0 | Status: SHIPPED | OUTPATIENT
Start: 2022-06-24

## 2022-06-24 NOTE — PATIENT INSTRUCTIONS
Start PT for groin strain  Elevate foot and ankle  Wear boot  Take naproxen for inflammation  Follow up with sports medicine for ankle and groin strain  Sore throat for 1 month  Rapid strep negative  Will send out throat culture  Likely due to allergies  Continue with allergy pill at bedtime and add flonase once daily  If not improving over the next week, follow up with pcp

## 2022-06-24 NOTE — PROGRESS NOTES
[facility]    NAME: John Zheng is a 59 y o  male  : 1960    MRN: [id number]  DATE: 2022  TIME: 9:52 AM    Assessment and Plan   Displaced fracture of fifth metatarsal bone, left foot, initial encounter for closed fracture [S92 352A]  1  Displaced fracture of fifth metatarsal bone, left foot, initial encounter for closed fracture  XR ankle 3+ vw left    XR foot 3+ vw left    Orthopedic injury treatment    Ambulatory Referral to Orthopedic Surgery    CANCELED: Ambulatory Referral to Physical Therapy    CANCELED: Ambulatory Referral to Sports Medicine   2  Inguinal strain, left, initial encounter  naproxen (NAPROSYN) 500 mg tablet    Ambulatory Referral to Physical Therapy    Ambulatory Referral to Sports Medicine    CANCELED: Ambulatory Referral to Physical Therapy    CANCELED: Ambulatory Referral to Sports Medicine   3  Sore throat  POCT rapid strepA    Throat culture    fluticasone (FLONASE) 50 mcg/act nasal spray   Orthopedic injury treatment    Date/Time: 2022 9:32 AM  Performed by: Kaila Rothman PA-C  Authorized by: Kaila Rothman PA-C     Patient Location:  [facility]  [place] Protocol:  Consent given by: patient  Required items: required blood products, implants, devices, and special equipment available      Injury location:  Foot  Location details:  Left foot  Injury type:  Fracture  Fracture type: fifth metatarsal    Neurovascular status: Neurovascularly intact    Distal perfusion: normal    Neurological function: normal    Range of motion: reduced    Immobilization:  Other (comment) (cam boot applied)  Neurovascular status: Neurovascularly intact    Distal perfusion: normal    Neurological function: normal    Range of motion: unchanged    Patient tolerance:  Patient tolerated the procedure well with no immediate complications        Patient Instructions     Patient Instructions   Start PT for groin strain  Elevate foot and ankle  Wear boot   Take naproxen for inflammation  Follow up with sports medicine for ankle and groin strain  Sore throat for 1 month  Rapid strep negative  Will send out throat culture  Likely due to allergies  Continue with allergy pill at bedtime and add flonase once daily  If not improving over the next week, follow up with pcp  Chief Complaint     Chief Complaint   Patient presents with    Sore Throat     X1 month: sore throat that comes & goes, x1 month: left groin lump & pain noted of 8/10, left foot overturned during fall x1 month ago & pain of 7-8/10  History of Present Illness   59year old male here with complaint of left foot and ankle pain, overturned it a month ago  Has been walking on it  Has not tried anything at home  Pain and swelling continue  Also reports having left groin pain, feels like a groin strain and pulling for the last month  Denies any injury  No testicular pain  Notes some swelling in the area  No redness  Also has a sore throat for a month  No fever, chills  Has some mild nasal congestion from allergies and does take a pill at night before bed  Review of Systems   Review of Systems   Constitutional: Negative for chills, fatigue and fever  HENT: Positive for sore throat  Negative for congestion, ear pain, postnasal drip and sinus pressure  Respiratory: Negative for cough, shortness of breath and wheezing  Musculoskeletal:        Left foot and ankle pain, swelling  Left groin strain, pain     Skin: Negative for color change and wound  Neurological: Negative for headaches  All other systems reviewed and are negative        Current Medications     Current Outpatient Medications:     fluticasone (FLONASE) 50 mcg/act nasal spray, 2 sprays into each nostril daily, Disp: 18 2 mL, Rfl: 0    furosemide (LASIX) 20 mg tablet, Take 1 tablet (20 mg total) by mouth daily, Disp: 90 tablet, Rfl: 3    gabapentin (Neurontin) 300 mg capsule, One po q AM X 1 then one po bid x1 and then tid (Patient taking differently: 2 (two) times a day), Disp: 270 capsule, Rfl: 1    losartan (Cozaar) 100 MG tablet, Take 1 tablet (100 mg total) by mouth daily, Disp: 90 tablet, Rfl: 3    multivitamin (THERAGRAN) TABS, Take 1 tablet by mouth daily, Disp: , Rfl:     naproxen (NAPROSYN) 500 mg tablet, Take 1 tablet (500 mg total) by mouth 2 (two) times a day with meals, Disp: 30 tablet, Rfl: 0    Omega-3 Fatty Acids (FISH OIL) 1,000 mg, Take 1,000 mg by mouth Take one capsule twice a week, Disp: , Rfl:     saccharomyces boulardii (Florastor) 250 mg capsule, Take 1 capsule (250 mg total) by mouth 2 (two) times a day, Disp: 180 capsule, Rfl: 3    sildenafil (VIAGRA) 100 mg tablet, Take 1 tablet (100 mg total) by mouth as needed in the morning for erectile dysfunction   (Patient not taking: Reported on 6/24/2022), Disp: 10 tablet, Rfl: 0    Current Allergies     Allergies as of 06/24/2022    (No Known Allergies)          The following portions of the patient's history were reviewed and updated as appropriate: allergies, current medications, past family history, past medical history, past social history, past surgical history and problem list    Past Medical History:   Diagnosis Date    Abnormal kidney function     last assessed: Feb 25, 2016    Allergic rhinitis     Benign colon polyp     Carpal tunnel syndrome     Cervical radiculopathy     last assessed: Feb 11, 2015    Chronic fatigue     Chronic pain disorder     COPD [facility]chronic obstructive [facility] disease) ([facility] )     CPAP (continuous positive airway pressure) dependence     Edema     Elevated liver enzymes     Gout     Hyperlipidemia     Hypertension     Hypotension     last assessed: March 22, 2017    Left ventricular hypertrophy     Obesity (BMI 30-39 9) 9/20/2018    Sleep apnea      Past Surgical History:   Procedure Laterality Date    CARPAL TUNNEL RELEASE Bilateral     HEMORRHOID SURGERY      LIVER BIOPSY       Family History   Problem Relation Age of Onset    Cancer Mother     No Known Problems Father     No Known Problems Sister     No Known Problems Brother     No Known Problems Maternal Grandmother     No Known Problems Maternal Grandfather     No Known Problems Paternal Grandmother     No Known Problems Paternal Grandfather      Social History     Socioeconomic History    Marital status: /Civil Union     Spouse name: Not on file    Number of children: Not on file    Years of education: Not on file    Highest education level: Not on file   Occupational History    Occupation: working full time    Tobacco Use    Smoking status: Current Every Day Smoker     Packs/day: 1 00     Years: 40 00     Pack years: 40 00     Types: Cigarettes    Smokeless tobacco: Never Used    Tobacco comment: Given [employer] "how to quit" info    Vaping Use    Vaping Use: Never used   Substance and Sexual Activity    Alcohol use: Yes     Alcohol/week: 6 0 standard drinks     Types: 6 Cans of beer per week     Comment: social     Drug use: No    Sexual activity: Not on file   Other Topics Concern    Not on file   Social History Narrative    EMPLOYED     Social Determinants of Health     Financial Resource Strain: Not on file   Food Insecurity: Not on file   Transportation Needs: Not on file   Physical Activity: Not on file   Stress: Not on file   Social Connections: Not on file   Intimate Partner Violence: Not on file   Housing Stability: Not on file     Medications have been verified  Objective   Pulse 80   Temp 98 5 °F (36 9 °C)   Resp 18   SpO2 99%      Physical Exam   Physical Exam  Vitals and nursing note reviewed  Constitutional:       General: He is not in acute distress  Appearance: He is well-developed  HENT:      Head: Normocephalic and atraumatic  Right Ear: Tympanic membrane normal       Left Ear: Tympanic membrane normal       Nose: Congestion present  No mucosal edema        Mouth/Throat:      Mouth: Mucous membranes are moist       Pharynx: Posterior oropharyngeal erythema present  Cardiovascular:      Rate and Rhythm: Normal rate and regular rhythm  Heart sounds: Normal heart sounds  Pulmonary:      Effort: Pulmonary effort is normal  No respiratory distress  Breath sounds: Normal breath sounds  Musculoskeletal:      Left ankle: Swelling present  Tenderness present over the base of 5th metatarsal  No lateral malleolus, medial malleolus or ATF ligament tenderness  Decreased range of motion          Legs:

## 2022-06-26 LAB — BACTERIA THROAT CULT: NORMAL

## 2022-06-27 ENCOUNTER — OFFICE VISIT (OUTPATIENT)
Dept: PODIATRY | Facility: CLINIC | Age: 62
End: 2022-06-27
Payer: COMMERCIAL

## 2022-06-27 VITALS
HEIGHT: 70 IN | BODY MASS INDEX: 38.51 KG/M2 | WEIGHT: 269 LBS | DIASTOLIC BLOOD PRESSURE: 64 MMHG | SYSTOLIC BLOOD PRESSURE: 128 MMHG

## 2022-06-27 DIAGNOSIS — S92.355A CLOSED NONDISPLACED FRACTURE OF FIFTH METATARSAL BONE OF LEFT FOOT, INITIAL ENCOUNTER: Primary | ICD-10-CM

## 2022-06-27 DIAGNOSIS — M79.672 LEFT FOOT PAIN: ICD-10-CM

## 2022-06-27 PROCEDURE — 99203 OFFICE O/P NEW LOW 30 MIN: CPT | Performed by: PODIATRIST

## 2022-06-27 PROCEDURE — 28470 CLTX METATARSAL FX WO MNP EA: CPT | Performed by: PODIATRIST

## 2022-06-27 NOTE — PROGRESS NOTES
HISTORY AND PHYSICAL EXAM  - [facility]    PATIENT:  Ara Ribera    1960      Assessment/Plan     Problem List Items Addressed This Visit    None     Visit Diagnoses     Closed nondisplaced fracture of fifth metatarsal bone of left foot, initial encounter    -  Primary    Left foot pain               Diagnoses and all orders for this visit:    Closed nondisplaced fracture of fifth metatarsal bone of left foot, initial encounter    Left foot pain     -x-rays three views reviewed and show oblique dancer's fracture not significantly displaced to the left foot   -patient is to be weight-bearing as tolerated for the next 6 weeks and follow up in 6 weeks for repeat x-rays to determine interval healing  I discussed ice ibuprofen anti-inflammatories and to continue to offload in his Cam boot  Orthopedic injury treatment    Date/Time: 6/27/2022 4:11 PM  Performed by: Bob Fletcher DPM  Authorized by: Bob Fletcher DPM     Patient Location:  [facility] Protocol:  Consent given by: patient  Timeout called at: 6/27/2022 4:11 PM   Patient understanding: patient states understanding of the procedure being performed  Patient consent: the patient's understanding of the procedure matches consent given  Patient identity confirmed: verbally with patient      Injury location:  Foot  Injury type:  Fracture  Fracture type: fifth metatarsal    Neurovascular status: Neurovascularly intact    Manipulation performed?: No    Neurovascular status: Neurovascularly intact    Patient tolerance:  Patient tolerated the procedure well with no immediate complications          History of Present Illness   Stacey Jose Luisriana Ribera is a 59 y o  male who presents with pain in his left foot since Memorial day, he is turned his foot and then did have a fracture  He is ambulating in a boot and notes no other issues       Review of Systems   Constitutional: Negative for chills and fever  HENT: Negative for ear pain and sore throat  Eyes: Negative for pain and visual disturbance  Respiratory: Negative for cough and shortness of breath  Cardiovascular: Negative for chest pain and palpitations  Gastrointestinal: Negative for abdominal pain and vomiting  Genitourinary: Negative for dysuria and hematuria  Musculoskeletal: Negative for arthralgias and back pain  Skin: Negative for color change and rash  Neurological: Negative for seizures and syncope  All other systems reviewed and are negative        Historical Information   Past Medical History:   Diagnosis Date    Abnormal kidney function     last assessed: Feb 25, 2016    Allergic rhinitis     Benign colon polyp     Carpal tunnel syndrome     Cervical radiculopathy     last assessed: Feb 11, 2015    Chronic fatigue     Chronic pain disorder     COPD (chronic obstructive pulmonary disease) (HCC)     CPAP (continuous positive airway pressure) dependence     Edema     Elevated liver enzymes     Gout     Hyperlipidemia     Hypertension     Hypotension     last assessed: March 22, 2017    Left ventricular hypertrophy     Obesity (BMI 30-39 9) 9/20/2018    Sleep apnea      Past Surgical History:   Procedure Laterality Date    CARPAL TUNNEL RELEASE Bilateral     HEMORRHOID SURGERY      LIVER BIOPSY       Social History   Social History     Substance and Sexual Activity   Alcohol Use Yes    Alcohol/week: 6 0 standard drinks    Types: 6 Cans of beer per week    Comment: social      Social History     Substance and Sexual Activity   Drug Use No     Social History     Tobacco Use   Smoking Status Current Every Day Smoker    Packs/day: 1 00    Years: 40 00    Pack years: 40 00    Types: Cigarettes   Smokeless Tobacco Never Used   Tobacco Comment    Given [employer] "how to quit" info      Family History: non-contributory    Meds/Allergies   all medications and allergies reviewed  No Known Allergies    Objective   Vitals: Blood pressure 128/64, height 5' 10" (1 778 m), weight 122 kg (269 lb)  ,Body mass index is 38 6 kg/m²  Physical Exam  Vitals reviewed  Constitutional:       Appearance: Normal appearance  He is obese  HENT:      Head: Normocephalic and atraumatic  Right Ear: Tympanic membrane normal       Nose: Nose normal       Mouth/Throat:      Mouth: Mucous membranes are moist    Eyes:      Pupils: Pupils are equal, round, and reactive to light  Cardiovascular:      Pulses: Normal pulses  Pulmonary:      Effort: Pulmonary effort is normal    Musculoskeletal:         General: Swelling and tenderness present  Comments: Tenderness to palpation along the shaft of left 5th metatarsal, no open lesions  +1/4 pitting edema with paresthesias to the left foot   Skin:     Capillary Refill: Capillary refill takes 2 to 3 seconds  Neurological:      General: No focal deficit present  Mental Status: He is alert and oriented to person, place, and time           Ortho Exam

## 2022-07-22 DIAGNOSIS — I10 BENIGN ESSENTIAL HYPERTENSION: ICD-10-CM

## 2022-07-25 RX ORDER — LOSARTAN POTASSIUM 100 MG/1
100 TABLET ORAL DAILY
Qty: 90 TABLET | Refills: 0 | Status: SHIPPED | OUTPATIENT
Start: 2022-07-25 | End: 2022-10-21

## 2022-09-26 DIAGNOSIS — I10 BENIGN ESSENTIAL HYPERTENSION: ICD-10-CM

## 2022-09-26 DIAGNOSIS — R60.0 LOCALIZED EDEMA: ICD-10-CM

## 2022-09-26 RX ORDER — FUROSEMIDE 20 MG/1
TABLET ORAL
Qty: 30 TABLET | Refills: 0 | Status: SHIPPED | OUTPATIENT
Start: 2022-09-26 | End: 2022-09-27

## 2022-09-27 ENCOUNTER — APPOINTMENT (OUTPATIENT)
Dept: LAB | Facility: CLINIC | Age: 62
End: 2022-09-27
Payer: COMMERCIAL

## 2022-09-27 ENCOUNTER — OFFICE VISIT (OUTPATIENT)
Dept: INTERNAL MEDICINE CLINIC | Facility: CLINIC | Age: 62
End: 2022-09-27
Payer: COMMERCIAL

## 2022-09-27 VITALS
OXYGEN SATURATION: 98 % | SYSTOLIC BLOOD PRESSURE: 138 MMHG | HEIGHT: 70 IN | TEMPERATURE: 97.8 F | RESPIRATION RATE: 14 BRPM | DIASTOLIC BLOOD PRESSURE: 74 MMHG | BODY MASS INDEX: 39.71 KG/M2 | WEIGHT: 277.4 LBS | HEART RATE: 87 BPM

## 2022-09-27 DIAGNOSIS — E83.19 IRON OVERLOAD: ICD-10-CM

## 2022-09-27 DIAGNOSIS — Z00.00 WELL ADULT EXAM: ICD-10-CM

## 2022-09-27 DIAGNOSIS — Z13.220 SCREENING FOR LIPID DISORDERS: ICD-10-CM

## 2022-09-27 DIAGNOSIS — J42 CHRONIC BRONCHITIS, UNSPECIFIED CHRONIC BRONCHITIS TYPE (HCC): ICD-10-CM

## 2022-09-27 DIAGNOSIS — I10 BENIGN ESSENTIAL HYPERTENSION: ICD-10-CM

## 2022-09-27 DIAGNOSIS — Z13.29 SCREENING FOR THYROID DISORDER: ICD-10-CM

## 2022-09-27 DIAGNOSIS — Z12.5 SCREENING FOR PROSTATE CANCER: ICD-10-CM

## 2022-09-27 DIAGNOSIS — E66.9 OBESITY (BMI 30-39.9): ICD-10-CM

## 2022-09-27 DIAGNOSIS — R60.0 LOCALIZED EDEMA: ICD-10-CM

## 2022-09-27 DIAGNOSIS — E78.2 MIXED HYPERLIPIDEMIA: ICD-10-CM

## 2022-09-27 DIAGNOSIS — M1A.9XX0 CHRONIC GOUT WITHOUT TOPHUS, UNSPECIFIED CAUSE, UNSPECIFIED SITE: ICD-10-CM

## 2022-09-27 DIAGNOSIS — Z72.0 TOBACCO USE: ICD-10-CM

## 2022-09-27 DIAGNOSIS — Z13.0 SCREENING FOR DEFICIENCY ANEMIA: ICD-10-CM

## 2022-09-27 DIAGNOSIS — Z13.1 SCREENING FOR DIABETES MELLITUS (DM): ICD-10-CM

## 2022-09-27 DIAGNOSIS — Z23 ENCOUNTER FOR VACCINATION: ICD-10-CM

## 2022-09-27 DIAGNOSIS — G47.33 OBSTRUCTIVE SLEEP APNEA: ICD-10-CM

## 2022-09-27 DIAGNOSIS — Z00.00 WELL ADULT EXAM: Primary | ICD-10-CM

## 2022-09-27 DIAGNOSIS — G89.4 PAIN SYNDROME, CHRONIC: ICD-10-CM

## 2022-09-27 LAB
ALBUMIN SERPL BCP-MCNC: 3.8 G/DL (ref 3.5–5)
ALP SERPL-CCNC: 88 U/L (ref 46–116)
ALT SERPL W P-5'-P-CCNC: 75 U/L (ref 12–78)
ANION GAP SERPL CALCULATED.3IONS-SCNC: 4 MMOL/L (ref 4–13)
AST SERPL W P-5'-P-CCNC: 121 U/L (ref 5–45)
BILIRUB SERPL-MCNC: 1.08 MG/DL (ref 0.2–1)
BUN SERPL-MCNC: 7 MG/DL (ref 5–25)
CALCIUM SERPL-MCNC: 9.8 MG/DL (ref 8.3–10.1)
CHLORIDE SERPL-SCNC: 104 MMOL/L (ref 96–108)
CO2 SERPL-SCNC: 29 MMOL/L (ref 21–32)
CREAT SERPL-MCNC: 0.72 MG/DL (ref 0.6–1.3)
CREAT UR-MCNC: 76.5 MG/DL
GFR SERPL CREATININE-BSD FRML MDRD: 99 ML/MIN/1.73SQ M
GLUCOSE P FAST SERPL-MCNC: 116 MG/DL (ref 65–99)
LDLC SERPL DIRECT ASSAY-MCNC: 125 MG/DL (ref 0–100)
MICROALBUMIN UR-MCNC: 77.2 MG/L (ref 0–20)
MICROALBUMIN/CREAT 24H UR: 101 MG/G CREATININE (ref 0–30)
POTASSIUM SERPL-SCNC: 4.6 MMOL/L (ref 3.5–5.3)
PROT SERPL-MCNC: 8.2 G/DL (ref 6.4–8.4)
PSA SERPL-MCNC: 1.1 NG/ML (ref 0–4)
SODIUM SERPL-SCNC: 137 MMOL/L (ref 135–147)
TRIGL SERPL-MCNC: 190 MG/DL

## 2022-09-27 PROCEDURE — 83721 ASSAY OF BLOOD LIPOPROTEIN: CPT

## 2022-09-27 PROCEDURE — 82043 UR ALBUMIN QUANTITATIVE: CPT | Performed by: INTERNAL MEDICINE

## 2022-09-27 PROCEDURE — G0103 PSA SCREENING: HCPCS

## 2022-09-27 PROCEDURE — 90471 IMMUNIZATION ADMIN: CPT | Performed by: INTERNAL MEDICINE

## 2022-09-27 PROCEDURE — 84478 ASSAY OF TRIGLYCERIDES: CPT

## 2022-09-27 PROCEDURE — 82570 ASSAY OF URINE CREATININE: CPT | Performed by: INTERNAL MEDICINE

## 2022-09-27 PROCEDURE — 90682 RIV4 VACC RECOMBINANT DNA IM: CPT | Performed by: INTERNAL MEDICINE

## 2022-09-27 PROCEDURE — 99396 PREV VISIT EST AGE 40-64: CPT | Performed by: INTERNAL MEDICINE

## 2022-09-27 PROCEDURE — 80053 COMPREHEN METABOLIC PANEL: CPT

## 2022-09-27 PROCEDURE — 36415 COLL VENOUS BLD VENIPUNCTURE: CPT

## 2022-09-27 RX ORDER — FUROSEMIDE 20 MG/1
20 TABLET ORAL DAILY
Qty: 90 TABLET | Refills: 3
Start: 2022-09-27 | End: 2022-10-21

## 2022-09-27 NOTE — PROGRESS NOTES
Tobacco Cessation Counseling: Tobacco cessation counseling was provided  The patient is sincerely urged to quit consumption of tobacco  He is not ready to quit tobacco  Medication options discussed  Side effects of medication not discussed  Patient refused medication  Assessment/Plan:  Problem List Items Addressed This Visit        Respiratory    Obstructive sleep apnea    COPD (chronic obstructive pulmonary disease) (HCC)       Cardiovascular and Mediastinum    Benign essential hypertension    Relevant Medications    furosemide (LASIX) 20 mg tablet    Other Relevant Orders    Comprehensive metabolic panel    Microalbumin / creatinine urine ratio       Other    Tobacco use    Pain syndrome, chronic    Obesity (BMI 30-39  9)    Iron overload    Hyperlipidemia    Relevant Orders    Comprehensive metabolic panel    LDL cholesterol, direct    Triglycerides    Microalbumin / creatinine urine ratio    Gout    Edema    Relevant Medications    furosemide (LASIX) 20 mg tablet      Other Visit Diagnoses     Well adult exam    -  Primary    Relevant Orders    Comprehensive metabolic panel    LDL cholesterol, direct    Triglycerides    PSA, Total Screen    Screening for prostate cancer        Relevant Orders    PSA, Total Screen    Screening for lipid disorders        Screening for diabetes mellitus (DM)        Screening for thyroid disorder        Screening for deficiency anemia        Encounter for vaccination        Relevant Orders    influenza vaccine, quadrivalent, recombinant, PF, 0 5 mL           Diagnoses and all orders for this visit:    Well adult exam  -     Comprehensive metabolic panel; Future  -     LDL cholesterol, direct; Future  -     Triglycerides; Future  -     PSA, Total Screen; Future    Screening for prostate cancer  -     PSA, Total Screen;  Future    Screening for lipid disorders    Screening for diabetes mellitus (DM)    Screening for thyroid disorder    Screening for deficiency anemia    Benign essential hypertension  -     Comprehensive metabolic panel; Future  -     Microalbumin / creatinine urine ratio  -     furosemide (LASIX) 20 mg tablet; Take 1 tablet (20 mg total) by mouth daily    Chronic bronchitis, unspecified chronic bronchitis type ([address] )    Obstructive sleep apnea    Tobacco use    Pain syndrome, chronic    Obesity (BMI 30-39  9)    Iron overload    Mixed hyperlipidemia  -     Comprehensive metabolic panel; Future  -     LDL cholesterol, direct; Future  -     Triglycerides; Future  -     Microalbumin / creatinine urine ratio    Chronic gout without tophus, unspecified cause, unspecified site    Encounter for vaccination  -     influenza vaccine, quadrivalent, recombinant, PF, 0 5 mL    Localized edema  -     furosemide (LASIX) 20 mg tablet; Take 1 tablet (20 mg total) by mouth daily      No problem-specific Assessment & Plan notes found for this encounter  A/P: Doing well and co-morbidities are stable  Labs will be ordered  Discussed vaccines will up date his flu shot    Wean tobacco    No mental or physical restrictions  No evidence of communicable diseases  Continue current treatment and RTC one year for annual and four months for routine         Subjective:      Patient ID: Jose Angel Du is a 59 y o  male  WM presents for a well exam  Doing ok and no c/o's  Breathing is good and DOT is manageable,but still awaiting a replacement CPAP  Still smoking  Chronic pain is manageable  No gout attacks  Remains active w/o difficulty and no falls  Denies depression  No suicidal or violent ideations  Working full time and no work related injuries or exposures  No recent travel  No fever, chills, or sweats  No unexplained wt change  Denies CP, SOB, palpitations, edema, orthopnea, or PND  No sz or syncope  No changes in bowel or bladder habits  No trouble swallowing  Appetite and sleep are good  Sees both  a DDS and an eye doctor  No change in PMH, PSH, ALL, OH, SH, or Fhx   Currently due for labs and vaccines            The following portions of the patient's history were reviewed and updated as appropriate:   He has a past medical history of Abnormal kidney function, Allergic rhinitis, Benign colon polyp, Carpal tunnel syndrome, Cervical radiculopathy, Chronic fatigue, Chronic pain disorder, COPD (chronic obstructive pulmonary disease) ([address] ), CPAP (continuous positive airway pressure) dependence, Edema, Elevated liver enzymes, Gout, Hyperlipidemia, Hypertension, Hypotension, Left ventricular hypertrophy, Obesity (BMI 30-39 9) (9/20/2018), and Sleep apnea  ,  does not have any pertinent problems on file  ,   has a past surgical history that includes Hemorrhoid surgery; Carpal tunnel release (Bilateral); and Liver biopsy  ,  family history includes Cancer in his mother; No Known Problems in his brother, father, maternal grandfather, maternal grandmother, paternal grandfather, paternal grandmother, and sister  ,   reports that he has been smoking cigarettes  He has a 40 00 pack-year smoking history  He has never used smokeless tobacco  He reports current alcohol use of about 6 0 standard drinks of alcohol per week  He reports that he does not use drugs  ,  has No Known Allergies     Current Outpatient Medications   Medication Sig Dispense Refill    fluticasone (FLONASE) 50 mcg/act nasal spray 2 sprays into each nostril daily 18 2 mL 0    furosemide (LASIX) 20 mg tablet Take 1 tablet (20 mg total) by mouth daily 90 tablet 3    losartan (Cozaar) 100 MG tablet Take 1 tablet (100 mg total) by mouth daily 90 tablet 0    multivitamin (THERAGRAN) TABS Take 1 tablet by mouth daily      Omega-3 Fatty Acids (FISH OIL) 1,000 mg Take 1,000 mg by mouth Take one capsule twice a week      sildenafil (VIAGRA) 100 mg tablet Take 1 tablet (100 mg total) by mouth as needed in the morning for erectile dysfunction   (Patient not taking: No sig reported) 10 tablet 0    gabapentin (Neurontin) 300 mg capsule One po q AM X 1 then one po bid x1 and then tid (Patient not taking: Reported on 9/27/2022) 270 capsule 1    naproxen (NAPROSYN) 500 mg tablet Take 1 tablet (500 mg total) by mouth 2 (two) times a day with meals (Patient not taking: Reported on 9/27/2022) 30 tablet 0    saccharomyces boulardii (Florastor) 250 mg capsule Take 1 capsule (250 mg total) by mouth 2 (two) times a day (Patient not taking: Reported on 9/27/2022) 180 capsule 3     No current facility-administered medications for this visit  Review of Systems   Constitutional: Negative for activity change, chills, diaphoresis, fatigue and fever  HENT: Negative  Eyes: Negative for visual disturbance  Respiratory: Negative for cough, chest tightness, shortness of breath and wheezing  Cardiovascular: Negative for chest pain, palpitations and leg swelling  Gastrointestinal: Negative for abdominal pain, constipation, diarrhea, nausea and vomiting  Endocrine: Negative for cold intolerance and heat intolerance  Genitourinary: Negative for difficulty urinating, dysuria and frequency  Musculoskeletal: Negative for arthralgias, gait problem and myalgias  Neurological: Negative for dizziness, seizures, syncope, weakness, light-headedness and headaches  Psychiatric/Behavioral: Negative for confusion, dysphoric mood and sleep disturbance  The patient is not nervous/anxious  PHQ-2/9 Depression Screening          Objective:  Vitals:    09/27/22 0728   BP: 138/74   Pulse: 87   Resp: 14   Temp: 97 8 °F (36 6 °C)   SpO2: 98%   Weight: 126 kg (277 lb 6 4 oz)   Height: 5' 10" (1 778 m)     Body mass index is 39 8 kg/m²  Physical Exam  Vitals and nursing note reviewed  Constitutional:       General: He is not in acute distress  Appearance: Normal appearance  He is not ill-appearing  HENT:      Head: Normocephalic and atraumatic  Mouth/Throat:      Mouth: Mucous membranes are moist    Eyes:      Extraocular Movements: Extraocular movements intact  Conjunctiva/sclera: Conjunctivae normal       Pupils: Pupils are equal, round, and reactive to light  Neck:      Vascular: No carotid bruit  Cardiovascular:      Rate and Rhythm: Normal rate and regular rhythm  Heart sounds: Normal heart sounds  Pulmonary:      Effort: Pulmonary effort is normal  No respiratory distress  Breath sounds: Normal breath sounds  No wheezing or rales  Abdominal:      General: Bowel sounds are normal  There is no distension  Palpations: Abdomen is soft  Tenderness: There is no abdominal tenderness  Musculoskeletal:      Cervical back: Neck supple  Right lower leg: Edema present  Left lower leg: Edema present  Neurological:      General: No focal deficit present  Mental Status: He is alert and oriented to person, place, and time  Mental status is at baseline  Psychiatric:         Mood and Affect: Mood normal          Behavior: Behavior normal          Thought Content: Thought content normal          Judgment: Judgment normal          Tobacco Cessation Counseling: Tobacco cessation counseling and education was provided  The patient is sincerely urged to quit consumption of tobacco  He is not ready to quit tobacco  The numerous health risks of tobacco consumption were discussed  If he decides to quit, there are a number of helpful adjunctive aids, and he can see me to discuss nicotine replacement therapy, chantix, or bupropion anytime in the future

## 2022-09-27 NOTE — PATIENT INSTRUCTIONS
Cigarette Smoking and Your Health   AMBULATORY CARE:   Risks to your health if you smoke:  Nicotine and other chemicals found in tobacco and e-cigarettes can damage every cell in your body  Even if you are a light smoker, you have an increased risk for cancer, heart disease, and lung disease  If you are pregnant or have diabetes, smoking increases your risk for complications  Nicotine can affect an adolescent's developing brain  This can lead to trouble thinking, learning, or paying attention  Benefits to your health if you stop smoking: You decrease respiratory symptoms such as coughing, wheezing, and shortness of breath  You reduce your risk for cancers of the lung, mouth, throat, kidney, bladder, pancreas, stomach, and cervix  If you already have cancer, you increase the benefits of chemotherapy  You also reduce your risk for cancer returning or a second cancer from developing  You reduce your risk for heart disease, blood clots, heart attack, and stroke  You reduce your risk for lung infections, and diseases such as pneumonia, asthma, chronic bronchitis, and emphysema  Your circulation improves  More oxygen can be delivered to your body  If you have diabetes, you lower your risk for complications, such as kidney, artery, and eye diseases  You also lower your risk for nerve damage  Nerve damage can lead to amputations, poor vision, and blindness  You improve your body's ability to heal and to fight infections  An adolescent can help his or her brain and body develop in a healthy way  Talk to your adolescent about all the health risks of nicotine  If you can, start talking about nicotine when your child is younger than 12 years  This may make it easier for him or her not to start using nicotine as a teenager or adult  Explain to him or her that it is best never to start  It can be hard to try to quit later      Benefits to the health of others if you stop smoking:  Tobacco is harmful to nonsmokers who breathe in your secondhand smoke  The following are ways the health of others around you may improve when you stop smoking: You lower the risks for lung cancer and heart disease in nonsmoking adults  If you are pregnant, you lower the risk for miscarriage, early delivery, low birth weight, and stillbirth  You also lower your baby's risk for SIDS, obesity, developmental delay, and neurobehavioral problems, such as ADHD  If you have children, you lower their risk for ear infections, colds, pneumonia, bronchitis, and asthma  Follow up with your doctor as directed:  Write down your questions so you remember to ask them during your visits  For support and more information:   American [employer] Association  [address] [facility]  [address] '[facility]  Phone: [facility] [address]  Phone: [phone]  Web Address: Deanne Butterfield  [facility]    Smokefree  gov  Phone: 9- 991 - [phone]  Web Address: [employer]  [address] [place] [address] 2022 Information is for End User's use only and may not be sold, redistributed or otherwise used for commercial purposes  All illustrations and images included in CareNotes® are the copyrighted property of A D A M , Inc  or [facility]  The above information is an  only  It is not intended as medical advice for individual conditions or treatments  Talk to your doctor, nurse or pharmacist before following any medical regimen to see if it is safe and effective for you  Chronic Hypertension   AMBULATORY CARE:   Hypertension  is high blood pressure  Your blood pressure is the force of your blood moving against the walls of your arteries  Hypertension causes your blood pressure to get so high that your heart has to work much harder than normal  This can damage your heart   Even if you have hypertension for years, lifestyle changes, medicines, or both can help bring your blood pressure to normal   Call your local emergency number (911 in the [address] [facility]) or have someone call if:   You have chest pain  You have any of the following signs of a heart attack:      Squeezing, pressure, or pain in your chest    You may  also have any of the following:     Discomfort or pain in your back, neck, jaw, stomach, or arm    Shortness of breath    Nausea or vomiting    Lightheadedness or a sudden cold sweat    You become confused or have difficulty speaking  You suddenly feel lightheaded or have trouble breathing  Seek care immediately if:   You have a severe headache or vision loss  You have weakness in an arm or leg  Call your doctor or cardiologist if:   You feel faint, dizzy, confused, or drowsy  You have been taking your blood pressure medicine but your pressure is higher than your provider says it should be  You have questions or concerns about your condition or care  Treatment for chronic hypertension  may include medicine to lower your blood pressure and cholesterol levels  A low cholesterol level helps prevent heart disease and makes it easier to control your blood pressure  Heart disease can make your blood pressure harder to control  You may also need to make lifestyle changes  What you need to know about the stages of hypertension:       Normal blood pressure is 119/79 or lower   Your healthcare provider may only check your blood pressure each year if it stays at a normal level  Elevated blood pressure is 120/79 to 129/79   This is sometimes called prehypertension  Your healthcare provider may suggest lifestyle changes to help lower your blood pressure to a normal level  He or she may then check it again in 3 to 6 months  Stage 1 hypertension is 130/80  to 139/89   Your provider may recommend lifestyle changes, medication, and checks every 3 to 6 months until your blood pressure is controlled  Stage 2 hypertension is 140/90 or higher   Your provider will recommend lifestyle changes and have you take 2 kinds of hypertension medicines   You will also need to have your blood pressure checked monthly until it is controlled  Manage chronic hypertension:   Check your blood pressure at home  Avoid smoking, caffeine, and exercise at least 30 minutes before checking your blood pressure  Sit and rest for 5 minutes before you take your blood pressure  Extend your arm and support it on a flat surface  Your arm should be at the same level as your heart  Follow the directions that came with your blood pressure monitor  Check your blood pressure 2 times, 1 minute apart, before you take your medicine in the morning  Also check your blood pressure before your evening meal  Keep a record of your readings and bring it to your follow-up visits  Ask your healthcare provider what your blood pressure should be  Manage any other health conditions you have  Health conditions such as diabetes can increase your risk for hypertension  Follow your healthcare provider's instructions and take all your medicines as directed  Talk to your healthcare provider about any new health conditions you have recently developed  Ask about all medicines  Certain medicines can increase your blood pressure  Examples include oral birth control pills, decongestants, herbal supplements, and NSAIDs, such as ibuprofen  Your healthcare provider can tell you which medicines are safe for you to take  This includes prescription and over-the-counter medicines  Lifestyle changes you can make to lower your blood pressure: Your provider may want you to make more lifestyle changes if you are having trouble controlling your blood pressure  This may feel difficult over time, especially if you think you are making good changes but your pressure is still high  It might help to focus on one new change at a time  For example, try to add 1 more day of exercise, or exercise for an extra 10 minutes on 2 days  Small changes can make a big difference   Your healthcare provider can also refer you to specialists such as a dietitian who can help you make small changes  Your family members may be included in helping you learn to create lifestyle changes, such as the following:     Limit sodium (salt) as directed  Too much sodium can affect your fluid balance  Check labels to find low-sodium or no-salt-added foods  Some low-sodium foods use potassium salts for flavor  Too much potassium can also cause health problems  Your healthcare provider will tell you how much sodium and potassium are safe for you to have in a day  He or she may recommend that you limit sodium to 2,300 mg a day  Follow the meal plan recommended by your healthcare provider  A dietitian or your provider can give you more information on low-sodium plans or the DASH (Dietary Approaches to Stop Hypertension) eating plan  The DASH plan is low in sodium, processed sugar, unhealthy fats, and total fat  It is high in potassium, calcium, and fiber  These can be found in vegetables, fruit, and whole-grain foods  Be physically active throughout the day  Physical activity, such as exercise, can help control your blood pressure and your weight  Be physically active for at least 30 minutes per day, on most days of the week  Include aerobic activity, such as walking or riding a bicycle  Also include strength training at least 2 times each week  Your healthcare providers can help you create a physical activity plan  Decrease stress  This may help lower your blood pressure  Learn ways to relax, such as deep breathing or listening to music  Limit alcohol as directed  Alcohol can increase your blood pressure  A drink of alcohol is 12 ounces of beer, 5 ounces of wine, or 1½ ounces of liquor  Do not smoke  Nicotine and other chemicals in cigarettes and cigars can increase your blood pressure and also cause lung damage  Ask your healthcare provider for information if you currently smoke and need help to quit   E-cigarettes or smokeless tobacco still contain nicotine  Talk to your healthcare provider before you use these products  Follow up with your doctor or cardiologist as directed: You will need to return to have your blood pressure checked and to have other lab tests done  Write down your questions so you remember to ask them during your visits  © [employer] 2022 Information is for End User's use only and may not be sold, redistributed or otherwise used for commercial purposes  All illustrations and images included in CareNotes® are the copyrighted property of A [employer] A [employer]  or [facility] Vishal Swartz   The above information is an  only  It is not intended as medical advice for individual conditions or treatments  Talk to your doctor, nurse or pharmacist before following any medical regimen to see if it is safe and effective for you

## 2022-10-21 DIAGNOSIS — I10 BENIGN ESSENTIAL HYPERTENSION: ICD-10-CM

## 2022-10-21 DIAGNOSIS — R60.0 LOCALIZED EDEMA: ICD-10-CM

## 2022-10-21 RX ORDER — LOSARTAN POTASSIUM 100 MG/1
TABLET ORAL
Qty: 90 TABLET | Refills: 0 | Status: SHIPPED | OUTPATIENT
Start: 2022-10-21

## 2022-10-21 RX ORDER — FUROSEMIDE 20 MG/1
TABLET ORAL
Qty: 30 TABLET | Refills: 0 | Status: SHIPPED | OUTPATIENT
Start: 2022-10-21

## 2022-12-28 ENCOUNTER — TELEPHONE (OUTPATIENT)
Dept: INTERNAL MEDICINE CLINIC | Facility: CLINIC | Age: 62
End: 2022-12-28

## 2022-12-28 DIAGNOSIS — R60.0 LOCALIZED EDEMA: ICD-10-CM

## 2022-12-28 DIAGNOSIS — I10 BENIGN ESSENTIAL HYPERTENSION: ICD-10-CM

## 2022-12-28 RX ORDER — LOSARTAN POTASSIUM 100 MG/1
100 TABLET ORAL DAILY
Qty: 90 TABLET | Refills: 0 | Status: SHIPPED | OUTPATIENT
Start: 2022-12-28

## 2022-12-28 RX ORDER — FUROSEMIDE 20 MG/1
20 TABLET ORAL DAILY
Qty: 90 TABLET | Refills: 0 | Status: SHIPPED | OUTPATIENT
Start: 2022-12-28

## 2023-01-10 ENCOUNTER — CONSULT (OUTPATIENT)
Age: 63
End: 2023-01-10

## 2023-01-10 VITALS
BODY MASS INDEX: 40.4 KG/M2 | SYSTOLIC BLOOD PRESSURE: 146 MMHG | DIASTOLIC BLOOD PRESSURE: 73 MMHG | HEIGHT: 70 IN | HEART RATE: 97 BPM | WEIGHT: 282.2 LBS

## 2023-01-10 DIAGNOSIS — M54.12 CERVICAL RADICULOPATHY: ICD-10-CM

## 2023-01-10 DIAGNOSIS — R20.0 NUMBNESS AND TINGLING OF FOOT: Primary | ICD-10-CM

## 2023-01-10 DIAGNOSIS — M50.120 CERVICAL DISC DISORDER WITH RADICULOPATHY OF MID-CERVICAL REGION: ICD-10-CM

## 2023-01-10 DIAGNOSIS — R20.0 NUMBNESS OF RIGHT HAND: ICD-10-CM

## 2023-01-10 DIAGNOSIS — G62.9 NEUROPATHY: ICD-10-CM

## 2023-01-10 DIAGNOSIS — R20.2 NUMBNESS AND TINGLING OF FOOT: Primary | ICD-10-CM

## 2023-01-10 DIAGNOSIS — M50.20 HERNIATED DISC, CERVICAL: ICD-10-CM

## 2023-01-10 DIAGNOSIS — M51.16 LUMBAR DISC DISEASE WITH RADICULOPATHY: ICD-10-CM

## 2023-01-10 RX ORDER — PREGABALIN 50 MG/1
50 CAPSULE ORAL 3 TIMES DAILY
Qty: 90 CAPSULE | Refills: 1 | Status: SHIPPED | OUTPATIENT
Start: 2023-01-10 | End: 2023-02-09

## 2023-01-10 NOTE — PROGRESS NOTES
Assessment:  1  Numbness and tingling of foot    2  Neuropathy    3  Herniated disc, cervical    4  Numbness of right hand    5  Cervical radiculopathy    6  Lumbar disc disease with radiculopathy    7  Cervical disc disorder with radiculopathy of mid-cervical region        Plan:  Patient is a 26-year-old male with complaints of bilateral feet pain, and numbness, right hand numbness presents to the office for initial consultation  Patient does have a history significant for cervical and lumbar disc herniations for which he received epidural steroid injections to the past which provided him significant amounts of relief  Currently he notes constant numbness and pain in bilateral feet with no ability of alleviation  Patient also notes constant numbness and right hand most significant in the thumb  Patient has seen a podiatrist who at this time cannot ascertain any physiological causes for the numbness in his feet  At this time we will need further diagnostic imaging due to the significant numbness  Feel at this time is more pertinent to obtain imaging in the physical therapy is not indicated  Patient has tried and failed gabapentin secondary to increased dizziness  1   We will order an x-ray of the cervical and lumbar spine to better assess the degenerative changes according patient current presentation  2   We will order an MRI of the cervical and lumbar spine to better assess the discogenic pathology behind patient's numbness  3   We will order an EMG of bilateral lower extremities to better assess the cause of patient's numbness  4  We will trial Lyrica 50 mg p o  3 times daily for neuropathy  5  Follow-up in 1 month to review diagnostic imaging            Complete risks and benefits including bleeding, infection, tissue reaction, nerve injury and allergic reaction were discussed  The approach was demonstrated using models and literature was provided   Verbal and written consent was obtained  History of Present Illness: The patient is a 57 y o  male who presents for consultation in regards to Foot Pain  Symptoms have been present for 1 year  Symptoms began without any precipitating injury or trauma  Pain is reported to be 8 on the numeric rating scale  Symptoms are felt constantly and worst in the no typical pattern  Symptoms are characterized as numbing  Symptoms are associated with no weakness  Aggravating factors include standing and sitting  Relieving factors include nothing  No change in symptoms with kneeling, lying down, standing, bending, leaning forward, leaning bckward, sitting, exercise, turning the head, relaxation, coughing/sneezing and bowel movements  Treatments that have been helpful include prior injections including JACLYN and chiropractic manipulation  chiropractic manipulation, acupuncture and heat/ice have provided no relief  Medications to relieve symptoms include gabapentin  Review of Systems:    Review of Systems   Constitutional: Positive for unexpected weight change  Negative for chills, fatigue and fever  HENT: Negative for hearing loss, sinus pain, sore throat and trouble swallowing  Eyes: Negative for pain and visual disturbance  Respiratory: Positive for wheezing  Negative for shortness of breath  Cardiovascular: Positive for leg swelling  Negative for chest pain and palpitations  Gastrointestinal: Negative for abdominal pain, constipation and nausea  Endocrine: Negative for polydipsia and polyuria  Genitourinary: Negative for difficulty urinating  Musculoskeletal: Negative for arthralgias, gait problem, joint swelling and myalgias  Skin: Negative for rash  Neurological: Negative for dizziness, weakness and headaches  Hematological: Does not bruise/bleed easily  Psychiatric/Behavioral: Negative for dysphoric mood  The patient is not nervous/anxious  All other systems reviewed and are negative          Past Medical History:   Diagnosis Date   • Abnormal kidney function     last assessed: Feb 25, 2016   • Allergic rhinitis    • Benign colon polyp    • Carpal tunnel syndrome    • Cervical radiculopathy     last assessed: Feb 11, 2015   • Chronic fatigue    • Chronic pain disorder    • COPD (chronic obstructive pulmonary disease) (HCC)    • CPAP (continuous positive airway pressure) dependence    • Edema    • Elevated liver enzymes    • Gout    • Hyperlipidemia    • Hypertension    • Hypotension     last assessed: March 22, 2017   • Left ventricular hypertrophy    • Obesity (BMI 30-39 9) 9/20/2018   • Sleep apnea        Past Surgical History:   Procedure Laterality Date   • CARPAL TUNNEL RELEASE Bilateral    • HEMORRHOID SURGERY     • LIVER BIOPSY         Family History   Problem Relation Age of Onset   • Cancer Mother    • No Known Problems Father    • No Known Problems Sister    • No Known Problems Brother    • No Known Problems Maternal Grandmother    • No Known Problems Maternal Grandfather    • No Known Problems Paternal Grandmother    • No Known Problems Paternal Grandfather        Social History     Occupational History   • Occupation: working full time    Tobacco Use   • Smoking status: Every Day     Packs/day: 1 00     Years: 40 00     Pack years: 40 00     Types: Cigarettes   • Smokeless tobacco: Never   • Tobacco comments:     Given [facility] "how to quit" info    Vaping Use   • Vaping Use: Never used   Substance and Sexual Activity   • Alcohol use: Yes     Alcohol/week: 6 0 standard drinks     Types: 6 Cans of beer per week     Comment: social    • Drug use: No   • Sexual activity: Not Currently         Current Outpatient Medications:   •  sildenafil (VIAGRA) 100 mg tablet, Take 1 tablet (100 mg total) by mouth as needed in the morning for erectile dysfunction   (Patient not taking: No sig reported), Disp: 10 tablet, Rfl: 0  •  fluticasone (FLONASE) 50 mcg/act nasal spray, 2 sprays into each nostril daily, Disp: 18 2 mL, Rfl: 0  •  furosemide (LASIX) 20 mg tablet, Take 1 tablet (20 mg total) by mouth daily, Disp: 90 tablet, Rfl: 0  •  gabapentin (Neurontin) 300 mg capsule, One po q AM X 1 then one po bid x1 and then tid (Patient not taking: Reported on 9/27/2022), Disp: 270 capsule, Rfl: 1  •  losartan (COZAAR) 100 MG tablet, Take 1 tablet (100 mg total) by mouth daily, Disp: 90 tablet, Rfl: 0  •  multivitamin (THERAGRAN) TABS, Take 1 tablet by mouth daily, Disp: , Rfl:   •  naproxen (NAPROSYN) 500 mg tablet, Take 1 tablet (500 mg total) by mouth 2 (two) times a day with meals (Patient not taking: Reported on 9/27/2022), Disp: 30 tablet, Rfl: 0  •  Omega-3 Fatty Acids (FISH OIL) 1,000 mg, Take 1,000 mg by mouth Take one capsule twice a week, Disp: , Rfl:   •  saccharomyces boulardii (Florastor) 250 mg capsule, Take 1 capsule (250 mg total) by mouth 2 (two) times a day (Patient not taking: Reported on 9/27/2022), Disp: 180 capsule, Rfl: 3    No Known Allergies    Physical Exam:    /73   Pulse 97   Ht 5' 10" (1 778 m)   Wt 128 kg (282 lb 3 2 oz)   BMI 40 49 kg/m²     Constitutional: normal, well developed, well nourished, alert, in no distress and non-toxic and no overt pain behavior  Eyes: anicteric  HEENT: grossly intact  Neck: supple, symmetric, trachea midline and no masses   Pulmonary:even and unlabored  Cardiovascular:No edema or pitting edema present  Skin:Normal without rashes or lesions and well hydrated  Psychiatric:Mood and affect appropriate  Neurologic:Cranial Nerves II-XII grossly intact  Musculoskeletal:antalgic     Cervical Spine examination demonstrates  Decreased ROM secondary to pain with lateral rotation to the left/right and bending to the left/right, in addition to neck flexion  5/5 upper extremity strength in all muscle groups bilaterally  Negative Spurling's maneuver to the b/l Ue, decreased sensitivity to light touch intact Right hand  Lumbar/Sacral Spine examination demonstrates    Full range of motion lumbar spine with pain upon: flexion, lateral rotation to the left/right, and bending to the left/right  Bilateral lumbar paraspinals tender to palpation  Muscle spasms noted in the lumbar area bilaterally  4/5 lower extremity strength in all muscle groups bilaterally  Positive seated straight leg raise for bilateral lower extremities  No sensitivity to light touch intact bilateral feet  2+ reflexes in the patella and Achilles  No ankle clonus    Imaging  No orders to display       No orders of the defined types were placed in this encounter

## 2023-01-10 NOTE — PATIENT INSTRUCTIONS
Pregabalin (By mouth)   Pregabalin ([id number]-GA-[id number]-rosalba)  Treats nerve and muscle pain, including fibromyalgia  Also treats partial-onset seizures  Brand Name(s): Lyrica, Lyrica CR   There may be other brand names for this medicine  When This Medicine Should Not Be Used: This medicine is not right for everyone  Do not use it if you had an allergic reaction to pregabalin  How to Use This Medicine:   Capsule, Liquid, Long Acting Tablet  Take your medicine as directed  Your dose may need to be changed several times to find what works best for you  Extended-release tablet: Swallow the extended-release tablet whole  Do not crush, break, or chew it  Take it after an evening meal   Oral liquid: Measure the oral liquid medicine with a marked measuring spoon, oral syringe, or medicine cup  This medicine should come with a Medication Guide  Ask your pharmacist for a copy if you do not have one  Missed dose: Take a dose as soon as you remember  If it is almost time for your next dose, wait until then and take a regular dose  Do not take extra medicine to make up for a missed dose  If you miss a dose of the extended-release tablet after your evening meal, take it before bedtime after a snack  If you miss the dose before bedtime, take it after your morning meal  If you do not take the dose the following morning, then take the next dose at your regular time after your evening meal  Do not take 2 doses at the same time  Store the medicine in a closed container at room temperature, away from heat, moisture, and direct light  Drugs and Foods to Avoid:   Ask your doctor or pharmacist before using any other medicine, including over-the-counter medicines, vitamins, and herbal products  Some medicines can affect how pregabalin works   Tell your doctor if you are using any of the following:   ACE inhibitor (including benazepril, enalapril, lisinopril, quinapril, ramipril)  Oral diabetes medicine (including metformin, pioglitazone, rosiglitazone)  Do not drink alcohol while you are using this medicine  Tell your doctor if you use anything else that makes you sleepy  Some examples are allergy medicine, narcotic pain medicine, and alcohol  Tell your doctor if you are also using oxycodone, lorazepam, or zolpidem  Warnings While Using This Medicine:   Tell your doctor if you are pregnant or breastfeeding, or if you have kidney disease, heart failure, heart rhythm problems, lung or breathing problems, a bleeding disorder, diabetes, sores or skin problems, or a low blood platelet count  Tell your doctor if you have a history of angioedema (severe swelling), alcohol or drug abuse, depression, or other mood problems  This medicine may cause the following problems:   Angioedema (severe swelling), which may be life-threatening  Changes in mood or behavior, including suicidal thoughts or behavior  Respiratory depression (serious breathing problem that can be life-threatening), when used with narcotic pain medicines  Peripheral edema (swelling of your hands, ankles, feet, or lower legs)  Increased risk for cancer and bleeding  Serious muscle problems  Heart rhythm changes  This medicine may make you dizzy or drowsy  It may also cause blurry or double vision  Do not drive or do anything else that could be dangerous until you know how this medicine affects you  Do not stop using this medicine suddenly  Your doctor will need to slowly decrease your dose before you stop it completely  Your doctor will do lab tests at regular visits to check on the effects of this medicine  Keep all appointments  Keep all medicine out of the reach of children  Never share your medicine with anyone  Possible Side Effects While Using This Medicine:   Call your doctor right away if you notice any of these side effects:   Allergic reaction: Itching or hives, swelling in your face or hands, swelling or tingling in your mouth or throat, chest tightness, trouble breathing  Blistering, peeling, red skin rash  Blue lips, fingernails, or skin, trouble breathing, chest pain  Blurry or double vision  Fever, chills, cough, sore throat, body aches  Muscle pain, tenderness, or weakness, general feeling of illness  Rapid weight gain, swelling in your hands, ankles, or feet  Severe dizziness or drowsiness  Sudden mood changes, unusual moods or behavior, including extreme happiness or depression, thoughts or attempts of killing oneself  Swelling in your throat, head, or neck  Uneven heartbeat  Unusual bleeding, bruising, or weakness  If you notice these less serious side effects, talk with your doctor:   Confusion, trouble concentrating  Constipation  Dry mouth  If you notice other side effects that you think are caused by this medicine, tell your doctor  Call your doctor for medical advice about side effects  You may report side effects to FDA at 4-934-FDA-5414    © Copyright [employer] 2022 Information is for End User's use only and may not be sold, redistributed or otherwise used for commercial purposes  The above information is an  only  It is not intended as medical advice for individual conditions or treatments  Talk to your doctor, nurse or pharmacist before following any medical regimen to see if it is safe and effective for you

## 2023-01-17 ENCOUNTER — TELEPHONE (OUTPATIENT)
Dept: INTERNAL MEDICINE CLINIC | Facility: CLINIC | Age: 63
End: 2023-01-17

## 2023-01-18 ENCOUNTER — TELEMEDICINE (OUTPATIENT)
Dept: INTERNAL MEDICINE CLINIC | Facility: CLINIC | Age: 63
End: 2023-01-18

## 2023-01-18 DIAGNOSIS — U07.1 LAB TEST POSITIVE FOR DETECTION OF COVID-19 VIRUS: Primary | ICD-10-CM

## 2023-01-18 DIAGNOSIS — J42 CHRONIC BRONCHITIS, UNSPECIFIED CHRONIC BRONCHITIS TYPE (HCC): ICD-10-CM

## 2023-01-18 DIAGNOSIS — I10 BENIGN ESSENTIAL HYPERTENSION: ICD-10-CM

## 2023-01-18 DIAGNOSIS — E66.01 CLASS 3 SEVERE OBESITY DUE TO EXCESS CALORIES WITH SERIOUS COMORBIDITY AND BODY MASS INDEX (BMI) OF 40.0 TO 44.9 IN ADULT (HCC): ICD-10-CM

## 2023-01-18 PROBLEM — E66.813 CLASS 3 SEVERE OBESITY DUE TO EXCESS CALORIES WITH SERIOUS COMORBIDITY AND BODY MASS INDEX (BMI) OF 40.0 TO 44.9 IN ADULT (HCC): Status: ACTIVE | Noted: 2018-09-20

## 2023-01-18 RX ORDER — NIRMATRELVIR AND RITONAVIR 300-100 MG
3 KIT ORAL 2 TIMES DAILY
Qty: 30 TABLET | Refills: 0 | Status: SHIPPED | OUTPATIENT
Start: 2023-01-18 | End: 2023-01-23

## 2023-01-18 RX ORDER — NIRMATRELVIR AND RITONAVIR 300-100 MG
3 KIT ORAL 2 TIMES DAILY
Qty: 30 TABLET | Refills: 0 | Status: SHIPPED | OUTPATIENT
Start: 2023-01-18 | End: 2023-01-18 | Stop reason: SDUPTHER

## 2023-01-18 NOTE — PATIENT INSTRUCTIONS
COVID-19 (Coronavirus Disease 2019)   WHAT YOU NEED TO KNOW:   COVID-19 is the disease caused by a coronavirus first discovered in December 2019  Coronaviruses generally cause upper respiratory (nose, throat, and lung) infections, such as a cold  The 2019 virus spreads quickly and easily  It can be spread starting 2 to 3 days before symptoms even begin  DISCHARGE INSTRUCTIONS:   Call your local emergency number (911 in the [address] [facility]) if:   You have trouble breathing or shortness of breath at rest     You have chest pain or pressure that lasts longer than 5 minutes  You become confused or hard to wake  Your lips or face are blue  Return to the emergency department if:   You have a fever of 104°F (40°C) or higher  Call your doctor if:   You have symptoms of COVID-19  You have questions or concerns about your condition or care  Medicines: You may need any of the following:  Decongestants  help reduce nasal congestion and help you breathe more easily  If you take decongestant pills, they may make you feel restless or cause problems with your sleep  Do not use decongestant sprays for more than a few days  Cough suppressants  help reduce coughing  Ask your healthcare provider which type of cough medicine is best for you  Acetaminophen  decreases pain and fever  It is available without a doctor's order  Ask how much to take and how often to take it  Follow directions  Read the labels of all other medicines you are using to see if they also contain acetaminophen, or ask your doctor or pharmacist  Acetaminophen can cause liver damage if not taken correctly  Do not use more than 4 grams (4,000 milligrams) total of acetaminophen in one day  NSAIDs , such as ibuprofen, help decrease swelling, pain, and fever  This medicine is available with or without a doctor's order  NSAIDs can cause stomach bleeding or kidney problems in certain people   If you take blood thinner medicine, always ask your healthcare provider if NSAIDs are safe for you  Always read the medicine label and follow directions  Blood thinners  help prevent blood clots  Clots can cause strokes, heart attacks, and death  The following are general safety guidelines to follow while you are taking a blood thinner:    Watch for bleeding and bruising while you take blood thinners  Watch for bleeding from your gums or nose  Watch for blood in your urine and bowel movements  Use a soft washcloth on your skin, and a soft toothbrush to brush your teeth  This can keep your skin and gums from bleeding  If you shave, use an electric shaver  Do not play contact sports  Tell your dentist and other healthcare providers that you take a blood thinner  Wear a bracelet or necklace that says you take this medicine  Do not start or stop any other medicines unless your healthcare provider tells you to  Many medicines cannot be used with blood thinners  Take your blood thinner exactly as prescribed by your healthcare provider  Do not skip does or take less than prescribed  Tell your provider right away if you forget to take your blood thinner, or if you take too much  Warfarin  is a blood thinner that you may need to take  The following are things you should be aware of if you take warfarin:     Foods and medicines can affect the amount of warfarin in your blood  Do not make major changes to your diet while you take warfarin  Warfarin works best when you eat about the same amount of vitamin K every day  Vitamin K is found in green leafy vegetables and certain other foods  Ask for more information about what to eat when you are taking warfarin  You will need to see your healthcare provider for follow-up visits when you are on warfarin  You will need regular blood tests  These tests are used to decide how much medicine you need  Take your medicine as directed    Contact your healthcare provider if you think your medicine is not helping or if you have side effects  Tell him or her if you are allergic to any medicine  Keep a list of the medicines, vitamins, and herbs you take  Include the amounts, and when and why you take them  Bring the list or the pill bottles to follow-up visits  Carry your medicine list with you in case of an emergency  What you need to know about variants: The virus has changed into several new forms (called variants) since it was discovered  The variants may be more contagious (easily spread) than the original form  Some may also cause more severe illness than others  What you need to know about COVID-19 vaccines:  Healthcare providers recommend a COVID-19 vaccine, even if you have already had COVID-19  You are considered fully vaccinated against COVID-19 two weeks after the final dose of any COVID-19 vaccine  Let your healthcare provider know when you have received the final dose of the vaccine  Make a copy of your vaccination card  Keep the original with you in case you need to show it  Keep the copy in a safe place  COVID-19 vaccines are given as a shot in 1 or 2 doses  Vaccination is recommended for everyone 5 years or older  One 2-dose vaccine is fully approved  for those 16 years or older  This vaccine also has an emergency use authorization (EUA) for children 11to 13years old  No vaccine is currently available for children younger than 5 years  A booster (additional) dose  is given to help the immune system continue to protect against severe COVID-19  A booster is recommended for all adults 18 or older  The booster can be a different brand of the COVID-19 vaccine than you originally received  The timing for the booster depends on the type of vaccine you received:    1-dose vaccine: The booster is given at least 2 months after you received the vaccine  2-dose vaccine: The booster is given at least 5 or 6 months after the second dose  A booster can be given to adolescents 15to 16years old    Only 1 COVID-19 vaccine has this EUA  The booster is given at least 5 months after the second dose of the original vaccine series  A booster is recommended for immunocompromised children 11to 6years old  Only 1 COVID-19 vaccine has this EUA  The booster is given 28 days after the second dose  Continue social distancing and other measures, even after you get the vaccine  Although it is not common, you can become infected after you get the vaccine  You may also be able to pass the virus to others without knowing you are infected  After you get the vaccine, check local, national, and international travel rules  You may need to be tested before you travel  Some countries require proof of a negative test before you travel  You may also need to quarantine after you return  Medicine may be given to prevent infection  The medicine can be given if you are at high risk for infection and cannot get the vaccine  It can also be given if your immune system does not respond well to the vaccine  How the 2019 coronavirus spreads:   Droplets are the main way all coronaviruses spread  The virus travels in droplets that form when a person talks, sings, coughs, or sneezes  The droplets can also float in the air for minutes or hours  Infection happens when you breathe in the droplets or get them in your eyes or nose  Close personal contact with an infected person increases your risk for infection  This means being within 6 feet (2 meters) of the person for at least 15 minutes over 24 hours  Person-to-person contact can spread the virus  For example, a person with the virus on his or her hands can spread it by shaking hands with someone  The virus can stay on objects and surfaces for up to 3 days  You may become infected by touching the object or surface and then touching your eyes or mouth  Help lower the risk for COVID-19:   Wash your hands often throughout the day  Use soap and water   Rub your soapy hands together, lacing your fingers, for at least 20 seconds  Rinse with warm, running water  Dry your hands with a clean towel or paper towel  Use hand  that contains alcohol if soap and water are not available  Teach children how to wash their hands and use hand   Cover sneezes and coughs  Turn your face away and cover your mouth and nose with a tissue  Throw the tissue away  Use the bend of your arm if a tissue is not available  Then wash your hands well with soap and water or use hand   Teach children how to cover a cough or sneeze  Wear a face covering (mask) when needed  Use a cloth covering with at least 2 layers  You can also create layers by putting a cloth covering over a disposable non-medical mask  Cover your mouth and your nose  Follow worldwide, national, and local social distancing guidelines  Keep at least 6 feet (2 meters) between you and others  Try not to touch your face  If you get the virus on your hands, you can transfer it to your eyes, nose, or mouth and become infected  You can also transfer it to objects, surfaces, or people  Clean and disinfect high-touch surfaces and objects often  Use disinfecting wipes, or make a solution of 4 teaspoons of bleach in 1 quart (4 cups) of water  Ask about other vaccines you may need  Get the influenza (flu) vaccine as soon as recommended each year, usually starting in September or October  Get the pneumonia vaccine if recommended  Your healthcare provider can tell you if you should also get other vaccines, and when to get them  Follow social distancing guidelines:  National and local social distancing rules vary  Rules and restrictions may change over time as restrictions are lifted  The following are general guidelines:  Stay home if you are sick or think you may have COVID-19  It is important to stay home if you are waiting for a testing appointment or for test results      Avoid close physical contact with anyone who does not live in your home  Do not shake hands with, hug, or kiss a person as a greeting  If you must use public transportation (such as a bus or subway), try to sit or stand away from others  Wear your face covering  Avoid in-person gatherings and crowds  Attend virtually if possible  Follow up with your doctor as directed:  Write down your questions so you remember to ask them during your visits  For more information:   [facility]  [address] [place] , [address]  Phone: 6- 753 - 425-9036  Web Address: [employer] br    © Copyright [employer] 2022 Information is for End User's use only and may not be sold, redistributed or otherwise used for commercial purposes  All illustrations and images included in CareNotes® are the copyrighted property of [employer]  or Jose Swartz   The above information is an  only  It is not intended as medical advice for individual conditions or treatments  Talk to your doctor, nurse or pharmacist before following any medical regimen to see if it is safe and effective for you

## 2023-01-18 NOTE — PROGRESS NOTES
COVID-19 Outpatient Progress Note    Assessment/Plan:    Problem List Items Addressed This Visit        Respiratory    COPD (chronic obstructive pulmonary disease) ([facility] [address] )    Relevant Medications    nirmatrelvir & ritonavir (Paxlovid, 300/100,) tablet therapy pack       Cardiovascular and Mediastinum    Benign essential hypertension    Relevant Medications    nirmatrelvir & ritonavir (Paxlovid, 300/100,) tablet therapy pack       Other    Class 3 severe obesity due to excess calories with serious comorbidity and body mass index (BMI) of 40 0 to 44 9 in adult [facility])    Relevant Medications    nirmatrelvir & ritonavir (Paxlovid, 300/100,) tablet therapy pack   Other Visit Diagnoses     Lab test positive for detection of COVID-19 virus    -  Primary    Relevant Medications    nirmatrelvir & ritonavir (Paxlovid, 300/100,) tablet therapy pack         Disposition:     Patient has asymptomatic or mild COVID-19 infection  Based off CDC guidelines, they were recommended to isolate for 5 days  If they are asymptomatic or symptoms are improving with no fevers in the past 24 hours, isolation may be ended followed by 5 days of wearing a mask when around othes to minimize risk of infecting others  If still have a fever or other symptoms have not improved, continue to isolate until they improve  Regardless of when they end isolation, avoid being around people who are more likely to get very sick from COVID-19 until at least day 11  Patient with moderate or severe illness or has a weakened immune system  They should isolate from others through at least day 10  Isolation can be ended if symptoms are improving and they are fever free for the past 24 hours  If they still have fever or other symptoms have not improved, continue to isolate until they improve  Regardless of when you isolation is ended, avoid being around people who are more likely to get very sick from COVID-19 until at least day 11   If COVID symptoms worsen after ending isolation, restart isolation at day 0  Discussed symptom directed medication options with patient  Discussed vitamin D, vitamin C, and/or zinc supplementation with patient  A/P: Day # 3 since onset of COVID s/s  Covid test at home was positive  Meets the criteria for paxlovid and discussed  Pt amendable to starting  GFR >60 and will need to hold viagra use    Doing ok  NO fever, but chills, sweats, fatigue  Some nasal congestion  Notes nausea and diarrhea  Cough and some SOB  No conversational dyspnea    Continue isolation/quarantine, increase fluids, PRN motrin/tylenol, and breathing exercises  RTC one day  for f/u  Patient meets criteria for PAXLOVID and they have been counseled appropriately according to EUA documentation released by the FDA  After discussion, patient agrees to treatment  Francesco Calvo is an investigational medicine used to treat mild-to-moderate COVID-19 in adults and children (15years of age and older weighing at least 80 pounds (40 kg)) with positive results of direct SARS-CoV-2 viral testing, and who are at high risk for progression to severe COVID-19, including hospitalization or death  PAXLOVID is investigational because it is still being studied  There is limited information about the safety and effectiveness of using PAXLOVID to treat people with mild-to-moderate COVID-19  The FDA has authorized the emergency use of PAXLOVID for the treatment of mild-tomoderate COVID-19 in adults and children (15years of age and older weighing at least 80 pounds (40 kg)) with a positive test for the virus that causes COVID-19, and who are at high risk for progression to severe COVID-19, including hospitalization or death, under an EUA  What should I tell my healthcare provider before I take PAXLOVID?     Tell your healthcare provider if you:  - Have any allergies  - Have liver or kidney disease  - Are pregnant or plan to become pregnant  - Are breastfeeding a child  - Have any serious illnesses    Tell your healthcare provider about all the medicines you take, including prescription and over-the-counter medicines, vitamins, and herbal supplements  Some medicines may interact with PAXLOVID and may cause serious side effects  Keep a list of your medicines to show your healthcare provider and pharmacist when you get a new medicine  You can ask your healthcare provider or pharmacist for a list of medicines that interact with PAXLOVID  Do not start taking a new medicine without telling your healthcare provider  Your healthcare provider can tell you if it is safe to take PAXLOVID with other medicines  Tell your healthcare provider if you are taking combined hormonal contraceptive  PAXLOVID may affect how your birth control pills work  Females who are able to become pregnant should use another effective alternative form of contraception or an additional barrier method of contraception  Talk to your healthcare provider if you have any questions about contraceptive methods that might be right for you  How do I take PAXLOVID? PAXLOVID consists of 2 medicines: nirmatrelvir and ritonavir  - Take 2 pink tablets of nirmatrelvir with 1 white tablet of ritonavir by mouth 2 times each day (in the morning and in the evening) for 5 days  For each dose, take all 3 tablets at the same time  - If you have kidney disease, talk to your healthcare provider  You may need a different dose  - Swallow the tablets whole  Do not chew, break, or crush the tablets  - Take PAXLOVID with or without food  - Do not stop taking PAXLOVID without talking to your healthcare provider, even if you feel better  - If you miss a dose of PAXLOVID within 8 hours of the time it is usually taken, take it as soon as you remember  If you miss a dose by more than 8 hours, skip the missed dose and take the next dose at your regular time  Do not take 2 doses of PAXLOVID at the same time    - If you take too much PAXLOVID, call your healthcare provider or go to the nearest hospital emergency room right away  - If you are taking a ritonavir- or cobicistat-containing medicine to treat hepatitis C or Human Immunodeficiency Virus (HIV), you should continue to take your medicine as prescribed by your healthcare provider   - Talk to your healthcare provider if you do not feel better or if you feel worse after 5 days  Who should generally not take PAXLOVID? Do not take PAXLOVID if:  You are allergic to nirmatrelvir, ritonavir, or any of the ingredients in PAXLOVID  You are taking any of the following medicines:  - Alfuzosin  - Pethidine, piroxicam, propoxyphene  - Ranolazine  - Amiodarone, dronedarone, flecainide, propafenone, quinidine  - Colchicine  - Lurasidone, pimozide, clozapine  - Dihydroergotamine, ergotamine, methylergonovine  - Lovastatin, simvastatin  - Sildenafil (Revatio®) for pulmonary arterial hypertension (PAH)  - Triazolam, oral midazolam  - Apalutamide  - Carbamazepine, phenobarbital, phenytoin  - Rifampin  - St  Venkatesh’s Wort (hypericum perforatum)    What are the important possible side effects of PAXLOVID? Possible side effects of PAXLOVID are:  - Liver Problems  Tell your healthcare provider right away if you have any of these signs and symptoms of liver problems: loss of appetite, yellowing of your skin and the whites of eyes (jaundice), dark-colored urine, pale colored stools and itchy skin, stomach area (abdominal) pain  - Resistance to HIV Medicines  If you have untreated HIV infection, PAXLOVID may lead to some HIV medicines not working as well in the future  - Other possible side effects include: altered sense of taste, diarrhea, high blood pressure, or muscle aches    These are not all the possible side effects of PAXLOVID  Not many people have taken PAXLOVID  Serious and unexpected side effects may happen   Posen Bussing is still being studied, so it is possible that all of the risks are not known at this time     What other treatment choices are there? Like Alfred Smith may allow for the emergency use of other medicines to treat people with COVID-19  Go to https://[employer]/ for information on the emergency use of other medicines that are authorized by FDA to treat people with COVID-19  Your healthcare provider may talk with you about clinical trials for which you may be eligible  It is your choice to be treated or not to be treated with PAXLOVID  Should you decide not to receive it or for your child not to receive it, it will not change your standard medical care  What if I am pregnant or breastfeeding? There is no experience treating pregnant women or breastfeeding mothers with PAXLOVID  For a mother and unborn baby, the benefit of taking PAXLOVID may be greater than the risk from the treatment  If you are pregnant, discuss your options and specific situation with your healthcare provider  It is recommended that you use effective barrier contraception or do not have sexual activity while taking PAXLOVID  If you are breastfeeding, discuss your options and specific situation with your healthcare provider  How do I report side effects with PAXLOVID? Contact your healthcare provider if you have any side effects that bother you or do not go away  Report side effects to FDA MedWatch at www fda gov/medwatch or call 8-357-IPK1559 or you can report side effects to [employer]  at the contact information provided below  Website Fax number Telephone number   [employer] [phone]     How should I store [address]? Store PAXLOVID tablets at room temperature between 68°F to 77°F (20°C to 25°C)      Full fact sheet for patients, parents, and caregivers can be found at: [employer] [place]    I have spent 20 minutes directly with the patient  Greater than 50% of this time was spent in counseling/coordination of care regarding: diagnostic results, prognosis, risks and benefits of treatment options, instructions for management, patient and family education, importance of treatment compliance, risk factor reductions and impressions  Encounter provider: Mimi Brennan DO     Provider located at: [address]  [address] [facility] [address]     Recent Visits  Date Type Provider Dept   01/17/23 Telephone [facility]   Showing recent visits within past 7 days and meeting all other requirements  Today's Visits  Date Type Provider Dept   01/18/23 Telemedicine DO Richard Longoria today's visits and meeting all other requirements  Future Appointments  No visits were found meeting these conditions  Showing future appointments within next 150 days and meeting all other requirements     This virtual check-in was done via [employer] and patient was informed that this is a secure, HIPAA-compliant platform  He agrees to proceed  Patient agrees to participate in a virtual check in via telephone or video visit instead of presenting to the office to address urgent/immediate medical needs  Patient is aware this is a billable service  He acknowledged consent and understanding of privacy and security of the video platform  The patient has agreed to participate and understands they can discontinue the visit at any time  After connecting through [facility], the patient was identified by name and date of birth  [employer] was informed that this was a telemedicine visit and that the exam was being conducted confidentially over secure lines  My office door was closed  No one else was in the room  [employer] acknowledged consent and understanding of privacy and security of the telemedicine visit   I informed the patient that I have reviewed his record in [address] [place] [address] and presented the opportunity for him to ask any questions regarding the visit today  The patient agreed to participate  Verification of patient location:  Patient is located in the following state in which I hold an active license: [place]    Subjective:   Devon Jeffries is a 57 y o  male who has been screened for COVID-19  Symptom change since last report: unchanged  Patient's symptoms include chills, fatigue, nasal congestion, rhinorrhea, sore throat, cough, shortness of breath, abdominal pain, nausea, diarrhea and myalgias  Patient denies fever, anosmia, loss of taste, chest tightness, vomiting and headaches  - Date of symptom onset: 1/16/2023  - Date of positive COVID-19 test: 1/16/2023  Type of test: Home antigen  Patient with typical symptoms of COVID-19 and they attest that they were positive on home rapid antigen testing  Image of positive result is not able to be uploaded into their chart  COVID-19 vaccination status: Fully vaccinated (primary series)    Anahi Trivedi has been staying home and has isolated themselves in his home  He is taking care to not share personal items and is cleaning all surfaces that are touched often, like counters, tabletops, and doorknobs using household cleaning sprays or wipes  He is wearing a mask when he leaves his room  No results found for: Rico Walls, SARSCORONAVI, CORONAVIRUSR, SARSCOVAG, [address] [facility]    Review of Systems   Constitutional: Positive for activity change, appetite change, chills, diaphoresis and fatigue  Negative for fever  HENT: Positive for congestion, postnasal drip, rhinorrhea and sore throat  Negative for ear discharge, ear pain, facial swelling, sinus pressure and sinus pain  No loss of smell/taste   Respiratory: Positive for cough and shortness of breath  Negative for chest tightness and wheezing  Cardiovascular: Negative for chest pain, palpitations and leg swelling     Gastrointestinal: Positive for abdominal pain, diarrhea and nausea  Negative for constipation and vomiting  Genitourinary: Negative for difficulty urinating, dysuria and frequency  Musculoskeletal: Positive for myalgias  Negative for arthralgias and gait problem  Neurological: Negative for light-headedness and headaches  Psychiatric/Behavioral: Negative for confusion  The patient is not nervous/anxious  Current Outpatient Medications on File Prior to Visit   Medication Sig   • fluticasone (FLONASE) 50 mcg/act nasal spray 2 sprays into each nostril daily   • furosemide (LASIX) 20 mg tablet Take 1 tablet (20 mg total) by mouth daily   • losartan (COZAAR) 100 MG tablet Take 1 tablet (100 mg total) by mouth daily   • multivitamin (THERAGRAN) TABS Take 1 tablet by mouth daily   • Omega-3 Fatty Acids (FISH OIL) 1,000 mg Take 1,000 mg by mouth Take one capsule twice a week   • pregabalin (LYRICA) 50 mg capsule Take 1 capsule (50 mg total) by mouth 3 (three) times a day   • [DISCONTINUED] naproxen (NAPROSYN) 500 mg tablet Take 1 tablet (500 mg total) by mouth 2 (two) times a day with meals (Patient not taking: Reported on 9/27/2022)   • [DISCONTINUED] saccharomyces boulardii (Florastor) 250 mg capsule Take 1 capsule (250 mg total) by mouth 2 (two) times a day (Patient not taking: Reported on 9/27/2022)   • [DISCONTINUED] sildenafil (VIAGRA) 100 mg tablet Take 1 tablet (100 mg total) by mouth as needed in the morning for erectile dysfunction  (Patient not taking: Reported on 6/24/2022)       Objective: There were no vitals taken for this visit  Physical Exam  Vitals and nursing note reviewed  Constitutional:       General: He is not in acute distress  Appearance: Normal appearance  He is obese  He is ill-appearing  HENT:      Head: Normocephalic and atraumatic  Mouth/Throat:      Mouth: Mucous membranes are moist    Eyes:      Extraocular Movements: Extraocular movements intact        Conjunctiva/sclera: Conjunctivae normal       Pupils: Pupils are equal, round, and reactive to light  Pulmonary:      Effort: Pulmonary effort is normal  No respiratory distress  Neurological:      General: No focal deficit present  Mental Status: He is alert and oriented to person, place, and time  Mental status is at baseline  Psychiatric:         Mood and Affect: Mood normal          Behavior: Behavior normal          Thought Content:  Thought content normal          Judgment: Judgment normal        Luis Alberto Adams DO

## 2023-01-19 ENCOUNTER — TELEMEDICINE (OUTPATIENT)
Dept: INTERNAL MEDICINE CLINIC | Facility: CLINIC | Age: 63
End: 2023-01-19

## 2023-01-19 DIAGNOSIS — J42 CHRONIC BRONCHITIS, UNSPECIFIED CHRONIC BRONCHITIS TYPE (HCC): ICD-10-CM

## 2023-01-19 DIAGNOSIS — E66.01 CLASS 3 SEVERE OBESITY DUE TO EXCESS CALORIES WITH SERIOUS COMORBIDITY AND BODY MASS INDEX (BMI) OF 40.0 TO 44.9 IN ADULT (HCC): ICD-10-CM

## 2023-01-19 DIAGNOSIS — I10 BENIGN ESSENTIAL HYPERTENSION: ICD-10-CM

## 2023-01-19 DIAGNOSIS — U07.1 LAB TEST POSITIVE FOR DETECTION OF COVID-19 VIRUS: Primary | ICD-10-CM

## 2023-01-19 NOTE — PROGRESS NOTES
COVID-19 Outpatient Progress Note    Assessment/Plan:    Problem List Items Addressed This Visit        Respiratory    COPD (chronic obstructive pulmonary disease) ([facility] [address] )       Cardiovascular and Mediastinum    Benign essential hypertension       Other    Class 3 severe obesity due to excess calories with serious comorbidity and body mass index (BMI) of 40 0 to 44 9 in adult [facility])   Other Visit Diagnoses     Lab test positive for detection of COVID-19 virus    -  Primary         Disposition:     Patient has asymptomatic or mild COVID-19 infection  Based off CDC guidelines, they were recommended to isolate for 5 days  If they are asymptomatic or symptoms are improving with no fevers in the past 24 hours, isolation may be ended followed by 5 days of wearing a mask when around othes to minimize risk of infecting others  If still have a fever or other symptoms have not improved, continue to isolate until they improve  Regardless of when they end isolation, avoid being around people who are more likely to get very sick from COVID-19 until at least day 11  Patient with moderate or severe illness or has a weakened immune system  They should isolate from others through at least day 10  Isolation can be ended if symptoms are improving and they are fever free for the past 24 hours  If they still have fever or other symptoms have not improved, continue to isolate until they improve  Regardless of when you isolation is ended, avoid being around people who are more likely to get very sick from COVID-19 until at least day 11  A/P: Day # 4 since onset of COVID s/s  Covid test was poisitve  Day #2 paxlovid  Doing better  No fever or chills  Less nasal congestion and diarrhea  Cough is less and no SOB    Continue isolation/quarantine, increase fluids, PRN motrin/tylenol, and breathing exercises  RTC as scheduled for f/u  I have spent 15 minutes directly with the patient   Greater than 50% of this time was spent in counseling/coordination of care regarding: prognosis, risks and benefits of treatment options, instructions for management, patient and family education, importance of treatment compliance, risk factor reductions and impressions  Encounter provider: Herrera Gomez DO     Provider located at: [address]  [address] [facility] [address]     Recent Visits  Date Type Provider Dept   01/18/23 Telemedicine Herrera Gomez DO [facility]   01/17/23 Telephone [facility]   Showing recent visits within past 7 days and meeting all other requirements  Today's Visits  Date Type Provider Dept   01/19/23 Telemedicine Herrera Gomez DO [facility] today's visits and meeting all other requirements  Future Appointments  No visits were found meeting these conditions  Showing future appointments within next 150 days and meeting all other requirements     This virtual check-in was done via [employer] and patient was informed that this is a secure, HIPAA-compliant platform  He agrees to proceed  Patient agrees to participate in a virtual check in via telephone or video visit instead of presenting to the office to address urgent/immediate medical needs  Patient is aware this is a billable service  He acknowledged consent and understanding of privacy and security of the video platform  The patient has agreed to participate and understands they can discontinue the visit at any time  After connecting through [facility], the patient was identified by name and date of birth  Marcus Richardson was informed that this was a telemedicine visit and that the exam was being conducted confidentially over secure lines  My office door was closed  No one else was in the room  Marcus Richardson acknowledged consent and understanding of privacy and security of the telemedicine visit   I informed the patient that I have reviewed his record in Epic and presented the opportunity for him to ask any questions regarding the visit today  The patient agreed to participate  Verification of patient location:  Patient is located in the following state in which I hold an active license: [place]    Subjective:   Charlotte Banuelos is a 57 y o  male who has been screened for COVID-19  Symptom change since last report: improving  Patient's symptoms include fatigue, nasal congestion, rhinorrhea, loss of taste, cough, shortness of breath and diarrhea  Patient denies fever, chills, sore throat, anosmia, chest tightness, abdominal pain, nausea, vomiting, myalgias and headaches  - Date of symptom onset: 1/16/2023  - Date of positive COVID-19 test: 1/16/2023  Type of test: Home antigen  COVID-19 vaccination status: Fully vaccinated (primary series)    Shamir Heaton has been staying home and has isolated themselves in his home  He is taking care to not share personal items and is cleaning all surfaces that are touched often, like counters, tabletops, and doorknobs using household cleaning sprays or wipes  He is wearing a mask when he leaves his room  No results found for: Christina Schneider, SARSCORONAVI, CORONAVIRUSR, SARSCOVAG, [address] [facility]    Review of Systems   Constitutional: Positive for activity change and fatigue  Negative for chills, diaphoresis and fever  HENT: Positive for congestion, postnasal drip and rhinorrhea  Negative for ear discharge, ear pain, facial swelling, sinus pressure, sinus pain and sore throat           loss of taste/smell  Respiratory: Positive for cough and shortness of breath  Negative for chest tightness and wheezing  Cardiovascular: Negative for chest pain, palpitations and leg swelling  Gastrointestinal: Positive for diarrhea  Negative for abdominal pain, constipation, nausea and vomiting  Genitourinary: Negative for difficulty urinating, dysuria and frequency     Musculoskeletal: Negative for arthralgias, gait problem and myalgias  Neurological: Negative for light-headedness and headaches  Psychiatric/Behavioral: Negative for confusion  The patient is not nervous/anxious  Current Outpatient Medications on File Prior to Visit   Medication Sig   • fluticasone (FLONASE) 50 mcg/act nasal spray 2 sprays into each nostril daily   • furosemide (LASIX) 20 mg tablet Take 1 tablet (20 mg total) by mouth daily   • losartan (COZAAR) 100 MG tablet Take 1 tablet (100 mg total) by mouth daily   • multivitamin (THERAGRAN) TABS Take 1 tablet by mouth daily   • nirmatrelvir & ritonavir (Paxlovid, 300/100,) tablet therapy pack Take 3 tablets by mouth 2 (two) times a day for 5 days Take 2 nirmatrelvir tablets + 1 ritonavir tablet together per dose   • Omega-3 Fatty Acids (FISH OIL) 1,000 mg Take 1,000 mg by mouth Take one capsule twice a week   • pregabalin (LYRICA) 50 mg capsule Take 1 capsule (50 mg total) by mouth 3 (three) times a day       Objective: There were no vitals taken for this visit  Physical Exam  Vitals and nursing note reviewed  Constitutional:       General: He is not in acute distress  Appearance: Normal appearance  He is not ill-appearing  HENT:      Head: Normocephalic and atraumatic  Mouth/Throat:      Mouth: Mucous membranes are moist    Eyes:      Extraocular Movements: Extraocular movements intact  Conjunctiva/sclera: Conjunctivae normal       Pupils: Pupils are equal, round, and reactive to light  Pulmonary:      Effort: Pulmonary effort is normal  No respiratory distress  Neurological:      General: No focal deficit present  Mental Status: He is alert and oriented to person, place, and time  Mental status is at baseline  Psychiatric:         Mood and Affect: Mood normal          Behavior: Behavior normal          Thought Content:  Thought content normal          Judgment: Judgment normal        Erleen Omari, DO

## 2023-01-19 NOTE — PATIENT INSTRUCTIONS
COVID-19 (Coronavirus Disease 2019)   WHAT YOU NEED TO KNOW:   COVID-19 is the disease caused by a coronavirus first discovered in December 2019  Coronaviruses generally cause upper respiratory (nose, throat, and lung) infections, such as a cold  The 2019 virus spreads quickly and easily  It can be spread starting 2 to 3 days before symptoms even begin  DISCHARGE INSTRUCTIONS:   Call your local emergency number (911 in the [address] [facility]) if:   You have trouble breathing or shortness of breath at rest     You have chest pain or pressure that lasts longer than 5 minutes  You become confused or hard to wake  Your lips or face are blue  Return to the emergency department if:   You have a fever of 104°F (40°C) or higher  Call your doctor if:   You have symptoms of COVID-19  You have questions or concerns about your condition or care  Medicines: You may need any of the following:  Decongestants  help reduce nasal congestion and help you breathe more easily  If you take decongestant pills, they may make you feel restless or cause problems with your sleep  Do not use decongestant sprays for more than a few days  Cough suppressants  help reduce coughing  Ask your healthcare provider which type of cough medicine is best for you  Acetaminophen  decreases pain and fever  It is available without a doctor's order  Ask how much to take and how often to take it  Follow directions  Read the labels of all other medicines you are using to see if they also contain acetaminophen, or ask your doctor or pharmacist  Acetaminophen can cause liver damage if not taken correctly  Do not use more than 4 grams (4,000 milligrams) total of acetaminophen in one day  NSAIDs , such as ibuprofen, help decrease swelling, pain, and fever  This medicine is available with or without a doctor's order  NSAIDs can cause stomach bleeding or kidney problems in certain people   If you take blood thinner medicine, always ask your healthcare provider if NSAIDs are safe for you  Always read the medicine label and follow directions  Blood thinners  help prevent blood clots  Clots can cause strokes, heart attacks, and death  The following are general safety guidelines to follow while you are taking a blood thinner:    Watch for bleeding and bruising while you take blood thinners  Watch for bleeding from your gums or nose  Watch for blood in your urine and bowel movements  Use a soft washcloth on your skin, and a soft toothbrush to brush your teeth  This can keep your skin and gums from bleeding  If you shave, use an electric shaver  Do not play contact sports  Tell your dentist and other healthcare providers that you take a blood thinner  Wear a bracelet or necklace that says you take this medicine  Do not start or stop any other medicines unless your healthcare provider tells you to  Many medicines cannot be used with blood thinners  Take your blood thinner exactly as prescribed by your healthcare provider  Do not skip does or take less than prescribed  Tell your provider right away if you forget to take your blood thinner, or if you take too much  Warfarin  is a blood thinner that you may need to take  The following are things you should be aware of if you take warfarin:     Foods and medicines can affect the amount of warfarin in your blood  Do not make major changes to your diet while you take warfarin  Warfarin works best when you eat about the same amount of vitamin K every day  Vitamin K is found in green leafy vegetables and certain other foods  Ask for more information about what to eat when you are taking warfarin  You will need to see your healthcare provider for follow-up visits when you are on warfarin  You will need regular blood tests  These tests are used to decide how much medicine you need  Take your medicine as directed    Contact your healthcare provider if you think your medicine is not helping or if you have side effects  Tell him or her if you are allergic to any medicine  Keep a list of the medicines, vitamins, and herbs you take  Include the amounts, and when and why you take them  Bring the list or the pill bottles to follow-up visits  Carry your medicine list with you in case of an emergency  What you need to know about variants: The virus has changed into several new forms (called variants) since it was discovered  The variants may be more contagious (easily spread) than the original form  Some may also cause more severe illness than others  What you need to know about COVID-19 vaccines:  Healthcare providers recommend a COVID-19 vaccine, even if you have already had COVID-19  You are considered fully vaccinated against COVID-19 two weeks after the final dose of any COVID-19 vaccine  Let your healthcare provider know when you have received the final dose of the vaccine  Make a copy of your vaccination card  Keep the original with you in case you need to show it  Keep the copy in a safe place  COVID-19 vaccines are given as a shot in 1 or 2 doses  Vaccination is recommended for everyone 5 years or older  One 2-dose vaccine is fully approved  for those 16 years or older  This vaccine also has an emergency use authorization (EUA) for children 11to 13years old  No vaccine is currently available for children younger than 5 years  A booster (additional) dose  is given to help the immune system continue to protect against severe COVID-19  A booster is recommended for all adults 18 or older  The booster can be a different brand of the COVID-19 vaccine than you originally received  The timing for the booster depends on the type of vaccine you received:    1-dose vaccine: The booster is given at least 2 months after you received the vaccine  2-dose vaccine: The booster is given at least 5 or 6 months after the second dose  A booster can be given to adolescents 15to 16years old    Only 1 COVID-19 vaccine has this EUA  The booster is given at least 5 months after the second dose of the original vaccine series  A booster is recommended for immunocompromised children 11to 6years old  Only 1 COVID-19 vaccine has this EUA  The booster is given 28 days after the second dose  Continue social distancing and other measures, even after you get the vaccine  Although it is not common, you can become infected after you get the vaccine  You may also be able to pass the virus to others without knowing you are infected  After you get the vaccine, check local, national, and international travel rules  You may need to be tested before you travel  Some countries require proof of a negative test before you travel  You may also need to quarantine after you return  Medicine may be given to prevent infection  The medicine can be given if you are at high risk for infection and cannot get the vaccine  It can also be given if your immune system does not respond well to the vaccine  How the 2019 coronavirus spreads:   Droplets are the main way all coronaviruses spread  The virus travels in droplets that form when a person talks, sings, coughs, or sneezes  The droplets can also float in the air for minutes or hours  Infection happens when you breathe in the droplets or get them in your eyes or nose  Close personal contact with an infected person increases your risk for infection  This means being within 6 feet (2 meters) of the person for at least 15 minutes over 24 hours  Person-to-person contact can spread the virus  For example, a person with the virus on his or her hands can spread it by shaking hands with someone  The virus can stay on objects and surfaces for up to 3 days  You may become infected by touching the object or surface and then touching your eyes or mouth  Help lower the risk for COVID-19:   Wash your hands often throughout the day  Use soap and water   Rub your soapy hands together, lacing your fingers, for at least 20 seconds  Rinse with warm, running water  Dry your hands with a clean towel or paper towel  Use hand  that contains alcohol if soap and water are not available  Teach children how to wash their hands and use hand   Cover sneezes and coughs  Turn your face away and cover your mouth and nose with a tissue  Throw the tissue away  Use the bend of your arm if a tissue is not available  Then wash your hands well with soap and water or use hand   Teach children how to cover a cough or sneeze  Wear a face covering (mask) when needed  Use a cloth covering with at least 2 layers  You can also create layers by putting a cloth covering over a disposable non-medical mask  Cover your mouth and your nose  Follow worldwide, national, and local social distancing guidelines  Keep at least 6 feet (2 meters) between you and others  Try not to touch your face  If you get the virus on your hands, you can transfer it to your eyes, nose, or mouth and become infected  You can also transfer it to objects, surfaces, or people  Clean and disinfect high-touch surfaces and objects often  Use disinfecting wipes, or make a solution of 4 teaspoons of bleach in 1 quart (4 cups) of water  Ask about other vaccines you may need  Get the influenza (flu) vaccine as soon as recommended each year, usually starting in September or October  Get the pneumonia vaccine if recommended  Your healthcare provider can tell you if you should also get other vaccines, and when to get them  Follow social distancing guidelines:  National and local social distancing rules vary  Rules and restrictions may change over time as restrictions are lifted  The following are general guidelines:  Stay home if you are sick or think you may have COVID-19  It is important to stay home if you are waiting for a testing appointment or for test results      Avoid close physical contact with anyone who does not live in your home  Do not shake hands with, hug, or kiss a person as a greeting  If you must use public transportation (such as a bus or subway), try to sit or stand away from others  Wear your face covering  Avoid in-person gatherings and crowds  Attend virtually if possible  Follow up with your doctor as directed:  Write down your questions so you remember to ask them during your visits  For more information:   [facility]  [address] [place] , [address]  Phone: 0- [phone] - [phone]  Web Address: [employer] com br    © Copyright [employer] 2022 Information is for End User's use only and may not be sold, redistributed or otherwise used for commercial purposes  All illustrations and images included in CareNotes® are the copyrighted property of [employer]  or Jose Swartz   The above information is an  only  It is not intended as medical advice for individual conditions or treatments  Talk to your doctor, nurse or pharmacist before following any medical regimen to see if it is safe and effective for you

## 2023-03-28 ENCOUNTER — OFFICE VISIT (OUTPATIENT)
Dept: INTERNAL MEDICINE CLINIC | Facility: CLINIC | Age: 63
End: 2023-03-28

## 2023-03-28 VITALS
HEIGHT: 70 IN | WEIGHT: 268 LBS | BODY MASS INDEX: 38.37 KG/M2 | OXYGEN SATURATION: 95 % | DIASTOLIC BLOOD PRESSURE: 60 MMHG | SYSTOLIC BLOOD PRESSURE: 124 MMHG | HEART RATE: 93 BPM | RESPIRATION RATE: 14 BRPM | TEMPERATURE: 97.4 F

## 2023-03-28 DIAGNOSIS — J01.10 ACUTE NON-RECURRENT FRONTAL SINUSITIS: ICD-10-CM

## 2023-03-28 DIAGNOSIS — R80.9 MICROALBUMINURIA: ICD-10-CM

## 2023-03-28 DIAGNOSIS — Z23 ENCOUNTER FOR VACCINATION: ICD-10-CM

## 2023-03-28 DIAGNOSIS — M1A.9XX0 CHRONIC GOUT WITHOUT TOPHUS, UNSPECIFIED CAUSE, UNSPECIFIED SITE: ICD-10-CM

## 2023-03-28 DIAGNOSIS — I10 BENIGN ESSENTIAL HYPERTENSION: ICD-10-CM

## 2023-03-28 DIAGNOSIS — Z13.1 SCREENING FOR DIABETES MELLITUS (DM): ICD-10-CM

## 2023-03-28 DIAGNOSIS — K63.5 BENIGN COLON POLYP: Primary | ICD-10-CM

## 2023-03-28 DIAGNOSIS — E78.2 MIXED HYPERLIPIDEMIA: ICD-10-CM

## 2023-03-28 DIAGNOSIS — Z12.2 ENCOUNTER FOR SCREENING FOR MALIGNANT NEOPLASM OF LUNG: ICD-10-CM

## 2023-03-28 DIAGNOSIS — J42 CHRONIC BRONCHITIS, UNSPECIFIED CHRONIC BRONCHITIS TYPE (HCC): ICD-10-CM

## 2023-03-28 DIAGNOSIS — R79.89 ELEVATED LFTS: ICD-10-CM

## 2023-03-28 DIAGNOSIS — E66.01 CLASS 3 SEVERE OBESITY DUE TO EXCESS CALORIES WITH SERIOUS COMORBIDITY AND BODY MASS INDEX (BMI) OF 40.0 TO 44.9 IN ADULT (HCC): ICD-10-CM

## 2023-03-28 DIAGNOSIS — Z12.11 SCREEN FOR COLON CANCER: ICD-10-CM

## 2023-03-28 DIAGNOSIS — Z72.0 TOBACCO USE: ICD-10-CM

## 2023-03-28 RX ORDER — AMOXICILLIN AND CLAVULANATE POTASSIUM 875; 125 MG/1; MG/1
1 TABLET, FILM COATED ORAL EVERY 12 HOURS SCHEDULED
Qty: 20 TABLET | Refills: 0 | Status: ON HOLD | OUTPATIENT
Start: 2023-03-28 | End: 2023-04-07

## 2023-03-28 RX ORDER — GUAIFENESIN 600 MG/1
600 TABLET, EXTENDED RELEASE ORAL EVERY 12 HOURS SCHEDULED
Qty: 20 TABLET | Refills: 0 | Status: ON HOLD | OUTPATIENT
Start: 2023-03-28

## 2023-03-28 RX ORDER — FLUTICASONE PROPIONATE 50 MCG
1 SPRAY, SUSPENSION (ML) NASAL DAILY
Qty: 16 G | Refills: 0 | Status: ON HOLD | OUTPATIENT
Start: 2023-03-28

## 2023-03-28 NOTE — PROGRESS NOTES
BMI Counseling: There is no height or weight on file to calculate BMI  The BMI is above normal  Nutrition recommendations include decreasing portion sizes and encouraging healthy choices of fruits and vegetables  Exercise recommendations include moderate physical activity 150 minutes/week  No pharmacotherapy was ordered  Rationale for BMI follow-up plan is due to patient being overweight or obese       Assessment/Plan:  Problem List Items Addressed This Visit        Digestive    Benign colon polyp - Primary       Respiratory    COPD (chronic obstructive pulmonary disease) (HCC)    Relevant Medications    guaiFENesin (Mucinex) 600 mg 12 hr tablet    fluticasone (FLONASE) 50 mcg/act nasal spray       Cardiovascular and Mediastinum    Benign essential hypertension    Relevant Orders    CBC and differential    Comprehensive metabolic panel       Other    Tobacco use    Microalbuminuria    Hyperlipidemia    Relevant Orders    Comprehensive metabolic panel    Lipid Panel with Direct LDL reflex    Gout    Relevant Orders    CBC and differential    Comprehensive metabolic panel    Uric acid    Elevated LFTs    Relevant Orders    TSH, 3rd generation with Free T4 reflex    Class 3 severe obesity due to excess calories with serious comorbidity and body mass index (BMI) of 40 0 to 44 9 in [facility])   Other Visit Diagnoses     Screen for colon cancer        Relevant Orders    Ambulatory referral for colonoscopy    Screening for diabetes mellitus (DM)        Relevant Orders    Hemoglobin A1C    Encounter for screening for malignant neoplasm of lung        Encounter for vaccination        Acute non-recurrent frontal sinusitis        Relevant Medications    amoxicillin-clavulanate (AUGMENTIN) 875-125 mg per tablet    guaiFENesin (Mucinex) 600 mg 12 hr tablet    fluticasone (FLONASE) 50 mcg/act nasal spray           Diagnoses and all orders for this visit:    Benign colon polyp    Benign essential hypertension  -     CBC and differential; Future  -     Comprehensive metabolic panel; Future    Chronic bronchitis, unspecified chronic bronchitis type ([facility] [address] )    Chronic gout without tophus, unspecified cause, unspecified site  -     CBC and differential; Future  -     Comprehensive metabolic panel; Future  -     Uric acid; Future    Mixed hyperlipidemia  -     Comprehensive metabolic panel; Future  -     Lipid Panel with Direct LDL reflex; Future    Tobacco use    Class 3 severe obesity due to excess calories with serious comorbidity and body mass index (BMI) of 40 0 to 44 9 in adult (HCC)    Microalbuminuria    Elevated LFTs  -     TSH, 3rd generation with Free T4 reflex; Future    Screen for colon cancer  -     Ambulatory referral for colonoscopy; Future    Screening for diabetes mellitus (DM)  -     Hemoglobin A1C; Future    Encounter for screening for malignant neoplasm of lung    Encounter for vaccination    Acute non-recurrent frontal sinusitis  -     amoxicillin-clavulanate (AUGMENTIN) 875-125 mg per tablet; Take 1 tablet by mouth every 12 (twelve) hours for 10 days  -     guaiFENesin (Mucinex) 600 mg 12 hr tablet; Take 1 tablet (600 mg total) by mouth every 12 (twelve) hours  -     fluticasone (FLONASE) 50 mcg/act nasal spray; 1 spray into each nostril daily      No problem-specific Assessment & Plan notes found for this encounter  A/P: Doing ok and will check labs  Discussed BMI and will give info on diet and exercise  Discussed vaccines defers the pneumonia vaccine  Discussed lung ca screening defers  Order CRC  Wean tobacco  Will call in abx, mucinex, and INS  Continue current treatment and RTC six months for routine  Subjective:      Patient ID: Galina Wilson is a 57 y o  male  WM RTC for f/u HTN, COPD, etc  Doing ok and no new issues, but several days of sinus infection  Notes PND, sore throat, headaches and bloody nasal d/c  Remains active w/o difficulty and no falls   COPD is controlled with limited rescue MDI use  Still smoking  Chronic pain is manageable  No gout attacks  Due for labs, vaccines, CRC, and lung ca screening  The following portions of the patient's history were reviewed and updated as appropriate:   He has a past medical history of Abnormal kidney function, Allergic rhinitis, Benign colon polyp, Carpal tunnel syndrome, Cervical radiculopathy, Chronic fatigue, Chronic pain disorder, COPD (chronic obstructive pulmonary disease) ([address] ), CPAP (continuous positive airway pressure) dependence, Edema, Elevated liver enzymes, Gout, Hyperlipidemia, Hypertension, Hypotension, Left ventricular hypertrophy, Obesity (BMI 30-39 9) (9/20/2018), and Sleep apnea  ,  does not have any pertinent problems on file  ,   has a past surgical history that includes Hemorrhoid surgery; Carpal tunnel release (Bilateral); and Liver biopsy  ,  family history includes Cancer in his mother; No Known Problems in his brother, father, maternal grandfather, maternal grandmother, paternal grandfather, paternal grandmother, and sister  ,   reports that he has been smoking cigarettes  He has a 40 00 pack-year smoking history  He has never used smokeless tobacco  He reports current alcohol use of about 6 0 standard drinks per week  He reports that he does not use drugs  ,  has No Known Allergies     Current Outpatient Medications   Medication Sig Dispense Refill   • amoxicillin-clavulanate (AUGMENTIN) 875-125 mg per tablet Take 1 tablet by mouth every 12 (twelve) hours for 10 days 20 tablet 0   • fluticasone (FLONASE) 50 mcg/act nasal spray 1 spray into each nostril daily 16 g 0   • furosemide (LASIX) 20 mg tablet Take 1 tablet (20 mg total) by mouth daily 90 tablet 0   • guaiFENesin (Mucinex) 600 mg 12 hr tablet Take 1 tablet (600 mg total) by mouth every 12 (twelve) hours 20 tablet 0   • losartan (COZAAR) 100 MG tablet Take 1 tablet (100 mg total) by mouth daily 90 tablet 0   • multivitamin (THERAGRAN) TABS Take 1 tablet by mouth daily     • "Omega-3 Fatty Acids (FISH OIL) 1,000 mg Take 1,000 mg by mouth daily     • pregabalin (LYRICA) 50 mg capsule Take 1 capsule (50 mg total) by mouth 3 (three) times a day 90 capsule 1     No current facility-administered medications for this visit  Review of Systems   Constitutional: Negative for activity change, chills, diaphoresis, fatigue and fever  HENT: Negative  Eyes: Negative for visual disturbance  Respiratory: Negative for cough, chest tightness, shortness of breath and wheezing  Cardiovascular: Positive for leg swelling  Negative for chest pain and palpitations  Gastrointestinal: Negative for abdominal pain, constipation, diarrhea, nausea and vomiting  Endocrine: Negative for cold intolerance and heat intolerance  Genitourinary: Negative for difficulty urinating, dysuria and frequency  Musculoskeletal: Negative for arthralgias, gait problem and myalgias  Neurological: Negative for dizziness, seizures, syncope, weakness, light-headedness and headaches  Psychiatric/Behavioral: Negative for confusion, dysphoric mood and sleep disturbance  The patient is not nervous/anxious  PHQ-2/9 Depression Screening          Objective:  Vitals:    03/28/23 0736   BP: 124/60   Pulse: 93   Resp: 14   Temp: (!) 97 4 °F (36 3 °C)   SpO2: 95%   Weight: 122 kg (268 lb)   Height: 5' 10\" (1 778 m)     Body mass index is 38 45 kg/m²  Physical Exam  Constitutional:       General: He is not in acute distress  Appearance: Normal appearance  He is obese  He is not ill-appearing  HENT:      Head: Normocephalic and atraumatic  Mouth/Throat:      Mouth: Mucous membranes are moist    Eyes:      Extraocular Movements: Extraocular movements intact  Conjunctiva/sclera: Conjunctivae normal       Pupils: Pupils are equal, round, and reactive to light  Neck:      Vascular: No carotid bruit  Cardiovascular:      Rate and Rhythm: Normal rate and regular rhythm        Heart sounds: Normal " heart sounds  No murmur heard  Pulmonary:      Effort: Pulmonary effort is normal  No respiratory distress  Breath sounds: Normal breath sounds  No wheezing, rhonchi or rales  Abdominal:      General: Bowel sounds are normal  There is no distension  Palpations: Abdomen is soft  Tenderness: There is no abdominal tenderness  Musculoskeletal:      Cervical back: Neck supple  Right lower leg: Edema present  Left lower leg: Edema present  Comments: bilat LE edema 1/4   Neurological:      General: No focal deficit present  Mental Status: He is alert and oriented to person, place, and time  Mental status is at baseline  Psychiatric:         Mood and Affect: Mood normal          Behavior: Behavior normal          Thought Content: Thought content normal          Judgment: Judgment normal          BMI Counseling: Body mass index is 38 45 kg/m²  The BMI is above normal  Nutrition recommendations include reducing portion sizes, decreasing overall calorie intake, reducing intake of saturated fat and trans fat and reducing intake of cholesterol  Exercise recommendations include moderate aerobic physical activity for 150 minutes/week

## 2023-03-28 NOTE — PATIENT INSTRUCTIONS
Obesity   AMBULATORY CARE:   Obesity  means your body mass index (BMI) is greater than 30  Your healthcare provider will use your age, height, and weight to measure your BMI  The risks of obesity include  many health problems, including injuries or physical disability  • Diabetes (high blood sugar level)    • High blood pressure or high cholesterol    • Heart disease    • Stroke    • Gallbladder or liver disease    • Cancer of the colon, breast, prostate, liver, or kidney    • Sleep apnea    • Arthritis or gout    Screening  is done to check for health conditions before you have signs or symptoms  If you are 28to 79years old, your blood sugar level may be checked every 3 years for signs of prediabetes or diabetes  Your healthcare provider will check your blood pressure at each visit  High blood pressure can lead to a stroke or other problems  Your provider may check for signs of heart disease, cancer, or other health problems  Seek care immediately if:   • You have a severe headache, confusion, or difficulty speaking  • You have weakness on one side of your body  • You have chest pain, sweating, or shortness of breath  Call your doctor if:   • You have symptoms of gallbladder or liver disease, such as pain in your upper abdomen  • You have knee or hip pain and discomfort while walking  • You have symptoms of diabetes, such as intense hunger and thirst, and frequent urination  • You have symptoms of sleep apnea, such as snoring or daytime sleepiness  • You have questions or concerns about your condition or care  Treatment for obesity  focuses on helping you lose weight to improve your health  Even a small decrease in BMI can reduce the risk for many health problems  Your healthcare provider will help you set a weight-loss goal   • Lifestyle changes  are the first step in treating obesity  These include making healthy food choices and getting regular physical activity   Your healthcare provider may suggest a weight-loss program that involves coaching, education, and therapy  • Medicine  may help you lose weight when it is used with a healthy foods and physical activity  • Surgery  can help you lose weight if you have obesity along with other health problems  Several types of weight-loss surgery are available  Ask your healthcare provider for more information  Tips for safe weight loss:   • Set small, realistic goals  An example of a small goal is to walk for 20 minutes 5 days a week  Anther goal is to lose 5% of your body weight  • Ask for support  Tell friends, family members, and coworkers about your goals  Ask someone to lose weight with you  You may also want to join a weight-loss support group  • Identify foods or triggers that may cause you to overeat  Remove tempting high-calorie foods from your home and workplace  Place a bowl of fresh fruit on your kitchen counter  If stress causes you to eat, find other ways to cope with stress  A counselor or therapist may be able to help you  • Track your daily calories and activity  Write down what you eat and drink  Also write down how many minutes of physical activity you do each day  • Track your weekly weight  Weigh yourself in the morning, before you eat or drink anything but after you use the bathroom  Use the same scale, in the same place, and in similar clothing each time  Only weigh yourself 1 to 2 times each week, or as directed  You may become discouraged if you weigh yourself every day  Eating changes: You will need to eat 500 to 1,000 fewer calories each day than you currently eat to lose 1 to 2 pounds a week  The following changes will help you cut calories:  • Eat smaller portions  Use small plates, no larger than 9 inches in diameter  Fill your plate half full of fruits and vegetables  Measure your food using measuring cups until you know what a serving size looks like           • Eat 3 meals and 1 or 2 snacks each day  Plan your meals in advance  Traci Moses and eat at home most of the time  Eat slowly  Do not skip meals  Skipping meals can lead to overeating later in the day  This can make it harder for you to lose weight  Talk with a dietitian to help you make a meal plan and schedule that is right for you  • Eat fruits and vegetables at every meal   They are low in calories and high in fiber, which makes you feel full  Do not add butter, margarine, or cream sauce to vegetables  Use herbs to season steamed vegetables  • Eat less fat and fewer fried foods  Eat more baked or grilled chicken and fish  These protein sources are lower in calories and fat than red meat  Limit fast food  Dress your salads with olive oil and vinegar instead of bottled dressing  • Limit the amount of sugar you eat  Do not drink sugary beverages  Limit alcohol  Activity changes:  Physical activity is good for your body in many ways  It helps you burn calories and build strong muscles  It decreases stress and depression, and improves your mood  It can also help you sleep better  Talk to your healthcare provider before you begin an exercise program   • Exercise for at least 30 minutes 5 days a week  Start slowly  Set aside time each day for physical activity that you enjoy and that is convenient for you  It is best to do both weight training and an activity that increases your heart rate, such as walking, bicycling, or swimming  • Find ways to be more active  Do yard work and housecleaning  Walk up the stairs instead of using elevators  Spend your leisure time going to events that require walking, such as outdoor festivals or fairs  This extra physical activity can help you lose weight and keep it off  Follow up with your doctor as directed: You may need to meet with a dietitian  Write down your questions so you remember to ask them during your visits    © Copyright [employer] 2022 Information is for End User's use only and may not be sold, redistributed or otherwise used for commercial purposes  The above information is an  only  It is not intended as medical advice for individual conditions or treatments  Talk to your doctor, nurse or pharmacist before following any medical regimen to see if it is safe and effective for you  Low Fat Diet   AMBULATORY CARE:   A low-fat diet  is an eating plan that is low in total fat, unhealthy fat, and cholesterol  You may need to follow a low-fat diet if you have trouble digesting or absorbing fat  You may also need to follow this diet if you have high cholesterol  You can also lower your cholesterol by increasing the amount of fiber in your diet  Soluble fiber is a type of fiber that helps to decrease cholesterol levels  Different types of fat in food:   • Limit unhealthy fats  A diet that is high in cholesterol, saturated fat, and trans fat may cause unhealthy cholesterol levels  Unhealthy cholesterol levels increase your risk of heart disease  ? Cholesterol:  Limit intake of cholesterol to less than 200 mg per day  Cholesterol is found in meat, eggs, and dairy  ? Saturated fat:  Limit saturated fat to less than 7% of your total daily calories  Ask your dietitian how many calories you need each day  Saturated fat is found in butter, cheese, ice cream, whole milk, and palm oil  Saturated fat is also found in meat, such as beef, pork, chicken skin, and processed meats  Processed meats include sausage, hot dogs, and bologna  ? Trans fat:  Avoid trans fat as much as possible  Trans fat is used in fried and baked foods  Foods that say trans fat free on the label may still have up to 0 5 grams of trans fat per serving  • Include healthy fats  Replace foods that are high in saturated and trans fat with foods high in healthy fats  This may help to decrease high cholesterol levels  ? Monounsaturated fats:   These are found in avocados, nuts, and vegetable oils, such as olive, canola, and sunflower oil  ? Polyunsaturated fats: These can be found in vegetable oils, such as soybean or corn oil  Omega-3 fats can help to decrease the risk of heart disease  Omega-3 fats are found in fish, such as salmon, herring, trout, and tuna  Omega-3 fats can also be found in plant foods, such as walnuts, flaxseed, soybeans, and canola oil  Foods to limit or avoid:   • Grains:      ? Snacks that are made with partially hydrogenated oils, such as chips, regular crackers, and butter-flavored popcorn    ? High-fat baked goods, such as biscuits, croissants, doughnuts, pies, cookies, and pastries    • Dairy:      ? Whole milk, 2% milk, and yogurt and ice cream made with whole milk    ? Half and half creamer, heavy cream, and whipping cream    ? Cheese, cream cheese, and sour cream    • Meats and proteins:      ? High-fat cuts of meat (T-bone steak, regular hamburger, and ribs)    ? Fried meat, poultry (turkey and chicken), and fish    ? Poultry (chicken and turkey) with skin    ? Cold cuts (salami or bologna), hot dogs, stone, and sausage    ? Whole eggs and egg yolks    • Vegetables and fruits with added fat:      ? Fried vegetables or vegetables in butter or high-fat sauces, such as cream or cheese sauces    ? Fried fruit or fruit served with butter or cream    • Fats:      ? Butter, stick margarine, and shortening    ? Coconut, palm oil, and palm kernel oil    Foods to include:       • Grains:      ? Whole-grain breads, cereals, pasta, and brown rice    ? Low-fat crackers and pretzels    • Vegetables and fruits:      ? Fresh, frozen, or canned vegetables (no salt or low-sodium)    ? Fresh, frozen, dried, or canned fruit (canned in light syrup or fruit juice)    ? Avocado    • Low-fat dairy products:      ? Nonfat (skim) or 1% milk    ? Nonfat or low-fat cheese, yogurt, and cottage cheese    • Meats and proteins:      ? Chicken or turkey with no skin    ?  Baked or broiled fish    ? Lean beef and pork (loin, round, extra lean hamburger)    ? Beans and peas, unsalted nuts, soy products    ? Egg whites and substitutes    ? Seeds and nuts    • Fats:      ? Unsaturated oil, such as canola, olive, peanut, soybean, or sunflower oil    ? Soft or liquid margarine and vegetable oil spread    ? Low-fat salad dressing  Other ways to decrease fat:   • Read food labels before you buy foods  Choose foods that have less than 30% of calories from fat  Choose low-fat or fat-free dairy products  Remember that fat free does not mean calorie free  These foods still contain calories, and too many calories can lead to weight gain  • Trim fat from meat and avoid fried food  Trim all visible fat from meat before you cook it  Remove the skin from poultry  Do not guzman meat, fish, or poultry  Bake, roast, boil, or broil these foods instead  Avoid fried foods  Eat a baked potato instead of [place] fries  Steam vegetables instead of sautéing them in butter  • Add less fat to foods  Use imitation stone bits on salads and baked potatoes instead of regular stone bits  Use fat-free or low-fat salad dressings instead of regular dressings  Use low-fat or nonfat butter-flavored topping instead of regular butter or margarine on popcorn and other foods  Ways to decrease fat in recipes:  Replace high-fat ingredients with low-fat or nonfat ones  This may cause baked goods to be drier than usual  You may need to use nonfat cooking spray on pans to prevent food from sticking  You also may need to change the amount of other ingredients, such as water, in the recipe  Try the following:  • Use low-fat or light margarine instead of regular margarine or shortening  • Use lean ground turkey breast or chicken, or lean ground beef (less than 5% fat) instead of hamburger  • Add 1 teaspoon of canola oil to 8 ounces of skim milk instead of using cream or half and half       • Use grated zucchini, carrots, or apples in breads instead of coconut  • Use blenderized, low-fat cottage cheese, plain tofu, or low-fat ricotta cheese instead of cream cheese  • Use 1 egg white and 1 teaspoon of canola oil, or use ¼ cup (2 ounces) of fat-free egg substitute instead of a whole egg  • Replace half of the oil that is called for in a recipe with applesauce when you bake  Use 3 tablespoons of cocoa powder and 1 tablespoon of canola oil instead of a square of baking chocolate  How to increase fiber:  Eat enough high-fiber foods to get 20 to 30 grams of fiber every day  Slowly increase your fiber intake to avoid stomach cramps, gas, and other problems  • Eat 3 ounces of whole-grain foods each day  An ounce is about 1 slice of bread  Eat whole-grain breads, such as whole-wheat bread  Whole wheat, whole-wheat flour, or other whole grains should be listed as the first ingredient on the food label  Replace white flour with whole-grain flour or use half of each in recipes  Whole-grain flour is heavier than white flour, so you may have to add more yeast or baking powder  • Eat a high-fiber cereal for breakfast   Oatmeal is a good source of soluble fiber  Look for cereals that have bran or fiber in the name  Choose whole-grain products, such as brown rice, barley, and whole-wheat pasta  • Eat more beans, peas, and lentils  For example, add beans to soups or salads  Eat at least 5 cups of fruits and vegetables each day  Eat fruits and vegetables with the peel because the peel is high in fiber  © Copyright Wandalee [employer] 2022 Information is for End User's use only and may not be sold, redistributed or otherwise used for commercial purposes  The above information is an  only  It is not intended as medical advice for individual conditions or treatments  Talk to your doctor, nurse or pharmacist before following any medical regimen to see if it is safe and effective for you      Heart Healthy Diet   AMBULATORY CARE:   A heart healthy diet  is an eating plan low in unhealthy fats and sodium (salt)  The plan is high in healthy fats and fiber  A heart healthy diet helps improve your cholesterol levels and lowers your risk for heart disease and stroke  A dietitian will teach you how to read and understand food labels  Heart healthy diet guidelines to follow:   • Choose foods that contain healthy fats:      ? Unsaturated fats  include monounsaturated and polyunsaturated fats  Unsaturated fat is found in foods such as soybean, canola, olive, corn, and safflower oils  It is also found in soft tub margarine that is made with liquid vegetable oil  ? Omega-3 fat  is found in certain fish, such as salmon, tuna, and trout, and in walnuts and flaxseed  Eat fish high in omega-3 fats at least 2 times a week  • Limit or do not have unhealthy fats:      ? Cholesterol  is found in animal foods, such as eggs and lobster, and in dairy products made from whole milk  Limit cholesterol to less than 200 mg each day  ? Saturated fat  is found in meats, such as stone and hamburger  It is also found in chicken or turkey skin, whole milk, and butter  Limit saturated fat to less than 7% of your total daily calories  ? Trans fat  is found in packaged foods, such as potato chips and cookies  It is also in hard margarine, some fried foods, and shortening  Do not eat foods that contain trans fats  • Get 20 to 30 grams of fiber each day  Fruits, vegetables, whole-grain foods, and legumes (cooked beans) are good sources of fiber  • Limit sodium as directed  You may be told to limit sodium, such as to 2,000 mg or less each day  Choose low-sodium or no-salt-added foods  Add little or no salt to food you prepare  Use herbs and spices in place of salt         Include the following in your heart healthy plan:  Ask your dietitian or healthcare provider how many servings to have each day from the following food groups:  • Grains: ? Whole-wheat breads, cereals, and pastas, and brown rice    ? Low-fat, low-sodium crackers and chips    • Vegetables:      ? Broccoli, green beans, green peas, and spinach    ? Collards, kale, and lima beans    ? Carrots, sweet potatoes, tomatoes, and peppers    ? Canned vegetables with no salt added    • Fruits:      ? Bananas, peaches, pears, and pineapple    ? Grapes, raisins, and dates    ? Oranges, tangerines, grapefruit, orange juice, and grapefruit juice    ? Apricots, mangoes, melons, and papaya    ? Raspberries and strawberries    ? Canned fruit with no added sugar    • Low-fat dairy:      ? Nonfat (skim) milk, 1% milk, and low-fat almond, cashew, or soy milks fortified with calcium    ? Low-fat cheese, regular or frozen yogurt, and cottage cheese    • Meats and proteins:      ? Lean cuts of beef and pork (loin, leg, round), skinless chicken and turkey    ? Legumes, soy products, egg whites, or nuts    Limit or do not include the following in your heart healthy plan:   • Foods and liquids that contain unhealthy fats and oils:      ? Whole or 2% milk, cream cheese, sour cream, or cheese    ? High-fat cuts of beef (T-bone steaks, ribs), chicken or turkey with skin, and organ meats such as liver    ? Butter, stick margarine, shortening, and cooking oils such as coconut or palm oil    • Foods and liquids high in sodium:      ? Packaged foods, such as frozen dinners, cookies, macaroni and cheese, and cereals with more than 300 mg of sodium per serving    ? Vegetables with added sodium, such as instant potatoes, vegetables with added sauces, or regular canned vegetables    ? Cured or smoked meats, such as hot dogs, stone, and sausage    ? High-sodium ketchup, barbecue sauce, salad dressing, pickles, olives, soy sauce, or miso    • Foods and liquids high in sugar:      ? Candy, cake, cookies, pies, or doughnuts    ? Soft drinks (soda), sports drinks, or sweetened tea    ?  Canned or dry mixes for cakes, soups, sauces, or gravies    Other healthy heart guidelines:   • Do not smoke  Nicotine and other chemicals in cigarettes and cigars can cause lung and heart damage  Ask your healthcare provider for information if you currently smoke and need help to quit  E-cigarettes or smokeless tobacco still contain nicotine  Talk to your provider before you use these products  • Limit or do not drink alcohol as directed  Alcohol can damage your heart and raise your blood pressure  Your healthcare provider may give you specific daily and weekly limits  The general recommended limit is 1 drink a day for women 21 or older and for men 72 or older  Do not have more than 3 drinks within 24 hours or 7 within a week  The recommended limit is 2 drinks a day for men 24to 59years of age  Do not have more than 4 drinks within 24 hours or 14 within a week  A drink of alcohol is 12 ounces of beer, 5 ounces of wine, or 1½ ounces of liquor  • Maintain a healthy weight  Extra body weight makes your heart work harder  Ask your provider what a healthy weight is for you  He or she can help you create a safe weight loss plan, if needed  • Exercise regularly  Exercise can help you maintain a healthy weight and improve your blood pressure and cholesterol levels  Regular exercise can also decrease your risk for heart problems  Ask your provider about the best exercise plan for you  Do not start an exercise program without asking your provider  Follow up with your doctor or cardiologist as directed:  Write down your questions so you remember to ask them during your visits  © Copyright Elvisae Collpedro 2022 Information is for End User's use only and may not be sold, redistributed or otherwise used for commercial purposes  The above information is an  only  It is not intended as medical advice for individual conditions or treatments   Talk to your doctor, nurse or pharmacist before following any medical regimen to see if it is safe and effective for you

## 2023-04-03 ENCOUNTER — APPOINTMENT (EMERGENCY)
Dept: RADIOLOGY | Facility: HOSPITAL | Age: 63
End: 2023-04-03

## 2023-04-03 ENCOUNTER — HOSPITAL ENCOUNTER (EMERGENCY)
Facility: HOSPITAL | Age: 63
End: 2023-04-03
Attending: EMERGENCY MEDICINE | Admitting: EMERGENCY MEDICINE

## 2023-04-03 ENCOUNTER — APPOINTMENT (EMERGENCY)
Dept: CT IMAGING | Facility: HOSPITAL | Age: 63
End: 2023-04-03
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
## 2023-04-03 DIAGNOSIS — T14.90XA TRAUMA: ICD-10-CM

## 2023-04-03 DIAGNOSIS — S00.532A HEMATOMA OF TONGUE: Primary | ICD-10-CM

## 2023-04-03 PROBLEM — J98.8 AIRWAY COMPROMISE: Status: ACTIVE | Noted: 2023-04-03

## 2023-04-03 PROBLEM — S01.512A: Status: ACTIVE | Noted: 2023-04-03

## 2023-04-03 PROBLEM — D69.6 THROMBOCYTOPENIA (HCC): Status: ACTIVE | Noted: 2023-04-03

## 2023-04-03 LAB
ABO GROUP BLD: NORMAL
ABO GROUP BLD: NORMAL
ALBUMIN SERPL BCP-MCNC: 4 G/DL (ref 3.5–5)
ALP SERPL-CCNC: 89 U/L (ref 34–104)
ALT SERPL W P-5'-P-CCNC: 66 U/L (ref 7–52)
AMPHETAMINES SERPL QL SCN: NEGATIVE
ANION GAP SERPL CALCULATED.3IONS-SCNC: 13 MMOL/L (ref 4–13)
ANISOCYTOSIS BLD QL SMEAR: PRESENT
APAP SERPL-MCNC: <10 UG/ML (ref 10–20)
APTT PPP: 33 SECONDS (ref 23–37)
ARTERIAL PATENCY WRIST A: YES
AST SERPL W P-5'-P-CCNC: 140 U/L (ref 13–39)
BACTERIA UR QL AUTO: ABNORMAL /HPF
BARBITURATES UR QL: NEGATIVE
BASE EXCESS BLDA CALC-SCNC: 0 MMOL/L
BASOPHILS # BLD MANUAL: 0 THOUSAND/UL (ref 0–0.1)
BASOPHILS NFR MAR MANUAL: 0 % (ref 0–1)
BENZODIAZ UR QL: NEGATIVE
BILIRUB SERPL-MCNC: 1.52 MG/DL (ref 0.2–1)
BILIRUB UR QL STRIP: NEGATIVE
BLD GP AB SCN SERPL QL: NEGATIVE
BUN SERPL-MCNC: 9 MG/DL (ref 5–25)
CALCIUM SERPL-MCNC: 8.3 MG/DL (ref 8.4–10.2)
CARDIAC TROPONIN I PNL SERPL HS: 102 NG/L
CHLORIDE SERPL-SCNC: 98 MMOL/L (ref 96–108)
CK SERPL-CCNC: 397 U/L (ref 39–308)
CLARITY UR: CLEAR
CO2 SERPL-SCNC: 25 MMOL/L (ref 21–32)
COCAINE UR QL: NEGATIVE
COLOR UR: YELLOW
CREAT SERPL-MCNC: 0.82 MG/DL (ref 0.6–1.3)
EOSINOPHIL # BLD MANUAL: 0 THOUSAND/UL (ref 0–0.4)
EOSINOPHIL NFR BLD MANUAL: 0 % (ref 0–6)
ERYTHROCYTE [DISTWIDTH] IN BLOOD BY AUTOMATED COUNT: 15 % (ref 11.6–15.1)
ETHANOL SERPL-MCNC: <10 MG/DL
GFR SERPL CREATININE-BSD FRML MDRD: 93 ML/MIN/1.73SQ M
GLUCOSE SERPL-MCNC: 171 MG/DL (ref 65–140)
GLUCOSE UR STRIP-MCNC: NEGATIVE MG/DL
HCO3 BLDA-SCNC: 26.5 MMOL/L (ref 22–28)
HCT VFR BLD AUTO: 42.9 % (ref 36.5–49.3)
HGB BLD-MCNC: 14.7 G/DL (ref 12–17)
HGB UR QL STRIP.AUTO: ABNORMAL
HOROWITZ INDEX BLDA+IHG-RTO: 40 MM[HG]
INR PPP: 1.37 (ref 0.84–1.19)
KETONES UR STRIP-MCNC: NEGATIVE MG/DL
LEUKOCYTE ESTERASE UR QL STRIP: NEGATIVE
LYMPHOCYTES # BLD AUTO: 1.07 THOUSAND/UL (ref 0.6–4.47)
LYMPHOCYTES # BLD AUTO: 18 % (ref 14–44)
MCH RBC QN AUTO: 35.7 PG (ref 26.8–34.3)
MCHC RBC AUTO-ENTMCNC: 34.3 G/DL (ref 31.4–37.4)
MCV RBC AUTO: 104 FL (ref 82–98)
METHADONE UR QL: NEGATIVE
MONOCYTES # BLD AUTO: 0.06 THOUSAND/UL (ref 0–1.22)
MONOCYTES NFR BLD: 1 % (ref 4–12)
MUCOUS THREADS UR QL AUTO: ABNORMAL
NEUTROPHILS # BLD MANUAL: 4.8 THOUSAND/UL (ref 1.85–7.62)
NEUTS BAND NFR BLD MANUAL: 2 % (ref 0–8)
NEUTS SEG NFR BLD AUTO: 79 % (ref 43–75)
NITRITE UR QL STRIP: NEGATIVE
NON-SQ EPI CELLS URNS QL MICRO: ABNORMAL /HPF
O2 CT BLDA-SCNC: 20.6 ML/DL (ref 16–23)
OPIATES UR QL SCN: NEGATIVE
OXYCODONE+OXYMORPHONE UR QL SCN: NEGATIVE
OXYHGB MFR BLDA: 94.9 % (ref 94–97)
PCO2 BLDA: 50 MM HG (ref 36–44)
PCP UR QL: NEGATIVE
PEEP RESPIRATORY: 6 CM[H2O]
PH BLDA: 7.34 [PH] (ref 7.35–7.45)
PH UR STRIP.AUTO: 7 [PH]
PLATELET # BLD AUTO: 41 THOUSANDS/UL (ref 149–390)
PLATELET BLD QL SMEAR: ABNORMAL
PMV BLD AUTO: 10 FL (ref 8.9–12.7)
PO2 BLDA: 98 MM HG (ref 75–129)
POTASSIUM SERPL-SCNC: 3.6 MMOL/L (ref 3.5–5.3)
PROT SERPL-MCNC: 7.1 G/DL (ref 6.4–8.4)
PROT UR STRIP-MCNC: ABNORMAL MG/DL
PROTHROMBIN TIME: 16.8 SECONDS (ref 11.6–14.5)
RBC # BLD AUTO: 4.12 MILLION/UL (ref 3.88–5.62)
RBC #/AREA URNS AUTO: ABNORMAL /HPF
RBC MORPH BLD: PRESENT
RH BLD: POSITIVE
RH BLD: POSITIVE
SODIUM SERPL-SCNC: 136 MMOL/L (ref 135–147)
SP GR UR STRIP.AUTO: 1.01
SPECIMEN EXPIRATION DATE: NORMAL
SPECIMEN SOURCE: ABNORMAL
THC UR QL: NEGATIVE
UROBILINOGEN UR QL STRIP.AUTO: 0.2 E.U./DL
VENT AC: 14
VT SETTING VENT: 500 ML
WBC # BLD AUTO: 5.93 THOUSAND/UL (ref 4.31–10.16)
WBC #/AREA URNS AUTO: ABNORMAL /HPF

## 2023-04-03 RX ORDER — PROPOFOL 10 MG/ML
5-50 INJECTION, EMULSION INTRAVENOUS
Status: DISCONTINUED | OUTPATIENT
Start: 2023-04-03 | End: 2023-04-03 | Stop reason: HOSPADM

## 2023-04-03 RX ORDER — VECURONIUM BROMIDE 1 MG/ML
12 INJECTION, POWDER, LYOPHILIZED, FOR SOLUTION INTRAVENOUS ONCE
Status: COMPLETED | OUTPATIENT
Start: 2023-04-03 | End: 2023-04-03

## 2023-04-03 RX ORDER — PROPOFOL 10 MG/ML
150 INJECTION, EMULSION INTRAVENOUS ONCE
Status: COMPLETED | OUTPATIENT
Start: 2023-04-03 | End: 2023-04-03

## 2023-04-03 RX ORDER — FENTANYL CITRATE 50 UG/ML
INJECTION, SOLUTION INTRAMUSCULAR; INTRAVENOUS
Status: COMPLETED
Start: 2023-04-03 | End: 2023-04-03

## 2023-04-03 RX ORDER — FENTANYL CITRATE-0.9 % NACL/PF 10 MCG/ML
50 PLASTIC BAG, INJECTION (ML) INTRAVENOUS CONTINUOUS
Status: DISCONTINUED | OUTPATIENT
Start: 2023-04-03 | End: 2023-04-03 | Stop reason: HOSPADM

## 2023-04-03 RX ORDER — FENTANYL CITRATE 50 UG/ML
75 INJECTION, SOLUTION INTRAMUSCULAR; INTRAVENOUS ONCE
Status: COMPLETED | OUTPATIENT
Start: 2023-04-03 | End: 2023-04-03

## 2023-04-03 RX ADMIN — VECURONIUM BROMIDE 12 MG: 10 INJECTION, POWDER, LYOPHILIZED, FOR SOLUTION INTRAVENOUS at 17:55

## 2023-04-03 RX ADMIN — PROPOFOL 25 MCG/KG/MIN: 10 INJECTION, EMULSION INTRAVENOUS at 18:00

## 2023-04-03 RX ADMIN — IOHEXOL 100 ML: 350 INJECTION, SOLUTION INTRAVENOUS at 18:06

## 2023-04-03 RX ADMIN — FENTANYL CITRATE 75 MCG: 50 INJECTION, SOLUTION INTRAMUSCULAR; INTRAVENOUS at 19:10

## 2023-04-03 RX ADMIN — Medication 50 MCG/HR: at 19:13

## 2023-04-03 RX ADMIN — PROPOFOL 150 MG: 10 INJECTION, EMULSION INTRAVENOUS at 17:50

## 2023-04-03 NOTE — EMTALA/ACUTE CARE TRANSFER
[facility] EMERGENCY DEPARTMENT  [address] [place] [address]  Dept: [phone]      EMTALA TRANSFER CONSENT    NAME Everardo Rodriguez                                         1960                              MRN [id number]    I have been informed of my rights regarding examination, treatment, and transfer   by Dr Sylwia Heller DO    Benefits: Specialized equipment and/or services available at the receiving facility (Include comment)________________________ (Trauma ICU)    Risks: Potential for delay in receiving treatment, Potential deterioration of medical condition, Loss of IV, Increased discomfort during transfer, Possible worsening of condition or death during transfer      Transfer Request   I acknowledge that my medical condition has been evaluated and explained to me by the emergency department physician or other qualified medical person and/or my attending physician who has recommended and offered to me further medical examination and treatment  I understand the Hospital's obligation with respect to the treatment and stabilization of my emergency medical condition  I nevertheless request to be transferred  I release the Hospital, the doctor, and any other persons caring for me from all responsibility or liability for any injury or ill effects that may result from my transfer and agree to accept all responsibility for the consequences of my choice to transfer, rather than receive stabilizing treatment at the Hospital  I understand that because the transfer is my request, my insurance may not provide reimbursement for the services  The Hospital will assist and direct me and my family in how to make arrangements for transfer, but the hospital is not liable for any fees charged by the transport service    In spite of this understanding, I refuse to consent to further medical examination and treatment which has been offered to me, and request transfer to Accepting Facility Name, [facility] [address] : [address] [facility]  I authorize the performance of emergency medical procedures and treatments upon me in both transit and upon arrival at the receiving facility  Additionally, I authorize the release of any and all medical records to the receiving facility and request they be transported with me, if possible  I authorize the performance of emergency medical procedures and treatments upon me in both transit and upon arrival at the receiving facility  Additionally, I authorize the release of any and all medical records to the receiving facility and request they be transported with me, if possible  I understand that the safest mode of transportation during a medical emergency is an ambulance and that the Hospital advocates the use of this mode of transport  Risks of traveling to the receiving facility by car, including absence of medical control, life sustaining equipment, such as oxygen, and medical personnel has been explained to me and I fully understand them  (DOMINIC CORRECT BOX BELOW)  [  ]  I consent to the stated transfer and to be transported by ambulance/helicopter  [  ]  I consent to the stated transfer, but refuse transportation by ambulance and accept full responsibility for my transportation by car  I understand the risks of non-ambulance transfers and I exonerate the Hospital and its staff from any deterioration in my condition that results from this refusal     X___________________________________________    DATE  04/03/23  TIME________  Signature of patient or legally responsible individual signing on patient behalf           RELATIONSHIP TO PATIENT_________________________          Provider Certification    NAME Hugoerasto Ho Breath 1960                              MRN [id number]    A medical screening exam was performed on the above named patient    Based on the examination:    Condition Necessitating Transfer The primary encounter diagnosis was Hematoma of tongue  Diagnoses of Trauma and Fall, initial encounter were also pertinent to this visit  Patient Condition: The patient has been stabilized such that within reasonable medical probability, no material deterioration of the patient condition or the condition of the unborn child(jesus) is likely to result from the transfer    Reason for Transfer: Level of Care needed not available at this facility    Transfer Requirements: [facility]   · Space available and qualified personnel available for treatment as acknowledged by    · Agreed to accept transfer and to provide appropriate medical treatment as acknowledged by       Dr Sarina Velasquez  · Appropriate medical records of the examination and treatment of the patient are provided at the time of transfer   [address] [facility], [address] _______  · Transfer will be performed by qualified personnel from    and appropriate transfer equipment as required, including the use of necessary and appropriate life support measures      Provider Certification: I have examined the patient and explained the following risks and benefits of being transferred/refusing transfer to the patient/family:  General risk, such as traffic hazards, adverse weather conditions, rough terrain or turbulence, possible failure of equipment (including vehicle or aircraft), or consequences of actions of persons outside the control of the transport personnel, Unanticipated needs of medical equipment and personnel during transport, Risk of worsening condition      Based on these reasonable risks and benefits to the patient and/or the unborn child(jesus), and based upon the information available at the time of the patient’s examination, I certify that the medical benefits reasonably to be expected from the provision of appropriate medical treatments at another medical facility outweigh the increasing risks, if any, to the individual’s medical condition, and in the case of labor to the unborn child, from effecting the transfer      X____________________________________________ DATE 04/03/23        TIME_______      ORIGINAL - SEND TO MEDICAL RECORDS   COPY - SEND WITH PATIENT DURING TRANSFER

## 2023-04-03 NOTE — RESPIRATORY THERAPY NOTE
RT Ventilator Management Note  Markos Harsh Sanchez 57 y o  male MRN: [id number]  Unit/Bed#: TR 03 Encounter: [id number]      Daily Screen    No data found in the last 10 encounters  Physical Exam:          Resp Comments: (P) Pt intubated at the bedside by Dr Suraj Goodwin BS noted  + ETCO2  ET tube secured at the 26cm at the teeth  Pt transported to CT on transport vent

## 2023-04-03 NOTE — ED PROVIDER NOTES
Emergency Department Trauma Note  Lis Lorenzo 57 y o  male MRN: [id number]  Unit/Bed#: TR 03/TR 03 Encounter: [id number]      Trauma Alert: Trauma Acuity: Trauma Evaluation  Model of Arrival:   via    Trauma Team: Current Providers  Attending Provider: Damien Ford DO  Registered Nurse: Nate Quinn RN  Consultants:     None      History of Present Illness     Chief Complaint:   Chief Complaint   Patient presents with   • Fall     Pt was found by family on the ground, pt has blood coming out of mouth, swollen tongue      HPI:  Lis Lorenzo is a 57 y o  male who presents via ems after reportedly being found down on his left side bleeding from his mouth    Patient unable to speak secondary to enlarged tongue on arrival   Mechanism:Details of Incident: pt was found by family on the ground Injury Date: 04/03/23 Injury Time:  (unknown) Injury Occurence Location - [address]: [place]    HPI  Review of Systems   Unable to perform ROS: Acuity of condition       Historical Information     Immunizations:   Immunization History   Administered Date(s) Administered   • COVID-19 MODERNA VACC 0 25 ML IM BOOSTER 11/22/2021   • COVID-19 MODERNA VACC 0 5 ML IM 03/19/2021, 04/21/2021   • DT (pediatric) 04/23/2008   • INFLUENZA 11/04/2019, 10/18/2020, 09/27/2022   • Influenza Quadrivalent 3 years and older 11/13/2017   • Influenza Quadrivalent Recombinant Preservative Free IM 11/04/2019, 10/18/2020   • Influenza Quadrivalent, 6-35 Months IM 11/13/2017   • Influenza, injectable, quadrivalent, preservative free 0 5 mL 01/23/2019   • Influenza, recombinant, quadrivalent,injectable, preservative free 09/28/2021, 09/27/2022   • Tdap 07/27/2018       Past Medical History:   Diagnosis Date   • Abnormal kidney function     last assessed: Feb 25, 2016   • Allergic rhinitis    • Benign colon polyp    • Carpal tunnel syndrome    • Cervical radiculopathy     last assessed: Feb 11, 2015   • Chronic fatigue    • Chronic "pain disorder    • COPD (chronic obstructive pulmonary disease) (HCC)    • CPAP (continuous positive airway pressure) dependence    • Edema    • Elevated liver enzymes    • Gout    • Hyperlipidemia    • Hypertension    • Hypotension     last assessed: 2017   • Left ventricular hypertrophy    • Obesity (BMI 30-39 9) 2018   • Sleep apnea        Family History   Problem Relation Age of Onset   • Cancer Mother    • No Known Problems Father    • No Known Problems Sister    • No Known Problems Brother    • No Known Problems Maternal Grandmother    • No Known Problems Maternal Grandfather    • No Known Problems Paternal Grandmother    • No Known Problems Paternal Grandfather      Past Surgical History:   Procedure Laterality Date   • CARPAL TUNNEL RELEASE Bilateral    • HEMORRHOID SURGERY     • LIVER BIOPSY       Social History     Tobacco Use   • Smoking status: Every Day     Packs/day: 1 00     Years: 40 00     Pack years: 40 00     Types: Cigarettes   • Smokeless tobacco: Never   • Tobacco comments:     Given [employer] \"how to quit\" info    Vaping Use   • Vaping Use: Never used   Substance Use Topics   • Alcohol use: Yes     Alcohol/week: 6 0 standard drinks     Types: 6 Cans of beer per week     Comment: social    • Drug use: No     E-Cigarette/Vaping   • E-Cigarette Use Never User      E-Cigarette/Vaping Substances   • Nicotine No    • THC No    • CBD No    • Flavoring No    • Other No    • Unknown No        Family History: non-contributory    Meds/Allergies   Prior to Admission Medications   Prescriptions Last Dose Informant Patient Reported? Taking?    Omega-3 Fatty Acids (FISH OIL) 1,000 mg   Yes No   Sig: Take 1,000 mg by mouth daily   amoxicillin-clavulanate (AUGMENTIN) 875-125 mg per tablet   No No   Sig: Take 1 tablet by mouth every 12 (twelve) hours for 10 days   fluticasone (FLONASE) 50 mcg/act nasal spray   No No   Si spray into each nostril daily   furosemide (LASIX) 20 mg tablet   No No   Sig: " Take 1 tablet (20 mg total) by mouth daily   guaiFENesin (Mucinex) 600 mg 12 hr tablet   No No   Sig: Take 1 tablet (600 mg total) by mouth every 12 (twelve) hours   losartan (COZAAR) 100 MG tablet   No No   Sig: Take 1 tablet (100 mg total) by mouth daily   multivitamin (THERAGRAN) TABS   Yes No   Sig: Take 1 tablet by mouth daily   pregabalin (LYRICA) 50 mg capsule   No No   Sig: Take 1 capsule (50 mg total) by mouth 3 (three) times a day      Facility-Administered Medications: None       No Known Allergies    PHYSICAL EXAM      Objective   Vitals:   First set: Temperature: 100 2 °F (37 9 °C) (04/03/23 1838)  Pulse: (!) 118 (04/03/23 1730)  Respirations: (!) 25 (04/03/23 1730)  Blood Pressure: (!) 205/114 (04/03/23 1730)  SpO2: 93 % (04/03/23 1730)    Primary Survey:   (A) Airway: partial obstruction with tongue swelling  (B) Breathing: equal B/L  (C) Circulation: Pulses:   normal  (D) Disabliity:  Eye Opening:   Spontaneous = 4, Motor Response: Obeys commands = 6 and Verbal Response: Incomprehensible sounds = 2  (E) Expose:  Completed    Secondary Survey: (Click on Physical Exam tab above)  Physical Exam  Vitals and nursing note reviewed  Constitutional:       General: He is in acute distress  Appearance: He is well-developed  He is ill-appearing and toxic-appearing  He is not diaphoretic  HENT:      Right Ear: External ear normal       Left Ear: External ear normal       Nose: Nose normal       Mouth/Throat:      Comments: Significant tongue swelling with dried blood noted in the oropharynx with no obvious source of bleeding observable  Noted to be gargling on blood  Eyes:      General: No scleral icterus  Right eye: No discharge  Left eye: No discharge  Conjunctiva/sclera: Conjunctivae normal       Pupils: Pupils are equal, round, and reactive to light  Cardiovascular:      Rate and Rhythm: Normal rate and regular rhythm  Heart sounds: Normal heart sounds  No murmur heard  No friction rub  No gallop  Pulmonary:      Effort: Respiratory distress present  Breath sounds: No wheezing, rhonchi or rales  Chest:      Chest wall: No tenderness  Abdominal:      General: Bowel sounds are normal  There is no distension  Palpations: Abdomen is soft  There is no mass  Tenderness: There is no abdominal tenderness  There is no guarding  Musculoskeletal:         General: No tenderness or deformity  Normal range of motion  Cervical back: Normal range of motion and neck supple  Skin:     General: Skin is warm and dry  Coloration: Skin is not pale  Findings: No erythema or rash  Neurological:      General: No focal deficit present  Mental Status: He is alert  Cranial Nerves: No cranial nerve deficit  Motor: No weakness  Comments: Patient following commands in all 4 extremities  Looking around the room with no obvious nystagmus  Psychiatric:         Behavior: Behavior normal          Thought Content: Thought content normal          Judgment: Judgment normal          Cervical spine cleared by clinical criteria? No (imaging required)      Invasive Devices     Peripheral Intravenous Line  Duration           Peripheral IV 04/03/23 Left Antecubital <1 day    Peripheral IV 04/03/23 Right;Ventral (anterior) Hand <1 day          Drain  Duration           NG/OG/Enteral Tube Orogastric 16 Fr Left mouth <1 day          Airway  Duration           ETT  Oral;Hi-Lo; Cuffed 8 mm <1 day                Lab Results:   Results Reviewed     Procedure Component Value Units Date/Time    CBC and differential [[id number]]  (Abnormal) Collected: 04/03/23 1901    Lab Status: Final result Specimen: Blood from Arm, Left Updated: 04/03/23 1911     WBC 5 93 Thousand/uL      RBC 4 12 Million/uL      Hemoglobin 14 7 g/dL      Hematocrit 42 9 %       fL      MCH 35 7 pg      MCHC 34 3 g/dL      RDW 15 0 %      MPV 10 0 fL      Platelets 41 Thousands/uL     Manual Differential(PHLEBS Do Not Order) [[id number]]  (Abnormal) Collected: 04/03/23 1901    Lab Status: Final result Specimen: Blood from Arm, Left Updated: 04/03/23 1910     Segmented % 79 %      Bands % 2 %      Lymphocytes % 18 %      Monocytes % 1 %      Eosinophils, % 0 %      Basophils % 0 %      Absolute Neutrophils 4 80 Thousand/uL      Lymphocytes Absolute 1 07 Thousand/uL      Monocytes Absolute 0 06 Thousand/uL      Eosinophils Absolute 0 00 Thousand/uL      Basophils Absolute 0 00 Thousand/uL      Total Counted --     RBC Morphology Present     Anisocytosis Present     Platelet Estimate Decreased    Ethanol [[id number]]  (Normal) Collected: 04/03/23 1823    Lab Status: Final result Specimen: Blood from Arm, Left Updated: 04/03/23 1902     Ethanol Lvl <10 mg/dL     HS Troponin 0hr (reflex protocol) [[id number]]  (Abnormal) Collected: 04/03/23 1823    Lab Status: Final result Specimen: Blood from Arm, Left Updated: 04/03/23 1900     hs TnI 0hr 102 ng/L     Blood gas, arterial [[id number]]  (Abnormal) Collected: 04/03/23 1856    Lab Status: Final result Specimen: Blood, Arterial from Radial, Left Updated: 04/03/23 1900     pH, Arterial 7 342     pCO2, Arterial 50 0 mm Hg      pO2, Arterial 98 0 mm Hg      HCO3, Arterial 26 5 mmol/L      Base Excess, Arterial 0 0 mmol/L      O2 Content, Arterial 20 6 mL/dL      O2 HGB,Arterial  94 9 %      SOURCE Radial, Left     ABIODUN TEST Yes     AC Rate 14     Tidal Volume 500 ml      Inspired Air (FIO2) 40     PEEP 6    Urine Microscopic [[id number]]  (Abnormal) Collected: 04/03/23 1836    Lab Status: Final result Specimen: Urine, Indwelling Boyer Catheter Updated: 04/03/23 1855     RBC, UA 0-1 /hpf      WBC, UA None Seen /hpf      Epithelial Cells Occasional /hpf      Bacteria, UA None Seen /hpf      MUCUS THREADS Occasional    Rapid drug screen, urine [[id number]]  (Normal) Collected: 04/03/23 1836    Lab Status: Final result Specimen: Urine, Catheter Updated: 04/03/23 8307 Amph/Meth UR Negative     Barbiturate Ur Negative     Benzodiazepine Urine Negative     Cocaine Urine Negative     Methadone Urine Negative     Opiate Urine Negative     PCP Ur Negative     THC Urine Negative     Oxycodone Urine Negative    Narrative:      FOR MEDICAL PURPOSES ONLY  IF CONFIRMATION NEEDED PLEASE CONTACT THE LAB WITHIN 5 DAYS  Drug Screen Cutoff Levels:  AMPHETAMINE/METHAMPHETAMINES  1000 ng/mL  BARBITURATES     200 ng/mL  BENZODIAZEPINES     200 ng/mL  COCAINE      300 ng/mL  METHADONE      300 ng/mL  OPIATES      300 ng/mL  PHENCYCLIDINE     25 ng/mL  THC       50 ng/mL  OXYCODONE      100 ng/mL    CK [[id number]]  (Abnormal) Collected: 04/03/23 1823    Lab Status: Final result Specimen: Blood from Arm, Left Updated: 04/03/23 1853     Total  U/L     Acetaminophen level-If concentration is detectable, please discuss with medical  on call   [[id number]]  (Abnormal) Collected: 04/03/23 1823    Lab Status: Final result Specimen: Blood from Arm, Left Updated: 04/03/23 1853     Acetaminophen Level <10 ug/mL     Comprehensive metabolic panel [[id number]]  (Abnormal) Collected: 04/03/23 1823    Lab Status: Final result Specimen: Blood from Arm, Left Updated: 04/03/23 1853     Sodium 136 mmol/L      Potassium 3 6 mmol/L      Chloride 98 mmol/L      CO2 25 mmol/L      ANION GAP 13 mmol/L      BUN 9 mg/dL      Creatinine 0 82 mg/dL      Glucose 171 mg/dL      Calcium 8 3 mg/dL       U/L      ALT 66 U/L      Alkaline Phosphatase 89 U/L      Total Protein 7 1 g/dL      Albumin 4 0 g/dL      Total Bilirubin 1 52 mg/dL      eGFR 93 ml/min/1 73sq m     Narrative:      [facility] guidelines for Chronic Kidney Disease (CKD):   •  Stage 1 with normal or high GFR (GFR > 90 mL/min/1 73 square meters)  •  Stage 2 Mild CKD (GFR = 60-89 mL/min/1 73 square meters)  •  Stage 3A Moderate CKD (GFR = 45-59 mL/min/1 73 square meters)  •  Stage 3B Moderate CKD (GFR = 30-44 mL/min/1 73 square meters)  •  Stage 4 Severe CKD (GFR = 15-29 mL/min/1 73 square meters)  •  Stage 5 End Stage CKD (GFR <15 mL/min/1 73 square meters)  Note: GFR calculation is accurate only with a steady state creatinine    APTT [[id number]]  (Normal) Collected: 04/03/23 1823    Lab Status: Final result Specimen: Blood from Arm, Left Updated: 04/03/23 1852     PTT 33 seconds     Protime-INR [[id number]]  (Abnormal) Collected: 04/03/23 1823    Lab Status: Final result Specimen: Blood from Arm, Left Updated: 04/03/23 1852     Protime 16 8 seconds      INR 1 37    UA w Reflex to Microscopic w Reflex to Culture [[id number]]  (Abnormal) Collected: 04/03/23 1836    Lab Status: Final result Specimen: Urine, Indwelling Boyer Catheter Updated: 04/03/23 1843     Color, UA Yellow     Clarity, UA Clear     Specific Gravity, UA 1 010     pH, UA 7 0     Leukocytes, UA Negative     Nitrite, UA Negative     Protein, UA Trace mg/dl      Glucose, UA Negative mg/dl      Ketones, UA Negative mg/dl      Urobilinogen, UA 0 2 E U /dl      Bilirubin, UA Negative     Occult Blood, UA 2+                 Imaging Studies:   Direct to CT: No  TRAUMA - CT head wo contrast   Final Result by Jeremy Nichols DO (04/03 1834)      No acute intracranial abnormality  Workstation performed: [id number]         TRAUMA - CT spine cervical wo contrast   Final Result by Jeremy Nichols DO (04/03 1844)      No cervical spine fracture or traumatic malalignment  Workstation performed: [id number]         TRAUMA - CT chest abdomen pelvis w contrast   Final Result by Jeremy Nichols DO (04/03 1915)      1  No acute traumatic injury detected within the chest, abdomen or pelvis  Life-support tubes as above  2  Small amount of low-density abdominal and pelvic free fluid   Etiology unclear although could be related to findings under impression 3       3   Enlarged, diffusely heterogeneous and fatty infiltrated liver  Element of Cirrhosis not excluded  Given the heterogeneity of the liver, MRI on a nonemergent basis may be considered  4   3 7 cm infrarenal AAA without retroperitoneal hemorrhage  5   Mild thickening of the left adrenal gland, new from prior study, indeterminate  Attention on short-term interval follow-up CT recommended  Additional incidental findings as above  Workstation performed: [id number]         XR Trauma chest portable   Final Result by Glen Keenan MD (04/03 1817)      No acute cardiopulmonary disease                    Workstation performed: [id number]               Procedures  ECG 12 Lead Documentation Only    Date/Time: 4/3/2023 7:29 PM  Performed by: Etienne Nelson DO  Authorized by: Etienne Nelson DO     Interpretation:     Interpretation: normal    Rate:     ECG rate:  99    ECG rate assessment: normal    Rhythm:     Rhythm: sinus rhythm    Ectopy:     Ectopy: none    QRS:     QRS axis:  Left    QRS intervals:  Normal  Conduction:     Conduction: normal    ST segments:     ST segments:  Normal  T waves:     T waves: normal    CriticalCare Time    Date/Time: 4/3/2023 7:31 PM  Performed by: Etienne Nelson DO  Authorized by: Etienne Nelson DO     Critical care provider statement:     Critical care time (minutes):  90    Critical care time was exclusive of:  Separately billable procedures and treating other patients    Critical care was necessary to treat or prevent imminent or life-threatening deterioration of the following conditions:  Trauma and respiratory failure    Critical care was time spent personally by me on the following activities:  Blood draw for specimens, obtaining history from patient or surrogate, development of treatment plan with patient or surrogate, discussions with consultants, evaluation of patient's response to treatment, examination of patient, review of old charts, ventilator management, re-evaluation of patient's condition, ordering and review of radiographic studies, ordering and review of laboratory studies, ordering and performing treatments and interventions and interpretation of cardiac output measurements    I assumed direction of critical care for this patient from another provider in my specialty: no               ED Course  ED Course as of 04/04/23 1159   Mon Apr 03, 2023   1719 Pt evaluated upon arrival in the room, immediate concern for airway obstruction  Initially was made trauma eval     1720 At this time C-collar removed as patient unable to tolerate secretions and gargling blood in oropharynx, moving all extremities  Sat up and blood suctioned from airway with jankauer and NC/non-rebreather used for pre-oxygenation   1735 Pt upgraded to alert and Anesthesia with respiratory called to bedside for airway support and assistance  1750 Patient intubated with anesthesia/respiratory and Cric kit and bronch at bedside, glidescope used with 8 0 ETT on first attempt  Blood suctioned from airway with Louisa Aguilar   7964 Per wife patient has been complaining of bad headache for about 1 week and was seen by his doctor and diagnosed with sinusitis  1835 Also per wife and son patient usually drinks alcohol daily and may have been trying to lower his daily alcohol intake recently  1911 pCO2, Arterial(!): 50 0  RR increased from 14-->16           Medical Decision Making  On arrival patient immediately evaluated by myself gargling blood with tongue protruding from mouth and unable to talk  Patient will follow commands and is looking around the room  Able to move all 4 extremities  Decision made to intubate patient for airway protection as patient with significant tongue swelling and inability to protect airway  Anesthesia called to bedside and assisted with intubation, with cric kit available at bedside  Patient intubated and placed on ventilator  Decision made to use propofol and vecuronium    Concern for possible downtime so succinylcholine avoided at this time  Propofol ordered secondary to the concern of possible seizure activity  Placed on propofol drip for sedation afterwards  Discussed with son and wife severity of condition and that patient will be being transferred to [place] at this time for trauma evaluation  Trauma surgery requesting multiple CT scans prior to transfer  C-collar was removed secondary to impeding his airway, C-spine precautions held with manual stabilization and no C-spine hyperextension performed as glidescope used  Patient intubated for airway protection  CT C-spine ordered for clearance  Once patient placed on ventilator and stabilized, brought to CT scan with nursing and RT  Patient requiring further sedation with fentanyl drip added to propofol  Patient lifelighted to [place] under the care of Dr Louisa Paul  Amount and/or Complexity of Data Reviewed  Labs: ordered  Decision-making details documented in ED Course  Risk  Prescription drug management  Disposition  [facility] Transfer: Yes  Final diagnoses:   Hematoma of tongue   Trauma   Fall, initial encounter     Time reflects when diagnosis was documented in both MDM as applicable and the Disposition within this note     Time User Action Codes Description Comment    4/3/2023  5:40 PM Lorean Favre Add [O09 155I] Hematoma of tongue     4/3/2023  5:40 PM Geryl Bouse Trauma     4/3/2023  5:40 PM Lorean Favre Add [I64  [place] Citizen of [place], initial encounter       ED Disposition     ED Disposition   Transfer to Another Facility-In Network    Condition   --    Date/Time   Mon Apr 3, 2023  5:40 PM    Comment   Fabricio Everardos  Noemi Patton should be transferred out to [facility] Documentation    Guy Carmen Most Recent Value   Patient Condition The patient has been stabilized such that within reasonable medical probability, no material deterioration of the patient condition or the condition of the unborn child(jesus) is likely to result from the transfer   Reason for Transfer Level of Care needed not available at this facility   Benefits of Transfer Specialized equipment and/or services available at the receiving facility (Include comment)________________________  [facility]]   Risks of Transfer Potential for delay in receiving treatment, Potential deterioration of medical condition, Loss of IV, Increased discomfort during transfer, Possible worsening of condition or death during transfer   Accepting Physician Dr Genaro Morales NameFaraz   Sending MD Dr Fritz Lopez   Provider Certification General risk, such as traffic hazards, adverse weather conditions, rough terrain or turbulence, possible failure of equipment (including vehicle or aircraft), or consequences of actions of persons outside the control of the transport personnel, Unanticipated needs of medical equipment and personnel during transport, Risk of worsening condition      RN Documentation    72 Faraz Humphries   Level of Care Advanced life support      Follow-up Information    None       Discharge Medication List as of 4/3/2023  7:52 PM      CONTINUE these medications which have NOT CHANGED    Details   amoxicillin-clavulanate (AUGMENTIN) 875-125 mg per tablet Take 1 tablet by mouth every 12 (twelve) hours for 10 days, Starting Tue 3/28/2023, Until Fri 4/7/2023, Normal      fluticasone (FLONASE) 50 mcg/act nasal spray 1 spray into each nostril daily, Starting Tue 3/28/2023, Normal      furosemide (LASIX) 20 mg tablet Take 1 tablet (20 mg total) by mouth daily, Starting Wed 12/28/2022, Normal      guaiFENesin (Mucinex) 600 mg 12 hr tablet Take 1 tablet (600 mg total) by mouth every 12 (twelve) hours, Starting Tue 3/28/2023, Normal      losartan (COZAAR) 100 MG tablet Take 1 tablet (100 mg total) by mouth daily, Starting Wed 12/28/2022, Normal      multivitamin (THERAGRAN) TABS Take 1 tablet by mouth daily, Historical Med      Omega-3 Fatty Acids (FISH OIL) 1,000 mg Take 1,000 mg by mouth daily, Historical Med      pregabalin (LYRICA) 50 mg capsule Take 1 capsule (50 mg total) by mouth 3 (three) times a day, Starting Tue 1/10/2023, Until Tue 3/28/2023, Normal           No discharge procedures on file      PDMP Review     None          ED Provider  Electronically Signed by         Helga Kim, DO  04/03/23 Tanmay Metzger "Pari" 103, DO  04/04/23 1216

## 2023-04-04 VITALS
SYSTOLIC BLOOD PRESSURE: 192 MMHG | RESPIRATION RATE: 20 BRPM | TEMPERATURE: 100.2 F | HEART RATE: 102 BPM | OXYGEN SATURATION: 96 % | DIASTOLIC BLOOD PRESSURE: 91 MMHG

## 2023-04-04 PROBLEM — F10.10 ETOH ABUSE: Status: ACTIVE | Noted: 2023-04-04

## 2023-04-04 LAB
ATRIAL RATE: 99 BPM
P AXIS: 60 DEGREES
PR INTERVAL: 196 MS
QRS AXIS: -40 DEGREES
QRSD INTERVAL: 102 MS
QT INTERVAL: 388 MS
QTC INTERVAL: 497 MS
T WAVE AXIS: 69 DEGREES
VENTRICULAR RATE: 99 BPM

## 2023-04-04 NOTE — ED NOTES
Trauma was activated as Evaluation level at 1723 and upgraded to Alert level at 1732 per [facility] Text pages       Maame Brito RN  04/04/23 7681

## 2023-04-05 PROBLEM — J18.9 PNEUMONIA: Status: ACTIVE | Noted: 2023-04-05

## 2023-04-05 PROBLEM — J96.01 ACUTE RESPIRATORY FAILURE WITH HYPOXIA (HCC): Status: ACTIVE | Noted: 2023-04-05

## 2023-04-05 PROBLEM — R78.81 GRAM-POSITIVE COCCI BACTEREMIA: Status: ACTIVE | Noted: 2023-04-05

## 2023-04-06 PROBLEM — F10.939 ALCOHOL WITHDRAWAL (HCC): Status: ACTIVE | Noted: 2023-04-06

## 2023-04-07 PROBLEM — F32.A DEPRESSION: Status: ACTIVE | Noted: 2023-04-07

## 2023-04-07 PROBLEM — R45.1 AGITATION: Status: ACTIVE | Noted: 2023-04-07

## 2023-04-19 ENCOUNTER — APPOINTMENT (OUTPATIENT)
Dept: LAB | Facility: CLINIC | Age: 63
End: 2023-04-19

## 2023-04-19 DIAGNOSIS — E78.2 MIXED HYPERLIPIDEMIA: ICD-10-CM

## 2023-04-19 DIAGNOSIS — D69.6 THROMBOCYTOPENIA (HCC): ICD-10-CM

## 2023-04-19 DIAGNOSIS — I10 BENIGN ESSENTIAL HYPERTENSION: ICD-10-CM

## 2023-04-19 DIAGNOSIS — R79.89 ELEVATED LFTS: ICD-10-CM

## 2023-04-19 DIAGNOSIS — M1A.9XX0 CHRONIC GOUT WITHOUT TOPHUS, UNSPECIFIED CAUSE, UNSPECIFIED SITE: ICD-10-CM

## 2023-04-19 DIAGNOSIS — E83.51 HYPOCALCEMIA: ICD-10-CM

## 2023-04-19 DIAGNOSIS — Z13.1 SCREENING FOR DIABETES MELLITUS (DM): ICD-10-CM

## 2023-04-19 DIAGNOSIS — D75.89 MACROCYTOSIS WITHOUT ANEMIA: ICD-10-CM

## 2023-04-19 PROBLEM — R45.1 AGITATION: Status: RESOLVED | Noted: 2023-04-07 | Resolved: 2023-04-19
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
## 2023-04-19 PROBLEM — J18.9 PNEUMONIA: Status: RESOLVED | Noted: 2023-04-05 | Resolved: 2023-04-19

## 2023-04-19 PROBLEM — J96.01 ACUTE RESPIRATORY FAILURE WITH HYPOXIA (HCC): Status: RESOLVED | Noted: 2023-04-05 | Resolved: 2023-04-19

## 2023-04-19 PROBLEM — F10.939 ALCOHOL WITHDRAWAL (HCC): Status: RESOLVED | Noted: 2023-04-06 | Resolved: 2023-04-19

## 2023-04-19 PROBLEM — I71.40 ABDOMINAL AORTIC ANEURYSM (AAA) WITHOUT RUPTURE (HCC): Status: ACTIVE | Noted: 2023-04-19

## 2023-04-19 PROBLEM — R78.81 GRAM-POSITIVE COCCI BACTEREMIA: Status: RESOLVED | Noted: 2023-04-05 | Resolved: 2023-04-19

## 2023-04-19 PROBLEM — E27.9 ADRENAL ABNORMALITY (HCC): Status: ACTIVE | Noted: 2023-04-19

## 2023-04-19 LAB
ALBUMIN SERPL BCP-MCNC: 3.3 G/DL (ref 3.5–5)
ALP SERPL-CCNC: 107 U/L (ref 46–116)
ALT SERPL W P-5'-P-CCNC: 59 U/L (ref 12–78)
ANION GAP SERPL CALCULATED.3IONS-SCNC: 3 MMOL/L (ref 4–13)
AST SERPL W P-5'-P-CCNC: 66 U/L (ref 5–45)
BASOPHILS # BLD AUTO: 0.12 THOUSANDS/ΜL (ref 0–0.1)
BASOPHILS NFR BLD AUTO: 2 % (ref 0–1)
BILIRUB SERPL-MCNC: 0.53 MG/DL (ref 0.2–1)
BUN SERPL-MCNC: 11 MG/DL (ref 5–25)
CALCIUM ALBUM COR SERPL-MCNC: 10.2 MG/DL (ref 8.3–10.1)
CALCIUM SERPL-MCNC: 9.6 MG/DL (ref 8.3–10.1)
CHLORIDE SERPL-SCNC: 104 MMOL/L (ref 96–108)
CHOLEST SERPL-MCNC: 163 MG/DL
CO2 SERPL-SCNC: 28 MMOL/L (ref 21–32)
CREAT SERPL-MCNC: 0.77 MG/DL (ref 0.6–1.3)
EOSINOPHIL # BLD AUTO: 0.15 THOUSAND/ΜL (ref 0–0.61)
EOSINOPHIL NFR BLD AUTO: 2 % (ref 0–6)
ERYTHROCYTE [DISTWIDTH] IN BLOOD BY AUTOMATED COUNT: 14.6 % (ref 11.6–15.1)
GFR SERPL CREATININE-BSD FRML MDRD: 95 ML/MIN/1.73SQ M
GLUCOSE P FAST SERPL-MCNC: 110 MG/DL (ref 65–99)
HCT VFR BLD AUTO: 42.9 % (ref 36.5–49.3)
HDLC SERPL-MCNC: 22 MG/DL
HGB BLD-MCNC: 14.5 G/DL (ref 12–17)
IMM GRANULOCYTES # BLD AUTO: 0.04 THOUSAND/UL (ref 0–0.2)
IMM GRANULOCYTES NFR BLD AUTO: 1 % (ref 0–2)
LDLC SERPL CALC-MCNC: 91 MG/DL (ref 0–100)
LYMPHOCYTES # BLD AUTO: 1.79 THOUSANDS/ΜL (ref 0.6–4.47)
LYMPHOCYTES NFR BLD AUTO: 22 % (ref 14–44)
MCH RBC QN AUTO: 35.7 PG (ref 26.8–34.3)
MCHC RBC AUTO-ENTMCNC: 33.8 G/DL (ref 31.4–37.4)
MCV RBC AUTO: 106 FL (ref 82–98)
MONOCYTES # BLD AUTO: 0.75 THOUSAND/ΜL (ref 0.17–1.22)
MONOCYTES NFR BLD AUTO: 9 % (ref 4–12)
NEUTROPHILS # BLD AUTO: 5.23 THOUSANDS/ΜL (ref 1.85–7.62)
NEUTS SEG NFR BLD AUTO: 64 % (ref 43–75)
NRBC BLD AUTO-RTO: 0 /100 WBCS
PLATELET # BLD AUTO: 249 THOUSANDS/UL (ref 149–390)
PMV BLD AUTO: 10.9 FL (ref 8.9–12.7)
POTASSIUM SERPL-SCNC: 5 MMOL/L (ref 3.5–5.3)
PROT SERPL-MCNC: 7.6 G/DL (ref 6.4–8.4)
RBC # BLD AUTO: 4.06 MILLION/UL (ref 3.88–5.62)
SODIUM SERPL-SCNC: 135 MMOL/L (ref 135–147)
T4 FREE SERPL-MCNC: 1.13 NG/DL (ref 0.76–1.46)
TRIGL SERPL-MCNC: 252 MG/DL
TSH SERPL DL<=0.05 MIU/L-ACNC: 4.95 UIU/ML (ref 0.45–4.5)
URATE SERPL-MCNC: 4.5 MG/DL (ref 3.5–8.5)
WBC # BLD AUTO: 8.08 THOUSAND/UL (ref 4.31–10.16)

## 2023-04-21 LAB
EST. AVERAGE GLUCOSE BLD GHB EST-MCNC: 114 MG/DL
HBA1C MFR BLD: 5.6 %

## 2023-04-27 ENCOUNTER — OFFICE VISIT (OUTPATIENT)
Dept: INTERNAL MEDICINE CLINIC | Facility: CLINIC | Age: 63
End: 2023-04-27

## 2023-04-27 VITALS
TEMPERATURE: 98.6 F | BODY MASS INDEX: 36.51 KG/M2 | DIASTOLIC BLOOD PRESSURE: 60 MMHG | SYSTOLIC BLOOD PRESSURE: 118 MMHG | HEART RATE: 80 BPM | HEIGHT: 70 IN | WEIGHT: 255 LBS | OXYGEN SATURATION: 98 %

## 2023-04-27 DIAGNOSIS — E83.52 HYPERCALCEMIA: ICD-10-CM

## 2023-04-27 DIAGNOSIS — I10 BENIGN ESSENTIAL HYPERTENSION: ICD-10-CM

## 2023-04-27 DIAGNOSIS — R45.1 AGITATION: ICD-10-CM

## 2023-04-27 DIAGNOSIS — S01.512D: ICD-10-CM

## 2023-04-27 DIAGNOSIS — E78.2 MIXED HYPERLIPIDEMIA: ICD-10-CM

## 2023-04-27 DIAGNOSIS — D75.89 MACROCYTOSIS WITHOUT ANEMIA: ICD-10-CM

## 2023-04-27 DIAGNOSIS — R55 SYNCOPE, UNSPECIFIED SYNCOPE TYPE: Primary | ICD-10-CM

## 2023-04-27 DIAGNOSIS — E88.09 SERUM ALBUMIN DECREASED: ICD-10-CM

## 2023-04-27 DIAGNOSIS — R45.84 ANHEDONIA: ICD-10-CM

## 2023-04-27 DIAGNOSIS — E27.9 ADRENAL ABNORMALITY (HCC): ICD-10-CM

## 2023-04-27 DIAGNOSIS — I71.40 ABDOMINAL AORTIC ANEURYSM (AAA) WITHOUT RUPTURE, UNSPECIFIED PART (HCC): ICD-10-CM

## 2023-04-27 DIAGNOSIS — R79.89 ABNORMAL THYROID BLOOD TEST: ICD-10-CM

## 2023-04-27 DIAGNOSIS — F32.9 REACTIVE DEPRESSION: ICD-10-CM

## 2023-04-27 DIAGNOSIS — Z12.11 SCREENING FOR COLON CANCER: ICD-10-CM

## 2023-04-27 DIAGNOSIS — S01.512A: ICD-10-CM

## 2023-04-27 RX ORDER — QUETIAPINE FUMARATE 25 MG/1
50 TABLET, FILM COATED ORAL
Qty: 14 TABLET | Refills: 0 | Status: SHIPPED | OUTPATIENT
Start: 2023-04-27

## 2023-04-27 NOTE — LETTER
April 27, 2023     Patient: Rosanna Urbina  YOB: 1960  Date of Visit: 4/27/2023      To Whom it May Concern:    Lora Delacruz is under my professional care  Senthil Chavez was seen in my office on 4/27/2023  Senthil Chavez may return to work with limitations on 5/1/23 and to start back half time  He is to continue part time for one week and starting 5/8/23, may return without any restrictions  He will continue to be evaluated in one month       If you have any questions or concerns, please don't hesitate to call           Sincerely,          Miles Ge DO        CC: No Recipients

## 2023-04-27 NOTE — PROGRESS NOTES
Assessment/Plan:  Problem List Items Addressed This Visit        Digestive    Laceration of tongue with complication    Relevant Medications    QUEtiapine (SEROquel) 25 mg tablet       Endocrine    Adrenal abnormality (HCC)       Cardiovascular and Mediastinum    Benign essential hypertension    Abdominal aortic aneurysm (AAA) without rupture (HCC)       Other    Hyperlipidemia    Depression    Relevant Medications    QUEtiapine (SEROquel) 25 mg tablet   Other Visit Diagnoses     Syncope, unspecified syncope type    -  Primary    Screening for colon cancer        Relevant Orders    Ambulatory referral for colonoscopy    Anhedonia        Agitation        Relevant Medications    QUEtiapine (SEROquel) 25 mg tablet    Abnormal thyroid blood test        Hypercalcemia        Serum albumin decreased        Macrocytosis without anemia               Diagnoses and all orders for this visit:    Syncope, unspecified syncope type    Screening for colon cancer  -     Ambulatory referral for colonoscopy; Future    Reactive depression    Anhedonia    Abdominal aortic aneurysm (AAA) without rupture, unspecified part (HCC)    Adrenal abnormality (HCC)    Benign essential hypertension    Laceration of tongue with complication, subsequent encounter    Agitation  -     QUEtiapine (SEROquel) 25 mg tablet; Take 2 tablets (50 mg total) by mouth daily at bedtime    Abnormal thyroid blood test    Hypercalcemia    Serum albumin decreased    Macrocytosis without anemia    Mixed hyperlipidemia    Laceration of tongue with complication  -     QUEtiapine (SEROquel) 25 mg tablet; Take 2 tablets (50 mg total) by mouth daily at bedtime      No problem-specific Assessment & Plan notes found for this encounter  A/P: Doing much better  Wound healing and will f/u with ENT  Discussed labs and to watch his diet for TG, increase his protein for the albumin   Will recheck his calcium and Thyroid studies in six weeks and see if further eval/treatment "needed  Started a MVT and continues to abstain from ETOH for the MCV  No more syncope  Will wean off the seroquel  Stop AM dose and after one week, d/c q HS dose  MDD slightly better but SSRI has not reached its' peak  Continue lexapro at current dose  Will repeat CT for the adrenal changes in several months  Keep f/u with vascular for the AAA  RTC one month for f/u fatigue, MDD, and ETOH  May RTW part time and after one week, full time  Subjective:      Patient ID: Belen Boothe is a 57 y o  male  WM RTC for f/u several issues  First, no further syncopal episodes  Activity level improving, but still some fatigue felt to be multifactorial with recent Seroquel addition for agitation contributing  Mentally back to baseline and no agitation and will continue to wean the Seroquel  Reports MDD a little better after starting lexapro  Tolerating the med as well  Repeat labs with improved LFT\"s  Serum albumin decreased, TG up, MCV elevated, and borderline TSH  No personal or FHx of thyroid issues  Continues off the ETOH and no withdraw s/s except for slight hand tremor  Tongue lac healed and no pain or fevers  Awaiting ENT eval  No new issues  The following portions of the patient's history were reviewed and updated as appropriate:   He has a past medical history of Abnormal kidney function, Allergic rhinitis, Benign colon polyp, Carpal tunnel syndrome, Cervical radiculopathy, Chronic fatigue, Chronic pain disorder, COPD (chronic obstructive pulmonary disease) ([facility] [address] ), CPAP (continuous positive airway pressure) dependence, Depression (4/7/2023), Edema, Elevated liver enzymes, Gout, Hyperlipidemia, Hypertension, Hypotension, Left ventricular hypertrophy, Obesity (BMI 30-39 9) (9/20/2018), Pneumonia (4/5/2023), and Sleep apnea  ,  does not have any pertinent problems on file  ,   has a past surgical history that includes Hemorrhoid surgery; Carpal tunnel release (Bilateral); and Liver biopsy  ,  family history " includes Cancer in his mother; No Known Problems in his brother, father, maternal grandfather, maternal grandmother, paternal grandfather, paternal grandmother, and sister  ,   reports that he has been smoking cigarettes  He has a 20 00 pack-year smoking history  He has never used smokeless tobacco  He reports current alcohol use of about 6 0 standard drinks per week  He reports that he does not use drugs  ,  has No Known Allergies     Current Outpatient Medications   Medication Sig Dispense Refill   • cyanocobalamin (VITAMIN B-12) 100 mcg tablet      • escitalopram (LEXAPRO) 10 mg tablet Take 1 tablet (10 mg total) by mouth daily 90 tablet 3   • fluticasone (FLONASE) 50 mcg/act nasal spray 1 spray into each nostril daily 16 g 0   • furosemide (LASIX) 20 mg tablet Take 1 tablet (20 mg total) by mouth daily 90 tablet 0   • losartan (COZAAR) 100 MG tablet Take 1 tablet (100 mg total) by mouth daily 90 tablet 0   • multivitamin (THERAGRAN) TABS Take 1 tablet by mouth daily     • QUEtiapine (SEROquel) 25 mg tablet Take 2 tablets (50 mg total) by mouth daily at bedtime 14 tablet 0   • senna (SENOKOT) 8 6 MG tablet        No current facility-administered medications for this visit  Review of Systems   Constitutional: Positive for activity change and fatigue  Negative for chills, diaphoresis and fever  Respiratory: Negative for cough, chest tightness, shortness of breath and wheezing  Cardiovascular: Negative for chest pain, palpitations and leg swelling  Gastrointestinal: Negative for abdominal pain, constipation, diarrhea, nausea and vomiting  Genitourinary: Negative for difficulty urinating, dysuria and frequency  Musculoskeletal: Negative for arthralgias, gait problem and myalgias  Skin: Positive for wound  Neurological: Positive for tremors  Negative for seizures, syncope, light-headedness and headaches  Psychiatric/Behavioral: Negative for confusion, dysphoric mood and sleep disturbance   The "patient is not nervous/anxious  PHQ-2/9 Depression Screening          Objective:  Vitals:    04/27/23 0838   BP: 118/60   BP Location: Left arm   Patient Position: Sitting   Cuff Size: Standard   Pulse: 80   Temp: 98 6 °F (37 °C)   SpO2: 98%   Weight: 116 kg (255 lb)   Height: 5' 10\" (1 778 m)     Body mass index is 36 59 kg/m²  Physical Exam  Vitals and nursing note reviewed  Constitutional:       General: He is not in acute distress  Appearance: Normal appearance  He is not ill-appearing  HENT:      Head: Normocephalic and atraumatic  Mouth/Throat:      Mouth: Mucous membranes are moist    Eyes:      Extraocular Movements: Extraocular movements intact  Conjunctiva/sclera: Conjunctivae normal       Pupils: Pupils are equal, round, and reactive to light  Cardiovascular:      Rate and Rhythm: Normal rate and regular rhythm  Heart sounds: Normal heart sounds  No murmur heard  Pulmonary:      Effort: Pulmonary effort is normal  No respiratory distress  Breath sounds: Normal breath sounds  No wheezing, rhonchi or rales  Musculoskeletal:      Right lower leg: Edema present  Left lower leg: Edema present  Comments: Trace edema of the legs  Skin:     Comments: Tongue lac healed  Neurological:      General: No focal deficit present  Mental Status: He is alert and oriented to person, place, and time  Mental status is at baseline  Psychiatric:         Mood and Affect: Mood normal          Behavior: Behavior normal          Thought Content:  Thought content normal          Judgment: Judgment normal          "

## 2023-05-02 ENCOUNTER — TELEPHONE (OUTPATIENT)
Dept: PSYCHIATRY | Facility: CLINIC | Age: 63
End: 2023-05-02

## 2023-05-03 DIAGNOSIS — R60.0 LOCALIZED EDEMA: ICD-10-CM

## 2023-05-03 DIAGNOSIS — I10 BENIGN ESSENTIAL HYPERTENSION: ICD-10-CM

## 2023-05-03 RX ORDER — LOSARTAN POTASSIUM 100 MG/1
100 TABLET ORAL DAILY
Qty: 90 TABLET | Refills: 0 | Status: SHIPPED | OUTPATIENT
Start: 2023-05-03

## 2023-05-03 RX ORDER — FUROSEMIDE 20 MG/1
20 TABLET ORAL DAILY
Qty: 90 TABLET | Refills: 0 | Status: SHIPPED | OUTPATIENT
Start: 2023-05-03

## 2023-05-30 ENCOUNTER — OFFICE VISIT (OUTPATIENT)
Dept: INTERNAL MEDICINE CLINIC | Facility: CLINIC | Age: 63
End: 2023-05-30

## 2023-05-30 VITALS
TEMPERATURE: 99.2 F | HEART RATE: 89 BPM | SYSTOLIC BLOOD PRESSURE: 148 MMHG | OXYGEN SATURATION: 98 % | WEIGHT: 265 LBS | BODY MASS INDEX: 37.94 KG/M2 | DIASTOLIC BLOOD PRESSURE: 82 MMHG | HEIGHT: 70 IN

## 2023-05-30 DIAGNOSIS — R79.89 ABNORMAL THYROID BLOOD TEST: ICD-10-CM

## 2023-05-30 DIAGNOSIS — E83.52 HYPERCALCEMIA: ICD-10-CM

## 2023-05-30 DIAGNOSIS — R53.83 FATIGUE, UNSPECIFIED TYPE: Primary | ICD-10-CM

## 2023-05-30 DIAGNOSIS — R45.84 ANHEDONIA: ICD-10-CM

## 2023-05-30 DIAGNOSIS — F32.9 REACTIVE DEPRESSION: ICD-10-CM

## 2023-05-30 DIAGNOSIS — I71.40 ABDOMINAL AORTIC ANEURYSM (AAA) WITHOUT RUPTURE, UNSPECIFIED PART (HCC): ICD-10-CM

## 2023-05-30 DIAGNOSIS — I10 BENIGN ESSENTIAL HYPERTENSION: ICD-10-CM

## 2023-05-30 NOTE — PROGRESS NOTES
Assessment/Plan:  Problem List Items Addressed This Visit        Cardiovascular and Mediastinum    Benign essential hypertension    Abdominal aortic aneurysm (AAA) without rupture (HCC)       Other    Depression    Relevant Orders    TSH, 3rd generation with Free T4 reflex   Other Visit Diagnoses     Fatigue, unspecified type    -  Primary    Relevant Orders    TSH, 3rd generation with Free T4 reflex    Anhedonia        Hypercalcemia        Relevant Orders    Calcium    Calcium, ionized    Abnormal thyroid blood test        Relevant Orders    TSH, 3rd generation with Free T4 reflex           Diagnoses and all orders for this visit:    Fatigue, unspecified type  -     TSH, 3rd generation with Free T4 reflex; Future    Reactive depression  -     TSH, 3rd generation with Free T4 reflex; Future    Anhedonia    Abdominal aortic aneurysm (AAA) without rupture, unspecified part ([facility] [address] )    Benign essential hypertension    Hypercalcemia  -     Calcium; Future  -     Calcium, ionized; Future    Abnormal thyroid blood test  -     TSH, 3rd generation with Free T4 reflex; Future        No problem-specific Assessment & Plan notes found for this encounter  A/P: Doing better  Still with some fatigue, but improving  Tolerating the SSRI and feels a little better  NO new issues  Will continue SSRI a the current dose and attempt to wean in several months  IF fatigue and anhedonia persists, consider adding abilify  BP is up, but reports OP readings are good  Will hold on med adjustment for now  Continue to abstain from ETOH  Will repeat TSH and calcium and if further w/u is needed  Subjective:      Patient ID: Viviana Fatima is a 57 y o  male  WM RTC for f/u after admission for a syncope episode complicated by tongue laceration leading to sepsis  Started on lexapro for fatigue, MDD, and anhedonia  Continues with some fatigue  Tolerating the meds  Now off the seroquel  Back to work  Feels better  Staying away from the ETOH   No new c/o's  The following portions of the patient's history were reviewed and updated as appropriate:   He has a past medical history of Abnormal kidney function, Allergic rhinitis, Benign colon polyp, Carpal tunnel syndrome, Cervical radiculopathy, Chronic fatigue, Chronic pain disorder, COPD (chronic obstructive pulmonary disease) ([facility] [address] ), CPAP (continuous positive airway pressure) dependence, Depression (4/7/2023), Edema, Elevated liver enzymes, Gout, Hyperlipidemia, Hypertension, Hypotension, Left ventricular hypertrophy, Obesity (BMI 30-39 9) (9/20/2018), Pneumonia (4/5/2023), and Sleep apnea  ,  does not have any pertinent problems on file  ,   has a past surgical history that includes Hemorrhoid surgery; Carpal tunnel release (Bilateral); and Liver biopsy  ,  family history includes Cancer in his mother; No Known Problems in his brother, father, maternal grandfather, maternal grandmother, paternal grandfather, paternal grandmother, and sister  ,   reports that he has been smoking cigarettes  He has a 20 00 pack-year smoking history  He has never used smokeless tobacco  He reports current alcohol use of about 6 0 standard drinks of alcohol per week  He reports that he does not use drugs  ,  has No Known Allergies     Current Outpatient Medications   Medication Sig Dispense Refill   • cyanocobalamin (VITAMIN B-12) 100 mcg tablet      • escitalopram (LEXAPRO) 10 mg tablet Take 1 tablet (10 mg total) by mouth daily 90 tablet 3   • fluticasone (FLONASE) 50 mcg/act nasal spray 1 spray into each nostril daily 16 g 0   • furosemide (LASIX) 20 mg tablet Take 1 tablet (20 mg total) by mouth daily 90 tablet 0   • losartan (COZAAR) 100 MG tablet Take 1 tablet (100 mg total) by mouth daily 90 tablet 0   • multivitamin (THERAGRAN) TABS Take 1 tablet by mouth daily     • QUEtiapine (SEROquel) 25 mg tablet Take 2 tablets (50 mg total) by mouth daily at bedtime 14 tablet 0   • senna (SENOKOT) 8 6 MG tablet        No current "facility-administered medications for this visit  Review of Systems   Constitutional: Positive for fatigue  Negative for activity change, chills, diaphoresis and fever  Respiratory: Negative for cough, chest tightness, shortness of breath and wheezing  Cardiovascular: Negative for chest pain, palpitations and leg swelling  Gastrointestinal: Negative for abdominal pain, constipation, diarrhea, nausea and vomiting  Genitourinary: Negative for difficulty urinating, dysuria and frequency  Musculoskeletal: Negative for arthralgias, gait problem and myalgias  Neurological: Negative for light-headedness and headaches  Psychiatric/Behavioral: Negative for confusion, dysphoric mood and sleep disturbance  The patient is not nervous/anxious  PHQ-2/9 Depression Screening          Objective:  Vitals:    05/30/23 1304   BP: 148/82   Pulse: 89   Temp: 99 2 °F (37 3 °C)   SpO2: 98%   Weight: 120 kg (265 lb)   Height: 5' 10\" (1 778 m)     Body mass index is 38 02 kg/m²  Physical Exam  Vitals and nursing note reviewed  Constitutional:       General: He is not in acute distress  Appearance: Normal appearance  He is not ill-appearing  HENT:      Head: Normocephalic and atraumatic  Mouth/Throat:      Mouth: Mucous membranes are moist    Eyes:      Extraocular Movements: Extraocular movements intact  Conjunctiva/sclera: Conjunctivae normal       Pupils: Pupils are equal, round, and reactive to light  Cardiovascular:      Rate and Rhythm: Normal rate and regular rhythm  Heart sounds: Normal heart sounds  No murmur heard  Pulmonary:      Effort: Pulmonary effort is normal  No respiratory distress  Breath sounds: Normal breath sounds  No wheezing, rhonchi or rales  Abdominal:      General: Bowel sounds are normal  There is no distension  Palpations: Abdomen is soft  Tenderness: There is no abdominal tenderness  Musculoskeletal:      Right lower leg: No edema        " Left lower leg: No edema  Neurological:      General: No focal deficit present  Mental Status: He is alert and oriented to person, place, and time  Mental status is at baseline  Psychiatric:         Mood and Affect: Mood normal          Behavior: Behavior normal          Thought Content:  Thought content normal          Judgment: Judgment normal

## 2023-06-06 ENCOUNTER — CONSULT (OUTPATIENT)
Dept: VASCULAR SURGERY | Facility: CLINIC | Age: 63
End: 2023-06-06
Payer: COMMERCIAL

## 2023-06-06 VITALS
TEMPERATURE: 97.6 F | DIASTOLIC BLOOD PRESSURE: 76 MMHG | HEIGHT: 70 IN | HEART RATE: 80 BPM | BODY MASS INDEX: 38.48 KG/M2 | WEIGHT: 268.8 LBS | SYSTOLIC BLOOD PRESSURE: 138 MMHG | OXYGEN SATURATION: 98 %

## 2023-06-06 DIAGNOSIS — Z72.0 TOBACCO USE: ICD-10-CM

## 2023-06-06 DIAGNOSIS — E78.1 PURE HYPERTRIGLYCERIDEMIA: ICD-10-CM

## 2023-06-06 DIAGNOSIS — R60.0 BILATERAL LOWER EXTREMITY EDEMA: ICD-10-CM

## 2023-06-06 DIAGNOSIS — E66.01 CLASS 3 SEVERE OBESITY DUE TO EXCESS CALORIES WITH SERIOUS COMORBIDITY AND BODY MASS INDEX (BMI) OF 40.0 TO 44.9 IN ADULT (HCC): ICD-10-CM

## 2023-06-06 DIAGNOSIS — I71.40 ABDOMINAL AORTIC ANEURYSM (AAA) WITHOUT RUPTURE, UNSPECIFIED PART (HCC): Primary | ICD-10-CM

## 2023-06-06 PROCEDURE — 99244 OFF/OP CNSLTJ NEW/EST MOD 40: CPT | Performed by: SURGERY

## 2023-06-06 NOTE — PATIENT INSTRUCTIONS
1) AAA  -your cat scan showed a small 3 5cm aneurysm of the abdominal aorta; we don't consider fixing this surgically until it reaches 5 5cm in size  -we are going to monitor this with ultrasound on a yearly basis  -we will also scan your legs next year to look for aneurysm behind the knee    2) Medications  -please start taking aspirin 81mg once daily  -please talk to your PCP about starting a statin    3) Smoking  -this is a leading risk factor for aneurysm  -it is really important that you quit  -we have a smoking cessation program that can help if you would like to try it (ask me or your PCP for a referral)

## 2023-06-06 NOTE — PROGRESS NOTES
Assessment/Plan:    Pt is a 56 yo M w/ adrenal abnormailty, DOT, COPD, LVH, HTN, AAA, DDD, current smoker, HLD, MDD, BLE edema    Abdominal aortic aneurysm (AAA) without rupture, unspecified part ([facility] [address] )  -     Ambulatory Referral to Vascular Surgery  -     VAS abdominal aorta/iliacs; complete study; Future  -     VAS lower limb arterial duplex, complete bilateral; Future  -reviewed CT pelvis from '16 hwere aneurysm is only partially imaged at 2 6cm  -reviewed CT from April which shows 3 5cm infrarenal AAA  -discussed pathophysiology of aneurysmal disease and indications for surgical repair including size >5 5 or symptomatic  -will continue surveillance on a yearly basis  -will also get 1-time LEADs to screen for popliteal aneurysm next year, alhtough not suspicious on my exam  -instructed patient to notify 1st degree family members as aneurysm can be familial  -f/u 1 year    Class 3 severe obesity due to excess calories with serious comorbidity and body mass index (BMI) of 40 0 to 44 9 in [facility])  Bilateral lower extremity edema  -unclear etiology, chronic, intermittent  -advised compression use and leg elevation and weight loss    Tobacco use  -current 3/4PPD  -discussed role of smoking in aneurysm and need for cessation  -discussed smoking cessation program; patient declined at this time    Pure hypertriglyceridemia  Medications  -will start pt on aspirin 81mg once daily for indication of aneurysm  -consider starting statin for aneurysm and HLD, although is is more of a triglyceridemia          Subjective:      Patient ID: Kirill Wagner is a 57 y o  male  New patient referred by DR Gauthier for AAA had US of abdomen done 4/16/23  Pt denies abdominal pain or pain with eating  Patient reports lower back pain but said he has had it for years due to 5 herniated disks  Pt is a current smoker less then PPD  HPI    Patient referred for new finding of AAA      Patient had a fall and bit his tongue which "caused severe swelling, ultimately sepsis, and required intubation and ICU stay  Had CT imaging showing AAA  Wasn't aware prior to this  Denies abdominal pain  Has chronic back pain from herniated c-spine and L-spine discs  Has intermittent BLE edema  Denies hx of blood clots  Had thrombocytopenia in house which seems to have resolved by end of admission  No prior hx of any surgery including abdominal    No family hx of aneurysm    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)      The following portions of the patient's history were reviewed and updated as appropriate: allergies, current medications, past family history, past medical history, past social history, past surgical history and problem list     Review of Systems   Constitutional: Negative  HENT: Negative  Eyes: Negative  Respiratory: Positive for shortness of breath  Cardiovascular: Positive for leg swelling  Negative for chest pain  Gastrointestinal: Positive for diarrhea  Negative for abdominal pain  Endocrine: Negative  Genitourinary: Negative  Musculoskeletal: Positive for arthralgias (knees) and back pain  Negative for gait problem  Skin: Negative  Negative for wound  Allergic/Immunologic: Negative  Neurological: Negative  Hematological: Bruises/bleeds easily  Psychiatric/Behavioral: Negative  Objective:      /76 (BP Location: Left arm, Patient Position: Sitting, Cuff Size: Standard)   Pulse 80   Temp 97 6 °F (36 4 °C) (Temporal)   Ht 5' 10\" (1 778 m)   Wt 122 kg (268 lb 12 8 oz)   SpO2 98%   BMI 38 57 kg/m²          Physical Exam  Cardiovascular:      Rate and Rhythm: Normal rate and regular rhythm  Pulses:           Radial pulses are 2+ on the right side and 2+ on the left side  Popliteal pulses are 0 on the right side and 0 on the left side  Dorsalis pedis pulses are 2+ on the right side and 0 on the left side          Posterior tibial pulses are 2+ on the right " "side and 2+ on the left side  Heart sounds: No murmur heard  Comments: No carotid bruits B  Pulmonary:      Effort: No respiratory distress  Breath sounds: No wheezing or rales  Abdominal:      General: Abdomen is protuberant  There is no distension  Palpations: Abdomen is soft  There is no mass or pulsatile mass (cannot palpate known AAA)  Tenderness: There is no abdominal tenderness  Musculoskeletal:      Right lower le+ Edema present  Left lower le+ Edema present  I have reviewed and made appropriate changes to the review of systems input by the medical assistant      Vitals:    23 0907   BP: 138/76   BP Location: Left arm   Patient Position: Sitting   Cuff Size: Standard   Pulse: 80   Temp: 97 6 °F (36 4 °C)   TempSrc: Temporal   SpO2: 98%   Weight: 122 kg (268 lb 12 8 oz)   Height: 5' 10\" (1 778 m)       Patient Active Problem List   Diagnosis   • Allergic rhinitis   • Benign colon polyp   • Benign essential hypertension   • Chronic fatigue   • COPD (chronic obstructive pulmonary disease) (HCC)   • Bilateral lower extremity edema   • Gout   • Hyperlipidemia   • Hyponatremia   • Herniated disc, cervical   • Iron overload   • Left atrial enlargement   • Left ventricular hypertrophy   • Macrocytosis   • Mild mitral regurgitation   • Obstructive sleep apnea   • Pain syndrome, chronic   • Renal cyst, right   • Tobacco use   • Class 3 severe obesity due to excess calories with serious comorbidity and body mass index (BMI) of 40 0 to 44 9 in [facility])   • Chronic rhinitis   • Microalbuminuria   • Elevated LFTs   • Neuropathy   • Lumbar disc disease with radiculopathy   • Cervical disc disorder with radiculopathy of mid-cervical region   • Cervical radiculopathy   • Laceration of tongue with complication   • Thrombocytopenia (HCC)   • Airway compromise   • ETOH abuse   • Depression   • Abdominal aortic aneurysm (AAA) without rupture (HCC)   • Adrenal abnormality " "(HCC)       Past Surgical History:   Procedure Laterality Date   • CARPAL TUNNEL RELEASE Bilateral    • HEMORRHOID SURGERY     • LIVER BIOPSY         Family History   Problem Relation Age of Onset   • Cancer Mother    • No Known Problems Father    • No Known Problems Sister    • No Known Problems Brother    • No Known Problems Maternal Grandmother    • No Known Problems Maternal Grandfather    • No Known Problems Paternal Grandmother    • No Known Problems Paternal Grandfather        Social History     Socioeconomic History   • Marital status: /Civil Union     Spouse name: Not on file   • Number of children: Not on file   • Years of education: Not on file   • Highest education level: Not on file   Occupational History   • Occupation: working full time    Tobacco Use   • Smoking status: Every Day     Packs/day: 0 50     Years: 40 00     Total pack years: 20 00     Types: Cigarettes   • Smokeless tobacco: Never   • Tobacco comments:     Given [employer] \"how to quit\" info    Vaping Use   • Vaping Use: Never used   Substance and Sexual Activity   • Alcohol use: Yes     Alcohol/week: 6 0 standard drinks of alcohol     Types: 6 Cans of beer per week     Comment: social    • Drug use: No   • Sexual activity: Not Currently   Other Topics Concern   • Not on file   Social History Narrative    EMPLOYED     Social Determinants of Health     Financial Resource Strain: Not on file   Food Insecurity: No Food Insecurity (4/4/2023)    Hunger Vital Sign    • Worried About Running Out of Food in the Last Year: Never true    • Ran Out of Food in the Last Year: Never true   Transportation Needs: No Transportation Needs (4/4/2023)    PRAPARE - Transportation    • Lack of Transportation (Medical): No    • Lack of Transportation (Non-Medical):  No   Physical Activity: Not on file   Stress: Not on file   Social Connections: Not on file   Intimate Partner Violence: Not on file   Housing Stability: Low Risk  (4/4/2023)    Housing Stability " Vital Sign    • Unable to Pay for Housing in the Last Year: No    • Number of Places Lived in the Last Year: 1    • Unstable Housing in the Last Year: No       No Known Allergies      Current Outpatient Medications:   •  aspirin (ECOTRIN LOW STRENGTH) 81 mg EC tablet, Take 1 tablet (81 mg total) by mouth daily, Disp: , Rfl:   •  cyanocobalamin (VITAMIN B-12) 100 mcg tablet, , Disp: , Rfl:   •  escitalopram (LEXAPRO) 10 mg tablet, Take 1 tablet (10 mg total) by mouth daily, Disp: 90 tablet, Rfl: 3  •  fluticasone (FLONASE) 50 mcg/act nasal spray, 1 spray into each nostril daily, Disp: 16 g, Rfl: 0  •  furosemide (LASIX) 20 mg tablet, Take 1 tablet (20 mg total) by mouth daily, Disp: 90 tablet, Rfl: 0  •  losartan (COZAAR) 100 MG tablet, Take 1 tablet (100 mg total) by mouth daily, Disp: 90 tablet, Rfl: 0  •  multivitamin (THERAGRAN) TABS, Take 1 tablet by mouth daily, Disp: , Rfl:   •  QUEtiapine (SEROquel) 25 mg tablet, Take 2 tablets (50 mg total) by mouth daily at bedtime (Patient not taking: Reported on 6/6/2023), Disp: 14 tablet, Rfl: 0  •  senna (SENOKOT) 8 6 MG tablet, , Disp: , Rfl:

## 2023-07-10 ENCOUNTER — TELEPHONE (OUTPATIENT)
Dept: PSYCHIATRY | Facility: CLINIC | Age: 63
End: 2023-07-10

## 2023-07-10 NOTE — TELEPHONE ENCOUNTER
Contacted patient off of Medication Management  to verify needs of services in attempts to offer patient an appointment at [facility] .  [facility] for patient to contact intake dept  in regards to scheduling appointment

## 2023-08-07 ENCOUNTER — TELEPHONE (OUTPATIENT)
Dept: INTERNAL MEDICINE CLINIC | Facility: CLINIC | Age: 63
End: 2023-08-07

## 2023-08-07 DIAGNOSIS — I10 BENIGN ESSENTIAL HYPERTENSION: ICD-10-CM

## 2023-08-07 DIAGNOSIS — R60.0 LOCALIZED EDEMA: ICD-10-CM

## 2023-08-07 RX ORDER — LOSARTAN POTASSIUM 100 MG/1
100 TABLET ORAL DAILY
Qty: 90 TABLET | Refills: 0 | Status: SHIPPED | OUTPATIENT
Start: 2023-08-07

## 2023-08-07 RX ORDER — FUROSEMIDE 20 MG/1
20 TABLET ORAL DAILY
Qty: 90 TABLET | Refills: 0 | Status: SHIPPED | OUTPATIENT
Start: 2023-08-07

## 2023-08-07 NOTE — TELEPHONE ENCOUNTER
patient needs refill on losartan (COZAAR) 100 MG tablet and furosemide (LASIX) 20 mg tablet please send to [id number] [facility]

## 2023-08-30 ENCOUNTER — OFFICE VISIT (OUTPATIENT)
Dept: INTERNAL MEDICINE CLINIC | Facility: CLINIC | Age: 63
End: 2023-08-30
Payer: COMMERCIAL

## 2023-08-30 VITALS
HEIGHT: 70 IN | TEMPERATURE: 99.2 F | WEIGHT: 270 LBS | OXYGEN SATURATION: 99 % | BODY MASS INDEX: 38.65 KG/M2 | DIASTOLIC BLOOD PRESSURE: 68 MMHG | HEART RATE: 105 BPM | SYSTOLIC BLOOD PRESSURE: 120 MMHG
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
## 2023-08-30 DIAGNOSIS — E83.52 HYPERCALCEMIA: ICD-10-CM

## 2023-08-30 DIAGNOSIS — J30.2 SEASONAL ALLERGIES: ICD-10-CM

## 2023-08-30 DIAGNOSIS — E66.01 CLASS 3 SEVERE OBESITY DUE TO EXCESS CALORIES WITH SERIOUS COMORBIDITY AND BODY MASS INDEX (BMI) OF 40.0 TO 44.9 IN ADULT (HCC): ICD-10-CM

## 2023-08-30 DIAGNOSIS — E78.1 PURE HYPERTRIGLYCERIDEMIA: ICD-10-CM

## 2023-08-30 DIAGNOSIS — M1A.9XX0 CHRONIC GOUT WITHOUT TOPHUS, UNSPECIFIED CAUSE, UNSPECIFIED SITE: ICD-10-CM

## 2023-08-30 DIAGNOSIS — J42 CHRONIC BRONCHITIS, UNSPECIFIED CHRONIC BRONCHITIS TYPE (HCC): ICD-10-CM

## 2023-08-30 DIAGNOSIS — R79.89 ABNORMAL THYROID BLOOD TEST: ICD-10-CM

## 2023-08-30 DIAGNOSIS — D69.6 THROMBOCYTOPENIA (HCC): ICD-10-CM

## 2023-08-30 DIAGNOSIS — G89.4 PAIN SYNDROME, CHRONIC: ICD-10-CM

## 2023-08-30 DIAGNOSIS — F32.A DEPRESSION, UNSPECIFIED DEPRESSION TYPE: ICD-10-CM

## 2023-08-30 DIAGNOSIS — I10 BENIGN ESSENTIAL HYPERTENSION: Primary | ICD-10-CM

## 2023-08-30 PROBLEM — R53.82 CHRONIC FATIGUE: Status: RESOLVED | Noted: 2017-11-29 | Resolved: 2023-08-30

## 2023-08-30 PROBLEM — F10.10 ETOH ABUSE: Status: RESOLVED | Noted: 2023-04-04 | Resolved: 2023-08-30

## 2023-08-30 PROBLEM — J98.8 AIRWAY COMPROMISE: Status: RESOLVED | Noted: 2023-04-03 | Resolved: 2023-08-30

## 2023-08-30 PROBLEM — S01.512A: Status: RESOLVED | Noted: 2023-04-03 | Resolved: 2023-08-30

## 2023-08-30 PROBLEM — G62.9 NEUROPATHY: Status: RESOLVED | Noted: 2023-01-10 | Resolved: 2023-08-30

## 2023-08-30 PROCEDURE — 99214 OFFICE O/P EST MOD 30 MIN: CPT | Performed by: INTERNAL MEDICINE

## 2023-08-30 PROCEDURE — 96372 THER/PROPH/DIAG INJ SC/IM: CPT | Performed by: INTERNAL MEDICINE

## 2023-08-30 RX ORDER — MONTELUKAST SODIUM 10 MG/1
10 TABLET ORAL
Qty: 90 TABLET | Refills: 3 | Status: SHIPPED | OUTPATIENT
Start: 2023-08-30

## 2023-08-30 RX ORDER — TRIAMCINOLONE ACETONIDE 40 MG/ML
40 INJECTION, SUSPENSION INTRA-ARTICULAR; INTRAMUSCULAR ONCE
Status: COMPLETED | OUTPATIENT
Start: 2023-08-30 | End: 2023-08-30

## 2023-08-30 RX ORDER — MONTELUKAST SODIUM 10 MG/1
10 TABLET ORAL
Qty: 90 TABLET | Refills: 3 | Status: SHIPPED | OUTPATIENT
Start: 2023-08-30 | End: 2023-08-30 | Stop reason: SDUPTHER

## 2023-08-30 RX ADMIN — TRIAMCINOLONE ACETONIDE 40 MG: 40 INJECTION, SUSPENSION INTRA-ARTICULAR; INTRAMUSCULAR at 08:03

## 2023-08-30 NOTE — PROGRESS NOTES
Assessment/Plan:  Problem List Items Addressed This Visit        Respiratory    COPD (chronic obstructive pulmonary disease) (HCC)    Relevant Medications    Fexofenadine HCl (ALLEGRA ALLERGY PO)    triamcinolone acetonide (KENALOG-40) 40 mg/mL injection 40 mg (Completed) (Start on 8/30/2023  8:15 AM)    montelukast (SINGULAIR) 10 mg tablet       Cardiovascular and Mediastinum    Benign essential hypertension - Primary       Hematopoietic and Hemostatic    Thrombocytopenia (HCC)       Other    Pain syndrome, chronic    Hyperlipidemia    Gout    Depression    Class 3 severe obesity due to excess calories with serious comorbidity and body mass index (BMI) of 40.0 to 44.9 in [facility])   Other Visit Diagnoses     Hypercalcemia        Relevant Orders    Calcium, ionized    Calcium    PTH, intact    Abnormal thyroid blood test        Relevant Orders    TSH, 3rd generation with Free T4 reflex    Seasonal allergies        Relevant Medications    triamcinolone acetonide (KENALOG-40) 40 mg/mL injection 40 mg (Completed) (Start on 8/30/2023  8:15 AM)    montelukast (SINGULAIR) 10 mg tablet    Fall, initial encounter               Diagnoses and all orders for this visit:    Benign essential hypertension    Chronic bronchitis, unspecified chronic bronchitis type (HCC)    Thrombocytopenia ([address])    Chronic gout without tophus, unspecified cause, unspecified site    Pure hypertriglyceridemia    Pain syndrome, chronic    Class 3 severe obesity due to excess calories with serious comorbidity and body mass index (BMI) of 40.0 to 44.9 in [facility])    Depression, unspecified depression type    Hypercalcemia  -     Calcium, ionized; Future  -     Calcium; Future  -     PTH, intact; Future    Abnormal thyroid blood test  -     TSH, 3rd generation with Free T4 reflex; Future    Seasonal allergies  -     triamcinolone acetonide (KENALOG-40) 40 mg/mL injection 40 mg  -     Discontinue: montelukast (SINGULAIR) 10 mg tablet;  Take 1 tablet (10 mg total) by mouth daily at bedtime  -     montelukast (SINGULAIR) 10 mg tablet; Take 1 tablet (10 mg total) by mouth daily at bedtime    Fall, initial encounter    Other orders  -     Fexofenadine HCl (ALLEGRA ALLERGY PO); Take by mouth        No problem-specific Assessment & Plan notes found for this encounter. A/P: Doing ok and will check labs. Discussed vaccines and declines all vaccines. Looks miserable with allergies. Will start kenalog and singulair. Continue INS and OTC meds. Discussed fall and knee issues and defers w/u. Navi Speaker .Wean tobacco. Continue current treatment and RTC four months. Subjective:      Patient ID: Matthew Heath. Leonela Anguiano is a 57 y.o. male. WM RTC for f/u HTN, COPD, etc. Doing ok and no new issues, but allergies are bothering him despite OTC meds. . Remains active w/o difficulty and one fall after his knee gave out. No significant injury. Breathing is good and no rescue MDI use. Chronic pain is manageable. No gout attacks. Still smoking. DEYSI/MDD are controlled. Due for labs and vaccines. The following portions of the patient's history were reviewed and updated as appropriate:   He has a past medical history of Abnormal kidney function, Allergic rhinitis, Benign colon polyp, Carpal tunnel syndrome, Cervical radiculopathy, Chronic fatigue, Chronic pain disorder, COPD (chronic obstructive pulmonary disease) ([address]), CPAP (continuous positive airway pressure) dependence, Depression (4/7/2023), Edema, Elevated liver enzymes, Gout, Hyperlipidemia, Hypertension, Hypotension, Left ventricular hypertrophy, Obesity (BMI 30-39.9) (9/20/2018), Pneumonia (4/5/2023), and Sleep apnea. ,  does not have any pertinent problems on file. ,   has a past surgical history that includes Hemorrhoid surgery; Carpal tunnel release (Bilateral); and Liver biopsy. ,  family history includes Cancer in his mother; No Known Problems in his brother, father, maternal grandfather, maternal grandmother, paternal grandfather, paternal grandmother, and sister. ,   reports that he has been smoking cigarettes. He started smoking about 35 years ago. He has a 20.00 pack-year smoking history. He has never been exposed to tobacco smoke. He has never used smokeless tobacco. He reports current alcohol use of about 6.0 standard drinks of alcohol per week. He reports that he does not use drugs. ,  has No Known Allergies. .  Current Outpatient Medications   Medication Sig Dispense Refill   • aspirin (ECOTRIN LOW STRENGTH) 81 mg EC tablet Take 1 tablet (81 mg total) by mouth daily     • cyanocobalamin (VITAMIN B-12) 100 mcg tablet      • escitalopram (LEXAPRO) 10 mg tablet Take 1 tablet (10 mg total) by mouth daily 90 tablet 3   • Fexofenadine HCl (ALLEGRA ALLERGY PO) Take by mouth     • fluticasone (FLONASE) 50 mcg/act nasal spray 1 spray into each nostril daily 16 g 0   • furosemide (LASIX) 20 mg tablet Take 1 tablet (20 mg total) by mouth daily 90 tablet 0   • losartan (COZAAR) 100 MG tablet Take 1 tablet (100 mg total) by mouth daily 90 tablet 0   • montelukast (SINGULAIR) 10 mg tablet Take 1 tablet (10 mg total) by mouth daily at bedtime 90 tablet 3   • multivitamin (THERAGRAN) TABS Take 1 tablet by mouth daily       No current facility-administered medications for this visit. Review of Systems   Constitutional: Negative for activity change, chills, diaphoresis, fatigue and fever. HENT: Positive for congestion, postnasal drip, rhinorrhea and voice change. Negative for ear pain, facial swelling, hearing loss, sinus pressure, sinus pain and sore throat. Eyes: Positive for discharge, redness and itching. Negative for visual disturbance. Respiratory: Negative for cough, chest tightness, shortness of breath and wheezing. Cardiovascular: Positive for leg swelling. Negative for chest pain and palpitations. Gastrointestinal: Negative for abdominal pain, constipation, diarrhea, nausea and vomiting.    Endocrine: Negative for cold intolerance and heat intolerance. Genitourinary: Negative for difficulty urinating, dysuria and frequency. Musculoskeletal: Negative for arthralgias, gait problem and myalgias. Neurological: Negative for dizziness, seizures, syncope, weakness, light-headedness and headaches. Psychiatric/Behavioral: Negative for confusion, dysphoric mood and sleep disturbance. The patient is not nervous/anxious. PHQ-2/9 Depression Screening    Little interest or pleasure in doing things: 0 - not at all  Feeling down, depressed, or hopeless: 0 - not at all  Trouble falling or staying asleep, or sleeping too much: 0 - not at all  Feeling tired or having little energy: 0 - not at all  Poor appetite or overeatin - not at all  Feeling bad about yourself - or that you are a failure or have let yourself or your family down: 0 - not at all  Trouble concentrating on things, such as reading the newspaper or watching television: 0 - not at all  Moving or speaking so slowly that other people could have noticed. Or the opposite - being so fidgety or restless that you have been moving around a lot more than usual: 0 - not at all  Thoughts that you would be better off dead, or of hurting yourself in some way: 0 - not at all  PHQ-9 Score: 0   PHQ-9 Interpretation: No or Minimal depression         Objective:  Vitals:    23 0753   BP: 120/68   Pulse: 105   Temp: 99.2 °F (37.3 °C)   SpO2: 99%   Weight: 122 kg (270 lb)   Height: 5' 10" (1.778 m)     Body mass index is 38.74 kg/m². Physical Exam  Vitals and nursing note reviewed. Constitutional:       General: He is not in acute distress. Appearance: Normal appearance. He is not ill-appearing. HENT:      Head: Normocephalic and atraumatic. Mouth/Throat:      Mouth: Mucous membranes are moist.   Eyes:      Extraocular Movements: Extraocular movements intact. Pupils: Pupils are equal, round, and reactive to light. Comments: Conjunctiva injected. Clear d/c. Neck:      Vascular: No carotid bruit. Cardiovascular:      Rate and Rhythm: Normal rate and regular rhythm. Heart sounds: Normal heart sounds. No murmur heard. Pulmonary:      Effort: Pulmonary effort is normal. No respiratory distress. Breath sounds: Normal breath sounds. No wheezing, rhonchi or rales. Abdominal:      General: Bowel sounds are normal. There is no distension. Palpations: Abdomen is soft. Tenderness: There is no abdominal tenderness. Musculoskeletal:      Cervical back: Neck supple. Right lower leg: Edema present. Left lower leg: Edema present. Neurological:      General: No focal deficit present. Mental Status: He is alert and oriented to person, place, and time. Mental status is at baseline. Psychiatric:         Mood and Affect: Mood normal.         Behavior: Behavior normal.         Thought Content:  Thought content normal.         Judgment: Judgment normal.

## 2023-08-30 NOTE — PATIENT INSTRUCTIONS
Chronic Hypertension   AMBULATORY CARE:   Hypertension is considered chronic  when it continues for 3 months or longer. Hypertension that continues causes your heart to work much harder than normal, which may lead to heart damage. Even if you have hypertension for years, lifestyle changes, medicines, or both may help lower your blood pressure. Call your local emergency number [phone] in the [address]) or have someone call if:   You have chest pain. You have any of the following signs of a heart attack:      Squeezing, pressure, or pain in your chest    You may  also have any of the following:     Discomfort or pain in your back, neck, jaw, stomach, or arm    Shortness of breath    Nausea or vomiting    Lightheadedness or a sudden cold sweat    You become confused or have difficulty speaking. You suddenly feel lightheaded or have trouble breathing. Seek care immediately if:   You have a severe headache or vision loss. You have weakness in an arm or leg. Call your doctor or cardiologist if:   You feel faint, dizzy, confused, or drowsy. You have been taking your blood pressure medicine but your pressure is higher than your provider says it should be. You have questions or concerns about your condition or care. Treatment for chronic hypertension  may include medicine to lower your blood pressure and cholesterol levels. A low cholesterol level helps prevent heart disease and makes it easier to control your blood pressure. Heart disease can make your blood pressure harder to control. You may also need to make lifestyle changes. What you need to know about the stages of hypertension:  Your healthcare provider will give you a blood pressure goal based on your age, health, and risk for cardiovascular disease. The following are general guidelines on the stages of hypertension:  Normal blood pressure is 119/79 or lower .  Your provider may only check your blood pressure each year if it stays at a normal level.    Elevated blood pressure is 120/79 to 129/79 . This is sometimes called prehypertension. Your provider may suggest lifestyle changes to help lower your blood pressure to a normal level. He or she may then check it again in 3 to 6 months. Stage 1 hypertension is 130/80  to 139/89 . Your provider may recommend lifestyle changes, medication, and checks every 3 to 6 months until your blood pressure is controlled. Stage 2 hypertension is 140/90 or higher . Your provider will recommend lifestyle changes and have you take 2 kinds of hypertension medicines. You will also need to have your blood pressure checked monthly until it is controlled. Manage chronic hypertension:   Check your blood pressure at home. Do not smoke, have caffeine, or exercise for at least 30 minutes before you check your blood pressure. Sit and rest for 5 minutes before you check your blood pressure. Extend your arm and support it on a flat surface. Your arm should be at the same level as your heart. Follow the directions that came with your blood pressure monitor. Check your blood pressure 2 times, 1 minute apart, before you take your medicine in the morning. Also check your blood pressure before your evening meal. Keep a record of your readings and bring it to your follow-up visits. Your healthcare provider may use the readings to make changes to your treatment plan. Manage any other health conditions you have. Health conditions such as diabetes can increase your risk for hypertension. Follow your provider's instructions and take all your medicines as directed. Talk to your provider about any new health conditions you have recently developed. Ask about all medicines. Certain medicines can increase your blood pressure. Examples include oral birth control pills, decongestants, herbal supplements, and NSAIDs, such as ibuprofen. Your provider can tell you which medicines are safe for you to take.  This includes prescription and over-the-counter medicines. Lifestyle changes you can make to lower your blood pressure: Your provider may want you to make more lifestyle changes if you are having trouble controlling your blood pressure. This may feel difficult over time, especially if you think you are making good changes but your pressure is still high. It might help to focus on one new change at a time. For example, try to add 1 more day of exercise, or exercise for an extra 10 minutes on 2 days. Small changes can make a big difference. Your healthcare provider can also refer you to specialists such as a dietitian who can help you make small changes. Your family members may be included in helping you learn to create lifestyle changes, such as the following:     Limit sodium (salt) as directed. Too much sodium can affect your fluid balance. Check labels to find low-sodium or no-salt-added foods. Some low-sodium foods use potassium salts for flavor. Too much potassium can also cause health problems. Your provider will tell you how much sodium and potassium are safe for you to have in a day. He or she may recommend that you limit sodium to 2,300 mg a day. Follow the meal plan recommended by your provider. A dietitian or your provider can give you more information on low-sodium plans or the DASH (Dietary Approaches to Stop Hypertension) eating plan. The DASH plan is low in sodium, processed sugar, unhealthy fats, and total fat. It is high in potassium, calcium, and fiber. These can be found in vegetables, fruit, and whole-grain foods. Be physically active throughout the day. Physical activity, such as exercise, can help control your blood pressure and your weight. Be physically active for at least 30 minutes per day, on most days of the week. Include aerobic activity, such as walking or riding a bicycle. Also include strength training at least 2 times each week.  Your provider can help you create a physical activity plan. Decrease stress. This may help lower your blood pressure. Learn ways to relax, such as deep breathing or listening to music. Limit alcohol as directed. Alcohol can increase your blood pressure. A drink of alcohol is 12 ounces of beer, 5 ounces of wine, or 1½ ounces of liquor. Your provider can help you set daily and weekly drink limits. He or she may recommend no alcohol if your blood pressure stays higher than goal even with medicine or other measures. Ask your provider for information if you need help to quit. Do not smoke. Nicotine and other chemicals in cigarettes and cigars can increase your blood pressure and also cause lung damage. Ask your provider for information if you currently smoke and need help to quit. E-cigarettes or smokeless tobacco still contain nicotine. Talk to your provider before you use these products. Follow up with your doctor or cardiologist as directed: You will need to return to have your blood pressure checked and to have other lab tests done. Write down your questions so you remember to ask them during your visits. © Copyright [employer] 2022 Information is for End User's use only and may not be sold, redistributed or otherwise used for commercial purposes. The above information is an  only. It is not intended as medical advice for individual conditions or treatments. Talk to your doctor, nurse or pharmacist before following any medical regimen to see if it is safe and effective for you.

## 2023-08-31 ENCOUNTER — APPOINTMENT (OUTPATIENT)
Dept: LAB | Facility: CLINIC | Age: 63
End: 2023-08-31
Payer: COMMERCIAL

## 2023-08-31 DIAGNOSIS — E83.52 HYPERCALCEMIA: ICD-10-CM

## 2023-08-31 DIAGNOSIS — F32.9 REACTIVE DEPRESSION: ICD-10-CM

## 2023-08-31 DIAGNOSIS — R53.83 FATIGUE, UNSPECIFIED TYPE: ICD-10-CM

## 2023-08-31 DIAGNOSIS — R79.9 ABNORMAL BLOOD CHEMISTRY LEVEL: ICD-10-CM

## 2023-08-31 DIAGNOSIS — R79.89 ABNORMAL THYROID BLOOD TEST: ICD-10-CM

## 2023-08-31 PROCEDURE — 86376 MICROSOMAL ANTIBODY EACH: CPT

## 2023-08-31 PROCEDURE — 36415 COLL VENOUS BLD VENIPUNCTURE: CPT

## 2023-08-31 PROCEDURE — 82330 ASSAY OF CALCIUM: CPT

## 2023-08-31 PROCEDURE — 83970 ASSAY OF PARATHORMONE: CPT

## 2023-08-31 PROCEDURE — 84480 ASSAY TRIIODOTHYRONINE (T3): CPT

## 2023-08-31 PROCEDURE — 82310 ASSAY OF CALCIUM: CPT

## 2023-08-31 PROCEDURE — 86800 THYROGLOBULIN ANTIBODY: CPT

## 2023-08-31 PROCEDURE — 84443 ASSAY THYROID STIM HORMONE: CPT

## 2023-09-01 LAB
CA-I BLD-SCNC: 1.06 MMOL/L (ref 1.12–1.32)
CALCIUM SERPL-MCNC: 9.1 MG/DL (ref 8.4–10.2)
PTH-INTACT SERPL-MCNC: 31.9 PG/ML (ref 12–88)
T3 SERPL-MCNC: 0.9 NG/ML
TSH SERPL DL<=0.05 MIU/L-ACNC: 1.8 UIU/ML (ref 0.45–4.5)

## 2023-09-02 LAB
THYROGLOB AB SERPL-ACNC: <1 IU/ML (ref 0–0.9)
THYROPEROXIDASE AB SERPL-ACNC: 10 IU/ML (ref 0–34)

## 2023-10-02 ENCOUNTER — OFFICE VISIT (OUTPATIENT)
Dept: INTERNAL MEDICINE CLINIC | Facility: CLINIC | Age: 63
End: 2023-10-02
Payer: COMMERCIAL

## 2023-10-02 VITALS
HEIGHT: 70 IN | OXYGEN SATURATION: 96 % | WEIGHT: 281 LBS | DIASTOLIC BLOOD PRESSURE: 60 MMHG | SYSTOLIC BLOOD PRESSURE: 118 MMHG | BODY MASS INDEX: 40.23 KG/M2 | TEMPERATURE: 98.4 F | HEART RATE: 104 BPM

## 2023-10-02 DIAGNOSIS — M25.561 RIGHT KNEE PAIN, UNSPECIFIED CHRONICITY: ICD-10-CM

## 2023-10-02 DIAGNOSIS — T78.40XD ALLERGY, SUBSEQUENT ENCOUNTER: ICD-10-CM

## 2023-10-02 DIAGNOSIS — G47.9 SLEEP DISTURBANCE: ICD-10-CM

## 2023-10-02 DIAGNOSIS — J32.9 CHRONIC SINUSITIS, UNSPECIFIED LOCATION: Primary | ICD-10-CM

## 2023-10-02 DIAGNOSIS — G47.33 OBSTRUCTIVE SLEEP APNEA: ICD-10-CM

## 2023-10-02 DIAGNOSIS — R60.0 LOCALIZED EDEMA: ICD-10-CM

## 2023-10-02 DIAGNOSIS — R26.9 GAIT ABNORMALITY: ICD-10-CM

## 2023-10-02 DIAGNOSIS — R76.8 RHEUMATOID FACTOR POSITIVE: ICD-10-CM

## 2023-10-02 DIAGNOSIS — R53.82 CHRONIC FATIGUE: ICD-10-CM

## 2023-10-02 DIAGNOSIS — R93.0 ABNORMAL CT SCAN, SINUS: ICD-10-CM

## 2023-10-02 PROCEDURE — 99214 OFFICE O/P EST MOD 30 MIN: CPT | Performed by: INTERNAL MEDICINE

## 2023-10-02 NOTE — PROGRESS NOTES
Assessment/Plan:  Problem List Items Addressed This Visit        Respiratory    Obstructive sleep apnea    Relevant Orders    Ambulatory Referral to Sleep Medicine   Other Visit Diagnoses     Chronic sinusitis, unspecified location    -  Primary    Relevant Orders    [facility] Panel, Adult    Ambulatory Referral to Otolaryngology    Allergy, subsequent encounter        Relevant Orders    [facility]ergy Panel, Adult    Ambulatory Referral to Otolaryngology    Abnormal CT scan, sinus        Relevant Orders    Ambulatory Referral to Otolaryngology    Chronic fatigue        Relevant Orders    CBC and differential    Comprehensive metabolic panel    TSH, 3rd generation with Free T4 reflex    UA w Reflex to Microscopic w Reflex to Culture    Ambulatory Referral to Sleep Medicine    Hemoglobin A1C    Localized edema        Sleep disturbance        Gait abnormality        Relevant Orders    Ambulatory referral to Orthopedic Surgery    Right knee pain, unspecified chronicity        Relevant Orders    Ambulatory referral to Orthopedic Surgery    Rheumatoid factor positive               Diagnoses and all orders for this visit:    Chronic sinusitis, unspecified location  -     [facility] Allergy Panel, Adult; Future  -     Ambulatory Referral to Otolaryngology; Future    Allergy, subsequent encounter  -     [facility] Allergy Panel, Adult; Future  -     Ambulatory Referral to Otolaryngology; Future    Abnormal CT scan, sinus  -     Ambulatory Referral to Otolaryngology; Future    Chronic fatigue  -     CBC and differential; Future  -     Comprehensive metabolic panel; Future  -     TSH, 3rd generation with Free T4 reflex; Future  -     UA w Reflex to Microscopic w Reflex to Culture  -     Ambulatory Referral to Sleep Medicine; Future  -     Hemoglobin A1C; Future    Localized edema    Sleep disturbance    Obstructive sleep apnea  -     Ambulatory Referral to Sleep Medicine;  Future    Gait abnormality  -     Ambulatory referral to Orthopedic Surgery; Future    Right knee pain, unspecified chronicity  -     Ambulatory referral to Orthopedic Surgery; Future    Rheumatoid factor positive        No problem-specific Assessment & Plan notes found for this encounter. A/P: Pt stable but clinically looking worse. ?cause of the fatigue and worsening edema. No s/s of CHF, but ?developing ascites along with the muddy sclera. ?sleep due to sinuses and allergies or if increase in abdominal girth causing silent reflux interfering with sleep. RF positive in the past and did not f/u with rheum that I am a ware of. ?CPAP needs adjustment. ?ETOH is worse. Will check routine labs. Will refer to sleep med, ortho for the knee and ENT. ? CT Sinus changes a factory and if pt needs sinuses scoped. RTC two weeks for f/u and may need CT abd or US r/o ascites. Subjective:      Patient ID: Steve Roca. Kim Martino is a 57 y.o. male. WM RTC with his wife due to overall decline. Pt with severe fatigue and sleep issues. 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 Sleeping during the day at times, but very little at night. Reports sinuses and allergies have been very bad and has a lot of PND despite singulair, anti histamines and kenalog injection. Pt with a fall earlier this years with some facial trauma. CT face with sinuses abnormal.. Reports PND causing difficulty sleeping as well. Reports right knee will acutely give out and pt has had several falls/near falls. Edema continues.        The following portions of the patient's history were reviewed and updated as appropriate:   He has a past medical history of Abnormal kidney function, Allergic rhinitis, Benign colon polyp, Carpal tunnel syndrome, Cervical radiculopathy, Chronic fatigue, Chronic pain disorder, COPD (chronic obstructive pulmonary disease) ([address]), CPAP (continuous positive airway pressure) dependence, Depression (4/7/2023), Edema, Elevated liver enzymes, Gout, Hyperlipidemia, Hypertension, Hypotension, Left ventricular hypertrophy, Obesity (BMI 30-39.9) (9/20/2018), Pneumonia (4/5/2023), and Sleep apnea. ,  does not have any pertinent problems on file. ,   has a past surgical history that includes Hemorrhoid surgery; Carpal tunnel release (Bilateral); and Liver biopsy. ,  family history includes Cancer in his mother; No Known Problems in his brother, father, maternal grandfather, maternal grandmother, paternal grandfather, paternal grandmother, and sister. ,   reports that he has been smoking cigarettes. He started smoking about 35 years ago. He has a 20.00 pack-year smoking history. He has never been exposed to tobacco smoke. He has never used smokeless tobacco. He reports current alcohol use of about 6.0 standard drinks of alcohol per week. He reports that he does not use drugs. ,  has No Known Allergies. .  Current Outpatient Medications   Medication Sig Dispense Refill   • aspirin (ECOTRIN LOW STRENGTH) 81 mg EC tablet Take 1 tablet (81 mg total) by mouth daily     • cyanocobalamin (VITAMIN B-12) 100 mcg tablet      • escitalopram (LEXAPRO) 10 mg tablet Take 1 tablet (10 mg total) by mouth daily 90 tablet 3   • Fexofenadine HCl (ALLEGRA ALLERGY PO) Take by mouth     • fluticasone (FLONASE) 50 mcg/act nasal spray 1 spray into each nostril daily 16 g 0   • furosemide (LASIX) 20 mg tablet Take 1 tablet (20 mg total) by mouth daily 90 tablet 0   • losartan (COZAAR) 100 MG tablet Take 1 tablet (100 mg total) by mouth daily 90 tablet 0   • montelukast (SINGULAIR) 10 mg tablet Take 1 tablet (10 mg total) by mouth daily at bedtime 90 tablet 3   • multivitamin (THERAGRAN) TABS Take 1 tablet by mouth daily       No current facility-administered medications for this visit. Review of Systems   Constitutional: Positive for activity change. Negative for chills, diaphoresis, fatigue and fever. HENT: Positive for congestion, facial swelling, postnasal drip, rhinorrhea and sinus pressure.  Negative for ear discharge, ear pain, sinus pain and sore throat. Respiratory: Negative for cough, chest tightness, shortness of breath and wheezing. Cardiovascular: Positive for leg swelling. Negative for chest pain and palpitations. Gastrointestinal: Negative for abdominal pain, constipation, diarrhea, nausea and vomiting. Genitourinary: Negative for difficulty urinating, dysuria and frequency. Musculoskeletal: Positive for arthralgias and gait problem. Negative for joint swelling and myalgias. Neurological: Negative for light-headedness and headaches. Psychiatric/Behavioral: Positive for sleep disturbance. Negative for confusion. The patient is not nervous/anxious. PHQ-2/9 Depression Screening          Objective:  Vitals:    10/02/23 1620   BP: 118/60   Pulse: 104   Temp: 98.4 °F (36.9 °C)   SpO2: 96%   Weight: 127 kg (281 lb)   Height: 5' 10" (1.778 m)     Body mass index is 40.32 kg/m². Physical Exam  Vitals and nursing note reviewed. Constitutional:       General: He is not in acute distress. Appearance: Normal appearance. He is not ill-appearing. HENT:      Head: Normocephalic and atraumatic. Right Ear: Tympanic membrane, ear canal and external ear normal. There is no impacted cerumen. Left Ear: Tympanic membrane and external ear normal. There is no impacted cerumen. Nose: Congestion and rhinorrhea present. Mouth/Throat:      Mouth: Mucous membranes are moist.      Pharynx: No oropharyngeal exudate or posterior oropharyngeal erythema. Eyes:      General: Scleral icterus present. Conjunctiva/sclera: Conjunctivae normal.      Pupils: Pupils are equal, round, and reactive to light. Cardiovascular:      Rate and Rhythm: Normal rate and regular rhythm. Heart sounds: Normal heart sounds. No murmur heard. Pulmonary:      Effort: Pulmonary effort is normal. No respiratory distress. Breath sounds: Normal breath sounds. No wheezing, rhonchi or rales.    Abdominal:      General: Bowel sounds are normal. There is no distension. Palpations: Abdomen is soft. Tenderness: There is no abdominal tenderness. Musculoskeletal:      Cervical back: Neck supple. Right lower leg: Edema present. Left lower leg: Edema present. Lymphadenopathy:      Cervical: No cervical adenopathy. Neurological:      General: No focal deficit present. Mental Status: He is alert and oriented to person, place, and time. Mental status is at baseline. Psychiatric:         Mood and Affect: Mood normal.         Behavior: Behavior normal.         Thought Content:  Thought content normal.         Judgment: Judgment normal.

## 2023-10-03 ENCOUNTER — LAB (OUTPATIENT)
Dept: LAB | Facility: CLINIC | Age: 63
End: 2023-10-03
Payer: COMMERCIAL

## 2023-10-03 DIAGNOSIS — T78.40XD ALLERGY, SUBSEQUENT ENCOUNTER: ICD-10-CM

## 2023-10-03 DIAGNOSIS — J32.9 CHRONIC SINUSITIS, UNSPECIFIED LOCATION: ICD-10-CM

## 2023-10-03 DIAGNOSIS — R53.82 CHRONIC FATIGUE: ICD-10-CM

## 2023-10-03 DIAGNOSIS — I71.40 ABDOMINAL AORTIC ANEURYSM (AAA) WITHOUT RUPTURE, UNSPECIFIED PART (HCC): Primary | ICD-10-CM

## 2023-10-03 DIAGNOSIS — R18.8 OTHER ASCITES: ICD-10-CM

## 2023-10-03 DIAGNOSIS — R16.0 HEPATOMEGALY: ICD-10-CM

## 2023-10-03 DIAGNOSIS — R60.0 LOCALIZED EDEMA: ICD-10-CM

## 2023-10-03 DIAGNOSIS — I10 BENIGN ESSENTIAL HYPERTENSION: ICD-10-CM

## 2023-10-03 LAB
ALBUMIN SERPL BCP-MCNC: 3.6 G/DL (ref 3.5–5)
ALP SERPL-CCNC: 180 U/L (ref 34–104)
ALT SERPL W P-5'-P-CCNC: 104 U/L (ref 7–52)
ANION GAP SERPL CALCULATED.3IONS-SCNC: 11 MMOL/L
AST SERPL W P-5'-P-CCNC: 278 U/L (ref 13–39)
BACTERIA UR QL AUTO: ABNORMAL /HPF
BASOPHILS # BLD AUTO: 0.05 THOUSANDS/ÂΜL (ref 0–0.1)
BASOPHILS NFR BLD AUTO: 1 % (ref 0–1)
BILIRUB SERPL-MCNC: 4.23 MG/DL (ref 0.2–1)
BILIRUB UR QL STRIP: NEGATIVE
BUN SERPL-MCNC: 11 MG/DL (ref 5–25)
CALCIUM SERPL-MCNC: 8.1 MG/DL (ref 8.4–10.2)
CHLORIDE SERPL-SCNC: 89 MMOL/L (ref 96–108)
CLARITY UR: ABNORMAL
CO2 SERPL-SCNC: 28 MMOL/L (ref 21–32)
COLOR UR: YELLOW
CREAT SERPL-MCNC: 0.82 MG/DL (ref 0.6–1.3)
EOSINOPHIL # BLD AUTO: 0.14 THOUSAND/ÂΜL (ref 0–0.61)
EOSINOPHIL NFR BLD AUTO: 2 % (ref 0–6)
ERYTHROCYTE [DISTWIDTH] IN BLOOD BY AUTOMATED COUNT: 19.8 % (ref 11.6–15.1)
EST. AVERAGE GLUCOSE BLD GHB EST-MCNC: 123 MG/DL
GFR SERPL CREATININE-BSD FRML MDRD: 93 ML/MIN/1.73SQ M
GLUCOSE P FAST SERPL-MCNC: 123 MG/DL (ref 65–99)
GLUCOSE UR STRIP-MCNC: NEGATIVE MG/DL
HBA1C MFR BLD: 5.9 %
HCT VFR BLD AUTO: 36.2 % (ref 36.5–49.3)
HGB BLD-MCNC: 13.1 G/DL (ref 12–17)
HGB UR QL STRIP.AUTO: ABNORMAL
IMM GRANULOCYTES # BLD AUTO: 0.04 THOUSAND/UL (ref 0–0.2)
IMM GRANULOCYTES NFR BLD AUTO: 1 % (ref 0–2)
KETONES UR STRIP-MCNC: NEGATIVE MG/DL
LEUKOCYTE ESTERASE UR QL STRIP: NEGATIVE
LG PLATELETS BLD QL SMEAR: PRESENT
LYMPHOCYTES # BLD AUTO: 1.34 THOUSANDS/ÂΜL (ref 0.6–4.47)
LYMPHOCYTES NFR BLD AUTO: 22 % (ref 14–44)
MACROCYTES BLD QL AUTO: PRESENT
MCH RBC QN AUTO: 35.8 PG (ref 26.8–34.3)
MCHC RBC AUTO-ENTMCNC: 36.2 G/DL (ref 31.4–37.4)
MCV RBC AUTO: 99 FL (ref 82–98)
MONOCYTES # BLD AUTO: 0.92 THOUSAND/ÂΜL (ref 0.17–1.22)
MONOCYTES NFR BLD AUTO: 15 % (ref 4–12)
NEUTROPHILS # BLD AUTO: 3.7 THOUSANDS/ÂΜL (ref 1.85–7.62)
NEUTS SEG NFR BLD AUTO: 59 % (ref 43–75)
NITRITE UR QL STRIP: NEGATIVE
NON-SQ EPI CELLS URNS QL MICRO: ABNORMAL /HPF
NRBC BLD AUTO-RTO: 0 /100 WBCS
PAPPENHEIMER BODIES: PRESENT
PH UR STRIP.AUTO: 6.5 [PH]
PLATELET # BLD AUTO: 62 THOUSANDS/UL (ref 149–390)
PLATELET BLD QL SMEAR: ABNORMAL
PMV BLD AUTO: 11.6 FL (ref 8.9–12.7)
POLYCHROMASIA BLD QL SMEAR: PRESENT
POTASSIUM SERPL-SCNC: 4.2 MMOL/L (ref 3.5–5.3)
PROT SERPL-MCNC: 6.7 G/DL (ref 6.4–8.4)
PROT UR STRIP-MCNC: NEGATIVE MG/DL
RBC # BLD AUTO: 3.66 MILLION/UL (ref 3.88–5.62)
RBC #/AREA URNS AUTO: ABNORMAL /HPF
RBC MORPH BLD: ABNORMAL
SODIUM SERPL-SCNC: 128 MMOL/L (ref 135–147)
SP GR UR STRIP.AUTO: 1.01 (ref 1–1.03)
TARGETS BLD QL SMEAR: PRESENT
TSH SERPL DL<=0.05 MIU/L-ACNC: 4.11 UIU/ML (ref 0.45–4.5)
UROBILINOGEN UR STRIP-ACNC: 3 MG/DL
WBC # BLD AUTO: 6.19 THOUSAND/UL (ref 4.31–10.16)
WBC #/AREA URNS AUTO: ABNORMAL /HPF

## 2023-10-03 PROCEDURE — 81001 URINALYSIS AUTO W/SCOPE: CPT | Performed by: INTERNAL MEDICINE

## 2023-10-03 PROCEDURE — 82785 ASSAY OF IGE: CPT

## 2023-10-03 PROCEDURE — 36415 COLL VENOUS BLD VENIPUNCTURE: CPT

## 2023-10-03 PROCEDURE — 84443 ASSAY THYROID STIM HORMONE: CPT

## 2023-10-03 PROCEDURE — 83036 HEMOGLOBIN GLYCOSYLATED A1C: CPT

## 2023-10-03 PROCEDURE — 80053 COMPREHEN METABOLIC PANEL: CPT

## 2023-10-03 PROCEDURE — 85025 COMPLETE CBC W/AUTO DIFF WBC: CPT

## 2023-10-03 PROCEDURE — 86003 ALLG SPEC IGE CRUDE XTRC EA: CPT

## 2023-10-03 RX ORDER — FUROSEMIDE 20 MG/1
40 TABLET ORAL DAILY
Qty: 90 TABLET | Refills: 0
Start: 2023-10-03

## 2023-10-04 NOTE — RESULT ENCOUNTER NOTE
Call pt, labs with low sodium and calcium and elevated LFT's. Increase lasix as instructed yesterday. Liberalize his salt intake SLIGHTLY. Repeat   CMP and order a B12/folate on Monday.  Move up follow up to next week

## 2023-10-05 LAB

## 2023-10-06 DIAGNOSIS — R79.9 ABNORMAL BLOOD CHEMISTRY LEVEL: Primary | ICD-10-CM

## 2023-10-07 ENCOUNTER — APPOINTMENT (OUTPATIENT)
Dept: LAB | Facility: CLINIC | Age: 63
End: 2023-10-07
Payer: COMMERCIAL

## 2023-10-07 DIAGNOSIS — R79.9 ABNORMAL BLOOD CHEMISTRY LEVEL: ICD-10-CM

## 2023-10-07 LAB
ALBUMIN SERPL BCP-MCNC: 3.7 G/DL (ref 3.5–5)
ALP SERPL-CCNC: 197 U/L (ref 34–104)
ALT SERPL W P-5'-P-CCNC: 73 U/L (ref 7–52)
ANION GAP SERPL CALCULATED.3IONS-SCNC: 12 MMOL/L
AST SERPL W P-5'-P-CCNC: 203 U/L (ref 13–39)
BILIRUB SERPL-MCNC: 5.35 MG/DL (ref 0.2–1)
BUN SERPL-MCNC: 16 MG/DL (ref 5–25)
CALCIUM SERPL-MCNC: 9.2 MG/DL (ref 8.4–10.2)
CHLORIDE SERPL-SCNC: 93 MMOL/L (ref 96–108)
CO2 SERPL-SCNC: 28 MMOL/L (ref 21–32)
CREAT SERPL-MCNC: 0.87 MG/DL (ref 0.6–1.3)
FOLATE SERPL-MCNC: 6.4 NG/ML
GFR SERPL CREATININE-BSD FRML MDRD: 91 ML/MIN/1.73SQ M
GLUCOSE P FAST SERPL-MCNC: 117 MG/DL (ref 65–99)
POTASSIUM SERPL-SCNC: 4.1 MMOL/L (ref 3.5–5.3)
PROT SERPL-MCNC: 7.3 G/DL (ref 6.4–8.4)
SODIUM SERPL-SCNC: 133 MMOL/L (ref 135–147)
VIT B12 SERPL-MCNC: 3353 PG/ML (ref 180–914)

## 2023-10-07 PROCEDURE — 80053 COMPREHEN METABOLIC PANEL: CPT

## 2023-10-07 PROCEDURE — 36415 COLL VENOUS BLD VENIPUNCTURE: CPT

## 2023-10-07 PROCEDURE — 82607 VITAMIN B-12: CPT

## 2023-10-07 PROCEDURE — 82746 ASSAY OF FOLIC ACID SERUM: CPT

## 2023-10-09 ENCOUNTER — APPOINTMENT (OUTPATIENT)
Dept: RADIOLOGY | Facility: CLINIC | Age: 63
End: 2023-10-09
Payer: COMMERCIAL

## 2023-10-09 ENCOUNTER — OFFICE VISIT (OUTPATIENT)
Dept: OBGYN CLINIC | Facility: CLINIC | Age: 63
End: 2023-10-09
Payer: COMMERCIAL

## 2023-10-09 VITALS
DIASTOLIC BLOOD PRESSURE: 83 MMHG | HEIGHT: 70 IN | WEIGHT: 267.2 LBS | BODY MASS INDEX: 38.25 KG/M2 | SYSTOLIC BLOOD PRESSURE: 157 MMHG | TEMPERATURE: 98.6 F | HEART RATE: 82 BPM

## 2023-10-09 DIAGNOSIS — M25.561 RIGHT KNEE PAIN, UNSPECIFIED CHRONICITY: ICD-10-CM

## 2023-10-09 DIAGNOSIS — R26.9 GAIT ABNORMALITY: ICD-10-CM

## 2023-10-09 DIAGNOSIS — Z01.89 ENCOUNTER FOR LOWER EXTREMITY COMPARISON IMAGING STUDY: ICD-10-CM

## 2023-10-09 DIAGNOSIS — M51.37 DDD (DEGENERATIVE DISC DISEASE), LUMBOSACRAL: Primary | ICD-10-CM

## 2023-10-09 DIAGNOSIS — R29.898 WEAKNESS OF RIGHT LOWER EXTREMITY: ICD-10-CM

## 2023-10-09 PROCEDURE — 99204 OFFICE O/P NEW MOD 45 MIN: CPT | Performed by: STUDENT IN AN ORGANIZED HEALTH CARE EDUCATION/TRAINING PROGRAM

## 2023-10-09 PROCEDURE — 73560 X-RAY EXAM OF KNEE 1 OR 2: CPT

## 2023-10-09 PROCEDURE — 73562 X-RAY EXAM OF KNEE 3: CPT

## 2023-10-09 NOTE — PROGRESS NOTES
Ortho Sports Medicine Knee New Patient Visit     Assesment:   57 y.o. male with right knee swelling and weakness in the setting of a history of degenerative disc disease to cervical and lumbar spine. Plan:  Reviewed the history, exam, and imaging with the patient. Discussed with the patient that I think that his symptoms are more likely due to his cervical and lumbar degenerative disc disease rather than any pathology in the knee. He denies any pain or mechanical symptoms in the knee other than some mild swelling after episodes of buckling. I recommended that the patient have a repeat evaluation with our spine care team since it has been 10 years since he was evaluated for his cervical or lumbar issues. Patient demonstrated understanding of the discussion and was agreeable to this plan. All the patient's questions were answered. We placed a referral to the pain and spine care team.  Patient can follow-up with us as needed      Conservative treatment:  Recommend referral to Pain Management for reevaluation of the spine which may be causing weakness of the RLE. Imaging: All imaging from today was reviewed by myself and explained to the patient. Injection:  No Injection planned at this time. Surgery:   No surgery is recommended at this point, continue with conservative treatment plan as noted. Follow up:  Return if symptoms of knee swelling worsen or fail to improve. Chief Complaint   Patient presents with   • Right Knee - Pain       History of Present Illness: The patient is a 57 y.o. male, referred to me by their primary care physician, seen in clinic for consultation of right knee weakness. He states that he has a history of both cervical and lumbar herniated disks but was last evaluated approximate 10 years ago. He states that they were treated with injections but has had not had any surgery.   He states that he has recently had several episodes where his right knee has given out on him, most recently causing him to fall resulting in some abrasions and ecchymosis over his right knee as well as his face. He denies any pain or mechanical symptoms in the right knee. He does state that sometimes he notices a small amount of swelling in the knee. His only complaint is that it does buckle on him intermittently he feels is due to right lower extremity weakness. He does endorse decreased sensation in his right lower extremity. He denies any bowel or bladder incontinence or saddle anesthesia at this time.     Knee Surgical History:  None    Past Medical, Social and Family History:  Past Medical History:   Diagnosis Date   • Abnormal kidney function     last assessed: Feb 25, 2016   • Allergic rhinitis    • Benign colon polyp    • Carpal tunnel syndrome    • Cervical radiculopathy     last assessed: Feb 11, 2015   • Chronic fatigue    • Chronic pain disorder    • COPD (chronic obstructive pulmonary disease) (HCC)    • CPAP (continuous positive airway pressure) dependence    • Depression 4/7/2023   • Edema    • Elevated liver enzymes    • Gout    • Hyperlipidemia    • Hypertension    • Hypotension     last assessed: March 22, 2017   • Left ventricular hypertrophy    • Obesity (BMI 30-39.9) 9/20/2018   • Pneumonia 4/5/2023   • Sleep apnea      Past Surgical History:   Procedure Laterality Date   • CARPAL TUNNEL RELEASE Bilateral    • HEMORRHOID SURGERY     • LIVER BIOPSY       No Known Allergies  Current Outpatient Medications on File Prior to Visit   Medication Sig Dispense Refill   • cyanocobalamin (VITAMIN B-12) 100 mcg tablet      • escitalopram (LEXAPRO) 10 mg tablet Take 1 tablet (10 mg total) by mouth daily 90 tablet 3   • Fexofenadine HCl (ALLEGRA ALLERGY PO) Take by mouth     • fluticasone (FLONASE) 50 mcg/act nasal spray 1 spray into each nostril daily 16 g 0   • furosemide (LASIX) 20 mg tablet Take 2 tablets (40 mg total) by mouth daily 90 tablet 0   • losartan (COZAAR) 100 MG tablet Take 1 tablet (100 mg total) by mouth daily 90 tablet 0   • montelukast (SINGULAIR) 10 mg tablet Take 1 tablet (10 mg total) by mouth daily at bedtime 90 tablet 3   • multivitamin (THERAGRAN) TABS Take 1 tablet by mouth daily     • aspirin (ECOTRIN LOW STRENGTH) 81 mg EC tablet Take 1 tablet (81 mg total) by mouth daily       No current facility-administered medications on file prior to visit. Social History     Socioeconomic History   • Marital status: /Civil Union     Spouse name: Not on file   • Number of children: Not on file   • Years of education: Not on file   • Highest education level: Not on file   Occupational History   • Occupation: working full time    Tobacco Use   • Smoking status: Every Day     Packs/day: 0.50     Years: 40.00     Total pack years: 20.00     Types: Cigarettes     Start date: 1/2/1988     Passive exposure: Never   • Smokeless tobacco: Never   • Tobacco comments:     Given [facility] "how to quit" info    Vaping Use   • Vaping Use: Never used   Substance and Sexual Activity   • Alcohol use: Not Currently     Alcohol/week: 6.0 standard drinks of alcohol     Types: 6 Cans of beer per week     Comment: social    • Drug use: No   • Sexual activity: Not Currently   Other Topics Concern   • Not on file   Social History Narrative    EMPLOYED     Social Determinants of Health     Financial Resource Strain: Not on file   Food Insecurity: No Food Insecurity (4/4/2023)    Hunger Vital Sign    • Worried About Running Out of Food in the Last Year: Never true    • Ran Out of Food in the Last Year: Never true   Transportation Needs: No Transportation Needs (4/4/2023)    PRAPARE - Transportation    • Lack of Transportation (Medical): No    • Lack of Transportation (Non-Medical):  No   Physical Activity: Not on file   Stress: Not on file   Social Connections: Not on file   Intimate Partner Violence: Not on file   Housing Stability: Low Risk  (4/4/2023)    Housing Stability Vital Sign    • Unable to Pay for Housing in the Last Year: No    • Number of Places Lived in the Last Year: 1    • Unstable Housing in the Last Year: No         I have reviewed the past medical, surgical, social and family history, medications and allergies as documented in the EMR. Review of systems: ROS is negative other than that noted in the HPI. Constitutional: Negative for fatigue and fever. HENT: Negative for sore throat. Respiratory: Negative for shortness of breath. Cardiovascular: Negative for chest pain. Gastrointestinal: Negative for abdominal pain. Endocrine: Negative for cold intolerance and heat intolerance. Genitourinary: Negative for flank pain. Musculoskeletal: Negative for back pain. Skin: Negative for rash. Allergic/Immunologic: Negative for immunocompromised state. Neurological: Negative for dizziness. Psychiatric/Behavioral: Negative for agitation. Physical Exam:    Blood pressure 157/83, pulse 82, temperature 98.6 °F (37 °C), temperature source Tympanic, height 5' 10" (1.778 m), weight 121 kg (267 lb 3.2 oz). General/Constitutional: NAD, well developed, well nourished  HENT: Normocephalic, atraumatic  CV: Intact distal pulses, regular rate  Resp: No respiratory distress or labored breathing  GI: Soft and non-tender   Lymphatic: No lymphadenopathy palpated  Neuro: Alert and Oriented x 3, no focal deficits  Psych: Normal mood, normal affect, normal judgement, normal behavior  Skin: Warm, dry, no rashes, no erythema      Knee Exam (focused): RIGHT LEFT   ROM:   0-130    Palpation: Effusion mild      MJL tenderness Negative      LJL tenderness Negative    Meniscus:  Alta Negative     Apley's Compression Not Applicable    Instability: Varus stable      Valgus stable    Special Tests: Lachman Negative      Posterior drawer Negative      Anterior drawer Negative      Pivot shift not tested      Dial not tested    Patella: Palpation no tenderness                  Other:   4/5 strength with knee extension and flexion as well as hip flexion       LE NV Exam: +2 DP/PT pulses bilaterally  Sensation intact to light touch L2-S1 bilaterally     Bilateral hip ROM demonstrates no pain actively or passively. Right hip flexion 4/5. Ankle exam WNL. No calf tenderness to palpation bilaterally    Knee Imaging    X-rays of the right knee were reviewed, which demonstrate no acute fracture or dislocation. Mild patellofemoral osteoarthritis is noted. Joint space preserved medially and laterally.          Scribe Attestation    I,:  Evelin Riley PA-C am acting as a scribe while in the presence of the attending physician.:       I,:  Samuel Doll MD personally performed the services described in this documentation    as scribed in my presence.:

## 2023-10-12 ENCOUNTER — HOSPITAL ENCOUNTER (OUTPATIENT)
Dept: ULTRASOUND IMAGING | Facility: HOSPITAL | Age: 63
End: 2023-10-12
Attending: INTERNAL MEDICINE
Payer: COMMERCIAL

## 2023-10-12 DIAGNOSIS — I71.40 ABDOMINAL AORTIC ANEURYSM (AAA) WITHOUT RUPTURE, UNSPECIFIED PART (HCC): ICD-10-CM

## 2023-10-12 DIAGNOSIS — R18.8 OTHER ASCITES: ICD-10-CM

## 2023-10-12 DIAGNOSIS — R60.0 LOCALIZED EDEMA: ICD-10-CM

## 2023-10-12 DIAGNOSIS — R16.0 HEPATOMEGALY: ICD-10-CM

## 2023-10-12 PROCEDURE — 76700 US EXAM ABDOM COMPLETE: CPT

## 2023-10-17 ENCOUNTER — OFFICE VISIT (OUTPATIENT)
Dept: INTERNAL MEDICINE CLINIC | Facility: CLINIC | Age: 63
End: 2023-10-17
Payer: COMMERCIAL

## 2023-10-17 VITALS
HEART RATE: 68 BPM | HEIGHT: 70 IN | OXYGEN SATURATION: 99 % | DIASTOLIC BLOOD PRESSURE: 66 MMHG | BODY MASS INDEX: 38.37 KG/M2 | TEMPERATURE: 98.7 F | SYSTOLIC BLOOD PRESSURE: 130 MMHG | WEIGHT: 268 LBS

## 2023-10-17 DIAGNOSIS — J32.9 CHRONIC SINUSITIS, UNSPECIFIED LOCATION: ICD-10-CM

## 2023-10-17 DIAGNOSIS — R53.82 CHRONIC FATIGUE: Primary | ICD-10-CM

## 2023-10-17 DIAGNOSIS — R16.0 HEPATOMEGALY: ICD-10-CM

## 2023-10-17 DIAGNOSIS — Z23 ENCOUNTER FOR VACCINATION: ICD-10-CM

## 2023-10-17 DIAGNOSIS — R79.89 ELEVATED LFTS: ICD-10-CM

## 2023-10-17 DIAGNOSIS — R74.8 ELEVATED ALKALINE PHOSPHATASE LEVEL: ICD-10-CM

## 2023-10-17 DIAGNOSIS — R18.8 OTHER ASCITES: ICD-10-CM

## 2023-10-17 DIAGNOSIS — I10 BENIGN ESSENTIAL HYPERTENSION: ICD-10-CM

## 2023-10-17 DIAGNOSIS — R60.0 LOCALIZED EDEMA: ICD-10-CM

## 2023-10-17 DIAGNOSIS — M25.561 RIGHT KNEE PAIN, UNSPECIFIED CHRONICITY: ICD-10-CM

## 2023-10-17 DIAGNOSIS — R17 ELEVATED BILIRUBIN: ICD-10-CM

## 2023-10-17 PROCEDURE — 90662 IIV NO PRSV INCREASED AG IM: CPT

## 2023-10-17 PROCEDURE — 90471 IMMUNIZATION ADMIN: CPT

## 2023-10-17 PROCEDURE — 99214 OFFICE O/P EST MOD 30 MIN: CPT | Performed by: INTERNAL MEDICINE

## 2023-10-17 RX ORDER — LOSARTAN POTASSIUM 100 MG/1
100 TABLET ORAL DAILY
Qty: 90 TABLET | Refills: 1 | Status: SHIPPED | OUTPATIENT
Start: 2023-10-17

## 2023-10-17 RX ORDER — FUROSEMIDE 40 MG/1
40 TABLET ORAL DAILY
Qty: 90 TABLET | Refills: 1 | Status: SHIPPED | OUTPATIENT
Start: 2023-10-17

## 2023-10-17 NOTE — PROGRESS NOTES
Assessment/Plan:  Problem List Items Addressed This Visit        Cardiovascular and Mediastinum    Benign essential hypertension    Relevant Medications    losartan (COZAAR) 100 MG tablet    furosemide (LASIX) 40 mg tablet   Other Visit Diagnoses     Chronic fatigue    -  Primary    Localized edema        Relevant Medications    furosemide (LASIX) 40 mg tablet    Hepatomegaly        Other ascites        Chronic sinusitis, unspecified location        Right knee pain, unspecified chronicity        Elevated LFTs        Relevant Orders    Comprehensive metabolic panel    Elevated bilirubin        Relevant Orders    Comprehensive metabolic panel    Elevated alkaline phosphatase level        Relevant Orders    Comprehensive metabolic panel    Gamma GT    Encounter for vaccination        Relevant Orders    influenza vaccine, quadrivalent, 0.5 mL, preservative-free           Diagnoses and all orders for this visit:    Chronic fatigue    Localized edema  -     furosemide (LASIX) 40 mg tablet; Take 1 tablet (40 mg total) by mouth daily    Hepatomegaly    Other ascites    Chronic sinusitis, unspecified location    Right knee pain, unspecified chronicity    Benign essential hypertension  -     losartan (COZAAR) 100 MG tablet; Take 1 tablet (100 mg total) by mouth daily  -     furosemide (LASIX) 40 mg tablet; Take 1 tablet (40 mg total) by mouth daily    Elevated LFTs  -     Comprehensive metabolic panel; Future    Elevated bilirubin  -     Comprehensive metabolic panel; Future    Elevated alkaline phosphatase level  -     Comprehensive metabolic panel; Future  -     Gamma GT; Future    Encounter for vaccination  -     influenza vaccine, quadrivalent, 0.5 mL, preservative-free        No problem-specific Assessment & Plan notes found for this encounter. A/P: Stable and discussed labs and imaging. Appreciate ortho input. Still needs to see ENT about the sinuses. ?structural issues from facial trauma earlier this year.  Fatty liver and LFT's stable and slightly improved after the first set of labs. Awaiting US results to see if any ascites. Feeling better and 13# wt loss with the increased lasix. Tolerating the meds. ?cause of the fatigue and would keep f/u with sleep meds to see if CPAP needs titrations. Suspect mulitfactorial with sinuses, liver, etc. Will continue increased lasix. Repeat labs. RTC two months for  f/u and routine. Will update his flu vaccine. Subjective:      Patient ID: Pattie Che is a 57 y.o. male. WM RTC for two week f/u worsening fatigue, edema, sinusitis, right knee pain with buckling, and ?worsening ascites. Labs stable with LFT's elevated and hyponatremia, but no worse. No anemia. Allergy panel was completely negative! . Abdominal US is pending. Referred to ENT, sleep med, and ortho. Ortho feels knee issues related to worsening LS spine issues. Has yet to see ENT or sleep med. Reports feeling better after lasix increased. Less fatigue. Sleeping better. Edema better if not on his feet. Sinuses improved. .         The following portions of the patient's history were reviewed and updated as appropriate:   He has a past medical history of Abnormal kidney function, Allergic rhinitis, Benign colon polyp, Carpal tunnel syndrome, Cervical radiculopathy, Chronic fatigue, Chronic pain disorder, COPD (chronic obstructive pulmonary disease) ([address]), CPAP (continuous positive airway pressure) dependence, Depression (4/7/2023), Edema, Elevated liver enzymes, Gout, Hyperlipidemia, Hypertension, Hypotension, Left ventricular hypertrophy, Obesity (BMI 30-39.9) (9/20/2018), Pneumonia (4/5/2023), and Sleep apnea. ,  does not have any pertinent problems on file. ,   has a past surgical history that includes Hemorrhoid surgery; Carpal tunnel release (Bilateral); and Liver biopsy. ,  family history includes Cancer in his mother; No Known Problems in his brother, father, maternal grandfather, maternal grandmother, paternal grandfather, paternal grandmother, and sister. ,   reports that he has been smoking cigarettes. He started smoking about 35 years ago. He has a 20.00 pack-year smoking history. He has never been exposed to tobacco smoke. He has never used smokeless tobacco. He reports that he does not currently use alcohol after a past usage of about 6.0 standard drinks of alcohol per week. He reports that he does not use drugs. ,  has No Known Allergies. .  Current Outpatient Medications   Medication Sig Dispense Refill   • furosemide (LASIX) 40 mg tablet Take 1 tablet (40 mg total) by mouth daily 90 tablet 1   • losartan (COZAAR) 100 MG tablet Take 1 tablet (100 mg total) by mouth daily 90 tablet 1   • cyanocobalamin (VITAMIN B-12) 100 mcg tablet      • escitalopram (LEXAPRO) 10 mg tablet Take 1 tablet (10 mg total) by mouth daily 90 tablet 3   • Fexofenadine HCl (ALLEGRA ALLERGY PO) Take by mouth     • fluticasone (FLONASE) 50 mcg/act nasal spray 1 spray into each nostril daily 16 g 0   • montelukast (SINGULAIR) 10 mg tablet Take 1 tablet (10 mg total) by mouth daily at bedtime 90 tablet 3   • multivitamin (THERAGRAN) TABS Take 1 tablet by mouth daily       No current facility-administered medications for this visit. Review of Systems   Constitutional:  Positive for activity change, appetite change and fatigue. Negative for chills, diaphoresis and fever. HENT:  Positive for congestion, postnasal drip and rhinorrhea. Negative for ear discharge and ear pain. Respiratory:  Negative for cough, chest tightness, shortness of breath and wheezing. Cardiovascular:  Positive for leg swelling. Negative for chest pain and palpitations. Gastrointestinal:  Negative for abdominal pain, constipation, diarrhea, nausea and vomiting. Genitourinary:  Negative for difficulty urinating, dysuria and frequency. Musculoskeletal:  Positive for arthralgias and gait problem. Negative for joint swelling and myalgias.    Neurological: Negative for dizziness, seizures, syncope, weakness, light-headedness and headaches. Psychiatric/Behavioral:  Negative for confusion. The patient is not nervous/anxious. PHQ-2/9 Depression Screening          Objective:  Vitals:    10/17/23 1538   BP: 130/66   Pulse: 68   Temp: 98.7 °F (37.1 °C)   SpO2: 99%   Weight: 122 kg (268 lb)   Height: 5' 10" (1.778 m)     Body mass index is 38.45 kg/m². Physical Exam  Constitutional:       General: He is not in acute distress. Appearance: Normal appearance. He is not ill-appearing. HENT:      Head: Normocephalic and atraumatic. Mouth/Throat:      Mouth: Mucous membranes are moist.   Eyes:      General: Scleral icterus (muddy) present. Extraocular Movements: Extraocular movements intact. Conjunctiva/sclera: Conjunctivae normal.      Pupils: Pupils are equal, round, and reactive to light. Cardiovascular:      Rate and Rhythm: Normal rate and regular rhythm. Heart sounds: Normal heart sounds. No murmur heard. Pulmonary:      Effort: Pulmonary effort is normal. No respiratory distress. Breath sounds: Normal breath sounds. No wheezing, rhonchi or rales. Abdominal:      General: Bowel sounds are normal. There is no distension. Palpations: Abdomen is soft. Tenderness: There is no abdominal tenderness. Musculoskeletal:      Right lower leg: Edema present. Left lower leg: Edema present. Neurological:      General: No focal deficit present. Mental Status: He is alert and oriented to person, place, and time. Mental status is at baseline. Psychiatric:         Mood and Affect: Mood normal.         Behavior: Behavior normal.         Thought Content:  Thought content normal.         Judgment: Judgment normal.

## 2023-10-18 DIAGNOSIS — R16.0 HEPATOMEGALY: Primary | ICD-10-CM

## 2023-10-18 DIAGNOSIS — R60.0 LOCALIZED EDEMA: ICD-10-CM

## 2023-10-18 DIAGNOSIS — R74.8 ELEVATED ALKALINE PHOSPHATASE LEVEL: ICD-10-CM

## 2023-10-18 DIAGNOSIS — R79.89 ELEVATED LFTS: ICD-10-CM

## 2023-10-18 DIAGNOSIS — R18.8 OTHER ASCITES: ICD-10-CM

## 2023-10-18 DIAGNOSIS — R17 ELEVATED BILIRUBIN: ICD-10-CM

## 2023-11-11 ENCOUNTER — APPOINTMENT (OUTPATIENT)
Dept: LAB | Facility: CLINIC | Age: 63
End: 2023-11-11
Payer: COMMERCIAL

## 2023-11-11 DIAGNOSIS — R16.0 HEPATOMEGALY: ICD-10-CM

## 2023-11-11 DIAGNOSIS — R79.89 ELEVATED LFTS: Primary | ICD-10-CM

## 2023-11-11 DIAGNOSIS — R74.8 ELEVATED ALKALINE PHOSPHATASE LEVEL: ICD-10-CM

## 2023-11-11 DIAGNOSIS — R17 ELEVATED BILIRUBIN: ICD-10-CM

## 2023-11-11 DIAGNOSIS — R18.8 OTHER ASCITES: ICD-10-CM

## 2023-11-11 DIAGNOSIS — R79.89 ELEVATED LFTS: ICD-10-CM

## 2023-11-11 LAB
ALBUMIN SERPL BCP-MCNC: 3.7 G/DL (ref 3.5–5)
ALP SERPL-CCNC: 128 U/L (ref 34–104)
ALT SERPL W P-5'-P-CCNC: 37 U/L (ref 7–52)
ANION GAP SERPL CALCULATED.3IONS-SCNC: 13 MMOL/L
AST SERPL W P-5'-P-CCNC: 119 U/L (ref 13–39)
BILIRUB SERPL-MCNC: 2.19 MG/DL (ref 0.2–1)
BUN SERPL-MCNC: 7 MG/DL (ref 5–25)
CALCIUM SERPL-MCNC: 8.7 MG/DL (ref 8.4–10.2)
CHLORIDE SERPL-SCNC: 100 MMOL/L (ref 96–108)
CO2 SERPL-SCNC: 26 MMOL/L (ref 21–32)
CREAT SERPL-MCNC: 0.65 MG/DL (ref 0.6–1.3)
GFR SERPL CREATININE-BSD FRML MDRD: 102 ML/MIN/1.73SQ M
GGT SERPL-CCNC: 407 U/L (ref 9–64)
GLUCOSE P FAST SERPL-MCNC: 131 MG/DL (ref 65–99)
POTASSIUM SERPL-SCNC: 4 MMOL/L (ref 3.5–5.3)
PROT SERPL-MCNC: 7.2 G/DL (ref 6.4–8.4)
SODIUM SERPL-SCNC: 139 MMOL/L (ref 135–147)

## 2023-11-11 PROCEDURE — 82977 ASSAY OF GGT: CPT

## 2023-11-11 PROCEDURE — 80053 COMPREHEN METABOLIC PANEL: CPT

## 2023-11-11 PROCEDURE — 36415 COLL VENOUS BLD VENIPUNCTURE: CPT

## 2023-11-13 ENCOUNTER — OFFICE VISIT (OUTPATIENT)
Dept: SLEEP CENTER | Facility: CLINIC | Age: 63
End: 2023-11-13
Payer: COMMERCIAL

## 2023-11-13 VITALS
HEIGHT: 70 IN | DIASTOLIC BLOOD PRESSURE: 80 MMHG | BODY MASS INDEX: 39.22 KG/M2 | WEIGHT: 274 LBS | OXYGEN SATURATION: 99 % | SYSTOLIC BLOOD PRESSURE: 140 MMHG | HEART RATE: 99 BPM

## 2023-11-13 DIAGNOSIS — F17.200 SMOKER: ICD-10-CM

## 2023-11-13 DIAGNOSIS — J30.2 SEASONAL ALLERGIES: ICD-10-CM

## 2023-11-13 DIAGNOSIS — R53.82 CHRONIC FATIGUE: ICD-10-CM

## 2023-11-13 DIAGNOSIS — G47.33 OBSTRUCTIVE SLEEP APNEA: Primary | ICD-10-CM

## 2023-11-13 DIAGNOSIS — I10 ESSENTIAL HYPERTENSION: ICD-10-CM

## 2023-11-13 DIAGNOSIS — E66.9 OBESITY (BMI 30-39.9): ICD-10-CM

## 2023-11-13 PROCEDURE — 99244 OFF/OP CNSLTJ NEW/EST MOD 40: CPT | Performed by: INTERNAL MEDICINE

## 2023-11-13 NOTE — PATIENT INSTRUCTIONS

## 2023-11-13 NOTE — PROGRESS NOTES
Consultation - [address]amin Fernandes  70 y.o. male  BJW:[id number]  PIS:[id number]  DOS:11/13/2023    Physician Requesting Consult: Lexie Ordoñez DO             Reason for Consult : At your kind request I saw Juan Fernandes for initial sleep evaluation today. He was diagnosed with obstructive sleep apnea in the past and prescribed CPAP. He returns because he needs supplies. Results of prior studies: A diagnostic study in 2006 demonstrated an RDI of 15 per hour with minimum oxygen saturation of 82%. He spent 5% of the study with saturations less than 90%. During a subsequent therapeutic study at the time, his AHI was reduced to 4 per hour at 13 cm H2O. A retitration study study in 2017 demonstrated sleep disordered breathing was adequately remediated with nasal CPAP at 17 cm H2O. However he struggled tolerating the pressure and felt it was much too high. PFSH, Problem List, Medications & Allergies were reviewed in EMR. Shaniqua Rios  has a past medical history of Abnormal kidney function, Allergic rhinitis, Benign colon polyp, Carpal tunnel syndrome, Cervical radiculopathy, Chronic fatigue, Chronic pain disorder, COPD (chronic obstructive pulmonary disease) ([address]), CPAP (continuous positive airway pressure) dependence, Depression (4/7/2023), Edema, Elevated liver enzymes, Gout, Hyperlipidemia, Hypertension, Hypotension, Left ventricular hypertrophy, Obesity (BMI 30-39.9) (9/20/2018), Pneumonia (4/5/2023), and Sleep apnea. He has a current medication list which includes the following prescription(s): escitalopram, fexofenadine hcl, fluticasone, furosemide, losartan, multivitamin, cyanocobalamin, and montelukast.      HPI: He is using a [employer] 2 machine that he got as a replacement from [employer] over a year ago. With use, Shaniqua Rios is un aware of Snore loudly, Stop breathing and is satisfied with sleep and daytime function  Other complaints: None.  Restless Leg Syndrome: reports no suggestive symptoms. Parasomnia: no features reported    Sleep Routine (averaged): Typical Bedtime: 11 PM.  Gets OOB: 6 AM. TIB:7 hrs. Sleep latency:< 15 minutes Sleep Interruptions:  infrequent, unsure able to fall back asleep. Awakens: With aid of an alarm upon awakening: Usually feels refreshed. He estimates getting 6 hrs sleep. Daytime Function:Raymundo denies excessive daytime sleepiness no, but would if given the opportunity. He rated himself at Total score: 5 /24 on the [place] Sleepiness Scale. Habits:   reports that he has been smoking cigarettes. He started smoking about 35 years ago. He has a 20.00 pack-year smoking history. He has never been exposed to tobacco smoke. He has never used smokeless tobacco.;  reports that he does not currently use alcohol after a past usage of about 6.0 standard drinks of alcohol per week.; Reports no history of drug use.;  E-Cigarette/Vaping    E-Cigarette Use  Never User; Caffeine use:limited; Exercise routine: none but is physically active in his job. Occupation: Work Full Time   Family History: Suspects Sister has obstructive sleep apnea  ROS: Significant for weight has been stable. He has nasal symptoms due to seasonal allergies. He has a smoker's cough and reports some wheezing. He reported no cardiac symptoms. He feels mood is stable and is no longer taking Lexapro. Suzy Tobar EXAM:  /80   Pulse 99   Ht 5' 10" (1.778 m)   Wt 124 kg (274 lb)   SpO2 99%   BMI 39.31 kg/m²    General: Well groomed male, well appearing, in no apparent distress. Neurological: Alert and orientated; cooperative; Cranial nerves intact;    Psychiatric: Speech: Clear and coherent; normal mood, affect & thought   Skin: Warm and dry; Color& Hydration good; no facial rashes or lesions   HEENT:  Craniofacial anatomy: normal Sinuses: Non-tender.  TMJ: Normal    Eyes: EOM's intact; conjunctiva/corneas clear   Ears: Appear normal     Nasal Airway: is patent Septum: Intact; Turbinates: Normal; Rhinorrhea: None  Mouth: Lips: Normal posture; Dentition: normal . Mucosa: Moist; Hard Palate:normal    Oropharryx: crowded and AP narrowing Tongue: Mallampati:Class IV and MobileSoft Palate:  redundant  and Uvular Hypertrophy Tonsils: absent  Neck: Is thick and excess fatty tissue; neck Circumference: 18.5 "; supple; no abnormal masses; Thyroid: Normal. Trachea: Central.    Lymph: No cervical or submandibular Lymhadenopathy  Heart: S1,S2 normal; RRR; no gallop; no murmur   Lungs: Respiratory Effort: Normal. Air entry good bilaterally. No wheezes. No rales  Abdomen: Obese, soft & non-tender    Extremities: No pedal edema. No clubbing or cyanosis. Musculoskeletal:  Motor normal; Gait: Normal.       IMPRESSION: Primary/Secondary Sleep Diagnoses (to Medical or Psych conditions) & Comorbidities   1. Obstructive sleep apnea  Ambulatory Referral to Sleep Medicine    PAP DME Resupply/Reorder      2. Chronic fatigue  Ambulatory Referral to Sleep Medicine      3. Seasonal allergies        4. Smoker        5. Essential hypertension        6. Obesity (BMI 30-39. 9)             PLAN:   1. I reviewed results of the Sleep studies with the patient. 2. With respect to above conditions, I counseled on pathophysiology, diagnosis, treatment options, risks and benefits; inter-relationship and effects on symptoms and comorbidities; risks of no treatment; costs and insurance aspects. 3. Patient elected to continue positive airway pressure therapy and it is medically necessary. Pressure setting 17-20 cm H2O.  4. Need for compliance with therapy, weight reduction and smoking cessation were emphasized. 5. Follow-up to be scheduled in 1 year to monitor compliance, progress and to adjust therapy. Thank you for allowing me to participate in the care of this patient.      Sincerely,      Authenticated electronically on 13/42/75   Board Certified Specialist     Portions of the record may have been created with voice recognition software. Occasional wrong word or "sound a like" substitutions may have occurred due to the inherent limitations of voice recognition software. There may also be notations and random deletions of words or characters from malfunctioning software. Read the chart carefully and recognize, using context, where substitutions/deletions have occurred.

## 2023-11-14 ENCOUNTER — TELEPHONE (OUTPATIENT)
Dept: SLEEP CENTER | Facility: CLINIC | Age: 63
End: 2023-11-14

## 2023-11-15 LAB

## 2023-11-20 ENCOUNTER — APPOINTMENT (OUTPATIENT)
Dept: LAB | Facility: CLINIC | Age: 63
End: 2023-11-20
Payer: COMMERCIAL

## 2023-11-20 ENCOUNTER — OFFICE VISIT (OUTPATIENT)
Dept: INTERNAL MEDICINE CLINIC | Facility: CLINIC | Age: 63
End: 2023-11-20
Payer: COMMERCIAL

## 2023-11-20 VITALS
WEIGHT: 278 LBS | OXYGEN SATURATION: 97 % | BODY MASS INDEX: 39.8 KG/M2 | HEIGHT: 70 IN | TEMPERATURE: 98.1 F | HEART RATE: 104 BPM | SYSTOLIC BLOOD PRESSURE: 150 MMHG | DIASTOLIC BLOOD PRESSURE: 68 MMHG

## 2023-11-20 DIAGNOSIS — R74.8 ELEVATED ALKALINE PHOSPHATASE LEVEL: ICD-10-CM

## 2023-11-20 DIAGNOSIS — R17 ELEVATED BILIRUBIN: ICD-10-CM

## 2023-11-20 DIAGNOSIS — R06.2 WHEEZING: ICD-10-CM

## 2023-11-20 DIAGNOSIS — R16.0 HEPATOMEGALY: ICD-10-CM

## 2023-11-20 DIAGNOSIS — R18.8 OTHER ASCITES: ICD-10-CM

## 2023-11-20 DIAGNOSIS — I10 BENIGN ESSENTIAL HYPERTENSION: ICD-10-CM

## 2023-11-20 DIAGNOSIS — G89.29 CHRONIC NONINTRACTABLE HEADACHE, UNSPECIFIED HEADACHE TYPE: ICD-10-CM

## 2023-11-20 DIAGNOSIS — R51.9 CHRONIC NONINTRACTABLE HEADACHE, UNSPECIFIED HEADACHE TYPE: ICD-10-CM

## 2023-11-20 DIAGNOSIS — R60.0 LOCALIZED EDEMA: ICD-10-CM

## 2023-11-20 DIAGNOSIS — R79.89 ELEVATED LFTS: ICD-10-CM

## 2023-11-20 DIAGNOSIS — R60.0 LOCALIZED EDEMA: Primary | ICD-10-CM

## 2023-11-20 LAB
ALBUMIN SERPL BCP-MCNC: 3.7 G/DL (ref 3.5–5)
ALP SERPL-CCNC: 133 U/L (ref 34–104)
ALT SERPL W P-5'-P-CCNC: 43 U/L (ref 7–52)
ANION GAP SERPL CALCULATED.3IONS-SCNC: 11 MMOL/L
APTT PPP: 38 SECONDS (ref 23–37)
AST SERPL W P-5'-P-CCNC: 145 U/L (ref 13–39)
BASOPHILS # BLD AUTO: 0.08 THOUSANDS/ÂΜL (ref 0–0.1)
BASOPHILS NFR BLD AUTO: 1 % (ref 0–1)
BILIRUB SERPL-MCNC: 1.79 MG/DL (ref 0.2–1)
BNP SERPL-MCNC: 21 PG/ML (ref 0–100)
BUN SERPL-MCNC: 9 MG/DL (ref 5–25)
CALCIUM SERPL-MCNC: 8.7 MG/DL (ref 8.4–10.2)
CHLORIDE SERPL-SCNC: 101 MMOL/L (ref 96–108)
CO2 SERPL-SCNC: 28 MMOL/L (ref 21–32)
CREAT SERPL-MCNC: 0.74 MG/DL (ref 0.6–1.3)
EOSINOPHIL # BLD AUTO: 0.04 THOUSAND/ÂΜL (ref 0–0.61)
EOSINOPHIL NFR BLD AUTO: 1 % (ref 0–6)
ERYTHROCYTE [DISTWIDTH] IN BLOOD BY AUTOMATED COUNT: 15.4 % (ref 11.6–15.1)
FERRITIN SERPL-MCNC: 425 NG/ML (ref 24–336)
FOLATE SERPL-MCNC: 14.5 NG/ML
GFR SERPL CREATININE-BSD FRML MDRD: 96 ML/MIN/1.73SQ M
GLUCOSE SERPL-MCNC: 115 MG/DL (ref 65–140)
HCT VFR BLD AUTO: 41.3 % (ref 36.5–49.3)
HGB BLD-MCNC: 14.2 G/DL (ref 12–17)
IMM GRANULOCYTES # BLD AUTO: 0.03 THOUSAND/UL (ref 0–0.2)
IMM GRANULOCYTES NFR BLD AUTO: 0 % (ref 0–2)
INR PPP: 1.28 (ref 0.84–1.19)
IRON SATN MFR SERPL: 55 % (ref 15–50)
IRON SERPL-MCNC: 140 UG/DL (ref 50–212)
LYMPHOCYTES # BLD AUTO: 2.25 THOUSANDS/ÂΜL (ref 0.6–4.47)
LYMPHOCYTES NFR BLD AUTO: 29 % (ref 14–44)
MCH RBC QN AUTO: 37.7 PG (ref 26.8–34.3)
MCHC RBC AUTO-ENTMCNC: 34.4 G/DL (ref 31.4–37.4)
MCV RBC AUTO: 110 FL (ref 82–98)
MONOCYTES # BLD AUTO: 0.86 THOUSAND/ÂΜL (ref 0.17–1.22)
MONOCYTES NFR BLD AUTO: 11 % (ref 4–12)
NEUTROPHILS # BLD AUTO: 4.46 THOUSANDS/ÂΜL (ref 1.85–7.62)
NEUTS SEG NFR BLD AUTO: 58 % (ref 43–75)
NRBC BLD AUTO-RTO: 0 /100 WBCS
PLATELET # BLD AUTO: 98 THOUSANDS/UL (ref 149–390)
PMV BLD AUTO: 10.9 FL (ref 8.9–12.7)
POTASSIUM SERPL-SCNC: 3.6 MMOL/L (ref 3.5–5.3)
PROT SERPL-MCNC: 7.7 G/DL (ref 6.4–8.4)
PROTHROMBIN TIME: 15.9 SECONDS (ref 11.6–14.5)
RBC # BLD AUTO: 3.77 MILLION/UL (ref 3.88–5.62)
SODIUM SERPL-SCNC: 140 MMOL/L (ref 135–147)
TIBC SERPL-MCNC: 255 UG/DL (ref 250–450)
UIBC SERPL-MCNC: 115 UG/DL (ref 155–355)
VIT B12 SERPL-MCNC: 825 PG/ML (ref 180–914)
WBC # BLD AUTO: 7.72 THOUSAND/UL (ref 4.31–10.16)

## 2023-11-20 PROCEDURE — 83540 ASSAY OF IRON: CPT

## 2023-11-20 PROCEDURE — 86038 ANTINUCLEAR ANTIBODIES: CPT

## 2023-11-20 PROCEDURE — 83550 IRON BINDING TEST: CPT

## 2023-11-20 PROCEDURE — 36415 COLL VENOUS BLD VENIPUNCTURE: CPT

## 2023-11-20 PROCEDURE — 86430 RHEUMATOID FACTOR TEST QUAL: CPT

## 2023-11-20 PROCEDURE — 82607 VITAMIN B-12: CPT

## 2023-11-20 PROCEDURE — 82103 ALPHA-1-ANTITRYPSIN TOTAL: CPT

## 2023-11-20 PROCEDURE — 85610 PROTHROMBIN TIME: CPT

## 2023-11-20 PROCEDURE — 82390 ASSAY OF CERULOPLASMIN: CPT

## 2023-11-20 PROCEDURE — 82746 ASSAY OF FOLIC ACID SERUM: CPT

## 2023-11-20 PROCEDURE — 86431 RHEUMATOID FACTOR QUANT: CPT

## 2023-11-20 PROCEDURE — 82728 ASSAY OF FERRITIN: CPT

## 2023-11-20 PROCEDURE — 83880 ASSAY OF NATRIURETIC PEPTIDE: CPT

## 2023-11-20 PROCEDURE — 80053 COMPREHEN METABOLIC PANEL: CPT

## 2023-11-20 PROCEDURE — 85730 THROMBOPLASTIN TIME PARTIAL: CPT

## 2023-11-20 PROCEDURE — 85025 COMPLETE CBC W/AUTO DIFF WBC: CPT

## 2023-11-20 PROCEDURE — 99214 OFFICE O/P EST MOD 30 MIN: CPT | Performed by: INTERNAL MEDICINE

## 2023-11-20 RX ORDER — FUROSEMIDE 40 MG/1
80 TABLET ORAL DAILY
Qty: 90 TABLET | Refills: 1
Start: 2023-11-20

## 2023-11-20 NOTE — PROGRESS NOTES
Assessment/Plan:  Problem List Items Addressed This Visit          Cardiovascular and Mediastinum    Benign essential hypertension    Relevant Medications    furosemide (LASIX) 40 mg tablet    Other Relevant Orders    CBC and differential    Comprehensive metabolic panel    Ambulatory Referral to Nephrology    Ambulatory referral to Gastroenterology    B-Type Natriuretic Peptide(BNP)     Other Visit Diagnoses       Localized edema    -  Primary    Relevant Medications    furosemide (LASIX) 40 mg tablet    Other Relevant Orders    CBC and differential    Comprehensive metabolic panel    Ambulatory Referral to Nephrology    Ambulatory referral to Gastroenterology    B-Type Natriuretic Peptide(BNP)    Chronic nonintractable headache, unspecified headache type        Relevant Orders    CBC and differential    Comprehensive metabolic panel    Ambulatory Referral to Nephrology    Ambulatory referral to Gastroenterology    Wheezing        Relevant Orders    XR chest pa & lateral    B-Type Natriuretic Peptide(BNP)             Diagnoses and all orders for this visit:    Localized edema  -     CBC and differential; Future  -     Comprehensive metabolic panel; Future  -     Ambulatory Referral to Nephrology; Future  -     Ambulatory referral to Gastroenterology; Future  -     furosemide (LASIX) 40 mg tablet; Take 2 tablets (80 mg total) by mouth daily  -     B-Type Natriuretic Peptide(BNP); Future    Chronic nonintractable headache, unspecified headache type  -     CBC and differential; Future  -     Comprehensive metabolic panel; Future  -     Ambulatory Referral to Nephrology; Future  -     Ambulatory referral to Gastroenterology; Future    Benign essential hypertension  -     CBC and differential; Future  -     Comprehensive metabolic panel; Future  -     Ambulatory Referral to Nephrology; Future  -     Ambulatory referral to Gastroenterology; Future  -     furosemide (LASIX) 40 mg tablet;  Take 2 tablets (80 mg total) by mouth daily  -     B-Type Natriuretic Peptide(BNP); Future    Wheezing  -     XR chest pa & lateral; Future  -     B-Type Natriuretic Peptide(BNP); Future        No problem-specific Assessment & Plan notes found for this encounter. A/P: Was doing well and labs improved just a week or so ago. ?what changed. Stable with HR up and BP up, but reports times of very low bp and symptomatic. PE consistent with volume overload. Will double the lasix to 80mg and check labs and a CXR. ???ascites. Would like to add beta blocker for BP, HR, ?varices, and headache, but worried about dropping the BP too much with the increase in lasix. Consider starting spironolactone. Will hold adding for now. ?ascites increasing and causing some of the edema and BP issues due to compression. No s/s of DVT/PE at this time, but may need re eval.  Await labs, but may need to get more imaging of the belly. Will change GI referral to stat and will refer to nephro as well. RTC one week for f/u. Subjective:      Patient ID: Jason Ruelas. Hieu Leach is a 70 y.o. male. WM with past elevated LFT's/cirrhotic changes in imaging, HTN, and edema referred to GI and started on lasix, presents for overall worsening. Pt reports increased BP, headaches, and worsening edema over the last few weeks. Reports BP both high and lows with pt reporting fatigue. Referred to GI, but not scheduled. Until 1/24. Compliant with meds. Had CT head, CT abc, liver US, and echo over the past few months. No change in diet, meds, or activity. No h/o DVT. EF wnl recently. No CP, SOB, or palpitations. NO stroke like events. Touble sleeping.          The following portions of the patient's history were reviewed and updated as appropriate:   He has a past medical history of Abnormal kidney function, Allergic rhinitis, Benign colon polyp, Carpal tunnel syndrome, Cervical radiculopathy, Chronic fatigue, Chronic pain disorder, COPD (chronic obstructive pulmonary disease) ([address]), CPAP (continuous positive airway pressure) dependence, Depression (4/7/2023), Edema, Elevated liver enzymes, Gout, Hyperlipidemia, Hypertension, Hypotension, Left ventricular hypertrophy, Obesity (BMI 30-39.9) (9/20/2018), Pneumonia (4/5/2023), and Sleep apnea. ,  does not have any pertinent problems on file. ,   has a past surgical history that includes Hemorrhoid surgery; Carpal tunnel release (Bilateral); and Liver biopsy. ,  family history includes Cancer in his mother; No Known Problems in his brother, father, maternal grandfather, maternal grandmother, paternal grandfather, paternal grandmother, and sister. ,   reports that he has been smoking cigarettes. He started smoking about 35 years ago. He has a 20.00 pack-year smoking history. He has never been exposed to tobacco smoke. He has never used smokeless tobacco. He reports that he does not currently use alcohol after a past usage of about 6.0 standard drinks of alcohol per week. He reports that he does not use drugs. ,  has No Known Allergies. .  Current Outpatient Medications   Medication Sig Dispense Refill    escitalopram (LEXAPRO) 10 mg tablet Take 1 tablet (10 mg total) by mouth daily 90 tablet 3    Fexofenadine HCl (ALLEGRA ALLERGY PO) Take by mouth      fluticasone (FLONASE) 50 mcg/act nasal spray 1 spray into each nostril daily 16 g 0    furosemide (LASIX) 40 mg tablet Take 2 tablets (80 mg total) by mouth daily 90 tablet 1    losartan (COZAAR) 100 MG tablet Take 1 tablet (100 mg total) by mouth daily 90 tablet 1    multivitamin (THERAGRAN) TABS Take 1 tablet by mouth daily       No current facility-administered medications for this visit. Review of Systems   Constitutional:  Positive for activity change. Negative for chills, diaphoresis, fatigue and fever. Respiratory:  Positive for cough. Negative for chest tightness, shortness of breath and wheezing. Cardiovascular:  Positive for leg swelling. Negative for chest pain and palpitations. Gastrointestinal:  Negative for abdominal pain, constipation, diarrhea, nausea and vomiting. Genitourinary:  Negative for difficulty urinating, dysuria and frequency. Musculoskeletal:  Negative for arthralgias, gait problem and myalgias. Neurological:  Positive for headaches. Negative for dizziness, tremors, seizures, syncope, facial asymmetry, speech difficulty, weakness, light-headedness and numbness. Psychiatric/Behavioral:  Negative for confusion, dysphoric mood and sleep disturbance. The patient is not nervous/anxious. PHQ-2/9 Depression Screening            Objective:  Vitals:    11/20/23 1327   BP: 150/68   Pulse: 104   Temp: 98.1 °F (36.7 °C)   SpO2: 97%   Weight: 126 kg (278 lb)   Height: 5' 10" (1.778 m)     Body mass index is 39.89 kg/m². Physical Exam  Vitals and nursing note reviewed. Constitutional:       General: He is not in acute distress. Appearance: Normal appearance. He is obese. He is not ill-appearing. HENT:      Head: Normocephalic and atraumatic. Mouth/Throat:      Mouth: Mucous membranes are moist.   Eyes:      Extraocular Movements: Extraocular movements intact. Conjunctiva/sclera: Conjunctivae normal.      Pupils: Pupils are equal, round, and reactive to light. Cardiovascular:      Rate and Rhythm: Normal rate and regular rhythm. Heart sounds: Normal heart sounds. No murmur heard. Comments: Positive HJR/JVD  Pulmonary:      Effort: Pulmonary effort is normal. No respiratory distress. Breath sounds: Wheezing present. No rhonchi or rales. Abdominal:      General: Bowel sounds are normal. There is distension (?fluid wave). Palpations: Abdomen is soft. Tenderness: There is no abdominal tenderness. Musculoskeletal:      Right lower leg: Edema present. Left lower leg: Edema present. Comments: Bilat LE 2/4   Neurological:      General: No focal deficit present.       Mental Status: He is alert and oriented to person, place, and time. Mental status is at baseline. Psychiatric:         Mood and Affect: Mood normal.         Behavior: Behavior normal.         Thought Content:  Thought content normal.         Judgment: Judgment normal.

## 2023-11-21 ENCOUNTER — TELEPHONE (OUTPATIENT)
Dept: NEPHROLOGY | Facility: CLINIC | Age: 63
End: 2023-11-21

## 2023-11-21 ENCOUNTER — CONSULT (OUTPATIENT)
Dept: NEPHROLOGY | Facility: CLINIC | Age: 63
End: 2023-11-21
Payer: COMMERCIAL

## 2023-11-21 VITALS
WEIGHT: 280 LBS | DIASTOLIC BLOOD PRESSURE: 80 MMHG | BODY MASS INDEX: 40.09 KG/M2 | SYSTOLIC BLOOD PRESSURE: 148 MMHG | HEIGHT: 70 IN | OXYGEN SATURATION: 97 % | HEART RATE: 102 BPM

## 2023-11-21 DIAGNOSIS — K74.60 CIRRHOSIS (HCC): ICD-10-CM

## 2023-11-21 DIAGNOSIS — R60.0 LOCALIZED EDEMA: Primary | ICD-10-CM

## 2023-11-21 DIAGNOSIS — R80.9 MICROALBUMINURIA: ICD-10-CM

## 2023-11-21 DIAGNOSIS — E87.1 HYPONATREMIA: ICD-10-CM

## 2023-11-21 DIAGNOSIS — R76.8 ELEVATED RHEUMATOID FACTOR: Primary | ICD-10-CM

## 2023-11-21 DIAGNOSIS — I10 PRIMARY HYPERTENSION: ICD-10-CM

## 2023-11-21 LAB
A1AT SERPL-MCNC: 212 MG/DL (ref 101–187)
ANA SER QL IA: NEGATIVE
CERULOPLASMIN SERPL-MCNC: 27.1 MG/DL (ref 16–31)
CRYOGLOB RF SER-ACNC: ABNORMAL [IU]/ML
RHEUMATOID FACT SER QL LA: POSITIVE

## 2023-11-21 PROCEDURE — 99244 OFF/OP CNSLTJ NEW/EST MOD 40: CPT | Performed by: INTERNAL MEDICINE

## 2023-11-21 RX ORDER — SPIRONOLACTONE 25 MG/1
25 TABLET ORAL DAILY
Qty: 30 TABLET | Refills: 1 | Status: SHIPPED | OUTPATIENT
Start: 2023-11-21

## 2023-11-21 NOTE — PATIENT INSTRUCTIONS
Stop losartan and start spirinolactone 25mg/day. This medicine can help with fluid removal with liver issues. Check blood pressure as you are, call if blood pressure  less than 100 top number or greater than 150 top number. Will look to add bisoprolol as next medicine if blood pressure allows. If gaining 3-5 lb in 1 week, call the office. Restrict sodium to 2-3 gram per day. Follow up labs in 1 week after starting spironolactone. Lets discuss your blood pressure in 2 weeks.

## 2023-11-24 ENCOUNTER — CONSULT (OUTPATIENT)
Dept: GASTROENTEROLOGY | Facility: CLINIC | Age: 63
End: 2023-11-24
Payer: COMMERCIAL

## 2023-11-24 ENCOUNTER — APPOINTMENT (OUTPATIENT)
Dept: RADIOLOGY | Facility: CLINIC | Age: 63
End: 2023-11-24
Payer: COMMERCIAL

## 2023-11-24 VITALS
SYSTOLIC BLOOD PRESSURE: 174 MMHG | HEART RATE: 104 BPM | TEMPERATURE: 99.5 F | OXYGEN SATURATION: 98 % | HEIGHT: 71 IN | RESPIRATION RATE: 16 BRPM | DIASTOLIC BLOOD PRESSURE: 94 MMHG | BODY MASS INDEX: 38.5 KG/M2 | WEIGHT: 275 LBS

## 2023-11-24 DIAGNOSIS — R16.0 HEPATOMEGALY: Primary | ICD-10-CM

## 2023-11-24 DIAGNOSIS — Z12.11 SCREENING FOR COLON CANCER: ICD-10-CM

## 2023-11-24 DIAGNOSIS — R63.0 DECREASED APPETITE: ICD-10-CM

## 2023-11-24 DIAGNOSIS — R79.89 ELEVATED LFTS: ICD-10-CM

## 2023-11-24 DIAGNOSIS — F10.21 HISTORY OF ALCOHOLISM (HCC): ICD-10-CM

## 2023-11-24 DIAGNOSIS — R60.0 LOCALIZED EDEMA: ICD-10-CM

## 2023-11-24 DIAGNOSIS — R06.2 WHEEZING: ICD-10-CM

## 2023-11-24 DIAGNOSIS — R74.8 ELEVATED ALKALINE PHOSPHATASE LEVEL: ICD-10-CM

## 2023-11-24 DIAGNOSIS — R14.0 BLOATING: ICD-10-CM

## 2023-11-24 DIAGNOSIS — R18.8 OTHER ASCITES: ICD-10-CM

## 2023-11-24 DIAGNOSIS — R16.1 SPLENOMEGALY: ICD-10-CM

## 2023-11-24 DIAGNOSIS — R17 ELEVATED BILIRUBIN: ICD-10-CM

## 2023-11-24 DIAGNOSIS — Z86.010 HISTORY OF COLON POLYPS: ICD-10-CM

## 2023-11-24 PROBLEM — Z86.0100 HISTORY OF COLON POLYPS: Status: ACTIVE | Noted: 2023-11-24

## 2023-11-24 PROCEDURE — 71046 X-RAY EXAM CHEST 2 VIEWS: CPT

## 2023-11-24 PROCEDURE — 99244 OFF/OP CNSLTJ NEW/EST MOD 40: CPT | Performed by: NURSE PRACTITIONER

## 2023-11-24 RX ORDER — POLYETHYLENE GLYCOL 3350, SODIUM SULFATE ANHYDROUS, SODIUM BICARBONATE, SODIUM CHLORIDE, POTASSIUM CHLORIDE 236; 22.74; 6.74; 5.86; 2.97 G/4L; G/4L; G/4L; G/4L; G/4L
4000 POWDER, FOR SOLUTION ORAL ONCE
Qty: 4000 ML | Refills: 0 | Status: SHIPPED | OUTPATIENT
Start: 2023-11-24 | End: 2023-11-24

## 2023-11-24 NOTE — PATIENT INSTRUCTIONS
CARDIOLOGY CLINIC VISIT NOTE    Clinic Date: 1/22/2018    Chief Complaint:    Chief Complaint   Patient presents with   • Follow-up     2 week follow up- edema       History of Present Illness:  Perico Badillo Jr is a 58 year old male with a history of morbid obesity, obstructive sleep apnea, chronic kidney disease stage III, diabetes mellitus type II, hyperlipidemia, hypertension and coronary artery disease s/p cath 5/2016 which revealed diffuse small vessel disease and PCI was not indicated.     He presents to clinic today for a cardiology follow up. Patient is feeling well overall. He was seen in clinic couple weeks ago and was noticing BLE edema. he was initiated on diuretic. Today in clinic he reports some improvement with diuretic. Notices less swelling in hands. Still with leg swelling, uses compression stockings to help control this. He has chronic SANTIAGO.  Otherwise, has been following his medication regimen as directed. Denies any recent hospitalizations or syncopal episodes. Has not experienced any other symptoms such as chest pain, dyspnea, orthopnea, palpitations or dizziness.     Sodium (mmol/L)   Date Value   01/11/2018 144     Potassium (mmol/L)   Date Value   01/11/2018 4.5     Chloride (mmol/L)   Date Value   01/11/2018 110 (H)     Glucose (mg/dL)   Date Value   01/11/2018 81     CALCIUM (mg/dL)   Date Value   01/11/2018 7.7 (L)     Carbon Dioxide (mmol/L)   Date Value   01/11/2018 27     BUN (mg/dL)   Date Value   01/11/2018 27 (H)     Creatinine (mg/dL)   Date Value   01/11/2018 2.34 (H)       Echo stress 12/9/2017  No echocardiographic evidence of myocardial ischemia.    Echocardiogram 12/8/2017  Very technically limited study due to poor image quality. Grossly normal left ventricular size and systolic function, LVEF 61%. Right ventricle not well visualized. No significant valve abnormalities. Compared to the prior study (9/1/15), LVEF has decreased from 70% to 61% on today's study. No other  Proceed with Blood work in 2 weeks. Proceed with U/S elastography. Proceed with EGD/Colonoscopy. Abstain from alcohol. Work on smoking cessation. Schedule a f/u OV in 3-4 months with Dr Klarissa Moseley. The patient should continue to: Avoid alcohol and tobacco products. Avoid raw seafood/oysters and avoid swimming/wading in unchlorinated [place], particularly from the [facility], due to increased risk of infection from a bacteria called Vibrio Vulnificus. Maintain a low fat, low cholesterol diet. Continue to manage/monitor any other comorbidities including: High Blood Pressure, High Cholesterol/Tryglycerides, & Diabetes. Avoid medications called NSAID's (Motrin/Ibuprofen, Naproxen/Naprosyn/Aleve) if possible, due to increase risk of kidney dysfunction, and if you use medications containing acetaminophen (Tylenol, percocet, Endocet, and many cough/cold medications), the dose should not exceed 2 grams/day. Remain up to date with adult vaccination, including influenza, pneumovax and meningococcus. Inactivated HSV and zoster vaccine is safe and encouraged by PCP. Monitor for fevers over 101 F, have evidence of GI bleeding (black or bloody stools, bloody or coffee ground emesis) or confusion. Monitor for lower extremity edema, ascites, jaundice or excessive bruising/bleeding. significant changes.    Cath 5/12/2016  HEMODYNAMICS:  Heart rate: 72  Left ventricular pressure: 130/23/33  Aortic pressure on pullback: 150/85/110  There was no significant gradient on pullback across the aortic valve.  CORONARY ANATOMY:  1. Left main: Normal  2. Left anterior descending: There is mild disease in the LAD. The first diagonal artery has diffuse moderate-severe disease  3. Circumflex: Mild disease  4. Right coronary artery: Mild disease in the RCA. The rPDA is occluded but well collateralized from the left system  CONCLUSION:  Pt is a 57 yo gentleman who presented for coronary angiogram. He was found to have diffuse small vessel CAD.  There is nothing amenable to intervention at this time.      ALLERGIES:  No Known Allergies  Current Outpatient Prescriptions   Medication Sig   • gemfibrozil (LOPID) 600 MG tablet Take 1 tablet by mouth 2 times daily.   • ondansetron (ZOFRAN ODT) 8 MG disintegrating tablet Take 1 tablet by mouth every 8 hours as needed for Nausea.   • furosemide (LASIX) 40 MG tablet Take 1 tablet by mouth daily.   • Ranolazine (RANEXA) 1000 MG TABLET SR 12 HR Take 1 tablet by mouth 2 times daily.   • turmeric 500 MG capsule Take 500 mg by mouth at bedtime.   • dasatinib (SPRYCEL) 100 MG tablet Take 1 tablet by mouth daily.   • Insulin Pen Needle (NOVOFINE) 30G X 8 MM Misc Use to inject insulin 4 times daily as directed   • latanoprost (XALATAN) 0.005 % ophthalmic solution Instill one drop edson the right eye at bedtime   • rOPINIRole (REQUIP) 0.5 MG tablet Take 2 tablets by mouth daily.   • atorvastatin (LIPITOR) 80 MG tablet TAKE ONE TABLET BY MOUTH AT BEDTIME   • gabapentin (NEURONTIN) 600 MG tablet TAKE 1 TABLET BY MOUTH IN THE MORNING AND 2 TABLETS BY MOUTH AT NIGHT   • brimonidine (ALPHAGAN) 0.2 % ophthalmic solution INSTILL ONE DROP INTO RIGHT EYE TWICE DAILY   • isosorbide mononitrate (IMDUR) 60 MG 24 hr tablet Take 1 tablet by mouth daily.   • metoPROLOL tartrate (LOPRESSOR) 50  MG tablet Take 1 tablet twice a day   • insulin aspart (NOVOLOG FLEXPEN) 100 UNIT/ML pen-injector Inject up to 100 units three times daily with meals.   • timolol (TIMOPTIC) 0.5 % ophthalmic solution INSTILL ONE DROP INTO RIGHT EYE TWICE DAILY   • blood glucose test strip USE 5 TIMES DAILY AS DIRECTED   • insulin glargine (TOUJEO SOLOSTAR) 300 UNIT/ML pen-injector Inject 100 Units into the skin nightly.   • triamcinolone (NASACORT AQ) 55 MCG/ACT nasal inhaler Spray 2 sprays in each nostril daily.   • NITROSTAT 0.4 MG SL tablet PLACE 1 TABLET UNDER THE TONGUE EVERY 5 MINUTES AS NEEDED FOR CHEST PAIN   • Cholecalciferol 5000 UNITS Chew Tab Chew 1 tablet by mouth at bedtime.    • BIOTIN 5000 PO Take 5,000 mg by mouth at bedtime.    • aspirin 81 MG tablet Take 81 mg by mouth daily.   • Oxymetazoline HCl (AFRIN NASAL SPRAY NA) Spray 1 spray in each nostril as needed. Indications: congestion      No current facility-administered medications for this visit.        Review of Systems:  CARDIOVASCULAR:  No chest pain, palpitations, orthopnea, or PND (paroxysmal nocturnal dyspnea).  RESPIRATORY:  +SANTIAGO  EXTREMITIES: see HPI     Physical Exam:   Visit Vitals  /72   Pulse 68   Ht 5' 11\" (1.803 m)   Wt (!) 169.9 kg   BMI 52.25 kg/m²     General Appearance: Well developed, well nourished.  Neck: No JVD (jugular venous distension), no thyroid enlargement.    Respiratory: Lungs clear bilaterally.  Cardiovascular: Regular rate and rhythm, normal S1 and S2, no S3 or murmur; no carotid bruits, Pedal pulses palpable, 1+ BLE (lower extremity) edema. Knee high compression stockings  Gastrointestinal: Abdomen is nontender, nondistended, positive bowel sounds, no hepatosplenomegaly   Skin: Warm and dry; no rashes or erythema     Assessment:   · BLE edema, improved with diuretic but not resolved.  · SANTIAGO, related to diastolic dysfunction and body habitus  · Coronary Artery Disease: S/p cardiac cath 5/2016 showing diffuse small vessel  disease not amenable for PCI. Asymptomatic today. Remains on aspirin 81 mg daily and ranolazine 1000 mg BID.   · Hypertension: controlled  · Diastolic dysfunction  · Dyslipidemia: Last LDL of 59 on 9/11/17. Currently on atorvastatin 80 mg and gemfibrozil 600 mg daily.  · Diabetes Mellitus Type II: Followed by PCP, Dr. Ramesh Kendrick  · Obstructive Sleep Apnea: Wears CPAP, followed by Dr. Ching in pulmonology.  · Chronic Kidney Disease Stage III-IV: Last creatinine of 2.34 on 1/11/18 and potassium 5.0. Following with Dr. Payton in nephrology. Seeing her this week.  · Morbid Obesity      Recommendation:  continue lasix 40 mg daily  Advise effort for weight loss with diet and exercise. Additional options discussed such as surgical weight management. Patient wishes to think about whether or not he would want to pursue such option.  No cardiac restriction for return to work  Follow up in 6 months  Patient to contact office sooner if necessary by symptoms or concerns     On 01/22/18, INel NP scribed the services personally performed by Tanisha Hill MD     The documentation recorded by the scribe accurately and completely reflects the service(s) I personally performed and the decisions made by me.   Tanisha Hill MD

## 2023-11-24 NOTE — PROGRESS NOTES
[facility] - Outpatient Consultation  Martín Trujillo. Savage Cruz 70 y.o. male MRN: [id number]  Encounter: [id number]          ASSESSMENT AND PLAN:    The patient presents today for an initial consultation for his recent finding of hepatomegaly, elevated LFTs, ascites, localized edema of the lower extremities, and to discuss colon cancer screening. Exam:  Oral mucosa normal upon visual inspection, without any sores, lesions, or ulcerations. Sclera without icterus and benign. Lung sounds CTA b/l. Normal S1 & S2 upon exam. Abdomen is round, obese, distended, firm, but nontender with hypoactive bowel sounds x4. No edema noted of the b/l lower extremities upon exam today. Skin is non-icteric. 1. Hepatomegaly  2. Splenomegaly  3. Elevated bilirubin  4. Elevated alkaline phosphatase level  5. Elevated LFTs  6. Localized edema  7. History of alcoholism ([address])  8. Bloating  9. Decreased appetite  10. Other ascites  While the patient was in the office today, I discussed with the patient that with their current symptoms and exam findings, I feel it would be beneficial to proceed with an EGD to further evaluate any underlying etiology that could explain their symptoms, with differential diagnoses that could include but are not limited to; GERD, gastric ulcers, Nelson's esophagus, EOE, H. pylori, etc. They were agreeable and verbalized and understanding. I discussed the risks of procedure with the patient including, but not limited to: bleeding, infection, sore throat, and perforation. The patient gave verbal understanding and is agreeable to proceed. We will also proceed with an ultrasound elastography to further evaluate the hepatomegaly and get a better idea of the actual health of his liver. I did advise the patient once we have the results of the ultrasound our office will contact him to review the results and discuss any other treatment plan recommendations.     Discussed recommendations in regards to fatty liver including:   Strict control of contributing comorbidities (obesity, prediabetes/diabetes, hypertension, and hypertriglyceridemia). Weight loss of approx 10-15% of patient's current body weight over a period of 6-12 months through low fat diet and cardiovascular exercise as tolerated. Limiting alcohol consumption, preferably complete abstinence. Monitor hepatic function every 6 months with routine labs. - Ambulatory referral to Gastroenterology  - US elastography; Future  - Bilirubin, direct; Future  - CBC and differential; Future  - Comprehensive metabolic panel; Future  - Protime-INR; Future  - AFP tumor marker; Future  - Hepatic function panel; Future  - EGD; Future    11. History of colon polyps  12. Screening for colon cancer  I also discussed with the patient that at this point in time since he is past due for his colon cancer screening and does have a history of colon polyps, we will proceed with a repeat colonoscopy at the same time as the EGD. The patient was agreeable and verbalized an understanding. I discussed the risks of procedure with the patient including, but not limited to: bleeding, infection, perforation, and spleen injury. The patient gave verbal understanding and is agreeable to proceed. Bowel prep instructions were reviewed and given as ordered. - Colonoscopy; Future  - polyethylene glycol (Golytely) 4000 mL solution; Take 4,000 mL by mouth once for 1 dose Take 4000 mL by mouth once for 1 dose. Use as directed  Dispense: 4000 mL; Refill: 0    The patient will schedule a follow up office visit after his EGD and colonoscopy.  The patient was agreeable and verbalized an understanding.     ______________________________________________________________________    HPI: The patient is a 70 y.o. male who presents today for a consultation regarding his recent finding of hepatomegaly, elevated LFTs, ascites, localized edema of the lower extremities, and to discuss colon cancer screening. The patient reports that he was referred to our office because of his recent imaging findings of hepatomegaly, elevated LFTs, and to discuss colon cancer screening. The patient reports that over the past several months he has noticed a decrease in his appetite with chronic bloating and early satiety. Patient also continues with +2 nonpitting edema of the bilateral lower extremities and recently was switched from losartan to spironolactone which she just started earlier this week. The patient also reports he does have a history of chronic alcoholism and at times was a very heavy drinker, but reports that since his most recent hospitalization in April of this year he has significantly reduced his drinking down to 1 or 2 beers a week. The patient denies any reflux, nausea, dysphagia, vomiting, decreased appetite, or abdominal pain. Water Intake: 64 oz daily. The patient reports eating raw oysters yesterday. The patient reports that they have a BM daily and reports that it is sometimes relieving, without any consistent diarrhea, nocturnal BMs, constipation, straining, melena or bloody stools. [place]: 5-6. Last BM: This morning. Flatus: Yes, normal for him. The patient denies any evidence of jaundice, fevers, shira colored stools, fatigue, confusion, memory loss or easy bruising. PMH/PSH:   Abdominal/Chest Surgery: Liver biopsy 5-6 years ago. Colon Cancer: Denied  Any Cancer: Denied  Pre-Cancerous Polyps: Denied  Crohn's: Denied  Ulcerative Colitis: Denied    Tobacco/Vapin/2 PPD. Denied vaping. ETOH: 1-2 beers a week. Marijuana: Denied  Illicit Drug Use: Denied    FH:  Colon Cancer: Denied  Any Cancer: Mother; Melanoma, Sister; Breast  Family Members with Pre-Cancerous Polyps: Denied  Crohn's: Denied  Ulcerative Colitis: Denied    Meds: None  NSAID Use: Denied    Imaging: (10/2/23) Ultrasound of the abdomen with Dopplers: Hepatosplenomegaly. Cirrhotic morphology noting small volume of ascites. Endoscopy History: EGD: (40 years ago): No report, but patient reports he had an ulcer and has not had issues since. COLONOSCOPY: (2/10/21): 8 benign colon polyps with recommendation to proceed with a repeat colonoscopy in 2 years. DUE: 2/10/23    REVIEW OF SYSTEMS:    CONSTITUTIONAL: Denies any fever, chills, rigors, and weight loss. HEENT: No earache or tinnitus. Denies hearing loss or visual disturbances. CARDIOVASCULAR: No chest pain or palpitations. RESPIRATORY: Denies any cough, hemoptysis, shortness of breath or dyspnea on exertion. GASTROINTESTINAL: As noted in the History of Present Illness. GENITOURINARY: No problems with urination. Denies any hematuria or dysuria. NEUROLOGIC: No dizziness or vertigo, denies headaches. MUSCULOSKELETAL: Denies any muscle or joint pain. SKIN: Denies skin rashes or itching. ENDOCRINE: Denies excessive thirst. Denies intolerance to heat or cold. PSYCHOSOCIAL: Denies depression or anxiety. Denies any recent memory loss.        Historical Information   Past Medical History:   Diagnosis Date    Abnormal kidney function     last assessed: Feb 25, 2016    Allergic rhinitis     Benign colon polyp     Carpal tunnel syndrome     Cervical radiculopathy     last assessed: Feb 11, 2015    Chronic fatigue     Chronic pain disorder     COPD (chronic obstructive pulmonary disease) (HCC)     CPAP (continuous positive airway pressure) dependence     Depression 4/7/2023    Edema     Elevated liver enzymes     Gout     Hyperlipidemia     Hypertension     Hypotension     last assessed: March 22, 2017    Left ventricular hypertrophy     Obesity (BMI 30-39.9) 9/20/2018    Pneumonia 4/5/2023    Sleep apnea      Past Surgical History:   Procedure Laterality Date    CARPAL TUNNEL RELEASE Bilateral     HEMORRHOID SURGERY      LIVER BIOPSY       Social History   Social History     Substance and Sexual Activity   Alcohol Use Not Currently    Alcohol/week: 6.0 standard drinks of alcohol    Types: 6 Cans of beer per week    Comment: social      Social History     Substance and Sexual Activity   Drug Use No     Social History     Tobacco Use   Smoking Status Every Day    Packs/day: 0.50    Years: 40.00    Total pack years: 20.00    Types: Cigarettes    Start date: 1/2/1988    Passive exposure: Never   Smokeless Tobacco Never   Tobacco Comments    Given [facility] "how to quit" info      Family History   Problem Relation Age of Onset    Cancer Mother     No Known Problems Father     No Known Problems Sister     No Known Problems Brother     No Known Problems Maternal Grandmother     No Known Problems Maternal Grandfather     No Known Problems Paternal Grandmother     No Known Problems Paternal Grandfather        Meds/Allergies       Current Outpatient Medications:     escitalopram (LEXAPRO) 10 mg tablet    Fexofenadine HCl (ALLEGRA ALLERGY PO)    fluticasone (FLONASE) 50 mcg/act nasal spray    furosemide (LASIX) 40 mg tablet    multivitamin (THERAGRAN) TABS    spironolactone (ALDACTONE) 25 mg tablet    No Known Allergies        Objective     Blood pressure (!) 174/94, pulse 104, temperature 99.5 °F (37.5 °C), temperature source Tympanic, resp. rate 16, height 5' 11" (1.803 m), weight 125 kg (275 lb), SpO2 98 %. Body mass index is 38.35 kg/m². PHYSICAL EXAM:      General Appearance:   Alert, cooperative, no distress   HEENT:   Normocephalic, atraumatic, anicteric. Neck:  Supple, symmetrical, trachea midline   Lungs:   Clear to auscultation bilaterally; no rales, rhonchi or wheezing; respirations unlabored    Heart[de-identified]   Regular rate and rhythm; no murmur, rub, or gallop.    Abdomen:   Soft, non-tender, non-distended; normal bowel sounds; no masses, no organomegaly    Genitalia:   Deferred    Rectal:   Deferred    Extremities:  No cyanosis, clubbing or edema    Pulses:  2+ and symmetric    Skin:  No jaundice, rashes, or lesions    Lymph nodes:  No palpable cervical lymphadenopathy        Lab Results:   No visits with results within 1 Day(s) from this visit.    Latest known visit with results is:   Appointment on 11/20/2023   Component Date Value    Folate 11/20/2023 14.5     Vitamin B-12 11/20/2023 825     Rheumatoid Factor 11/20/2023 Positive (A)     Ceruloplasmin 11/20/2023 27.1     Protime 11/20/2023 15.9 (H)     INR 11/20/2023 1.28 (H)     PTT 11/20/2023 38 (H)     WBC 11/20/2023 7.72     RBC 11/20/2023 3.77 (L)     Hemoglobin 11/20/2023 14.2     Hematocrit 11/20/2023 41.3     MCV 11/20/2023 110 (H)     MCH 11/20/2023 37.7 (H)     MCHC 11/20/2023 34.4     RDW 11/20/2023 15.4 (H)     MPV 11/20/2023 10.9     Platelets 24/83/0408 98 (L)     nRBC 11/20/2023 0     Neutrophils Relative 11/20/2023 58     Immat GRANS % 11/20/2023 0     Lymphocytes Relative 11/20/2023 29     Monocytes Relative 11/20/2023 11     Eosinophils Relative 11/20/2023 1     Basophils Relative 11/20/2023 1     Neutrophils Absolute 11/20/2023 4.46     Immature Grans Absolute 11/20/2023 0.03     Lymphocytes Absolute 11/20/2023 2.25     Monocytes Absolute 11/20/2023 0.86     Eosinophils Absolute 11/20/2023 0.04     Basophils Absolute 11/20/2023 0.08     Sodium 11/20/2023 140     Potassium 11/20/2023 3.6     Chloride 11/20/2023 101     CO2 11/20/2023 28     ANION GAP 11/20/2023 11     BUN 11/20/2023 9     Creatinine 11/20/2023 0.74     Glucose 11/20/2023 115     Calcium 11/20/2023 8.7     AST 11/20/2023 145 (H)     ALT 11/20/2023 43     Alkaline Phosphatase 11/20/2023 133 (H)     Total Protein 11/20/2023 7.7     Albumin 11/20/2023 3.7     Total Bilirubin 11/20/2023 1.79 (H)     eGFR 11/20/2023 96     BNP 11/20/2023 21     RACHEL 11/20/2023 Negative     A-1 Antitrypsin 11/20/2023 212 (H)     Iron Saturation 11/20/2023 55 (H)     TIBC 11/20/2023 255     Iron 11/20/2023 140     UIBC 11/20/2023 115 (L)     Ferritin 11/20/2023 425 (H)     RF Quantitation 11/20/2023 1024 IU/mL (A) Radiology Results:   No results found.

## 2023-11-26 PROBLEM — R14.0 BLOATING: Status: ACTIVE | Noted: 2023-11-26

## 2023-11-26 PROBLEM — R63.0 DECREASED APPETITE: Status: ACTIVE | Noted: 2023-11-26

## 2023-11-27 ENCOUNTER — APPOINTMENT (OUTPATIENT)
Dept: LAB | Facility: CLINIC | Age: 63
End: 2023-11-27
Payer: COMMERCIAL

## 2023-11-27 ENCOUNTER — OFFICE VISIT (OUTPATIENT)
Dept: INTERNAL MEDICINE CLINIC | Facility: CLINIC | Age: 63
End: 2023-11-27
Payer: COMMERCIAL

## 2023-11-27 VITALS
OXYGEN SATURATION: 97 % | DIASTOLIC BLOOD PRESSURE: 70 MMHG | HEIGHT: 71 IN | SYSTOLIC BLOOD PRESSURE: 152 MMHG | WEIGHT: 272 LBS | BODY MASS INDEX: 38.08 KG/M2 | TEMPERATURE: 97.8 F | HEART RATE: 98 BPM

## 2023-11-27 DIAGNOSIS — R60.0 LOCALIZED EDEMA: ICD-10-CM

## 2023-11-27 DIAGNOSIS — R16.0 HEPATOMEGALY: ICD-10-CM

## 2023-11-27 DIAGNOSIS — R79.89 ELEVATED LFTS: ICD-10-CM

## 2023-11-27 DIAGNOSIS — R18.8 OTHER ASCITES: ICD-10-CM

## 2023-11-27 DIAGNOSIS — I10 BENIGN ESSENTIAL HYPERTENSION: Primary | ICD-10-CM

## 2023-11-27 LAB
BACTERIA UR QL AUTO: ABNORMAL /HPF
BILIRUB UR QL STRIP: NEGATIVE
CLARITY UR: CLEAR
COLOR UR: ABNORMAL
CREAT UR-MCNC: 20.4 MG/DL
CREAT UR-MCNC: 20.4 MG/DL
GLUCOSE UR STRIP-MCNC: NEGATIVE MG/DL
HGB UR QL STRIP.AUTO: NEGATIVE
KETONES UR STRIP-MCNC: NEGATIVE MG/DL
LEUKOCYTE ESTERASE UR QL STRIP: NEGATIVE
MICROALBUMIN UR-MCNC: <7 MG/L
MICROALBUMIN/CREAT 24H UR: <34 MG/G CREATININE (ref 0–30)
NITRITE UR QL STRIP: NEGATIVE
NON-SQ EPI CELLS URNS QL MICRO: ABNORMAL /HPF
PH UR STRIP.AUTO: 7 [PH]
PROT UR STRIP-MCNC: NEGATIVE MG/DL
PROT UR-MCNC: <4 MG/DL
RBC #/AREA URNS AUTO: ABNORMAL /HPF
SP GR UR STRIP.AUTO: 1.01 (ref 1–1.03)
UROBILINOGEN UR STRIP-ACNC: 3 MG/DL
WBC #/AREA URNS AUTO: ABNORMAL /HPF

## 2023-11-27 PROCEDURE — 84156 ASSAY OF PROTEIN URINE: CPT | Performed by: INTERNAL MEDICINE

## 2023-11-27 PROCEDURE — 81001 URINALYSIS AUTO W/SCOPE: CPT | Performed by: INTERNAL MEDICINE

## 2023-11-27 PROCEDURE — 99213 OFFICE O/P EST LOW 20 MIN: CPT | Performed by: INTERNAL MEDICINE

## 2023-11-27 PROCEDURE — 82570 ASSAY OF URINE CREATININE: CPT | Performed by: INTERNAL MEDICINE

## 2023-11-27 PROCEDURE — 82043 UR ALBUMIN QUANTITATIVE: CPT | Performed by: INTERNAL MEDICINE

## 2023-11-27 NOTE — PROGRESS NOTES
Assessment/Plan:  Problem List Items Addressed This Visit        Digestive    Hepatomegaly    Relevant Orders    Comprehensive metabolic panel       Cardiovascular and Mediastinum    Benign essential hypertension - Primary    Relevant Orders    Comprehensive metabolic panel       Other    Other ascites    Relevant Orders    Comprehensive metabolic panel    Elevated LFTs    Relevant Orders    Comprehensive metabolic panel   Other Visit Diagnoses     Localized edema        Relevant Orders    Comprehensive metabolic panel           Diagnoses and all orders for this visit:    Benign essential hypertension  -     Comprehensive metabolic panel; Future    Hepatomegaly  -     Comprehensive metabolic panel; Future    Other ascites  -     Comprehensive metabolic panel; Future    Elevated LFTs  -     Comprehensive metabolic panel; Future    Localized edema  -     Comprehensive metabolic panel; Future        No problem-specific Assessment & Plan notes found for this encounter. A/P: Doing ok and appreciated GI and nephro input. Tolerating meds. Clinically doing a little better with less edema, bp improving, and pt feeling better. .BP not at goal and discussed adding a beta blocker for BP, HR, and possible live issues. Defers med change at this time. May need to increase spironolactone as well. Will repeat labs in one week given new meds. Continue current treatment and RTC one month for f/u and keep f/u with specialists and for testing. Will call in one week for update as well. Subjective:      Patient ID: Jeevan Velásquez. Cassia Campbell is a 70 y.o. male. WM RTC several days for worsening edema of the legs, ?ascites, elevated LFT's, and fluctuating BP. Referred to GI and nephro after lasix increased. Nephro has since added spironolactone. GI is working up his live issues as well with an EGD and US elastography. Reports feeling a little better. Less edema. BP coming down.  .         The following portions of the patient's history were reviewed and updated as appropriate:   He has a past medical history of Abnormal kidney function, Allergic rhinitis, Benign colon polyp, Carpal tunnel syndrome, Cervical radiculopathy, Chronic fatigue, Chronic pain disorder, COPD (chronic obstructive pulmonary disease) ([address]), CPAP (continuous positive airway pressure) dependence, Depression (4/7/2023), Edema, Elevated liver enzymes, Gout, Hyperlipidemia, Hypertension, Hypotension, Left ventricular hypertrophy, Obesity (BMI 30-39.9) (9/20/2018), Pneumonia (4/5/2023), and Sleep apnea. ,  does not have any pertinent problems on file. ,   has a past surgical history that includes Hemorrhoid surgery; Carpal tunnel release (Bilateral); and Liver biopsy. ,  family history includes Cancer in his mother; No Known Problems in his brother, father, maternal grandfather, maternal grandmother, paternal grandfather, paternal grandmother, and sister. ,   reports that he has been smoking cigarettes. He started smoking about 35 years ago. He has a 20.00 pack-year smoking history. He has never been exposed to tobacco smoke. He has never used smokeless tobacco. He reports that he does not currently use alcohol after a past usage of about 6.0 standard drinks of alcohol per week. He reports that he does not use drugs. ,  has No Known Allergies. .  Current Outpatient Medications   Medication Sig Dispense Refill   • escitalopram (LEXAPRO) 10 mg tablet Take 1 tablet (10 mg total) by mouth daily 90 tablet 3   • Fexofenadine HCl (ALLEGRA ALLERGY PO) Take by mouth     • fluticasone (FLONASE) 50 mcg/act nasal spray 1 spray into each nostril daily 16 g 0   • furosemide (LASIX) 40 mg tablet Take 2 tablets (80 mg total) by mouth daily 90 tablet 1   • multivitamin (THERAGRAN) TABS Take 1 tablet by mouth daily     • polyethylene glycol (Golytely) 4000 mL solution Take 4,000 mL by mouth once for 1 dose Take 4000 mL by mouth once for 1 dose.  Use as directed 4000 mL 0   • spironolactone (ALDACTONE) 25 mg tablet Take 1 tablet (25 mg total) by mouth daily 30 tablet 1     No current facility-administered medications for this visit. Review of Systems   Constitutional:  Positive for activity change. Negative for appetite change, chills, diaphoresis, fatigue and fever. Respiratory:  Negative for cough, chest tightness, shortness of breath and wheezing. Cardiovascular:  Positive for leg swelling. Negative for chest pain and palpitations. Gastrointestinal:  Negative for abdominal pain, constipation, diarrhea, nausea and vomiting. Genitourinary:  Negative for difficulty urinating, dysuria and frequency. Musculoskeletal:  Negative for arthralgias, gait problem and myalgias. Neurological:  Negative for dizziness, seizures, syncope, weakness, light-headedness and headaches. Psychiatric/Behavioral:  Negative for confusion, dysphoric mood and sleep disturbance. The patient is not nervous/anxious. PHQ-2/9 Depression Screening    Little interest or pleasure in doing things: 0 - not at all  Feeling down, depressed, or hopeless: 0 - not at all  Trouble falling or staying asleep, or sleeping too much: 0 - not at all  Feeling tired or having little energy: 0 - not at all  Poor appetite or overeatin - not at all  Feeling bad about yourself - or that you are a failure or have let yourself or your family down: 0 - not at all  Trouble concentrating on things, such as reading the newspaper or watching television: 0 - not at all  Moving or speaking so slowly that other people could have noticed.  Or the opposite - being so fidgety or restless that you have been moving around a lot more than usual: 0 - not at all  Thoughts that you would be better off dead, or of hurting yourself in some way: 0 - not at all  PHQ-9 Score: 0   PHQ-9 Interpretation: No or Minimal depression         Objective:  Vitals:    23 0944   BP: 152/70   Pulse: 98   Temp: 97.8 °F (36.6 °C)   SpO2: 97%   Weight: 123 kg (272 lb)   Height: 5' 11" (1.803 m)     Body mass index is 37.94 kg/m². Physical Exam  Vitals and nursing note reviewed. Constitutional:       General: He is not in acute distress. Appearance: Normal appearance. He is well-developed. He is not ill-appearing. HENT:      Head: Normocephalic and atraumatic. Mouth/Throat:      Mouth: Mucous membranes are moist.   Eyes:      General: Scleral icterus (muddy) present. Extraocular Movements: Extraocular movements intact. Conjunctiva/sclera: Conjunctivae normal.      Pupils: Pupils are equal, round, and reactive to light. Cardiovascular:      Rate and Rhythm: Normal rate and regular rhythm. Heart sounds: Normal heart sounds. No murmur heard. Pulmonary:      Effort: No respiratory distress. Breath sounds: Normal breath sounds. No wheezing, rhonchi or rales. Abdominal:      General: Bowel sounds are normal. There is no distension. Palpations: Abdomen is soft. Tenderness: There is no abdominal tenderness. There is no guarding or rebound. Musculoskeletal:         General: Normal range of motion. Right lower leg: Edema present. Left lower leg: Edema present. Neurological:      General: No focal deficit present. Mental Status: He is alert and oriented to person, place, and time. Mental status is at baseline. Psychiatric:         Mood and Affect: Mood normal.         Behavior: Behavior normal.         Thought Content:  Thought content normal.         Judgment: Judgment normal.

## 2023-11-28 ENCOUNTER — TELEPHONE (OUTPATIENT)
Dept: INTERNAL MEDICINE CLINIC | Facility: CLINIC | Age: 63
End: 2023-11-28

## 2023-12-02 ENCOUNTER — HOSPITAL ENCOUNTER (OUTPATIENT)
Dept: ULTRASOUND IMAGING | Facility: HOSPITAL | Age: 63
Discharge: HOME | End: 2023-12-02
Payer: COMMERCIAL

## 2023-12-02 DIAGNOSIS — R17 ELEVATED BILIRUBIN: ICD-10-CM

## 2023-12-02 DIAGNOSIS — R60.0 LOCALIZED EDEMA: ICD-10-CM

## 2023-12-02 DIAGNOSIS — R18.8 OTHER ASCITES: ICD-10-CM

## 2023-12-02 DIAGNOSIS — R74.8 ELEVATED ALKALINE PHOSPHATASE LEVEL: ICD-10-CM

## 2023-12-02 DIAGNOSIS — R16.0 HEPATOMEGALY: ICD-10-CM

## 2023-12-02 DIAGNOSIS — R16.1 SPLENOMEGALY: ICD-10-CM

## 2023-12-02 DIAGNOSIS — R79.89 ELEVATED LFTS: ICD-10-CM

## 2023-12-02 PROCEDURE — 76981 USE PARENCHYMA: CPT

## 2023-12-08 ENCOUNTER — TELEPHONE (OUTPATIENT)
Dept: NEPHROLOGY | Facility: CLINIC | Age: 63
End: 2023-12-08

## 2023-12-08 NOTE — TELEPHONE ENCOUNTER
Please call patient and ask about swelling and the blood pressure. We may need to go up on the spirinolactone dose to help with swelling.

## 2023-12-09 ENCOUNTER — APPOINTMENT (OUTPATIENT)
Dept: LAB | Facility: CLINIC | Age: 63
End: 2023-12-09
Payer: COMMERCIAL

## 2023-12-09 DIAGNOSIS — R79.89 ELEVATED LFTS: ICD-10-CM

## 2023-12-09 DIAGNOSIS — R18.8 OTHER ASCITES: ICD-10-CM

## 2023-12-09 DIAGNOSIS — R17 ELEVATED BILIRUBIN: ICD-10-CM

## 2023-12-09 DIAGNOSIS — R74.8 ELEVATED ALKALINE PHOSPHATASE LEVEL: ICD-10-CM

## 2023-12-09 DIAGNOSIS — I10 BENIGN ESSENTIAL HYPERTENSION: ICD-10-CM

## 2023-12-09 DIAGNOSIS — R16.0 HEPATOMEGALY: ICD-10-CM

## 2023-12-09 DIAGNOSIS — R16.1 SPLENOMEGALY: ICD-10-CM

## 2023-12-09 DIAGNOSIS — R60.0 LOCALIZED EDEMA: ICD-10-CM

## 2023-12-09 LAB
AFP-TM SERPL-MCNC: 9.88 NG/ML (ref 0–9)
ALBUMIN SERPL BCP-MCNC: 3.5 G/DL (ref 3.5–5)
ALP SERPL-CCNC: 102 U/L (ref 34–104)
ALT SERPL W P-5'-P-CCNC: 31 U/L (ref 7–52)
ANION GAP SERPL CALCULATED.3IONS-SCNC: 9 MMOL/L
AST SERPL W P-5'-P-CCNC: 76 U/L (ref 13–39)
BASOPHILS # BLD AUTO: 0.12 THOUSANDS/ÂΜL (ref 0–0.1)
BASOPHILS NFR BLD AUTO: 2 % (ref 0–1)
BILIRUB DIRECT SERPL-MCNC: 0.54 MG/DL (ref 0–0.2)
BILIRUB SERPL-MCNC: 1.37 MG/DL (ref 0.2–1)
BUN SERPL-MCNC: 7 MG/DL (ref 5–25)
CALCIUM SERPL-MCNC: 8.9 MG/DL (ref 8.4–10.2)
CHLORIDE SERPL-SCNC: 103 MMOL/L (ref 96–108)
CO2 SERPL-SCNC: 27 MMOL/L (ref 21–32)
CREAT SERPL-MCNC: 0.56 MG/DL (ref 0.6–1.3)
EOSINOPHIL # BLD AUTO: 0.21 THOUSAND/ÂΜL (ref 0–0.61)
EOSINOPHIL NFR BLD AUTO: 3 % (ref 0–6)
ERYTHROCYTE [DISTWIDTH] IN BLOOD BY AUTOMATED COUNT: 13.7 % (ref 11.6–15.1)
GFR SERPL CREATININE-BSD FRML MDRD: 108 ML/MIN/1.73SQ M
GLUCOSE P FAST SERPL-MCNC: 123 MG/DL (ref 65–99)
HCT VFR BLD AUTO: 40.4 % (ref 36.5–49.3)
HGB BLD-MCNC: 13.6 G/DL (ref 12–17)
IMM GRANULOCYTES # BLD AUTO: 0.03 THOUSAND/UL (ref 0–0.2)
IMM GRANULOCYTES NFR BLD AUTO: 0 % (ref 0–2)
INR PPP: 1.21 (ref 0.84–1.19)
LYMPHOCYTES # BLD AUTO: 1.9 THOUSANDS/ÂΜL (ref 0.6–4.47)
LYMPHOCYTES NFR BLD AUTO: 25 % (ref 14–44)
MCH RBC QN AUTO: 37.4 PG (ref 26.8–34.3)
MCHC RBC AUTO-ENTMCNC: 33.7 G/DL (ref 31.4–37.4)
MCV RBC AUTO: 111 FL (ref 82–98)
MONOCYTES # BLD AUTO: 0.9 THOUSAND/ÂΜL (ref 0.17–1.22)
MONOCYTES NFR BLD AUTO: 12 % (ref 4–12)
NEUTROPHILS # BLD AUTO: 4.43 THOUSANDS/ÂΜL (ref 1.85–7.62)
NEUTS SEG NFR BLD AUTO: 58 % (ref 43–75)
NRBC BLD AUTO-RTO: 0 /100 WBCS
PLATELET # BLD AUTO: 215 THOUSANDS/UL (ref 149–390)
PMV BLD AUTO: 10.6 FL (ref 8.9–12.7)
POTASSIUM SERPL-SCNC: 3.8 MMOL/L (ref 3.5–5.3)
PROT SERPL-MCNC: 7.2 G/DL (ref 6.4–8.4)
PROTHROMBIN TIME: 15.1 SECONDS (ref 11.6–14.5)
RBC # BLD AUTO: 3.64 MILLION/UL (ref 3.88–5.62)
SODIUM SERPL-SCNC: 139 MMOL/L (ref 135–147)
WBC # BLD AUTO: 7.59 THOUSAND/UL (ref 4.31–10.16)

## 2023-12-09 PROCEDURE — 82248 BILIRUBIN DIRECT: CPT

## 2023-12-09 PROCEDURE — 85025 COMPLETE CBC W/AUTO DIFF WBC: CPT

## 2023-12-09 PROCEDURE — 85610 PROTHROMBIN TIME: CPT

## 2023-12-09 PROCEDURE — 82105 ALPHA-FETOPROTEIN SERUM: CPT

## 2023-12-09 PROCEDURE — 80053 COMPREHEN METABOLIC PANEL: CPT

## 2023-12-09 PROCEDURE — 36415 COLL VENOUS BLD VENIPUNCTURE: CPT

## 2023-12-11 NOTE — TELEPHONE ENCOUNTER
I called and left a VM to call the office back regarding a provider question regarding his swelling and BP readings.

## 2023-12-17 DIAGNOSIS — R60.0 LOCALIZED EDEMA: ICD-10-CM

## 2023-12-17 DIAGNOSIS — I10 BENIGN ESSENTIAL HYPERTENSION: ICD-10-CM

## 2023-12-18 DIAGNOSIS — I10 PRIMARY HYPERTENSION: ICD-10-CM

## 2023-12-18 DIAGNOSIS — R60.0 LOCALIZED EDEMA: ICD-10-CM

## 2023-12-18 RX ORDER — FUROSEMIDE 40 MG/1
80 TABLET ORAL DAILY
Qty: 90 TABLET | Refills: 0 | Status: SHIPPED | OUTPATIENT
Start: 2023-12-18

## 2023-12-19 RX ORDER — SPIRONOLACTONE 25 MG/1
25 TABLET ORAL DAILY
Qty: 30 TABLET | Refills: 3 | Status: SHIPPED | OUTPATIENT
Start: 2023-12-19

## 2024-01-03 ENCOUNTER — OFFICE VISIT (OUTPATIENT)
Dept: INTERNAL MEDICINE CLINIC | Facility: CLINIC | Age: 64
End: 2024-01-03
Payer: COMMERCIAL

## 2024-01-03 VITALS
WEIGHT: 264.4 LBS | HEART RATE: 83 BPM | BODY MASS INDEX: 37.02 KG/M2 | OXYGEN SATURATION: 99 % | HEIGHT: 71 IN | TEMPERATURE: 98.1 F | DIASTOLIC BLOOD PRESSURE: 84 MMHG | SYSTOLIC BLOOD PRESSURE: 136 MMHG

## 2024-01-03 DIAGNOSIS — J42 CHRONIC BRONCHITIS, UNSPECIFIED CHRONIC BRONCHITIS TYPE (HCC): ICD-10-CM

## 2024-01-03 DIAGNOSIS — M1A.9XX0 CHRONIC GOUT WITHOUT TOPHUS, UNSPECIFIED CAUSE, UNSPECIFIED SITE: ICD-10-CM

## 2024-01-03 DIAGNOSIS — Z72.0 TOBACCO USE: ICD-10-CM

## 2024-01-03 DIAGNOSIS — R60.0 LOCALIZED EDEMA: ICD-10-CM

## 2024-01-03 DIAGNOSIS — Z12.5 SCREENING FOR PROSTATE CANCER: ICD-10-CM

## 2024-01-03 DIAGNOSIS — Z23 ENCOUNTER FOR IMMUNIZATION: ICD-10-CM

## 2024-01-03 DIAGNOSIS — R79.89 ELEVATED LFTS: ICD-10-CM

## 2024-01-03 DIAGNOSIS — I10 BENIGN ESSENTIAL HYPERTENSION: Primary | ICD-10-CM

## 2024-01-03 DIAGNOSIS — R16.0 HEPATOMEGALY: ICD-10-CM

## 2024-01-03 DIAGNOSIS — I10 PRIMARY HYPERTENSION: ICD-10-CM

## 2024-01-03 DIAGNOSIS — Z23 ENCOUNTER FOR VACCINATION: ICD-10-CM

## 2024-01-03 DIAGNOSIS — E78.1 PURE HYPERTRIGLYCERIDEMIA: ICD-10-CM

## 2024-01-03 PROCEDURE — 90677 PCV20 VACCINE IM: CPT

## 2024-01-03 PROCEDURE — 90471 IMMUNIZATION ADMIN: CPT

## 2024-01-03 PROCEDURE — 99214 OFFICE O/P EST MOD 30 MIN: CPT | Performed by: INTERNAL MEDICINE

## 2024-01-03 RX ORDER — ZOSTER VACCINE RECOMBINANT, ADJUVANTED 50 MCG/0.5
0.5 KIT INTRAMUSCULAR ONCE
Qty: 1 EACH | Refills: 1 | Status: SHIPPED | OUTPATIENT
Start: 2024-01-03 | End: 2024-01-03

## 2024-01-03 RX ORDER — FUROSEMIDE 80 MG
80 TABLET ORAL DAILY
Qty: 90 TABLET | Refills: 1 | Status: SHIPPED | OUTPATIENT
Start: 2024-01-03

## 2024-01-03 NOTE — PROGRESS NOTES
Assessment/Plan:  Problem List Items Addressed This Visit        Digestive    Hepatomegaly       Respiratory    COPD (chronic obstructive pulmonary disease) (HCC)       Cardiovascular and Mediastinum    Benign essential hypertension - Primary    Relevant Medications    furosemide (LASIX) 80 mg tablet       Other    Tobacco use    Hyperlipidemia    Relevant Orders    LDL cholesterol, direct    Triglycerides    Gout    Elevated LFTs   Other Visit Diagnoses     Encounter for immunization        Relevant Orders    Pneumococcal Conjugate Vaccine 20-valent (Pcv20)    Zoster Vaccine Recombinant IM    Encounter for vaccination        Relevant Medications    Zoster Vac Recomb Adjuvanted (Shingrix) 50 MCG/0.5ML SUSR    Other Relevant Orders    Pneumococcal Conjugate Vaccine 20-valent (Pcv20)    Screening for prostate cancer        Relevant Orders    PSA, Total Screen    Localized edema        Relevant Medications    furosemide (LASIX) 80 mg tablet    Primary hypertension        Relevant Medications    furosemide (LASIX) 80 mg tablet           Diagnoses and all orders for this visit:    Benign essential hypertension  -     furosemide (LASIX) 80 mg tablet; Take 1 tablet (80 mg total) by mouth daily    Encounter for immunization  -     Pneumococcal Conjugate Vaccine 20-valent (Pcv20)  -     Zoster Vaccine Recombinant IM    Chronic bronchitis, unspecified chronic bronchitis type (HCC)    Hepatomegaly    Chronic gout without tophus, unspecified cause, unspecified site    Elevated LFTs    Pure hypertriglyceridemia  -     LDL cholesterol, direct; Future  -     Triglycerides; Future    Tobacco use    Encounter for vaccination  -     Pneumococcal Conjugate Vaccine 20-valent (Pcv20)  -     Zoster Vac Recomb Adjuvanted (Shingrix) 50 MCG/0.5ML SUSR; Inject 0.5 mL into a muscle once for 1 dose Repeat dose in 2 to 6 months    Screening for prostate cancer  -     PSA, Total Screen; Future    Localized edema  -     furosemide (LASIX) 80 mg  tablet; Take 1 tablet (80 mg total) by mouth daily    Primary hypertension        No problem-specific Assessment & Plan notes found for this encounter.    A/P: Doing ok and no new issues. Will check labs. Appreciate GI and nephro input. Discussed vaccines HZV and pneumonia.. Wean tobacco. Continue current treatment and RTC four months for routine.     Subjective:      Patient ID: Raymundo Weller is a 63 y.o. male.    WM RTC for f/u HTN, HLD, etc. Doing ok and no new issues. Liver concerns stable and f/u with GI. COPD and DOT are controlled and limited rescue MDI use. Still smoking. Chronic pain is manageable. No gout attacks. MDD/DEYSI are controlled. Due for labs and vaccines.         The following portions of the patient's history were reviewed and updated as appropriate:   He has a past medical history of Abnormal kidney function, Allergic rhinitis, Benign colon polyp, Carpal tunnel syndrome, Cervical radiculopathy, Chronic fatigue, Chronic pain disorder, COPD (chronic obstructive pulmonary disease) (HCC), CPAP (continuous positive airway pressure) dependence, Depression (4/7/2023), Edema, Elevated liver enzymes, Gout, Hyperlipidemia, Hypertension, Hypotension, Left ventricular hypertrophy, Obesity (BMI 30-39.9) (9/20/2018), Pneumonia (4/5/2023), and Sleep apnea.,  does not have any pertinent problems on file.,   has a past surgical history that includes Hemorrhoid surgery; Carpal tunnel release (Bilateral); and Liver biopsy.,  family history includes Cancer in his mother; No Known Problems in his brother, father, maternal grandfather, maternal grandmother, paternal grandfather, paternal grandmother, and sister.,   reports that he has been smoking cigarettes. He started smoking about 36 years ago. He has a 20.0 pack-year smoking history. He has never been exposed to tobacco smoke. He has never used smokeless tobacco. He reports current alcohol use of about 6.0 standard drinks of alcohol per week. He reports that  he does not use drugs.,  has No Known Allergies..  Current Outpatient Medications   Medication Sig Dispense Refill   • escitalopram (LEXAPRO) 10 mg tablet Take 1 tablet (10 mg total) by mouth daily 90 tablet 3   • Fexofenadine HCl (ALLEGRA ALLERGY PO) Take by mouth     • fluticasone (FLONASE) 50 mcg/act nasal spray 1 spray into each nostril daily 16 g 0   • furosemide (LASIX) 80 mg tablet Take 1 tablet (80 mg total) by mouth daily 90 tablet 1   • multivitamin (THERAGRAN) TABS Take 1 tablet by mouth daily     • polyethylene glycol (Golytely) 4000 mL solution Take 4,000 mL by mouth once for 1 dose Take 4000 mL by mouth once for 1 dose. Use as directed 4000 mL 0   • spironolactone (ALDACTONE) 25 mg tablet Take 1 tablet (25 mg total) by mouth daily 30 tablet 3   • Zoster Vac Recomb Adjuvanted (Shingrix) 50 MCG/0.5ML SUSR Inject 0.5 mL into a muscle once for 1 dose Repeat dose in 2 to 6 months 1 each 1     No current facility-administered medications for this visit.       Review of Systems   Constitutional:  Negative for activity change, chills, diaphoresis, fatigue and fever.   HENT: Negative.     Eyes:  Negative for visual disturbance.   Respiratory:  Negative for cough, chest tightness, shortness of breath and wheezing.    Cardiovascular:  Negative for chest pain, palpitations and leg swelling.   Gastrointestinal:  Negative for abdominal pain, constipation, diarrhea, nausea and vomiting.   Endocrine: Negative for cold intolerance and heat intolerance.   Genitourinary:  Negative for difficulty urinating, dysuria and frequency.   Musculoskeletal:  Negative for arthralgias, gait problem and myalgias.   Neurological:  Negative for dizziness, seizures, syncope, weakness, light-headedness and headaches.   Psychiatric/Behavioral:  Negative for confusion, dysphoric mood and sleep disturbance. The patient is not nervous/anxious.        PHQ-2/9 Depression Screening          Objective:  Vitals:    01/03/24 0731   BP: 136/84  "  Pulse: 83   Temp: 98.1 °F (36.7 °C)   SpO2: 99%   Weight: 120 kg (264 lb 6.4 oz)   Height: 5' 11\" (1.803 m)     Body mass index is 36.88 kg/m².     Physical Exam  Vitals and nursing note reviewed.   Constitutional:       General: He is not in acute distress.     Appearance: Normal appearance. He is not ill-appearing.   HENT:      Head: Normocephalic and atraumatic.      Mouth/Throat:      Mouth: Mucous membranes are moist.   Eyes:      Extraocular Movements: Extraocular movements intact.      Conjunctiva/sclera: Conjunctivae normal.      Pupils: Pupils are equal, round, and reactive to light.   Neck:      Vascular: No carotid bruit.   Cardiovascular:      Rate and Rhythm: Normal rate and regular rhythm.      Heart sounds: Normal heart sounds. No murmur heard.  Pulmonary:      Effort: Pulmonary effort is normal. No respiratory distress.      Breath sounds: Normal breath sounds. No wheezing, rhonchi or rales.   Abdominal:      General: Bowel sounds are normal. There is no distension.      Palpations: Abdomen is soft.      Tenderness: There is no abdominal tenderness.   Musculoskeletal:      Cervical back: Neck supple.      Right lower leg: Edema present.      Left lower leg: Edema present.   Neurological:      General: No focal deficit present.      Mental Status: He is alert and oriented to person, place, and time. Mental status is at baseline.   Psychiatric:         Mood and Affect: Mood normal.         Behavior: Behavior normal.         Thought Content: Thought content normal.         Judgment: Judgment normal.         "

## 2024-01-05 ENCOUNTER — TELEPHONE (OUTPATIENT)
Age: 64
End: 2024-01-05

## 2024-01-05 DIAGNOSIS — Z86.010 HISTORY OF COLON POLYPS: ICD-10-CM

## 2024-01-05 DIAGNOSIS — Z12.11 SCREENING FOR COLON CANCER: Primary | ICD-10-CM

## 2024-01-05 NOTE — TELEPHONE ENCOUNTER
Pt called to reschedule his cancelled colonoscopy. He cancelled because he tested positive for covid yesterday/ Pt states he did mix and start his prep before he tested positive. Pt will need a new prescription for golytely sent to the [facility] in [place]

## 2024-01-20 ENCOUNTER — APPOINTMENT (OUTPATIENT)
Dept: LAB | Facility: CLINIC | Age: 64
End: 2024-01-20
Payer: COMMERCIAL

## 2024-01-20 DIAGNOSIS — E78.1 PURE HYPERTRIGLYCERIDEMIA: ICD-10-CM

## 2024-01-20 DIAGNOSIS — Z12.5 SCREENING FOR PROSTATE CANCER: ICD-10-CM

## 2024-01-20 LAB
LDLC SERPL DIRECT ASSAY-MCNC: 115 MG/DL (ref 0–100)
PSA SERPL-MCNC: 0.82 NG/ML (ref 0–4)
TRIGL SERPL-MCNC: 108 MG/DL

## 2024-01-20 PROCEDURE — 36415 COLL VENOUS BLD VENIPUNCTURE: CPT

## 2024-01-20 PROCEDURE — G0103 PSA SCREENING: HCPCS

## 2024-01-20 PROCEDURE — 84478 ASSAY OF TRIGLYCERIDES: CPT

## 2024-01-20 PROCEDURE — 83721 ASSAY OF BLOOD LIPOPROTEIN: CPT

## 2024-01-23 PROBLEM — Z12.11 SCREENING FOR COLON CANCER: Status: RESOLVED | Noted: 2023-11-24 | Resolved: 2024-01-23

## 2024-01-25 RX ORDER — SODIUM CHLORIDE, SODIUM LACTATE, POTASSIUM CHLORIDE, CALCIUM CHLORIDE 600; 310; 30; 20 MG/100ML; MG/100ML; MG/100ML; MG/100ML
125 INJECTION, SOLUTION INTRAVENOUS CONTINUOUS
Status: CANCELLED | OUTPATIENT
Start: 2024-01-25

## 2024-01-26 ENCOUNTER — ANESTHESIA (OUTPATIENT)
Dept: GASTROENTEROLOGY | Facility: HOSPITAL | Age: 64
End: 2024-01-26

## 2024-01-26 ENCOUNTER — ANESTHESIA EVENT (OUTPATIENT)
Dept: GASTROENTEROLOGY | Facility: HOSPITAL | Age: 64
End: 2024-01-26

## 2024-01-26 ENCOUNTER — HOSPITAL ENCOUNTER (OUTPATIENT)
Dept: GASTROENTEROLOGY | Facility: HOSPITAL | Age: 64
Setting detail: OUTPATIENT SURGERY
End: 2024-01-26
Attending: INTERNAL MEDICINE
Payer: COMMERCIAL

## 2024-01-26 VITALS
TEMPERATURE: 97 F | OXYGEN SATURATION: 98 % | HEART RATE: 73 BPM | RESPIRATION RATE: 18 BRPM | DIASTOLIC BLOOD PRESSURE: 67 MMHG | SYSTOLIC BLOOD PRESSURE: 124 MMHG

## 2024-01-26 DIAGNOSIS — R63.0 DECREASED APPETITE: ICD-10-CM

## 2024-01-26 DIAGNOSIS — R74.8 ELEVATED ALKALINE PHOSPHATASE LEVEL: ICD-10-CM

## 2024-01-26 DIAGNOSIS — Z12.11 SCREENING FOR COLON CANCER: ICD-10-CM

## 2024-01-26 DIAGNOSIS — R16.1 SPLENOMEGALY: ICD-10-CM

## 2024-01-26 DIAGNOSIS — R16.0 HEPATOMEGALY: ICD-10-CM

## 2024-01-26 DIAGNOSIS — R17 ELEVATED BILIRUBIN: ICD-10-CM

## 2024-01-26 DIAGNOSIS — R14.0 BLOATING: ICD-10-CM

## 2024-01-26 DIAGNOSIS — F10.21 HISTORY OF ALCOHOLISM (HCC): ICD-10-CM

## 2024-01-26 DIAGNOSIS — Z86.010 HISTORY OF COLON POLYPS: ICD-10-CM

## 2024-01-26 DIAGNOSIS — R79.89 ELEVATED LFTS: ICD-10-CM

## 2024-01-26 PROCEDURE — 45385 COLONOSCOPY W/LESION REMOVAL: CPT | Performed by: INTERNAL MEDICINE

## 2024-01-26 PROCEDURE — 43239 EGD BIOPSY SINGLE/MULTIPLE: CPT | Performed by: INTERNAL MEDICINE

## 2024-01-26 PROCEDURE — 88305 TISSUE EXAM BY PATHOLOGIST: CPT | Performed by: PATHOLOGY

## 2024-01-26 RX ORDER — LIDOCAINE HYDROCHLORIDE 20 MG/ML
INJECTION, SOLUTION EPIDURAL; INFILTRATION; INTRACAUDAL; PERINEURAL AS NEEDED
Status: DISCONTINUED | OUTPATIENT
Start: 2024-01-26 | End: 2024-01-26

## 2024-01-26 RX ORDER — PROPOFOL 10 MG/ML
INJECTION, EMULSION INTRAVENOUS CONTINUOUS PRN
Status: DISCONTINUED | OUTPATIENT
Start: 2024-01-26 | End: 2024-01-26

## 2024-01-26 RX ORDER — SODIUM CHLORIDE, SODIUM LACTATE, POTASSIUM CHLORIDE, CALCIUM CHLORIDE 600; 310; 30; 20 MG/100ML; MG/100ML; MG/100ML; MG/100ML
125 INJECTION, SOLUTION INTRAVENOUS CONTINUOUS
Status: DISCONTINUED | OUTPATIENT
Start: 2024-01-26 | End: 2024-01-30 | Stop reason: HOSPADM

## 2024-01-26 RX ORDER — PROPOFOL 10 MG/ML
INJECTION, EMULSION INTRAVENOUS AS NEEDED
Status: DISCONTINUED | OUTPATIENT
Start: 2024-01-26 | End: 2024-01-26

## 2024-01-26 RX ADMIN — PROPOFOL 120 MCG/KG/MIN: 10 INJECTION, EMULSION INTRAVENOUS at 08:55

## 2024-01-26 RX ADMIN — SODIUM CHLORIDE, SODIUM LACTATE, POTASSIUM CHLORIDE, AND CALCIUM CHLORIDE 125 ML/HR: .6; .31; .03; .02 INJECTION, SOLUTION INTRAVENOUS at 08:19

## 2024-01-26 RX ADMIN — PROPOFOL 150 MG: 10 INJECTION, EMULSION INTRAVENOUS at 08:55

## 2024-01-26 RX ADMIN — LIDOCAINE HYDROCHLORIDE 100 MG: 20 INJECTION, SOLUTION EPIDURAL; INFILTRATION; INTRACAUDAL; PERINEURAL at 08:55

## 2024-01-26 NOTE — H&P
"History and Physical -  [facility]  Raymundo Weller 63 y.o. male MRN: [id number]                  HPI: Raymundo Weller is a 63 y.o. year old male who presents for EGD/colonoscopy      REVIEW OF SYSTEMS: Per the HPI, and otherwise unremarkable.    Historical Information   Past Medical History:   Diagnosis Date    Abnormal kidney function     last assessed: Feb 25, 2016    Allergic rhinitis     Benign colon polyp     Carpal tunnel syndrome     Cervical radiculopathy     last assessed: Feb 11, 2015    Chronic fatigue     Chronic pain disorder     COPD (chronic obstructive pulmonary disease) (HCC)     COVID 01/05/2024    CPAP (continuous positive airway pressure) dependence     Depression 04/07/2023    Edema     Elevated liver enzymes     Gout     Hyperlipidemia     Hypertension     Hypotension     last assessed: March 22, 2017    Left ventricular hypertrophy     Obesity (BMI 30-39.9) 09/20/2018    Pneumonia 04/05/2023    Sleep apnea      Past Surgical History:   Procedure Laterality Date    CARPAL TUNNEL RELEASE Bilateral     HEMORRHOID SURGERY      LIVER BIOPSY       Social History   Social History     Substance and Sexual Activity   Alcohol Use Yes    Alcohol/week: 6.0 standard drinks of alcohol    Types: 6 Cans of beer per week    Comment: social      Social History     Substance and Sexual Activity   Drug Use No     Social History     Tobacco Use   Smoking Status Every Day    Current packs/day: 0.50    Average packs/day: 0.5 packs/day for 40.1 years (20.0 ttl pk-yrs)    Types: Cigarettes    Start date: 1/2/1988    Passive exposure: Never   Smokeless Tobacco Never   Tobacco Comments    Given [facility] \"how to quit\" info      Family History   Problem Relation Age of Onset    Cancer Mother     No Known Problems Father     No Known Problems Sister     No Known Problems Brother     No Known Problems Maternal Grandmother     No Known Problems Maternal Grandfather     No Known Problems Paternal " Grandmother     No Known Problems Paternal Grandfather        Meds/Allergies       Current Outpatient Medications:     fluticasone (FLONASE) 50 mcg/act nasal spray    furosemide (LASIX) 80 mg tablet    multivitamin (THERAGRAN) TABS    spironolactone (ALDACTONE) 25 mg tablet    escitalopram (LEXAPRO) 10 mg tablet    Fexofenadine HCl (ALLEGRA ALLERGY PO)    polyethylene glycol (Golytely) 4000 mL solution    polyethylene glycol (GOLYTELY) 4000 mL solution    Current Facility-Administered Medications:     lactated ringers infusion, 125 mL/hr, Intravenous, Continuous, 125 mL/hr at 01/26/24 0819    No Known Allergies    Objective     /73   Pulse 87   Temp (!) 96.8 °F (36 °C) (Temporal)   Resp 20   SpO2 96%       PHYSICAL EXAM    Gen: NAD  Head: NCAT  CV: RRR  CHEST: Clear  ABD: soft, NT/ND  EXT: no edema      ASSESSMENT/PLAN:  This is a 63 y.o. year old male here for EGD/colonoscopy, and he is stable and optimized for his procedure.

## 2024-01-26 NOTE — ANESTHESIA POSTPROCEDURE EVALUATION
Post-Op Assessment Note    CV Status:  Stable  Pain Score: 0    Pain management: adequate       Mental Status:  Sleepy   Hydration Status:  Euvolemic   PONV Controlled:  Controlled   Airway Patency:  Patent     Post Op Vitals Reviewed: Yes    No anethesia notable event occurred.    Staff: CRNA               BP   116/66   Temp 97   Pulse 66   Resp 15   SpO2 98

## 2024-01-26 NOTE — ANESTHESIA PREPROCEDURE EVALUATION
Procedure:  EGD  COLONOSCOPY    Relevant Problems   CARDIO   (+) Abdominal aortic aneurysm (AAA) without rupture (HCC)   (+) Benign essential hypertension   (+) Hyperlipidemia   (+) Mild mitral regurgitation      GI/HEPATIC   (+) Hepatomegaly      /RENAL   (+) Renal cyst, right      HEMATOLOGY   (+) Thrombocytopenia (HCC)      MUSCULOSKELETAL   (+) Gout      NEURO/PSYCH   (+) Depression   (+) Pain syndrome, chronic      PULMONARY   (+) COPD (chronic obstructive pulmonary disease) (HCC)   (+) Obstructive sleep apnea      Other   (+) Splenomegaly        Physical Exam    Airway    Mallampati score: II  TM Distance: >3 FB  Neck ROM: full     Dental   No notable dental hx     Cardiovascular      Pulmonary      Other Findings      Anesthesia Plan  ASA Score- 3     Anesthesia Type- IV sedation with anesthesia with ASA Monitors.         Additional Monitors:     Airway Plan:            Plan Factors-Exercise tolerance (METS): >4 METS.    Chart reviewed. EKG reviewed.   Patient summary reviewed.    Patient is a current smoker.  Patient instructed to abstain from smoking on day of procedure. Patient did not smoke on day of surgery.    There is medical exclusion for perioperative obstructive sleep apnea risk education.        Induction- intravenous.    Postoperative Plan-     Informed Consent- Anesthetic plan and risks discussed with patient.  I personally reviewed this patient with the CRNA. Discussed and agreed on the Anesthesia Plan with the CRNA..

## 2024-01-26 NOTE — DISCHARGE INSTRUCTIONS
..Upper Endoscopy and Colonoscopy   WHAT YOU NEED TO KNOW:   An upper endoscopy is also called an upper gastrointestinal (GI) endoscopy, or an esophagogastroduodenoscopy (EGD). It is a procedure to examine the inside of your esophagus, stomach, and duodenum (first part of the small intestine) with a scope. You may feel bloated, gassy, or have some abdominal discomfort after your procedure. Your throat may be sore for 24 to 36 hours. You may burp or pass gas from air that is still inside your body.                A colonoscopy is a procedure to examine the inside of your colon (intestine) with a scope. Polyps or tissue growths may have been removed during your colonoscopy. It is normal to feel bloated and to have some abdominal discomfort. You should be passing gas. If you have hemorrhoids or you had polyps removed, you may have a small amount of bleeding.          DISCHARGE INSTRUCTIONS:   Seek care immediately if:   You have sudden, severe abdominal pain.     You have problems swallowing.     You have a large amount of black, sticky bowel movements or blood in your bowel movements.     You have sudden trouble breathing.     You feel weak, lightheaded, or faint or your heart beats faster than normal for you.     Contact your healthcare provider if:   You have a fever and chills.      You have nausea or are vomiting.      Your abdomen is bloated or feels full and hard.     You have abdominal pain.    You have a large amount of black, sticky bowel movements or blood in your bowel movements.    You have not had a bowel movement for 3 days after your procedure.    You have rash or hives.    You have questions or concerns about your procedure.     Activity:   ·       Do not lift, strain, or run for 24 hours after your procedure.     ·       Rest after your procedure. You have been given medicine to relax you. Do not drive or make important decisions until the day after your procedure. Return to your normal activity as  directed.     ·       Relieve gas and discomfort from bloating by lying on your right side with a heating pad on your abdomen. You may need to take short walks to help the gas move out. Eat small meals until bloating is relieved.  Follow up with your healthcare provider as directed: Write down your questions so you remember to ask them during your visits.      If you take a “blood thinner”, please review the specific instructions from your endoscopist about when you should resume it. These can be found in the “Recommendation” and “Your Medication list” sections of this After Visit Summary.

## 2024-01-29 DIAGNOSIS — R60.0 LOCALIZED EDEMA: ICD-10-CM

## 2024-01-29 DIAGNOSIS — I10 PRIMARY HYPERTENSION: ICD-10-CM

## 2024-01-29 RX ORDER — SPIRONOLACTONE 25 MG/1
25 TABLET ORAL DAILY
Qty: 30 TABLET | Refills: 5 | Status: SHIPPED | OUTPATIENT
Start: 2024-01-29 | End: 2024-02-06

## 2024-01-30 PROCEDURE — 88305 TISSUE EXAM BY PATHOLOGIST: CPT | Performed by: PATHOLOGY

## 2024-01-31 ENCOUNTER — TELEPHONE (OUTPATIENT)
Dept: NEPHROLOGY | Facility: CLINIC | Age: 64
End: 2024-01-31

## 2024-01-31 DIAGNOSIS — I10 ESSENTIAL (PRIMARY) HYPERTENSION: Primary | ICD-10-CM

## 2024-02-03 ENCOUNTER — APPOINTMENT (OUTPATIENT)
Dept: LAB | Facility: CLINIC | Age: 64
End: 2024-02-03
Payer: COMMERCIAL

## 2024-02-03 DIAGNOSIS — I10 ESSENTIAL (PRIMARY) HYPERTENSION: ICD-10-CM

## 2024-02-03 LAB
ANION GAP SERPL CALCULATED.3IONS-SCNC: 8 MMOL/L
BUN SERPL-MCNC: 8 MG/DL (ref 5–25)
CALCIUM SERPL-MCNC: 9.6 MG/DL (ref 8.4–10.2)
CHLORIDE SERPL-SCNC: 103 MMOL/L (ref 96–108)
CO2 SERPL-SCNC: 27 MMOL/L (ref 21–32)
CREAT SERPL-MCNC: 0.5 MG/DL (ref 0.6–1.3)
GFR SERPL CREATININE-BSD FRML MDRD: 113 ML/MIN/1.73SQ M
GLUCOSE P FAST SERPL-MCNC: 108 MG/DL (ref 65–99)
POTASSIUM SERPL-SCNC: 3.8 MMOL/L (ref 3.5–5.3)
SODIUM SERPL-SCNC: 138 MMOL/L (ref 135–147)

## 2024-02-03 PROCEDURE — 80048 BASIC METABOLIC PNL TOTAL CA: CPT

## 2024-02-03 PROCEDURE — 36415 COLL VENOUS BLD VENIPUNCTURE: CPT

## 2024-02-05 ENCOUNTER — PREP FOR PROCEDURE (OUTPATIENT)
Dept: GASTROENTEROLOGY | Facility: CLINIC | Age: 64
End: 2024-02-05

## 2024-02-05 DIAGNOSIS — K22.70 BARRETT'S ESOPHAGUS WITHOUT DYSPLASIA: Primary | ICD-10-CM

## 2024-02-05 DIAGNOSIS — K63.5 POLYP OF COLON, UNSPECIFIED PART OF COLON, UNSPECIFIED TYPE: ICD-10-CM

## 2024-02-06 ENCOUNTER — OFFICE VISIT (OUTPATIENT)
Dept: NEPHROLOGY | Facility: CLINIC | Age: 64
End: 2024-02-06
Payer: COMMERCIAL

## 2024-02-06 ENCOUNTER — TELEPHONE (OUTPATIENT)
Dept: GASTROENTEROLOGY | Facility: CLINIC | Age: 64
End: 2024-02-06

## 2024-02-06 VITALS
WEIGHT: 268 LBS | HEART RATE: 88 BPM | BODY MASS INDEX: 37.52 KG/M2 | OXYGEN SATURATION: 96 % | DIASTOLIC BLOOD PRESSURE: 78 MMHG | SYSTOLIC BLOOD PRESSURE: 170 MMHG | HEIGHT: 71 IN

## 2024-02-06 DIAGNOSIS — R60.0 LOCALIZED EDEMA: ICD-10-CM

## 2024-02-06 DIAGNOSIS — R76.8 RHEUMATOID FACTOR POSITIVE: ICD-10-CM

## 2024-02-06 DIAGNOSIS — I10 PRIMARY HYPERTENSION: Primary | ICD-10-CM

## 2024-02-06 DIAGNOSIS — K74.60 CIRRHOSIS (HCC): ICD-10-CM

## 2024-02-06 PROCEDURE — 99214 OFFICE O/P EST MOD 30 MIN: CPT | Performed by: INTERNAL MEDICINE

## 2024-02-06 RX ORDER — SPIRONOLACTONE 50 MG/1
50 TABLET, FILM COATED ORAL DAILY
COMMUNITY

## 2024-02-06 NOTE — TELEPHONE ENCOUNTER
----- Message from Milka Alvarez MA sent at 2/5/2024  2:17 PM EST -----    ----- Message -----  From: Miranda Hernandez DO  Sent: 2/5/2024   1:25 PM EST  To: [facility]    Please schedule patient for repeat EGD and colonoscopy in the next 3 to 6 months, at his convenience.  He will need a 2-day bowel preparation.

## 2024-02-06 NOTE — PROGRESS NOTES
Assessment & Plan:    1. Primary hypertension  -     Basic metabolic panel; Future; Expected date: 02/20/2024  -     Urinalysis with microscopic; Future  -     Uric acid; Future  -     PTH, intact; Future  -     Comprehensive metabolic panel; Future  -     CBC and differential; Future; Expected date: 02/06/2024  -     Magnesium; Future  -     Phosphorus; Future  -     Albumin / creatinine urine ratio; Future    2. Localized edema  -     Basic metabolic panel; Future; Expected date: 02/20/2024  -     Urinalysis with microscopic; Future  -     Uric acid; Future  -     PTH, intact; Future  -     Comprehensive metabolic panel; Future  -     CBC and differential; Future; Expected date: 02/06/2024  -     Magnesium; Future  -     Phosphorus; Future  -     Albumin / creatinine urine ratio; Future    3. Rheumatoid factor positive    4. Cirrhosis (HCC)         Primary HTN, bp poorly controlled in the office but typically well controlled per patient.  Patient continues to appear hypervolemic but weight down 12  lb since last visit. He continues to have LE edema but denies respiratory complaints.    Recommend to increase spirinolactone to 50mg/day and repeat chem in 2 weeks.  Higher doses of spirinolactone tolerated in cirrhotics to help with volume management preferentially.   Check BP once a day and keep a record, contact office if SBP<100.  Follow daily weights and contact office if 2-3 lb weight gain in 24 hr or 3-5 lb weight gain in 1 week.  Fu in 6 months with labs prior.  His Creatinine trends are lower due to cirrhosis and dilutional from volume overload.    2. Localized edema/Volume overload.  Patient has continued moderate LE edema. Weight declined 280->268 lb with diuretic titration but still definitely has considerable swelling.  Recommend to increase spirinolactone to 50mg/day and repeat chem in 2 weeks.  Higher doses of spirinolactone tolerated in cirrhotics to help with volume management preferentially.     3.  Rheumatoid factor positive  FU with rheumatology for evaluation.    4. Cirrhosis  Keep sodium intake 2-3 gram per day.  Keep fluid intake 1.5-1.8 L per day.     Continue to follow with GI/hepatology.  Serologic workup performed by GI for hepatitis  Fu in 6 months with labs prior.  Increase spirinolactone to 50mg/day but may need increase to 100mg/day to manage volume/BP. Higher doses tolerated in cirrhotics and are first line for volume management.    The benefits, risks and alternatives to the treatment plan were discussed at this visit. Patient was advised of common adverse effects of any medical therapies prescribed. All questions were answered and discussed with the patient and any accompanying family members or caretakers.      Subjective:      Patient ID: Raymundo Weller is a 63 y.o. male presents for HTN/volume management in the [place] office. Patient last seen on 11/21/23 and spirinolactone added at that time.    HPI    In the interim since last visit he has been following with GI. Patient had EGD/Cscope performed as well.  His diureitc have been adjusted by PCP and now he is on lasix 80mg/day and spirinolactone 25mg/day.  Reports BP typically well controled but BP high in the office--170/78.    Patient reports swelling is stable and denies weight gain. He is using compression stockings. He has some abdominal distention as well that is stable.  Weight 280 lb at last visit and is 268 lb today.     Denies ROS.     Trying to watch sodium intake. Not watching a specific amount of fluid intake.    2/3 Cr 0.5 with eGFR 113 ml/min. Previous Cr 0.7-0.8.    The following portions of the patient's history were reviewed and updated as appropriate: allergies, current medications, past family history, past medical history, past social history, past surgical history, and problem list.    Review of Systems   Respiratory: Negative.     Cardiovascular:  Positive for leg swelling.   Gastrointestinal:  Positive for  "abdominal distention.   Genitourinary: Negative.    Neurological: Negative.    All other systems reviewed and are negative.        Objective:      /78 (BP Location: Left arm, Patient Position: Sitting, Cuff Size: Standard) Comment: 160/70 IN RIGHT ARM.  Pulse 88   Ht 5' 11\" (1.803 m)   Wt 122 kg (268 lb)   SpO2 96%   BMI 37.38 kg/m²          Physical Exam  Vitals reviewed.   Constitutional:       General: He is not in acute distress.     Appearance: He is obese. He is not ill-appearing.   HENT:      Head: Normocephalic and atraumatic.      Nose: Nose normal.      Mouth/Throat:      Mouth: Mucous membranes are moist.      Pharynx: Oropharynx is clear.   Eyes:      Extraocular Movements: Extraocular movements intact.      Conjunctiva/sclera: Conjunctivae normal.   Cardiovascular:      Rate and Rhythm: Normal rate and regular rhythm.      Heart sounds:      No friction rub.   Pulmonary:      Breath sounds: Normal breath sounds. No wheezing, rhonchi or rales.   Abdominal:      Palpations: Abdomen is soft.      Tenderness: There is no abdominal tenderness. There is no guarding.   Musculoskeletal:      Right lower leg: Edema present.      Left lower leg: Edema present.      Comments: Moderate edema BL LE   Skin:     Findings: No bruising or rash.   Neurological:      General: No focal deficit present.      Mental Status: He is alert and oriented to person, place, and time.      Cranial Nerves: No cranial nerve deficit.   Psychiatric:         Mood and Affect: Mood normal.         Behavior: Behavior normal.             Lab Results   Component Value Date     02/11/2015    SODIUM 138 02/03/2024    K 3.8 02/03/2024     02/03/2024    CO2 27 02/03/2024    ANIONGAP 12 02/11/2015    AGAP 8 02/03/2024    BUN 8 02/03/2024    CREATININE 0.50 (L) 02/03/2024    GLUC 115 11/20/2023    GLUF 108 (H) 02/03/2024    CALCIUM 9.6 02/03/2024    AST 76 (H) 12/09/2023    ALT 31 12/09/2023    ALKPHOS 102 12/09/2023    PROT " "7.5 02/11/2015    TP 7.2 12/09/2023    BILITOT 0.3 02/11/2015    TBILI 1.37 (H) 12/09/2023    EGFR 113 02/03/2024      Lab Results   Component Value Date    CREATININE 0.50 (L) 02/03/2024    CREATININE 0.56 (L) 12/09/2023    CREATININE 0.74 11/20/2023    CREATININE 0.65 11/11/2023    CREATININE 0.87 10/07/2023    CREATININE 0.82 10/03/2023    CREATININE 0.77 04/19/2023    CREATININE 0.53 (L) 04/10/2023    CREATININE 0.53 (L) 04/10/2023    CREATININE 0.64 04/08/2023    CREATININE 0.66 04/07/2023    CREATININE 0.58 (L) 04/06/2023    CREATININE 0.67 04/05/2023    CREATININE 0.69 04/04/2023    CREATININE 0.83 04/03/2023      Lab Results   Component Value Date    COLORU Light Yellow 11/27/2023    CLARITYU Clear 11/27/2023    SPECGRAV 1.008 11/27/2023    PHUR 7.0 11/27/2023    LEUKOCYTESUR Negative 11/27/2023    NITRITE Negative 11/27/2023    PROTEIN UA Negative 11/27/2023    GLUCOSEU Negative 11/27/2023    KETONESU Negative 11/27/2023    UROBILINOGEN 3.0 (A) 11/27/2023    BILIRUBINUR Negative 11/27/2023    BLOODU Negative 11/27/2023    RBCUA None Seen 11/27/2023    WBCUA 1-2 11/27/2023    EPIS Occasional 11/27/2023    BACTERIA None Seen 11/27/2023      No results found for: \"LABPROT\"  No results found for: \"MICROALBUR\", \"[id number]\"  Lab Results   Component Value Date    WBC 7.59 12/09/2023    HGB 13.6 12/09/2023    HCT 40.4 12/09/2023     (H) 12/09/2023     12/09/2023      Lab Results   Component Value Date    HGB 13.6 12/09/2023    HGB 14.2 11/20/2023    HGB 13.1 10/03/2023    HGB 14.5 04/19/2023    HGB 13.1 04/10/2023      Lab Results   Component Value Date    IRON 140 11/20/2023    TIBC 255 11/20/2023    FERRITIN 425 (H) 11/20/2023      No results found for: \"PTHCALCIUM\", \"[id number]\", \"PHOSPHORUS\"   Lab Results   Component Value Date    CHOLESTEROL 163 04/19/2023    HDL 22 (L) 04/19/2023    LDLCALC 91 04/19/2023    TRIG 108 01/20/2024      Lab Results   Component Value Date    URICACID 4.5 04/19/2023 " "     Lab Results   Component Value Date    HGBA1C 5.9 (H) 10/03/2023      Lab Results   Component Value Date    X7NYGAV 0.9 08/31/2023    FREET4 1.13 04/19/2023      Lab Results   Component Value Date    RACHEL Negative 11/20/2023      Lab Results   Component Value Date    PROT 7.5 02/11/2015        Portions of the record may have been created with voice recognition software. Occasional wrong word or \"sound a like\" substitutions may have occurred due to the inherent limitations of voice recognition software. Read the chart carefully and recognize, using context, where substitutions have occurred. If you have any questions, please contact the dictating provider.          "

## 2024-02-06 NOTE — PATIENT INSTRUCTIONS
Increase spironolactone to 50mg/day, if any dizziness,lightheadedness, weakness call the office.  Check blood work in 2 weeks.  Check your blood pressure once-twice a day and keep a record. If your systolic blood pressure (top number) less than 100 call the office.   Get on a scale once a day, if weight goes up by 2-3 lb in 24 hour or 3-5 lb in 1 week, call the office.

## 2024-02-14 NOTE — TELEPHONE ENCOUNTER
Attempted to contact patient. Sounded like call was answered, no recording played. Repeated hello 3 times.no answer

## 2024-02-17 ENCOUNTER — APPOINTMENT (OUTPATIENT)
Dept: LAB | Facility: CLINIC | Age: 64
End: 2024-02-17
Payer: COMMERCIAL

## 2024-02-17 DIAGNOSIS — I10 PRIMARY HYPERTENSION: ICD-10-CM

## 2024-02-17 DIAGNOSIS — R60.0 LOCALIZED EDEMA: ICD-10-CM

## 2024-02-17 LAB
ALBUMIN SERPL BCP-MCNC: 3.8 G/DL (ref 3.5–5)
ALP SERPL-CCNC: 86 U/L (ref 34–104)
ALT SERPL W P-5'-P-CCNC: 24 U/L (ref 7–52)
ANION GAP SERPL CALCULATED.3IONS-SCNC: 8 MMOL/L
AST SERPL W P-5'-P-CCNC: 57 U/L (ref 13–39)
BASOPHILS # BLD AUTO: 0.05 THOUSANDS/ÂΜL (ref 0–0.1)
BASOPHILS NFR BLD AUTO: 1 % (ref 0–1)
BILIRUB SERPL-MCNC: 1.23 MG/DL (ref 0.2–1)
BUN SERPL-MCNC: 8 MG/DL (ref 5–25)
CALCIUM SERPL-MCNC: 9.6 MG/DL (ref 8.4–10.2)
CHLORIDE SERPL-SCNC: 102 MMOL/L (ref 96–108)
CO2 SERPL-SCNC: 26 MMOL/L (ref 21–32)
CREAT SERPL-MCNC: 0.6 MG/DL (ref 0.6–1.3)
CREAT UR-MCNC: 34.5 MG/DL
EOSINOPHIL # BLD AUTO: 0.3 THOUSAND/ÂΜL (ref 0–0.61)
EOSINOPHIL NFR BLD AUTO: 5 % (ref 0–6)
ERYTHROCYTE [DISTWIDTH] IN BLOOD BY AUTOMATED COUNT: 14 % (ref 11.6–15.1)
GFR SERPL CREATININE-BSD FRML MDRD: 105 ML/MIN/1.73SQ M
GLUCOSE P FAST SERPL-MCNC: 117 MG/DL (ref 65–99)
HCT VFR BLD AUTO: 44 % (ref 36.5–49.3)
HGB BLD-MCNC: 14.5 G/DL (ref 12–17)
IMM GRANULOCYTES # BLD AUTO: 0.01 THOUSAND/UL (ref 0–0.2)
IMM GRANULOCYTES NFR BLD AUTO: 0 % (ref 0–2)
LYMPHOCYTES # BLD AUTO: 1.65 THOUSANDS/ÂΜL (ref 0.6–4.47)
LYMPHOCYTES NFR BLD AUTO: 25 % (ref 14–44)
MAGNESIUM SERPL-MCNC: 1.9 MG/DL (ref 1.9–2.7)
MCH RBC QN AUTO: 35.3 PG (ref 26.8–34.3)
MCHC RBC AUTO-ENTMCNC: 33 G/DL (ref 31.4–37.4)
MCV RBC AUTO: 107 FL (ref 82–98)
MICROALBUMIN UR-MCNC: <7 MG/L
MICROALBUMIN/CREAT 24H UR: <20 MG/G CREATININE (ref 0–30)
MONOCYTES # BLD AUTO: 0.71 THOUSAND/ÂΜL (ref 0.17–1.22)
MONOCYTES NFR BLD AUTO: 11 % (ref 4–12)
NEUTROPHILS # BLD AUTO: 4.02 THOUSANDS/ÂΜL (ref 1.85–7.62)
NEUTS SEG NFR BLD AUTO: 58 % (ref 43–75)
NRBC BLD AUTO-RTO: 0 /100 WBCS
PHOSPHATE SERPL-MCNC: 3.8 MG/DL (ref 2.3–4.1)
PLATELET # BLD AUTO: 146 THOUSANDS/UL (ref 149–390)
PMV BLD AUTO: 10.9 FL (ref 8.9–12.7)
POTASSIUM SERPL-SCNC: 4.7 MMOL/L (ref 3.5–5.3)
PROT SERPL-MCNC: 7.6 G/DL (ref 6.4–8.4)
PTH-INTACT SERPL-MCNC: 29.6 PG/ML (ref 12–88)
RBC # BLD AUTO: 4.11 MILLION/UL (ref 3.88–5.62)
SODIUM SERPL-SCNC: 136 MMOL/L (ref 135–147)
URATE SERPL-MCNC: 6.2 MG/DL (ref 3.5–8.5)
WBC # BLD AUTO: 6.74 THOUSAND/UL (ref 4.31–10.16)

## 2024-02-17 PROCEDURE — 82043 UR ALBUMIN QUANTITATIVE: CPT

## 2024-02-17 PROCEDURE — 83735 ASSAY OF MAGNESIUM: CPT

## 2024-02-17 PROCEDURE — 81001 URINALYSIS AUTO W/SCOPE: CPT

## 2024-02-17 PROCEDURE — 80053 COMPREHEN METABOLIC PANEL: CPT

## 2024-02-17 PROCEDURE — 85025 COMPLETE CBC W/AUTO DIFF WBC: CPT

## 2024-02-17 PROCEDURE — 36415 COLL VENOUS BLD VENIPUNCTURE: CPT

## 2024-02-17 PROCEDURE — 84550 ASSAY OF BLOOD/URIC ACID: CPT

## 2024-02-17 PROCEDURE — 83970 ASSAY OF PARATHORMONE: CPT

## 2024-02-17 PROCEDURE — 84100 ASSAY OF PHOSPHORUS: CPT

## 2024-02-17 PROCEDURE — 82570 ASSAY OF URINE CREATININE: CPT

## 2024-02-18 LAB
BACTERIA UR QL AUTO: NORMAL /HPF
BILIRUB UR QL STRIP: NEGATIVE
CLARITY UR: CLEAR
COLOR UR: NORMAL
GLUCOSE UR STRIP-MCNC: NEGATIVE MG/DL
HGB UR QL STRIP.AUTO: NEGATIVE
KETONES UR STRIP-MCNC: NEGATIVE MG/DL
LEUKOCYTE ESTERASE UR QL STRIP: NEGATIVE
MUCOUS THREADS UR QL AUTO: NORMAL
NITRITE UR QL STRIP: NEGATIVE
NON-SQ EPI CELLS URNS QL MICRO: NORMAL /HPF
PH UR STRIP.AUTO: 7 [PH]
PROT UR STRIP-MCNC: NEGATIVE MG/DL
RBC #/AREA URNS AUTO: NORMAL /HPF
SP GR UR STRIP.AUTO: 1.01 (ref 1–1.03)
UROBILINOGEN UR STRIP-ACNC: <2 MG/DL
WBC #/AREA URNS AUTO: NORMAL /HPF

## 2024-02-21 ENCOUNTER — OFFICE VISIT (OUTPATIENT)
Dept: GASTROENTEROLOGY | Facility: CLINIC | Age: 64
End: 2024-02-21
Payer: COMMERCIAL

## 2024-02-21 VITALS
DIASTOLIC BLOOD PRESSURE: 90 MMHG | HEIGHT: 71 IN | WEIGHT: 261 LBS | TEMPERATURE: 97.9 F | SYSTOLIC BLOOD PRESSURE: 148 MMHG | BODY MASS INDEX: 36.54 KG/M2

## 2024-02-21 DIAGNOSIS — D69.6 THROMBOCYTOPENIA (HCC): ICD-10-CM

## 2024-02-21 DIAGNOSIS — K70.31 ALCOHOLIC CIRRHOSIS OF LIVER WITH ASCITES (HCC): Primary | ICD-10-CM

## 2024-02-21 DIAGNOSIS — R60.0 LOWER EXTREMITY EDEMA: ICD-10-CM

## 2024-02-21 PROCEDURE — 99215 OFFICE O/P EST HI 40 MIN: CPT | Performed by: INTERNAL MEDICINE

## 2024-02-21 NOTE — PROGRESS NOTES
[facility] - Outpatient Follow-up Note  Raymundo Weller 63 y.o. male MRN: [id number]  Encounter: [id number]          ASSESSMENT AND PLAN:      Summary:    Mr. Weller is a 63 y.o. year old male with cirrhosis secondary to Chronic alcohol abuse which is decompensated with ascites, lower extremity edema, thrombocytopenia, and esophageal varices.    Problem List and Plan:    End Stage Liver Disease:  MELD 3.0: 9 at 12/9/2023  8:37 AM  No clinical indication for liver transplantation at this time.  Extensive workup has ruled out other underlying competing causes of chronic liver disease.  Will need hepatitis A and B immunity testing.  Will check MELD labs every 3 months.  Recommended he attend AA or alcohol rehab to increase his chance of sobriety and decreased risk of recidivism.  Volume: Ascites/Edema currently well controlled.  Continue current diuretic doses.  Diuretics currently managed by nephrology.  He is on a high dose of furosemide compared to spironolactone.  However his basic metabolic panel shows stable creatinine, sodium and potassium.  Will continue to monitor these closely.  Hepatic Encephalopathy: No history of hepatic encephalopathy.  Mr. Weller and his family/care giver are aware of the signs/symptoms of hepatic encephalopathy and understand to seek immediate medical attention should they arise..  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  Repeat EGD due in January 2025 .  Hepatoma Screening: Last imaging was an abdominal ultrasound with Doppler in October which showed evidence of cirrhosis, splenomegaly and a small amount of ascites without evidence of worrisome liver mass will plan for triple phase CAT scan in April as one has already been ordered to follow-up his infrarenal AAA..  Colon Cancer Screening: Mr. Weller last had a screening colonoscopy in in January along with his  EGD in which 3 subcentimeter polyps were removed however he was recommended to have a repeat colonoscopy in 1 year due to inadequate bowel prep.  Nutritional status: No significant sarcopenia suggested on exam.  He does have abdominal obesity.  Encouraged high protein snacking, low salt diet.  If weight loss evident, will refer to nutritional counseling.     Health Maintenance:  Mr. Weller was counseled to avoid alcohol and tobacco products.  Mr. Weller was also advised to avoid raw seafood/oysters and from swimming in unchlorinated stallworth, particularly from the [facility], due to increased risk of infection from Vibrio Vulnificus.  Mr. Weller was also instructed to seek immediate medical attention should he develop fevers over 101 F, evidence of GI bleeding (black or bloody stools, bloody or coffee ground emesis) or confusion.  Mr. Weller was advised to avoid NSAIDs, if possible, due to increase risk of renal dysfunction, and advised that if he should use acetaminophen, dose should not exceed 2 grams/day.  Mr. Weller was recommend to remain up to date with adult vaccination, including influenza, pneumovax and meningococcus. Inactivated HSV and zoster vaccine is safe and encouraged by PCP.  Mr. Weller was instructed to call either our office or that of his referring doctor should he develop worsening symptoms related to lower extremity edema, ascites, jaundice or excessive bruising/bleeding.    I have spent a total time of 49 minutes on 02/21/24 in caring for this patient including Diagnostic results, Prognosis, Risks and benefits of tx options, Instructions for management, Patient and family education, Importance of tx compliance, Risk factor reductions, Impressions, Counseling / Coordination of care, Documenting in the medical record, Reviewing / ordering tests, medicine, procedures  , and Obtaining or reviewing history  .      FOLLOW-UP:  Return in about 3 months (around 5/21/2024).     VISIT DIAGNOSES AND  ORDERS:      1. Alcoholic cirrhosis of liver with ascites (HCC)    2. Thrombocytopenia (HCC)    3. Lower extremity edema      Orders Placed This Encounter   Procedures    CT abdomen w wo contrast    CBC    Comprehensive metabolic panel    Protime-INR    Hepatitis A antibody, total    Hepatitis B surface antibody     ______________________________________________________________________    SUBJECTIVE:         Mr. Raymundo Weller is a 63 y.o. male with medical history as outlined below including hypertension, hyperlipidemia, abdominal obesity, obstructive sleep apnea, class III obesity, AAA and extensive history of alcohol use till mid 2023 and then intermittently since, with last drink 1 month ago, who comes in today to establish care with hepatology after last being seen by my gastroenterology colleagues in late November for further evaluation of cirrhosis, bloating and CRC screening.    Patient states he was admitted to the hospital with seizure activity, likely related to alcohol withdrawal last year and workup led to the finding of a cirrhotic appearing liver, thrombocytopenia and ascites.  He subsequently developed lower extremity edema and was started on diuretics.    He is currently taking 80 mg of furosemide daily and 50 mg of spironolactone daily.  He feels with this regimen, his lower extremity edema has been fairly well-controlled.  He does complain of some lightheadedness and orthostasis with this.  He is following closely with nephrology who appears to be managing his diuresis.    He denies a history of GI bleeding.  He complains of easy bruising.  He denies overt change in mental status.  He denies pruritus.  He denies abdominal pain, nausea, vomiting, diarrhea or constipation.  He does complain of intermittent bloating.           REVIEW OF SYSTEMS     Review of Systems per HPI, all other systems without significant complaints.    Historical Information   Patient Active Problem List   Diagnosis     "Allergic rhinitis    Benign colon polyp    Benign essential hypertension    COPD (chronic obstructive pulmonary disease) (HCC)    Bilateral lower extremity edema    Gout    Hyperlipidemia    Hyponatremia    Herniated disc, cervical    Iron overload    Left atrial enlargement    Left ventricular hypertrophy    Macrocytosis    Mild mitral regurgitation    Obstructive sleep apnea    Pain syndrome, chronic    Renal cyst, right    Tobacco use    Class 3 severe obesity due to excess calories with serious comorbidity and body mass index (BMI) of 40.0 to 44.9 in adult (HCC)    Chronic rhinitis    Microalbuminuria    Elevated LFTs    Lumbar disc disease with radiculopathy    Cervical disc disorder with radiculopathy of mid-cervical region    Cervical radiculopathy    Thrombocytopenia (HCC)    Depression    Abdominal aortic aneurysm (AAA) without rupture (HCC)    Adrenal abnormality (HCC)    Hepatomegaly    Elevated bilirubin    Splenomegaly    Elevated alkaline phosphatase level    Other ascites    History of colon polyps    History of alcoholism (HCC)    Decreased appetite    Bloating     Social History     Substance and Sexual Activity   Alcohol Use Yes    Alcohol/week: 6.0 standard drinks of alcohol    Types: 6 Cans of beer per week    Comment: social      Social History     Substance and Sexual Activity   Drug Use No     Social History     Tobacco Use   Smoking Status Every Day    Current packs/day: 0.50    Average packs/day: 0.5 packs/day for 40.1 years (20.1 ttl pk-yrs)    Types: Cigarettes    Start date: 1/2/1988    Passive exposure: Never   Smokeless Tobacco Never   Tobacco Comments    Given [employer] \"how to quit\" info        Meds/Allergies       Current Outpatient Medications:     furosemide (LASIX) 80 mg tablet    multivitamin (THERAGRAN) TABS    spironolactone (ALDACTONE) 50 mg tablet    escitalopram (LEXAPRO) 10 mg tablet    Fexofenadine HCl (ALLEGRA ALLERGY PO)    fluticasone (FLONASE) 50 mcg/act nasal spray    No " "Known Allergies        Objective     Blood pressure 148/90, temperature 97.9 °F (36.6 °C), temperature source Tympanic, height 5' 11\" (1.803 m), weight 118 kg (261 lb). Body mass index is 36.4 kg/m².      PHYSICAL EXAM:      Physical Exam  Vitals reviewed.   Constitutional:       General: He is not in acute distress.     Appearance: He is obese. He is not ill-appearing.   HENT:      Head: Normocephalic and atraumatic.      Nose: Nose normal.      Mouth/Throat:      Pharynx: Oropharynx is clear. No posterior oropharyngeal erythema.   Eyes:      General: No scleral icterus.     Extraocular Movements: Extraocular movements intact.   Cardiovascular:      Rate and Rhythm: Normal rate and regular rhythm.      Heart sounds: No murmur heard.  Pulmonary:      Effort: Pulmonary effort is normal. No respiratory distress.      Breath sounds: Normal breath sounds. No rales.   Abdominal:      General: There is distension (Abdominal obesity without any clear evidence of shifting dullness or fluid wave.).      Palpations: Abdomen is soft. There is hepatomegaly and splenomegaly. There is no shifting dullness or fluid wave.      Tenderness: There is no abdominal tenderness. There is no guarding.   Musculoskeletal:         General: Swelling (Mild nonpitting bilateral lower extremity edema) present. Normal range of motion.      Cervical back: Normal range of motion and neck supple.   Skin:     General: Skin is warm.      Coloration: Skin is not jaundiced.   Neurological:      General: No focal deficit present.      Mental Status: He is oriented to person, place, and time.   Psychiatric:         Mood and Affect: Mood normal.         Lab Results:   Lab Results   Component Value Date     02/11/2015    K 4.7 02/17/2024    CO2 26 02/17/2024     02/17/2024    BUN 8 02/17/2024    CREATININE 0.60 02/17/2024    GLUCOSE 84 02/11/2015     Lab Results   Component Value Date    WBC 6.74 02/17/2024    HGB 14.5 02/17/2024    HCT 44.0 " 02/17/2024     (H) 02/17/2024     (L) 02/17/2024     Lab Results   Component Value Date    TP 7.6 02/17/2024    AST 57 (H) 02/17/2024    ALT 24 02/17/2024    BILITOT 0.3 02/11/2015    BILIDIR 0.54 (H) 12/09/2023    INR 1.21 (H) 12/09/2023      Lab Results   Component Value Date    IRON 140 11/20/2023    FERRITIN 425 (H) 11/20/2023     Lab Results   Component Value Date    CHOL 214 09/24/2014    HDL 22 (L) 04/19/2023    TRIG 108 01/20/2024     MELD 3.0: 9 at 12/9/2023  8:37 AM  MELD-Na: 10 at 12/9/2023  8:37 AM  Calculated from:  Serum Creatinine: 0.56 mg/dL (Using min of 1 mg/dL) at 12/9/2023  8:37 AM  Serum Sodium: 139 mmol/L (Using max of 137 mmol/L) at 12/9/2023  8:37 AM  Total Bilirubin: 1.37 mg/dL at 12/9/2023  8:37 AM  Serum Albumin: 3.5 g/dL at 12/9/2023  8:37 AM  INR(ratio): 1.21 at 12/9/2023  8:37 AM  Age at listing (hypothetical): 63 years  Sex: Male at 12/9/2023  8:37 AM        Radiology Results:   Colonoscopy    Result Date: 1/26/2024  Narrative: Table formatting from the original result was not included. [facility] [address] [place] [id number] [phone] DATE OF SERVICE: 1/26/24 PHYSICIAN(S): Attending: Miranda Hernandez DO Fellow: No Staff Documented INDICATION: Screening for colon cancer, History of colon polyps POST-OP DIAGNOSIS: See the impression below. HISTORY: Prior colonoscopy: 3 years ago. BOWEL PREPARATION: Golytely/Colyte/Trilyte PREPROCEDURE: Informed consent was obtained for the procedure, including sedation. Risks including but not limited to bleeding, infection, perforation, adverse drug reaction and aspiration were explained in detail. Also explained about less than 100% sensitivity with the exam and other alternatives. The patient was placed in the left lateral decubitus position. Procedure: Colonoscopy DETAILS OF PROCEDURE: Patient was taken to the procedure room where a time out was performed to confirm correct patient and  correct procedure. The patient underwent monitored anesthesia care, which was administered by an anesthesia professional. The patient's blood pressure, heart rate, level of consciousness, oxygen, respirations and ECG were monitored throughout the procedure. A digital rectal exam was performed. The scope was introduced through the anus and advanced to the cecum. Retroflexion was performed in the rectum. The quality of bowel preparation was evaluated using the Burwell Bowel Preparation Scale with scores of: right colon = 2, transverse colon = 1, left colon = 1. The total BBPS score was 4. Bowel prep was not adequate. The patient experienced no blood loss. The procedure was not difficult. The patient tolerated the procedure well. There were no apparent adverse events. ANESTHESIA INFORMATION: ASA: III Anesthesia Type: IV Sedation with Anesthesia MEDICATIONS: lactated ringers infusion 600 mL*  *From user-documented volume (Totals for administrations occurring from 0851 to 0931 on 01/26/24) FINDINGS: Few diverticula in the sigmoid colon Three sessile polyps measuring smaller than 5 mm in the transverse colon and splenic flexure; performed cold snare removal Angioectasia in the ascending colon EVENTS: Procedure Events Event Event Time ENDO SCOPE OUT TIME 1/26/2024  9:02 AM ENDO CECUM REACHED 1/26/2024  9:14 AM ENDO SCOPE OUT TIME 1/26/2024  9:30 AM SPECIMENS: ID Type Source Tests Collected by Time Destination 1 : r/o h pylori Tissue Stomach TISSUE EXAM Miranda Hernandez DO 1/26/2024  8:57 AM  2 : distal esop r/o barretts Tissue Esophagus TISSUE EXAM Miranda Hernandez DO 1/26/2024  9:01 AM  3 : polyp x2 Tissue Large Intestine, Transverse Colon TISSUE EXAM Miranda Hernandez,  1/26/2024  9:20 AM  4 : polyp Tissue Large Intestine, Splenic Flexure TISSUE EXAM Miranda Hernandez,  1/26/2024  9:24 AM  EQUIPMENT: Colonoscope -     Impression: Diverticulosis in the sigmoid colon 3 subcentimeter polyps in the transverse  colon and splenic flexure were removed with cold snare Angioectasia in the ascending colon RECOMMENDATION:  Await pathology results  Repeat colonoscopy in 1 year  Inadequate bowel preparation Personal history of colon polyps   2 day bowel prep for next colonoscopy  Miranda Hernandez DO     EGD    Result Date: 1/26/2024  Narrative: Table formatting from the original result was not included. [facility] [address] [place] [id number] [phone] DATE OF SERVICE: 1/26/24 PHYSICIAN(S): Attending: Miranda Hernandez DO Fellow: No Staff Documented INDICATION: Hepatomegaly, Splenomegaly, Bloating, Decreased appetite, Elevated LFTs, History of alcoholism (HCC), Elevated alkaline phosphatase level, Elevated bilirubin POST-OP DIAGNOSIS: See the impression below. PREPROCEDURE: Informed consent was obtained for the procedure, including sedation.  Risks of perforation, hemorrhage, adverse drug reaction and aspiration were discussed. The patient was placed in the left lateral decubitus position. Patient was explained about the risks and benefits of the procedure. Risks including but not limited to bleeding, infection, and perforation were explained in detail. Also explained about less than 100% sensitivity with the exam and other alternatives. PROCEDURE: EGD DETAILS OF PROCEDURE: Patient was taken to the procedure room where a time out was performed to confirm correct patient and correct procedure. The patient underwent monitored anesthesia care, which was administered by an anesthesia professional. The patient's blood pressure, heart rate, level of consciousness, respirations and oxygen were monitored throughout the procedure. The scope was advanced to the second part of the duodenum. Retroflexion was performed in the fundus. The patient experienced no blood loss. The procedure was not difficult. The patient tolerated the procedure well. There were no apparent adverse events. ANESTHESIA  INFORMATION: ASA: III Anesthesia Type: IV Sedation with Anesthesia MEDICATIONS: No administrations occurring from 0851 to 0903 on 01/26/24 FINDINGS: Abnormal mucosa in the lower third of the esophagus; performed cold forceps biopsy to rule out Nelson's esophagus One small varix in the lower third of the esophagus Cobblestone, friable and mosaic mucosa in the cardia, fundus of the stomach, body of the stomach, greater curve of the stomach, lesser curve of the stomach, incisura, antrum and pylorus. Consistent with portal gastropathy The duodenum appeared normal. SPECIMENS: ID Type Source Tests Collected by Time Destination 1 : r/o h pylori Tissue Stomach TISSUE EXAM Miranda Hernandez DO 1/26/2024  8:57 AM  2 : distal esop r/o barretts Tissue Esophagus TISSUE EXAM Miranda Hernandez DO 1/26/2024  9:01 AM      Impression: Abnormal mucosa concerning for possible Nelson's esophagus, C2M3 with small tongue along varix One small varix in the lower third of the esophagus The duodenum appeared normal. RECOMMENDATION:  Await pathology results Repeat EGD in 1 year for surveillance Complete EtOH abstinence   DO Harley Munguia MD

## 2024-02-21 NOTE — PATIENT INSTRUCTIONS
As discussed today:    Medications:  Continue taking your current medications without changes  Laboratory Testing (Listed below):  Please have blood work drawn in May.  Radiology/Diagnostic Testing:  Please schedule a triple phase CT-scan in April.  Avoid alcohol and tobacco products.  Also avoid raw seafood/oysters and avoid swimming/wading in unchlorinated stall[place], particularly from the [facility], due to increased risk of infection from a bacteria called Vibrio Vulnificus.  Avoid medications called NSAID's (Motrin/Ibuprofen, Naproxen/Naprosyn/Aleve) if possible, due to increase risk of kidney dysfunction, and if you use medications containing acetaminophen (Tylenol, percocet, Endocet, and many cough/cold medications), the dose should not exceed 2 grams/day.  Seek immediate medical attention if you develop fevers over 101 F, have evidence of GI bleeding (black or bloody stools, bloody or coffee ground emesis) or confusion.  Call our office if you develop worsening symptoms related to lower extremity edema, ascites, jaundice or excessive bruising/bleeding.     CT scheduled on 4/8 at [facility]

## 2024-02-22 ENCOUNTER — TELEPHONE (OUTPATIENT)
Dept: NEPHROLOGY | Facility: CLINIC | Age: 64
End: 2024-02-22

## 2024-02-22 NOTE — TELEPHONE ENCOUNTER
Patient called stating for the last couple of days he has been experiencing lightheadedness and dizziness.  Patient stated  he was to call office if this happened to do the increase of spirolactone. Please advise.

## 2024-02-28 NOTE — TELEPHONE ENCOUNTER
Gaston called me back and is aware to hold the medication Spirolactone until he is feeling better. He states he feels dizzy when he stands still. I informed him to decrease dosing back to dosing he was on before and see if it helps. He is going to keep the office updated.

## 2024-02-28 NOTE — TELEPHONE ENCOUNTER
I called and left a VM requesting a call back to the office to discuss how he is feeling currently.

## 2024-03-21 ENCOUNTER — CONSULT (OUTPATIENT)
Dept: RHEUMATOLOGY | Facility: CLINIC | Age: 64
End: 2024-03-21
Payer: COMMERCIAL

## 2024-03-21 VITALS
BODY MASS INDEX: 35.98 KG/M2 | HEIGHT: 71 IN | DIASTOLIC BLOOD PRESSURE: 64 MMHG | SYSTOLIC BLOOD PRESSURE: 136 MMHG | HEART RATE: 90 BPM | WEIGHT: 257 LBS | OXYGEN SATURATION: 98 %

## 2024-03-21 DIAGNOSIS — M50.30 DDD (DEGENERATIVE DISC DISEASE), CERVICAL: ICD-10-CM

## 2024-03-21 DIAGNOSIS — M19.049 OSTEOARTHRITIS OF HAND, UNSPECIFIED LATERALITY, UNSPECIFIED OSTEOARTHRITIS TYPE: Primary | ICD-10-CM

## 2024-03-21 DIAGNOSIS — M17.0 OSTEOARTHRITIS OF BOTH KNEES, UNSPECIFIED OSTEOARTHRITIS TYPE: ICD-10-CM

## 2024-03-21 DIAGNOSIS — R76.8 ELEVATED RHEUMATOID FACTOR: ICD-10-CM

## 2024-03-21 DIAGNOSIS — M51.37 DDD (DEGENERATIVE DISC DISEASE), LUMBOSACRAL: ICD-10-CM

## 2024-03-21 PROCEDURE — 99215 OFFICE O/P EST HI 40 MIN: CPT | Performed by: INTERNAL MEDICINE

## 2024-03-21 NOTE — PROGRESS NOTES
This is a Rheumatology Consult seen at the request of Dr. Gauthier      HPI: Raymundo Weller is a 64 y/o male who presents for further evaluation + RF. He has past medical obstructive sleep apnea on CPAP, alcoholism, cirrhosis, allergic rhinitis, chronic fatigue, chronic pain syndrome, HTN, HLT,     He has had chronic neck and back pain, DDD, radiculopathy. He has had injections per outside pain management cervical and lumbar spine.    Also intermittent arthralgias hips and knees. No pain, swelling and stiffness small joints hands and feet.    Chronic cirrhosis secondary alcohol and complicated with ascites, LE edema, thrombocytopenia, varices. He is being closely monitored per hepatology    H/o gout  X 1.     B/l CTS release    Last seen per Dr Hernandez in 2022 + RF and did not feel RA              --------------------------------------------------------------------------------------------------------        ROS:        - for: Fevers, Chills or sweats.  No HAs or scalp tenderness.  No jaw claudication.  No acute visual or eye changes.  No dry eyes.  No auditory complaints.  No oral lesions or ulcers.  No dry mouth.  No sore throat or cough.  No chest pains or palpitations.  No shortness of breath, dyspnea on exertion or wheezing.  No hemotpysis.  No abdominal pain, GERD symptoms, diarrhea or constipation.  No urinary complaints.  No numbness, tingling or weakness.  No rashes.    All other ROS was reviewed and negative except as above         --------------------------------------------------------------------------------------------------------    Past Medical History    Past Medical History:   Diagnosis Date    Abnormal kidney function     last assessed: Feb 25, 2016    Allergic rhinitis     Benign colon polyp     Carpal tunnel syndrome     Cervical radiculopathy     last assessed: Feb 11, 2015    Chronic fatigue     Chronic pain disorder     COPD (chronic obstructive pulmonary disease) ([facility])     COVID 01/05/2024     "CPAP (continuous positive airway pressure) dependence     Depression 04/07/2023    Edema     Elevated liver enzymes     Gout     Hyperlipidemia     Hypertension     Hypotension     last assessed: March 22, 2017    Left ventricular hypertrophy     Obesity (BMI 30-39.9) 09/20/2018    Pneumonia 04/05/2023    Sleep apnea            Past Surgical History    Past Surgical History:   Procedure Laterality Date    CARPAL TUNNEL RELEASE Bilateral     HEMORRHOID SURGERY      LIVER BIOPSY             Family History    Family History   Problem Relation Age of Onset    Cancer Mother     No Known Problems Father     No Known Problems Sister     No Known Problems Brother     No Known Problems Maternal Grandmother     No Known Problems Maternal Grandfather     No Known Problems Paternal Grandmother     No Known Problems Paternal Grandfather             Social History    Social History     Tobacco Use    Smoking status: Every Day     Current packs/day: 0.50     Average packs/day: 0.5 packs/day for 40.2 years (20.1 ttl pk-yrs)     Types: Cigarettes     Start date: 1/2/1988     Passive exposure: Never    Smokeless tobacco: Never    Tobacco comments:     Given [employer] \"how to quit\" info    Vaping Use    Vaping status: Never Used   Substance Use Topics    Alcohol use: Yes     Alcohol/week: 6.0 standard drinks of alcohol     Types: 6 Cans of beer per week     Comment: few drinks a day    Drug use: No         Allergies    No Known Allergies      Medications    Current Outpatient Medications   Medication Instructions    escitalopram (LEXAPRO) 10 mg, Oral, Daily    Fexofenadine HCl (ALLEGRA ALLERGY PO) Take by mouth    fluticasone (FLONASE) 50 mcg/act nasal spray 1 spray, Nasal, Daily    furosemide (LASIX) 80 mg, Oral, Daily    multivitamin (THERAGRAN) TABS 1 tablet, Oral, Daily    spironolactone (ALDACTONE) 50 mg, Oral, Daily          Physical Exam    /64   Pulse 90   Ht 5' 11\" (1.803 m)   Wt 117 kg (257 lb)   SpO2 98%   BMI 35.84 " kg/m²     GEN: AAO, No apparent distress.  Patient is well developed.  HEENT:  Pupils are equal, round and reactive.  Sclera are clear.  Fundoscopic exam is normal.  External ears are without lesions.  Oral pharynx is clear of ulcers or other lesions.  MMM.   NECK:  Supple.  There is no adenopathy appreciable in anterior or posterior cervical chains or supraclavicularly.  JVP is normal.    HEART: Regular rate and rhythm.  There is no appreciable murmur, gallop or rub.  LUNGS: Clear to auscultation.  ABD:  Soft, without tenderness, rebound or guarding.  No appreciable organomegally.  NEURO: Speech and cognition are normal.  Strength is 5/5 throughout.  Tone is normal.  DTRs are 2/4 at the knees, ankles and elbows.  Gait is normal.  SKIN: There are no rashes or lesions    MUSCULOSKELETAL:   + neida OA      ________________________________________________________________________          Results Review       Latest Reference Range & Units 11/29/17 09:01 12/03/21 07:21 05/10/22 10:24 11/20/23 14:05   CYCLIC CITRULLINATED PEPTIDE ANTIBODY See comment    1.5    RHEUMATOID FACTOR Negative  Negative Positive !  Positive !   !: Data is abnormal        Impressions and Plans:    This is a 64 y/o male with h/o carpal tunnel syndrome, trigger finger, neuropathy, cirrhosis secondary to alcohol     + RF noted (last seen 2022 per Dr Hernandez)    Clinically more consistent with osteoarthritis    At this time RF appears false positive, however symptoms may evolve over time    Check CCP ab    Offered to refer Ortho for trigger finger (declines for now)    RTC an needed      Thank you for involving me in this patient's care.        Vance Barrett MD  [facility] Rheumatology      I have spent a total time of 42 minutes on 03/21/24 in caring for this patient including Diagnostic results, Prognosis, Impressions, Counseling / Coordination of care, Documenting in the medical record, Reviewing / ordering tests, medicine, procedures  , and  Obtaining or reviewing history  .

## 2024-04-08 ENCOUNTER — HOSPITAL ENCOUNTER (OUTPATIENT)
Dept: CT IMAGING | Facility: HOSPITAL | Age: 64
Discharge: HOME | End: 2024-04-08
Attending: INTERNAL MEDICINE
Payer: COMMERCIAL

## 2024-04-08 DIAGNOSIS — R60.0 LOWER EXTREMITY EDEMA: ICD-10-CM

## 2024-04-08 DIAGNOSIS — K70.31 ALCOHOLIC CIRRHOSIS OF LIVER WITH ASCITES (HCC): ICD-10-CM

## 2024-04-08 DIAGNOSIS — D69.6 THROMBOCYTOPENIA (HCC): ICD-10-CM

## 2024-04-08 PROCEDURE — G1004 CDSM NDSC: HCPCS

## 2024-04-08 PROCEDURE — 74170 CT ABD WO CNTRST FLWD CNTRST: CPT

## 2024-04-08 RX ADMIN — IOHEXOL 100 ML: 350 INJECTION, SOLUTION INTRAVENOUS at 07:10

## 2024-04-23 ENCOUNTER — TELEPHONE (OUTPATIENT)
Dept: NEPHROLOGY | Facility: CLINIC | Age: 64
End: 2024-04-23

## 2024-04-23 NOTE — TELEPHONE ENCOUNTER
left voicemail for patient to call the office to schedule follow up for August 2024 in [place]. Patient can see JACQUELINE or PA

## 2024-04-24 ENCOUNTER — TELEPHONE (OUTPATIENT)
Dept: NEPHROLOGY | Facility: CLINIC | Age: 64
End: 2024-04-24

## 2024-04-24 NOTE — TELEPHONE ENCOUNTER
lvm for patient to call the office to schedule follow up in AUGUST 2024 in [place]. Patient can see JACQUELINE or PA

## 2024-05-07 ENCOUNTER — OFFICE VISIT (OUTPATIENT)
Dept: INTERNAL MEDICINE CLINIC | Facility: CLINIC | Age: 64
End: 2024-05-07
Payer: COMMERCIAL

## 2024-05-07 VITALS
BODY MASS INDEX: 37.8 KG/M2 | SYSTOLIC BLOOD PRESSURE: 132 MMHG | WEIGHT: 270 LBS | OXYGEN SATURATION: 97 % | HEART RATE: 89 BPM | DIASTOLIC BLOOD PRESSURE: 78 MMHG | HEIGHT: 71 IN | TEMPERATURE: 97.7 F

## 2024-05-07 DIAGNOSIS — M1A.9XX0 CHRONIC GOUT WITHOUT TOPHUS, UNSPECIFIED CAUSE, UNSPECIFIED SITE: ICD-10-CM

## 2024-05-07 DIAGNOSIS — I71.40 ABDOMINAL AORTIC ANEURYSM (AAA) WITHOUT RUPTURE, UNSPECIFIED PART (HCC): ICD-10-CM

## 2024-05-07 DIAGNOSIS — J42 CHRONIC BRONCHITIS, UNSPECIFIED CHRONIC BRONCHITIS TYPE (HCC): ICD-10-CM

## 2024-05-07 DIAGNOSIS — E83.19 IRON OVERLOAD: ICD-10-CM

## 2024-05-07 DIAGNOSIS — R16.0 HEPATOMEGALY: ICD-10-CM

## 2024-05-07 DIAGNOSIS — D69.6 THROMBOCYTOPENIA (HCC): ICD-10-CM

## 2024-05-07 DIAGNOSIS — I10 BENIGN ESSENTIAL HYPERTENSION: Primary | ICD-10-CM

## 2024-05-07 DIAGNOSIS — F32.A DEPRESSION, UNSPECIFIED DEPRESSION TYPE: ICD-10-CM

## 2024-05-07 DIAGNOSIS — Z72.0 TOBACCO USE: ICD-10-CM

## 2024-05-07 DIAGNOSIS — E78.1 PURE HYPERTRIGLYCERIDEMIA: ICD-10-CM

## 2024-05-07 DIAGNOSIS — Z13.1 SCREENING FOR DIABETES MELLITUS (DM): ICD-10-CM

## 2024-05-07 DIAGNOSIS — E66.01 CLASS 2 SEVERE OBESITY DUE TO EXCESS CALORIES WITH SERIOUS COMORBIDITY AND BODY MASS INDEX (BMI) OF 35.0 TO 35.9 IN ADULT (HCC): ICD-10-CM

## 2024-05-07 DIAGNOSIS — E27.9 ADRENAL ABNORMALITY (HCC): ICD-10-CM

## 2024-05-07 PROBLEM — E66.812 CLASS 2 SEVERE OBESITY DUE TO EXCESS CALORIES WITH SERIOUS COMORBIDITY AND BODY MASS INDEX (BMI) OF 35.0 TO 35.9 IN ADULT (HCC): Status: ACTIVE | Noted: 2018-09-20

## 2024-05-07 PROBLEM — R63.0 DECREASED APPETITE: Status: RESOLVED | Noted: 2023-11-26 | Resolved: 2024-05-07

## 2024-05-07 PROCEDURE — 99214 OFFICE O/P EST MOD 30 MIN: CPT | Performed by: INTERNAL MEDICINE

## 2024-05-07 NOTE — PATIENT INSTRUCTIONS
Cigarette Smoking and Your Health   AMBULATORY CARE:   Risks to your health if you smoke:  Nicotine and other chemicals found in tobacco and e-cigarettes can damage every cell in your body. Even if you are a light smoker, you have an increased risk for cancer, heart disease, and lung disease. If you are pregnant or have diabetes, smoking increases your risk for complications. Nicotine can affect an adolescent's developing brain. This can lead to trouble thinking, learning, or paying attention.  Benefits to your health if you stop smoking:   You decrease respiratory symptoms such as coughing, wheezing, and shortness of breath.    You reduce your risk for cancers of the lung, mouth, throat, kidney, bladder, pancreas, stomach, and cervix. If you already have cancer, you increase the benefits of chemotherapy. You also reduce your risk for cancer returning or a second cancer from developing.    You reduce your risk for heart disease, blood clots, heart attack, and stroke.    You reduce your risk for lung infections, and diseases such as pneumonia, asthma, chronic bronchitis, and emphysema.    Your circulation improves. More oxygen can be delivered to your body. If you have diabetes, you lower your risk for complications, such as kidney, artery, and eye diseases. You also lower your risk for nerve damage. Nerve damage can lead to amputations, poor vision, and blindness.    You improve your body's ability to heal and to fight infections.    An adolescent can help his or her brain and body develop in a healthy way. Talk to your adolescent about all the health risks of nicotine. If you can, start talking about nicotine when your child is younger than 12 years. This may make it easier for him or her not to start using nicotine as a teenager or adult. Explain to him or her that it is best never to start. It can be hard to try to quit later.    Benefits to the health of others if you stop smoking:  Tobacco is harmful to  nonsmokers who breathe in your secondhand smoke. The following are ways the health of others around you may improve when you stop smoking:  You lower the risks for lung cancer, heart disease, and stroke in nonsmoking adults.    If you are pregnant, you lower the risk for miscarriage, early delivery, low birth weight, and stillbirth. You also lower your baby's risk for SIDS, obesity, developmental delay, and neurobehavioral problems, such as ADHD.    If you have children, you lower their risk for ear infections, colds, pneumonia, bronchitis, and asthma.    Follow up with your doctor as directed:  Write down your questions so you remember to ask them during your visits.  For support and more information:   [employer]  [address] [address]  Phone: 1- 387 - [phone]  Phone: 0- 618 - 218-7629  Web Address: www.lung.org    Smokefree.gov  Phone: 0- 100 - 704-9158  Web Address: www.smokefree.gov  © Copyright [employer]ative 2023 Information is for End User's use only and may not be sold, redistributed or otherwise used for commercial purposes.  The above information is an  only. It is not intended as medical advice for individual conditions or treatments. Talk to your doctor, nurse or pharmacist before following any medical regimen to see if it is safe and effective for you.

## 2024-05-07 NOTE — PROGRESS NOTES
Depression Screening and Follow-up Plan: Patient was screened for depression during today's encounter. They screened negative with a PHQ-9 score of 0.    Tobacco Cessation Counseling: Tobacco cessation counseling was provided. The patient is sincerely urged to quit consumption of tobacco. He is not ready to quit tobacco. Medication options discussed. Side effects of medication not discussed. Patient refused medication.     Assessment/Plan:  Problem List Items Addressed This Visit          Cardiovascular and Mediastinum    Benign essential hypertension - Primary    Relevant Orders    Comprehensive metabolic panel    TSH, 3rd generation    Abdominal aortic aneurysm (AAA) without rupture (HCC)       Respiratory    COPD (chronic obstructive pulmonary disease) (HCC)       Digestive    Hepatomegaly       Endocrine    Adrenal abnormality (HCC)       Hematopoietic and Hemostatic    Thrombocytopenia (HCC)       Behavioral Health    Tobacco use    Depression       Orthopedic/Musculoskeletal    Gout       Other    Iron overload    Relevant Orders    Iron Panel (Includes Ferritin, Iron Sat%, Iron, and TIBC)    Hyperlipidemia    Class 2 severe obesity due to excess calories with serious comorbidity and body mass index (BMI) of 35.0 to 35.9 in adult (HCC)     Other Visit Diagnoses       Screening for diabetes mellitus (DM)        Relevant Orders    Hemoglobin A1C             Diagnoses and all orders for this visit:    Benign essential hypertension  -     Comprehensive metabolic panel; Future  -     TSH, 3rd generation; Future    Tobacco use    Chronic bronchitis, unspecified chronic bronchitis type (HCC)    Abdominal aortic aneurysm (AAA) without rupture, unspecified part (HCC)    Hepatomegaly    Adrenal abnormality (HCC)    Thrombocytopenia (HCC)    Depression, unspecified depression type    Pure hypertriglyceridemia    Iron overload  -     Iron Panel (Includes Ferritin, Iron Sat%, Iron, and TIBC); Future    Class 2 severe  obesity due to excess calories with serious comorbidity and body mass index (BMI) of 35.0 to 35.9 in adult (HCC)    Chronic gout without tophus, unspecified cause, unspecified site    Screening for diabetes mellitus (DM)  -     Hemoglobin A1C; Future        No problem-specific Assessment & Plan notes found for this encounter.    A/P: Doing ok and will check labs. Defers lung ca screening. Appreciate rheum, GI, and nephro input. Wean tobacco further. . Continue current treatment and RTC four months for routine.     Subjective:      Patient ID: Raymundo Weller is a 63 y.o. male.    WM RTC For f/u HTN, Gout, etc. Doing ok and no new issues. Stopped the lasix due to making him lightheaded at times. Remains active w/o difficulty and no falls. Chronic pain and gout are manageable. Still smoking, but less. Abstaining from ETOH.. COPD is controlled and limited rescue MDI. MDD/DEYSI are controlled. Due for labs and CT lung ca screening.         The following portions of the patient's history were reviewed and updated as appropriate:   He has a past medical history of Abnormal kidney function, Allergic rhinitis, Benign colon polyp, Carpal tunnel syndrome, Cervical radiculopathy, Chronic fatigue, Chronic pain disorder, COPD (chronic obstructive pulmonary disease) ([facility]), COVID (01/05/2024), CPAP (continuous positive airway pressure) dependence, Depression (04/07/2023), Edema, Elevated liver enzymes, Gout, Hyperlipidemia, Hypertension, Hypotension, Left ventricular hypertrophy, Obesity (BMI 30-39.9) (09/20/2018), Pneumonia (04/05/2023), and Sleep apnea.,  does not have any pertinent problems on file.,   has a past surgical history that includes Hemorrhoid surgery; Carpal tunnel release (Bilateral); and Liver biopsy.,  family history includes Cancer in his mother; No Known Problems in his brother, father, maternal grandfather, maternal grandmother, paternal grandfather, paternal grandmother, and sister.,   reports that he has  been smoking cigarettes. He started smoking about 36 years ago. He has a 20.2 pack-year smoking history. He has never been exposed to tobacco smoke. He has never used smokeless tobacco. He reports current alcohol use of about 6.0 standard drinks of alcohol per week. He reports that he does not use drugs.,  has No Known Allergies..  Current Outpatient Medications   Medication Sig Dispense Refill    escitalopram (LEXAPRO) 10 mg tablet Take 1 tablet (10 mg total) by mouth daily 90 tablet 3    Fexofenadine HCl (ALLEGRA ALLERGY PO) Take by mouth      fluticasone (FLONASE) 50 mcg/act nasal spray 1 spray into each nostril daily 16 g 0    multivitamin (THERAGRAN) TABS Take 1 tablet by mouth daily      spironolactone (ALDACTONE) 50 mg tablet Take 50 mg by mouth daily       No current facility-administered medications for this visit.       Review of Systems   Constitutional:  Negative for activity change, chills, diaphoresis, fatigue and fever.   HENT: Negative.     Eyes:  Negative for visual disturbance.   Respiratory:  Negative for cough, chest tightness, shortness of breath and wheezing.    Cardiovascular:  Negative for chest pain, palpitations and leg swelling.   Gastrointestinal:  Negative for abdominal pain, constipation, diarrhea, nausea and vomiting.   Endocrine: Negative for cold intolerance and heat intolerance.   Genitourinary:  Negative for difficulty urinating, dysuria and frequency.   Musculoskeletal:  Negative for arthralgias, gait problem and myalgias.   Neurological:  Negative for dizziness, seizures, syncope, weakness, light-headedness and headaches.   Psychiatric/Behavioral:  Negative for confusion, dysphoric mood and sleep disturbance. The patient is not nervous/anxious.        PHQ-2/9 Depression Screening    Little interest or pleasure in doing things: 0 - not at all  Feeling down, depressed, or hopeless: 0 - not at all  Trouble falling or staying asleep, or sleeping too much: 0 - not at all  Feeling  "tired or having little energy: 0 - not at all  Poor appetite or overeatin - not at all  Feeling bad about yourself - or that you are a failure or have let yourself or your family down: 0 - not at all  Trouble concentrating on things, such as reading the newspaper or watching television: 0 - not at all  Moving or speaking so slowly that other people could have noticed. Or the opposite - being so fidgety or restless that you have been moving around a lot more than usual: 0 - not at all  Thoughts that you would be better off dead, or of hurting yourself in some way: 0 - not at all  PHQ-9 Score: 0  PHQ-9 Interpretation: No or Minimal depression        Objective:  Vitals:    24 0727   BP: 132/78   Pulse: 89   Temp: 97.7 °F (36.5 °C)   SpO2: 97%   Weight: 122 kg (270 lb)   Height: 5' 11\" (1.803 m)     Body mass index is 37.66 kg/m².     Physical Exam  Vitals and nursing note reviewed.   Constitutional:       General: He is not in acute distress.     Appearance: Normal appearance. He is obese. He is not ill-appearing.   HENT:      Head: Normocephalic and atraumatic.      Mouth/Throat:      Mouth: Mucous membranes are moist.   Eyes:      Extraocular Movements: Extraocular movements intact.      Conjunctiva/sclera: Conjunctivae normal.      Pupils: Pupils are equal, round, and reactive to light.   Neck:      Vascular: No carotid bruit.   Cardiovascular:      Rate and Rhythm: Normal rate and regular rhythm.      Heart sounds: Normal heart sounds. No murmur heard.  Pulmonary:      Effort: Pulmonary effort is normal. No respiratory distress.      Breath sounds: Normal breath sounds. No wheezing, rhonchi or rales.   Abdominal:      General: Bowel sounds are normal. There is no distension.      Palpations: Abdomen is soft.      Tenderness: There is no abdominal tenderness.   Musculoskeletal:      Cervical back: Neck supple.      Right lower leg: Edema present.      Left lower leg: Edema present.      Comments: Bilat LE " edema 1-2/4   Neurological:      General: No focal deficit present.      Mental Status: He is alert and oriented to person, place, and time. Mental status is at baseline.   Psychiatric:         Mood and Affect: Mood normal.         Behavior: Behavior normal.         Thought Content: Thought content normal.         Judgment: Judgment normal.       Tobacco Cessation Counseling: Tobacco cessation counseling and education was provided. The patient is sincerely urged to quit consumption of tobacco. He is not ready to quit tobacco. The numerous health risks of tobacco consumption were discussed. If he decides to quit, there are a number of helpful adjunctive aids, and he can see me to discuss nicotine replacement therapy, chantix, or bupropion anytime in the future.

## 2024-05-13 ENCOUNTER — TELEPHONE (OUTPATIENT)
Age: 64
End: 2024-05-13

## 2024-05-13 NOTE — TELEPHONE ENCOUNTER
Spoke with patient to compare the medications he is taking to the medications we have on file for him. He was questioning his blood pressure medication but kept referencing lasix and wanting it to be 40mg. I advised patient that he would need to speak with his provider in nephrology since they were handling his BP med.

## 2024-05-13 NOTE — TELEPHONE ENCOUNTER
Patient asked if Dr. Gauthier would call him back. He wants to make sure he is taking the right meds.

## 2024-05-17 ENCOUNTER — TELEPHONE (OUTPATIENT)
Age: 64
End: 2024-05-17

## 2024-05-17 DIAGNOSIS — Z00.8 ENCOUNTER FOR OTHER GENERAL EXAMINATION: ICD-10-CM

## 2024-05-17 DIAGNOSIS — Z02.89 ENCOUNTER FOR PHYSICAL EXAMINATION RELATED TO EMPLOYMENT: Primary | ICD-10-CM

## 2024-05-17 NOTE — TELEPHONE ENCOUNTER
Patient called to inquire if the current lab orders meet/satisfy the requirements of his insurance which is . [place] provided by his spouse.    Please review orders and call patient back to confirm.      Patient plans to have blood drawn tomorrow, Sat., 5/18/24.

## 2024-05-18 ENCOUNTER — APPOINTMENT (OUTPATIENT)
Dept: LAB | Facility: CLINIC | Age: 64
End: 2024-05-18
Payer: COMMERCIAL

## 2024-05-18 DIAGNOSIS — M19.049 OSTEOARTHRITIS OF HAND, UNSPECIFIED LATERALITY, UNSPECIFIED OSTEOARTHRITIS TYPE: ICD-10-CM

## 2024-05-18 DIAGNOSIS — D69.6 THROMBOCYTOPENIA (HCC): ICD-10-CM

## 2024-05-18 DIAGNOSIS — M50.30 DDD (DEGENERATIVE DISC DISEASE), CERVICAL: ICD-10-CM

## 2024-05-18 DIAGNOSIS — E83.19 IRON OVERLOAD: ICD-10-CM

## 2024-05-18 DIAGNOSIS — R60.0 LOWER EXTREMITY EDEMA: ICD-10-CM

## 2024-05-18 DIAGNOSIS — R76.8 ELEVATED RHEUMATOID FACTOR: ICD-10-CM

## 2024-05-18 DIAGNOSIS — Z00.8 ENCOUNTER FOR OTHER GENERAL EXAMINATION: ICD-10-CM

## 2024-05-18 DIAGNOSIS — M51.37 DDD (DEGENERATIVE DISC DISEASE), LUMBOSACRAL: ICD-10-CM

## 2024-05-18 DIAGNOSIS — Z13.1 SCREENING FOR DIABETES MELLITUS (DM): ICD-10-CM

## 2024-05-18 DIAGNOSIS — K70.31 ALCOHOLIC CIRRHOSIS OF LIVER WITH ASCITES (HCC): ICD-10-CM

## 2024-05-18 DIAGNOSIS — Z02.89 ENCOUNTER FOR PHYSICAL EXAMINATION RELATED TO EMPLOYMENT: ICD-10-CM

## 2024-05-18 DIAGNOSIS — I10 BENIGN ESSENTIAL HYPERTENSION: ICD-10-CM

## 2024-05-18 DIAGNOSIS — M17.0 OSTEOARTHRITIS OF BOTH KNEES, UNSPECIFIED OSTEOARTHRITIS TYPE: ICD-10-CM

## 2024-05-18 LAB
ALBUMIN SERPL BCP-MCNC: 3.9 G/DL (ref 3.5–5)
ALP SERPL-CCNC: 80 U/L (ref 34–104)
ALT SERPL W P-5'-P-CCNC: 26 U/L (ref 7–52)
ANION GAP SERPL CALCULATED.3IONS-SCNC: 9 MMOL/L (ref 4–13)
AST SERPL W P-5'-P-CCNC: 44 U/L (ref 13–39)
BILIRUB SERPL-MCNC: 1.07 MG/DL (ref 0.2–1)
BUN SERPL-MCNC: 9 MG/DL (ref 5–25)
CALCIUM SERPL-MCNC: 9.5 MG/DL (ref 8.4–10.2)
CHLORIDE SERPL-SCNC: 104 MMOL/L (ref 96–108)
CHOLEST SERPL-MCNC: 129 MG/DL
CO2 SERPL-SCNC: 24 MMOL/L (ref 21–32)
CREAT SERPL-MCNC: 0.55 MG/DL (ref 0.6–1.3)
ERYTHROCYTE [DISTWIDTH] IN BLOOD BY AUTOMATED COUNT: 14.6 % (ref 11.6–15.1)
EST. AVERAGE GLUCOSE BLD GHB EST-MCNC: 111 MG/DL
FERRITIN SERPL-MCNC: 61 NG/ML (ref 24–336)
GFR SERPL CREATININE-BSD FRML MDRD: 109 ML/MIN/1.73SQ M
GLUCOSE P FAST SERPL-MCNC: 104 MG/DL (ref 65–99)
HBA1C MFR BLD: 5.5 %
HCT VFR BLD AUTO: 45.4 % (ref 36.5–49.3)
HDLC SERPL-MCNC: 26 MG/DL
HGB BLD-MCNC: 15.2 G/DL (ref 12–17)
INR PPP: 1.21 (ref 0.84–1.19)
IRON SATN MFR SERPL: 28 % (ref 15–50)
IRON SERPL-MCNC: 113 UG/DL (ref 50–212)
LDLC SERPL CALC-MCNC: 85 MG/DL (ref 0–100)
MCH RBC QN AUTO: 33.8 PG (ref 26.8–34.3)
MCHC RBC AUTO-ENTMCNC: 33.5 G/DL (ref 31.4–37.4)
MCV RBC AUTO: 101 FL (ref 82–98)
PLATELET # BLD AUTO: 125 THOUSANDS/UL (ref 149–390)
PMV BLD AUTO: 10.9 FL (ref 8.9–12.7)
POTASSIUM SERPL-SCNC: 4.8 MMOL/L (ref 3.5–5.3)
PROT SERPL-MCNC: 7.6 G/DL (ref 6.4–8.4)
PROTHROMBIN TIME: 15.2 SECONDS (ref 11.6–14.5)
RBC # BLD AUTO: 4.5 MILLION/UL (ref 3.88–5.62)
SODIUM SERPL-SCNC: 137 MMOL/L (ref 135–147)
TIBC SERPL-MCNC: 400 UG/DL (ref 250–450)
TRIGL SERPL-MCNC: 90 MG/DL
TSH SERPL DL<=0.05 MIU/L-ACNC: 1.9 UIU/ML (ref 0.45–4.5)
UIBC SERPL-MCNC: 287 UG/DL (ref 155–355)
WBC # BLD AUTO: 6.41 THOUSAND/UL (ref 4.31–10.16)

## 2024-05-18 PROCEDURE — 80053 COMPREHEN METABOLIC PANEL: CPT

## 2024-05-18 PROCEDURE — 84443 ASSAY THYROID STIM HORMONE: CPT

## 2024-05-18 PROCEDURE — 80061 LIPID PANEL: CPT

## 2024-05-18 PROCEDURE — 86200 CCP ANTIBODY: CPT

## 2024-05-18 PROCEDURE — 82728 ASSAY OF FERRITIN: CPT

## 2024-05-18 PROCEDURE — 83036 HEMOGLOBIN GLYCOSYLATED A1C: CPT

## 2024-05-18 PROCEDURE — 36415 COLL VENOUS BLD VENIPUNCTURE: CPT

## 2024-05-18 PROCEDURE — 83540 ASSAY OF IRON: CPT

## 2024-05-18 PROCEDURE — 83550 IRON BINDING TEST: CPT

## 2024-05-18 PROCEDURE — 85610 PROTHROMBIN TIME: CPT

## 2024-05-18 PROCEDURE — 85027 COMPLETE CBC AUTOMATED: CPT

## 2024-05-21 LAB — CCP AB SER IA-ACNC: 1.6

## 2024-05-22 DIAGNOSIS — I10 PRIMARY HYPERTENSION: Primary | ICD-10-CM

## 2024-05-23 RX ORDER — SPIRONOLACTONE 50 MG/1
50 TABLET, FILM COATED ORAL DAILY
Qty: 90 TABLET | Refills: 1 | Status: SHIPPED | OUTPATIENT
Start: 2024-05-23

## 2024-05-29 PROCEDURE — 88305 TISSUE EXAM BY PATHOLOGIST: CPT | Performed by: STUDENT IN AN ORGANIZED HEALTH CARE EDUCATION/TRAINING PROGRAM

## 2024-05-29 NOTE — PROGRESS NOTES
[facility]  Outpatient Follow-up  Encounter: [id number]    PATIENT INFO     Name: Raymundo Weller  YOB: 1960   Age: 63 y.o.   Sex: male   MRN: [id number]    ASSESSMENT & PLAN     Raymundo Weller is a 63 y.o. male with PMH hypertension, hyperlipidemia, obstructive sleep apnea, class III obesity, AAA and extensive history of alcohol use till mid 2023 and then intermittently since, with last drink about 2 weeks ago, who presents to hepatology clinic for 5-month follow-up.  He was seen 2/21/2024 to establish care with hepatology for cirrhosis.     Diagnoses and all orders for this visit:    Alcoholic cirrhosis of liver with ascites (HCC)  -     CBC and differential; Standing  -     Comprehensive metabolic panel; Standing  -     Protime-INR; Standing  -     US abdomen complete; Future  -     AFP tumor marker; Future  MELD 3.0: 8 at 5/18/2024  9:09 AM  MELD-Na: 9 at 5/18/2024  9:09 AM  Calculated from:  Serum Creatinine: 0.55 mg/dL (Using min of 1 mg/dL) at 5/18/2024  9:09 AM  Serum Sodium: 137 mmol/L at 5/18/2024  9:09 AM  Total Bilirubin: 1.07 mg/dL at 5/18/2024  9:09 AM  Serum Albumin: 3.9 g/dL (Using max of 3.5 g/dL) at 5/18/2024  9:09 AM  INR(ratio): 1.21 at 5/18/2024  9:09 AM  Age at listing (hypothetical): 63 years  Sex: Male at 5/18/2024  9:09 AM  Prior workup - Negative RACHEL, ceruloplasmin, alpha-1 antitrypsin, celiac panel, anti-smooth muscle antibody, hepatitis panel, iron panel  S/p liver biopsy in 2018 significant for steatohepatitis consistent with patient's history of alcohol use with likely diagnosis of alcoholic steatohepatitis - no significant iron depostition   Hepatitis A antibody and hepatitis B surface antibody both ordered to check for vaccination/immunization status last visit and not completed - orders reprinted for patient  Emphasized continued importance of complete alcohol cessation  Ascites  Continue spironolactone 50mg daily  Low salt diet,  high protein  Call with increased swelling/significant weight gain  Varices  EV screening 1/26/24 with only one small varix  Repeat screening in 1 year  HE  No history  Monitor mental status closely  Discussed S/S to monitor for  HCC Screening  CT w/contrast done 4/2024 without mass  Repeat US in October  AFP elevated in past - will recheck now  Transplant  Currently MELD too low for transplant evaluation  Discussed importance of abstinence should transplant be needed in future  Health Maintenance:  Mr. Weller was counseled to avoid alcohol and tobacco products.  Mr. Weller was also advised to avoid raw seafood/oysters and from swimming in unchlorinated [place], particularly from the [facility] [facility], due to increased risk of infection from Vibrio Vulnificus.  Mr. Weller was also instructed to seek immediate medical attention should he develop fevers over 101 F, evidence of GI bleeding (black or bloody stools, bloody or coffee ground emesis) or confusion.  Mr. Weller was advised to avoid NSAIDs, if possible, due to increase risk of renal dysfunction, and advised that if he should use acetaminophen, dose should not exceed 2 grams/day.  Mr. Weller was recommend to remain up to date with adult vaccination, including influenza, pneumovax and meningococcus. Inactivated HSV and zoster vaccine is safe and encouraged by PCP.  Check hep A/ hep B serologies to evaluate for immunity  Mr. Weller was instructed to call either our office or that of his referring doctor should he develop worsening symptoms related to lower extremity edema, ascites, jaundice or excessive bruising/bleeding.     I have spent a total time of 45 minutes on 05/30/24 in caring for this patient including Prognosis, Risks and benefits of tx options, Instructions for management, Patient and family education, Importance of tx compliance, Risk factor reductions, Documenting in the medical record, Reviewing / ordering tests, medicine, procedures  , Obtaining or  reviewing history  , and Communicating with other healthcare professionals .     FOLLOW-UP: 6 months    HISTORY OF PRESENT ILLNESS       Raymundo Weller is a 63 y.o. male with PMH hypertension, hyperlipidemia, obstructive sleep apnea, class III obesity, AAA and extensive history of alcohol use till mid 2023 and then intermittently since, with last drink about 2 weeks ago, who presents to hepatology clinic for 5-month follow-up.  He was seen 2/21/2024 to establish care with hepatology after last being seen by primary GI in late November for further evaluation of cirrhosis, bloating and CRC screening.     Ultrasound elastography completed 12/2/2023 demonstrating F4 cirrhosis and S3 steatosis    CT abdomen with and without contrast done 4/8/2024 notable for tiny hepatic cysts, cirrhotic morphology, no evidence of suspicious hepatic mass, patent portal vein    Overall, patient has been doing well since last office visit. Still has lower ext edema, but mostly controlled with spironolactone and compression stockings. More edema by end of day. No significant ascites. No confusion noted at home. Denies N/V. Intermittent constipation managed conservatively    Does endorse drinking 1 glass of wine approximately 2 weeks ago, but denies any other alcohol use since last visit.     ENDOSCOPIC HISTORY     UPPER ENDOSCOPY: 1/26/2024 significant for 1 small varix not requiring intervention, PHG, normal duodenum, Nelson's esophagus-negative gastric biopsies, esophageal biopsies confirm Nelson's esophagus  COLONOSCOPY: 1/26/2024 -few sigmoid diverticula, ascending colon AVM transverse and splenic flexure hyperplastic polyps removed.  Recommended repeat colonoscopy in 1 year due to inadequate bowel preparation and personal history of colon polyps with 2-day bowel prep  Prior colonoscopy in 2021 significant for 5 tubular adenomas in the splenic flexure, 1 ascending colon tubular adenoma, sessile serrated lesion compatible with  hyperplastic polyp in the descending colon and rectum    REVIEW OF SYSTEMS     CONSTITUTIONAL: Denies any fever, chills, rigors, and weight loss  HEENT: No earache or tinnitus, denies hearing loss or visual disturbances  CARDIOVASCULAR: No chest pain or palpitations  RESPIRATORY: Denies any cough, hemoptysis, shortness of breath or dyspnea on exertion  GASTROINTESTINAL: As noted in the History of Present Illness  GENITOURINARY: No problems with urination, denies any hematuria or dysuria  NEUROLOGIC: No dizziness or vertigo, denies headaches   MUSCULOSKELETAL: Denies any muscle or joint pain   SKIN: Denies skin rashes or itching  ENDOCRINE: Denies excessive thirst, denies intolerance to heat or cold  PSYCHOSOCIAL: Denies depression or anxiety, denies any recent memory loss     Historical Information   Past Medical History:   Diagnosis Date    Abnormal kidney function     last assessed: Feb 25, 2016    Allergic rhinitis     Benign colon polyp     Carpal tunnel syndrome     Cervical radiculopathy     last assessed: Feb 11, 2015    Chronic fatigue     Chronic pain disorder     COPD (chronic obstructive pulmonary disease) (HCC)     COVID 01/05/2024    CPAP (continuous positive airway pressure) dependence     Depression 04/07/2023    Edema     Elevated liver enzymes     Gout     Hyperlipidemia     Hypertension     Hypotension     last assessed: March 22, 2017    Left ventricular hypertrophy     Obesity (BMI 30-39.9) 09/20/2018    Pneumonia 04/05/2023    Sleep apnea      Past Surgical History:   Procedure Laterality Date    CARPAL TUNNEL RELEASE Bilateral     HEMORRHOID SURGERY      LIVER BIOPSY       Social History   Social History     Substance and Sexual Activity   Alcohol Use Yes    Alcohol/week: 6.0 standard drinks of alcohol    Types: 6 Cans of beer per week    Comment: few drinks a day     Social History     Substance and Sexual Activity   Drug Use No     Social History     Tobacco Use   Smoking Status Every Day     "Current packs/day: 0.50    Average packs/day: 0.5 packs/day for 40.4 years (20.2 ttl pk-yrs)    Types: Cigarettes    Start date: 1/2/1988    Passive exposure: Never   Smokeless Tobacco Never   Tobacco Comments    Given [employer] \"how to quit\" info      Family History   Problem Relation Age of Onset    Cancer Mother     No Known Problems Father     No Known Problems Sister     No Known Problems Brother     No Known Problems Maternal Grandmother     No Known Problems Maternal Grandfather     No Known Problems Paternal Grandmother     No Known Problems Paternal Grandfather          MEDICATIONS AND ALLERGIES     Current Outpatient Medications   Medication Instructions    escitalopram (LEXAPRO) 10 mg, Oral, Daily    Fexofenadine HCl (ALLEGRA ALLERGY PO) Oral    fluticasone (FLONASE) 50 mcg/act nasal spray 1 spray, Nasal, Daily    multivitamin (THERAGRAN) TABS 1 tablet, Oral, Daily    spironolactone (ALDACTONE) 50 mg, Oral, Daily     No Known Allergies    PHYSICAL EXAM      Objective   There were no vitals taken for this visit. There is no height or weight on file to calculate BMI.    General Appearance:   Alert, cooperative, no distress   HEENT:   Normocephalic, atraumatic, anicteric     Neck:   Supple, symmetrical, trachea midline   Lungs:   Equal chest rise, respirations unlabored    Heart:   Regular rate and rhythm   Abdomen:   Soft, non-tender, non-distended; normal bowel sounds; no masses, no organomegaly    Rectal:   Deferred    Extremities:   No cyanosis, clubbing or edema    Neuro:   Moves all 4 extremities    Skin:   No jaundice, rashes, or lesions      LABORATORY RESULTS     No visits with results within 1 Day(s) from this visit.   Latest known visit with results is:   Appointment on 05/18/2024   Component Date Value    WBC 05/18/2024 6.41     RBC 05/18/2024 4.50     Hemoglobin 05/18/2024 15.2     Hematocrit 05/18/2024 45.4     MCV 05/18/2024 101 (H)     MCH 05/18/2024 33.8     MCHC 05/18/2024 33.5     RDW 05/18/2024 " 14.6     Platelets 05/18/2024 125 (L)     MPV 05/18/2024 10.9     Sodium 05/18/2024 137     Potassium 05/18/2024 4.8     Chloride 05/18/2024 104     CO2 05/18/2024 24     ANION GAP 05/18/2024 9     BUN 05/18/2024 9     Creatinine 05/18/2024 0.55 (L)     Glucose, Fasting 05/18/2024 104 (H)     Calcium 05/18/2024 9.5     AST 05/18/2024 44 (H)     ALT 05/18/2024 26     Alkaline Phosphatase 05/18/2024 80     Total Protein 05/18/2024 7.6     Albumin 05/18/2024 3.9     Total Bilirubin 05/18/2024 1.07 (H)     eGFR 05/18/2024 109     Protime 05/18/2024 15.2 (H)     INR 05/18/2024 1.21 (H)     Hemoglobin A1C 05/18/2024 5.5     EAG 05/18/2024 111     Cyclic Citrullinated Pep* 05/18/2024 1.6     Cholesterol 05/18/2024 129     Triglycerides 05/18/2024 90     HDL, Direct 05/18/2024 26 (L)     LDL Calculated 05/18/2024 85     TSH 3RD GENERATON 05/18/2024 1.903     Iron Saturation 05/18/2024 28     TIBC 05/18/2024 400     Iron 05/18/2024 113     UIBC 05/18/2024 287     Ferritin 05/18/2024 61      No results found.    RADIOLOGY RESULTS: I have personally reviewed pertinent imaging studies.      Cassi Tim D.O.  Gastroenterology Fellow  [facility]  Division of Gastroenterology & Hepatology  Available of [employer]    ** Please Note: This note is constructed using a voice recognition dictation system. **

## 2024-05-30 ENCOUNTER — OFFICE VISIT (OUTPATIENT)
Dept: GASTROENTEROLOGY | Facility: CLINIC | Age: 64
End: 2024-05-30
Payer: COMMERCIAL

## 2024-05-30 VITALS
HEIGHT: 71 IN | TEMPERATURE: 97.9 F | SYSTOLIC BLOOD PRESSURE: 154 MMHG | DIASTOLIC BLOOD PRESSURE: 84 MMHG | WEIGHT: 270.4 LBS | BODY MASS INDEX: 37.85 KG/M2

## 2024-05-30 DIAGNOSIS — K70.31 ALCOHOLIC CIRRHOSIS OF LIVER WITH ASCITES (HCC): Primary | ICD-10-CM

## 2024-05-30 PROCEDURE — 99215 OFFICE O/P EST HI 40 MIN: CPT | Performed by: INTERNAL MEDICINE

## 2024-05-30 NOTE — PATIENT INSTRUCTIONS
Repeat labs in 3 months (August)  Repeat US in October    Patient will call to schedule US in October.      
Lightheadedness

## 2024-06-05 PROCEDURE — 88305 TISSUE EXAM BY PATHOLOGIST: CPT | Performed by: STUDENT IN AN ORGANIZED HEALTH CARE EDUCATION/TRAINING PROGRAM

## 2024-06-06 ENCOUNTER — HOSPITAL ENCOUNTER (OUTPATIENT)
Dept: NON INVASIVE DIAGNOSTICS | Facility: HOSPITAL | Age: 64
Discharge: HOME | End: 2024-06-06
Attending: SURGERY
Payer: COMMERCIAL

## 2024-06-06 DIAGNOSIS — I71.40 ABDOMINAL AORTIC ANEURYSM (AAA) WITHOUT RUPTURE, UNSPECIFIED PART (HCC): ICD-10-CM

## 2024-06-06 PROCEDURE — 93925 LOWER EXTREMITY STUDY: CPT

## 2024-06-06 PROCEDURE — 93923 UPR/LXTR ART STDY 3+ LVLS: CPT

## 2024-06-06 PROCEDURE — 93978 VASCULAR STUDY: CPT

## 2024-06-11 PROCEDURE — 93978 VASCULAR STUDY: CPT | Performed by: SURGERY

## 2024-06-11 PROCEDURE — 93922 UPR/L XTREMITY ART 2 LEVELS: CPT | Performed by: SURGERY

## 2024-06-11 PROCEDURE — 93925 LOWER EXTREMITY STUDY: CPT | Performed by: SURGERY

## 2024-06-12 ENCOUNTER — TELEPHONE (OUTPATIENT)
Dept: VASCULAR SURGERY | Facility: CLINIC | Age: 64
End: 2024-06-12

## 2024-06-12 NOTE — TELEPHONE ENCOUNTER
Attempted to contact patient to schedule appointment(s) listed below.  Requested patient call [phone] option 3 to schedule appointment(s).    Patient's appointment(s) are due now.    Dopplers  [] Abdominal Aorta Iliac (AOIL)  [] Carotid (CV)   [] Celiac and/or Mesenteric  [] Endovascular Aortic Repair (EVAR)   [] Hemodialysis Access (HD)   [] Lower Limb Arterial (VALENTINA)  [] Lower Limb Venous (LEV)  [] Lower Limb Venous Duplex with Reflux (LEVDR)  [] Renal Artery  [] Upper Limb Arterial (UEA)    [] Upper Limb Venous (UEV)              [] CYNDI and Waveform analysis     Advanced Imaging   [] CTA head/neck    [] CTA abdomen    [] CTA abdomen & pelvis    [] CT abdomen with/ without contrast  [] CT abdomen with contrast  [] CT abdomen without contrast    [] CT abdomen & pelvis with/ without contrast  [] CT abdomen & pelvis with contrast  [] CT abdomen & pelvis without contrast    Office Visit   [] New patient, patient last seen over 3 years ago  [] Risk factor modification (RFM)   [x] Follow up   [] Lost to follow up (LTFU)   Called patient & LMOM to schedule 1 yr ov/RR AOIL 6/11/24

## 2024-06-26 ENCOUNTER — HOSPITAL ENCOUNTER (OUTPATIENT)
Dept: CT IMAGING | Facility: HOSPITAL | Age: 64
Discharge: HOME | End: 2024-06-26
Attending: OTOLARYNGOLOGY
Payer: COMMERCIAL

## 2024-06-26 DIAGNOSIS — C02.9 SQUAMOUS CELL CANCER OF TONGUE (HCC): ICD-10-CM

## 2024-06-26 PROCEDURE — 70491 CT SOFT TISSUE NECK W/DYE: CPT

## 2024-06-26 RX ADMIN — IOHEXOL 85 ML: 350 INJECTION, SOLUTION INTRAVENOUS at 10:59

## 2024-06-28 ENCOUNTER — HOSPITAL ENCOUNTER (OUTPATIENT)
Dept: NUCLEAR MEDICINE | Facility: HOSPITAL | Age: 64
Discharge: HOME | End: 2024-06-28
Attending: OTOLARYNGOLOGY

## 2024-06-28 DIAGNOSIS — C02.9 SQUAMOUS CELL CANCER OF TONGUE (HCC): ICD-10-CM

## 2024-07-02 ENCOUNTER — HOSPITAL ENCOUNTER (OUTPATIENT)
Dept: NUCLEAR MEDICINE | Facility: HOSPITAL | Age: 64
Discharge: HOME | End: 2024-07-02
Attending: OTOLARYNGOLOGY
Payer: COMMERCIAL

## 2024-07-02 ENCOUNTER — TELEPHONE (OUTPATIENT)
Age: 64
End: 2024-07-02

## 2024-07-02 LAB — GLUCOSE SERPL-MCNC: 124 MG/DL (ref 65–140)

## 2024-07-02 PROCEDURE — A9552 F18 FDG: HCPCS

## 2024-07-02 PROCEDURE — 78815 PET IMAGE W/CT SKULL-THIGH: CPT

## 2024-07-02 PROCEDURE — 82948 REAGENT STRIP/BLOOD GLUCOSE: CPT

## 2024-07-02 NOTE — TELEPHONE ENCOUNTER
Pt is due for a results review w/ LMD. He uses the [place] location.There is currently no availability. I added pt to wait list. Please call pt when something opens.

## 2024-08-02 NOTE — H&P (VIEW-ONLY)
Assessment/Plan:    Raymundo Weller presents for pre-operative history and physical. Patient is scheduled to have primary tumor excision of right lateral tongue cancer with frozen section and alloderm grafting with Dr. Luis on 8/20/2024. The patient denies any interval changes to health. The post-operative instructions were reviewed, and all questions were answered. He will be obtaining medical clearance, labs, ECG, chest x-ray. Patient will be scheduled for a post-operative evaluation in our office.       Encounter Diagnosis     ICD-10-CM    1. Pre-operative examination  Z01.818       2. Squamous cell cancer of tongue (HCC)  C02.9              Patient ID: Raymundo Weller is a 63 y.o. male.    Raymundo Weller is a 63 y.o. male who presents for pre-operative history and physical. Patient is scheduled to have primary tumor excision of right lateral tongue cancer with frozen section and alloderm grafting with Dr. Luis on 8/20/2024. The indication is biopsy proven SCCA of the lateral tongue. The patient denies any interval changes to health. He obtained post-operative Percocet and knows to set it aside for after surgery. Patient is not accompanied at today's visit.     7/15/2024 with Dr. Luis:  Raymundo was recently diagnosed with biopsy proven SCCA of the lateral tongue.    Based upon the results I obtained a PET/CT.  Below are the findings.   PET/CT - 7/2/24 - 1. Known right tongue squamous cell carcinoma is not definitively visualized on PET/CT. This may be due to lesion size. Correlate clinically.  2. Probable reactive nodes in the bilateral upper neck. Attention on follow-up  3. Mild FDG uptake associated with a 7 mm right upper posterior subpleural lung nodule. This is nonspecific and may be postinflammatory or possibly neoplastic. 3-month CT follow-up recommended.  4. No hypermetabolic metastases in the chest, abdomen, pelvis.  5. Focal FDG uptake associated with a 1.2 cm right calcified choroid  plexus nodule, though unchanged from the prior CT  6. Cirrhotic liver morphology and ascites again visualized.  7. Mildly aneurysmal abdominal aorta. Consider 12-month ultrasound follow-up.  CT neck - 6/26/24 - 1. The patient's biopsy-proven squamous cell carcinoma of the tongue is not discretely identified on this CT possibly obscured by beam hardening artifact from dental fillings. Further clinical assessment and follow-up recommended.  2. No suspicious cervical lymphadenopathy. 3. Indeterminant tree-in-bud nodularity posteriorly in the right upper lobe of the lung. Infectious or inflammatory processes are favored in the differential diagnosis. Neoplastic etiologies including metastases in this patient with a history of squamous cell carcinoma should be considered. Recommend surveillance repeat CT of the chest in 6 months to assess for resolution    The following portions of the patient's history were reviewed and updated as appropriate: allergies, current medications, past family history, past medical history, past social history, past surgical history and problem list.      Past Medical History:   Diagnosis Date    Abnormal kidney function     last assessed: Feb 25, 2016    Allergic rhinitis     Benign colon polyp     Carpal tunnel syndrome     Cervical radiculopathy     last assessed: Feb 11, 2015    Chronic fatigue     Chronic pain disorder     COPD (chronic obstructive pulmonary disease) (HCC)     COVID 01/05/2024    CPAP (continuous positive airway pressure) dependence     Depression 04/07/2023    Edema     Elevated liver enzymes     Gout     Hyperlipidemia     Hypertension     Hypotension     last assessed: March 22, 2017    Left ventricular hypertrophy     Obesity (BMI 30-39.9) 09/20/2018    Pneumonia 04/05/2023    Sleep apnea        Past Surgical History:   Procedure Laterality Date    CARPAL TUNNEL RELEASE Bilateral     HEMORRHOID SURGERY      LIVER BIOPSY         Social History     Tobacco Use     "Smoking status: Every Day     Current packs/day: 0.50     Average packs/day: 0.5 packs/day for 40.6 years (20.3 ttl pk-yrs)     Types: Cigarettes     Start date: 1/2/1988     Passive exposure: Never    Smokeless tobacco: Never    Tobacco comments:     Given [employer] \"how to quit\" info    Vaping Use    Vaping status: Never Used   Substance Use Topics    Alcohol use: Yes     Alcohol/week: 6.0 standard drinks of alcohol     Types: 6 Cans of beer per week     Comment: few drinks a day    Drug use: No       Current Outpatient Medications on File Prior to Visit   Medication Sig Dispense Refill    escitalopram (LEXAPRO) 10 mg tablet Take 1 tablet (10 mg total) by mouth daily (Patient not taking: Reported on 5/30/2024) 90 tablet 3    Fexofenadine HCl (ALLEGRA ALLERGY PO) Take by mouth (Patient not taking: Reported on 5/30/2024)      fluticasone (FLONASE) 50 mcg/act nasal spray 1 spray into each nostril daily 16 g 0    gabapentin (NEURONTIN) 300 mg capsule Take one tablet two time a day for pain 60 capsule 0    multivitamin (THERAGRAN) TABS Take 1 tablet by mouth daily      oxyCODONE-acetaminophen (PERCOCET) 5-325 mg per tablet Take 1 tablet by mouth every 6 (six) hours as needed for moderate pain for up to 20 doses Max Daily Amount: 4 tablets 20 tablet 0    spironolactone (ALDACTONE) 50 mg tablet Take 1 tablet (50 mg total) by mouth daily 90 tablet 1     No current facility-administered medications on file prior to visit.       No Known Allergies        Review of Systems   Constitutional:  Negative for activity change, appetite change, fatigue and unexpected weight change.   HENT:  Negative for congestion, ear discharge, ear pain, facial swelling, hearing loss, nosebleeds, postnasal drip, rhinorrhea, sinus pressure, sinus pain, sneezing, sore throat, tinnitus, trouble swallowing and voice change.    Eyes:  Negative for photophobia, pain, discharge, itching and visual disturbance.   Respiratory:  Negative for cough, chest " tightness and shortness of breath.    Musculoskeletal:  Negative for gait problem, neck pain and neck stiffness.   Skin:  Negative for rash and wound.   Allergic/Immunologic: Negative for environmental allergies.   Neurological:  Negative for dizziness, facial asymmetry and speech difficulty.   Hematological:  Negative for adenopathy.   Psychiatric/Behavioral:  Negative for sleep disturbance. The patient is not nervous/anxious.          There were no vitals taken for this visit.      PHYSICAL  EXAMINATION    CONSTITUTION:    Appears appropriate for age.    No evidence of any acute distress.    Communicates normally.    Voice quality is clear.    Alert and oriented.    HEAD/FACE:    Atraumatic, normocephalic on inspection.    No scars present.    Salivary glands are normal in texture and size without any asymmetry.    Facial nerve function is symmetric and normal.    EYES:    Extraocular muscles intact in both eyes, normal gaze bilaterally and no evidence of nystagmus.    Pupils equal, round, and accommodate to light bilaterally.    EARS:    External ears normal.    External canals are clear and dry after removal of minimal cerumen.    Tympanic membranes intact with normal mobility, no effusion, no retraction, no perforation.    Post auricular area is normal    NOSE:    External nose without deformity.    Internal mucosa pink and moist.    Septum midline.  Some dried blood left side  Inferior nasal turbinates normal in color and mild hypertrophy bilaterally.    ORAL CAVITY:    Lips normal and healthy in appearance.    Dentition in very good repair.  Lower third molar with a slightly sharp edge medially.    Gums healthy, pink and moist.    Tongue appears pink and moist with an erosive ulceration laterally in the mid to posterior two thirds of the tongue. This seems to be in contact with the rough edge of the tooth.    Floor of mouth pink, moist, and smooth.    Submandibular ducts patent with clear saliva.    Parotid  ducts patent with clear saliva.    Oral mucosa pink and moist.    Hard palate normal in appearance without any lesions.    OROPHARYNX:    Soft palate pink and moist without any lesions.    Uvula midline without any lesions.    Tonsils grade 2 bilaterally.    Posterior pharynx pink and moist without any lesions    NECK:    Supple and symmetric.    No masses noted.    Trachea midline.    No thyromegaly or nodules noted.    LYMPH:    No palpable adenopathy in left or right neck    SKIN:    No rashes. No lesions noted.     HEART:   Regular rate and rhythm    LUNGS:  Clear to auscultation bilaterally

## 2024-08-07 ENCOUNTER — TELEPHONE (OUTPATIENT)
Dept: INTERNAL MEDICINE CLINIC | Facility: CLINIC | Age: 64
End: 2024-08-07

## 2024-08-09 ENCOUNTER — TELEPHONE (OUTPATIENT)
Dept: INTERNAL MEDICINE CLINIC | Facility: CLINIC | Age: 64
End: 2024-08-09

## 2024-08-09 NOTE — TELEPHONE ENCOUNTER
Lm for patient to call the office in regards to pre op appointment scheduled 8/14. Pt has orders for labs, chest xray and EKG in chart is he going to complete EKG in this office or surgeons office? Also, it is recommended for patient to complete labs and chest xray before pre op appointment

## 2024-08-12 ENCOUNTER — ANESTHESIA EVENT (OUTPATIENT)
Dept: PERIOP | Facility: HOSPITAL | Age: 64
End: 2024-08-12
Payer: COMMERCIAL

## 2024-08-12 ENCOUNTER — APPOINTMENT (OUTPATIENT)
Dept: LAB | Facility: CLINIC | Age: 64
End: 2024-08-12
Payer: COMMERCIAL

## 2024-08-12 ENCOUNTER — APPOINTMENT (OUTPATIENT)
Dept: RADIOLOGY | Facility: CLINIC | Age: 64
End: 2024-08-12
Payer: COMMERCIAL

## 2024-08-12 DIAGNOSIS — D69.6 THROMBOCYTOPENIA (HCC): ICD-10-CM

## 2024-08-12 DIAGNOSIS — R60.0 LOWER EXTREMITY EDEMA: ICD-10-CM

## 2024-08-12 DIAGNOSIS — C02.9 SQUAMOUS CELL CANCER OF TONGUE (HCC): ICD-10-CM

## 2024-08-12 DIAGNOSIS — K70.31 ALCOHOLIC CIRRHOSIS OF LIVER WITH ASCITES (HCC): ICD-10-CM

## 2024-08-12 DIAGNOSIS — D68.9 COAGULOPATHY (HCC): ICD-10-CM

## 2024-08-12 LAB
AFP-TM SERPL-MCNC: 7.36 NG/ML (ref 0–9)
ALBUMIN SERPL BCG-MCNC: 3.9 G/DL (ref 3.5–5)
ALP SERPL-CCNC: 90 U/L (ref 34–104)
ALT SERPL W P-5'-P-CCNC: 27 U/L (ref 7–52)
ANION GAP SERPL CALCULATED.3IONS-SCNC: 7 MMOL/L (ref 4–13)
APTT PPP: 34 SECONDS (ref 23–34)
AST SERPL W P-5'-P-CCNC: 47 U/L (ref 13–39)
BASOPHILS # BLD AUTO: 0.05 THOUSANDS/ÂΜL (ref 0–0.1)
BASOPHILS NFR BLD AUTO: 1 % (ref 0–1)
BILIRUB SERPL-MCNC: 1.2 MG/DL (ref 0.2–1)
BUN SERPL-MCNC: 9 MG/DL (ref 5–25)
CALCIUM SERPL-MCNC: 9.4 MG/DL (ref 8.4–10.2)
CHLORIDE SERPL-SCNC: 103 MMOL/L (ref 96–108)
CO2 SERPL-SCNC: 26 MMOL/L (ref 21–32)
CREAT SERPL-MCNC: 0.59 MG/DL (ref 0.6–1.3)
EOSINOPHIL # BLD AUTO: 0.14 THOUSAND/ÂΜL (ref 0–0.61)
EOSINOPHIL NFR BLD AUTO: 3 % (ref 0–6)
ERYTHROCYTE [DISTWIDTH] IN BLOOD BY AUTOMATED COUNT: 15.9 % (ref 11.6–15.1)
GFR SERPL CREATININE-BSD FRML MDRD: 106 ML/MIN/1.73SQ M
GLUCOSE P FAST SERPL-MCNC: 115 MG/DL (ref 65–99)
HAV AB SER QL IA: NORMAL
HBV SURFACE AB SER-ACNC: <3 MIU/ML
HCT VFR BLD AUTO: 45.8 % (ref 36.5–49.3)
HGB BLD-MCNC: 15.1 G/DL (ref 12–17)
IMM GRANULOCYTES # BLD AUTO: 0.02 THOUSAND/UL (ref 0–0.2)
IMM GRANULOCYTES NFR BLD AUTO: 0 % (ref 0–2)
INR PPP: 1.2 (ref 0.85–1.19)
LYMPHOCYTES # BLD AUTO: 1.32 THOUSANDS/ÂΜL (ref 0.6–4.47)
LYMPHOCYTES NFR BLD AUTO: 26 % (ref 14–44)
MCH RBC QN AUTO: 33.6 PG (ref 26.8–34.3)
MCHC RBC AUTO-ENTMCNC: 33 G/DL (ref 31.4–37.4)
MCV RBC AUTO: 102 FL (ref 82–98)
MONOCYTES # BLD AUTO: 0.69 THOUSAND/ÂΜL (ref 0.17–1.22)
MONOCYTES NFR BLD AUTO: 14 % (ref 4–12)
NEUTROPHILS # BLD AUTO: 2.79 THOUSANDS/ÂΜL (ref 1.85–7.62)
NEUTS SEG NFR BLD AUTO: 56 % (ref 43–75)
NRBC BLD AUTO-RTO: 0 /100 WBCS
PLATELET # BLD AUTO: 113 THOUSANDS/UL (ref 149–390)
PMV BLD AUTO: 10.3 FL (ref 8.9–12.7)
POTASSIUM SERPL-SCNC: 4.5 MMOL/L (ref 3.5–5.3)
PROT SERPL-MCNC: 7.9 G/DL (ref 6.4–8.4)
PROTHROMBIN TIME: 15.4 SECONDS (ref 12.3–15)
RBC # BLD AUTO: 4.5 MILLION/UL (ref 3.88–5.62)
SODIUM SERPL-SCNC: 136 MMOL/L (ref 135–147)
WBC # BLD AUTO: 5.01 THOUSAND/UL (ref 4.31–10.16)

## 2024-08-12 PROCEDURE — 82105 ALPHA-FETOPROTEIN SERUM: CPT

## 2024-08-12 PROCEDURE — 85730 THROMBOPLASTIN TIME PARTIAL: CPT

## 2024-08-12 PROCEDURE — 85025 COMPLETE CBC W/AUTO DIFF WBC: CPT

## 2024-08-12 PROCEDURE — 36415 COLL VENOUS BLD VENIPUNCTURE: CPT

## 2024-08-12 PROCEDURE — 86706 HEP B SURFACE ANTIBODY: CPT

## 2024-08-12 PROCEDURE — 85610 PROTHROMBIN TIME: CPT

## 2024-08-12 PROCEDURE — 80053 COMPREHEN METABOLIC PANEL: CPT

## 2024-08-12 PROCEDURE — 86708 HEPATITIS A ANTIBODY: CPT

## 2024-08-12 PROCEDURE — 71046 X-RAY EXAM CHEST 2 VIEWS: CPT

## 2024-08-12 NOTE — PRE-PROCEDURE INSTRUCTIONS
Pre-Surgery Instructions:   Medication Instructions    fluticasone (FLONASE) 50 mcg/act nasal spray Uses PRN- OK to take day of surgery    multivitamin (THERAGRAN) TABS Stop taking 7 days prior to surgery.    spironolactone (ALDACTONE) 50 mg tablet Hold day of surgery.    Medication instructions for day surgery reviewed. Please use only a sip of water to take your instructed medications. Avoid all over the counter vitamins, supplements and NSAIDS for one week prior to surgery per anesthesia guidelines. Tylenol is ok to take as needed.     You will receive a call one business day prior to surgery with an arrival time and hospital directions. If your surgery is scheduled on a Monday, the hospital will be calling you on the Friday prior to your surgery. If you have not heard from anyone by 8pm, please call the hospital supervisor through the hospital  at [phone]. ([place] [phone] or [place] [phone]).    Do not eat or drink anything after midnight the night before your surgery, including candy, mints, lifesavers, or chewing gum. Do not drink alcohol 24hrs before your surgery. Try not to smoke at least 24hrs before your surgery.       Follow the pre surgery showering instructions as listed in the “My Surgical Experience Booklet” or otherwise provided by your surgeon's office. Do not use a blade to shave the surgical area 1 week before surgery. It is okay to use a clean electric clippers up to 24 hours before surgery. Do not apply any lotions, creams, including makeup, cologne, deodorant, or perfumes after showering on the day of your surgery. Do not use dry shampoo, hair spray, hair gel, or any type of hair products.     No contact lenses, eye make-up, or artificial eyelashes. Remove nail polish, including gel polish, and any artificial, gel, or acrylic nails if possible. Remove all jewelry including rings and body piercing jewelry.     Wear causal clothing that is easy to take on and off.  Consider your type of surgery.    Keep any valuables, jewelry, piercings at home. Please bring any specially ordered equipment (sling, braces) if indicated.    Arrange for a responsible person to drive you to and from the hospital on the day of your surgery. Please confirm the visitor policy for the day of your procedure when you receive your phone call with an arrival time.     Call the surgeon's office with any new illnesses, exposures, or additional questions prior to surgery.    Please reference your “My Surgical Experience Booklet” for additional information to prepare for your upcoming surgery.

## 2024-08-14 ENCOUNTER — CONSULT (OUTPATIENT)
Dept: INTERNAL MEDICINE CLINIC | Facility: CLINIC | Age: 64
End: 2024-08-14
Payer: COMMERCIAL

## 2024-08-14 VITALS
HEIGHT: 71 IN | DIASTOLIC BLOOD PRESSURE: 82 MMHG | OXYGEN SATURATION: 98 % | WEIGHT: 271.38 LBS | HEART RATE: 87 BPM | BODY MASS INDEX: 37.99 KG/M2 | SYSTOLIC BLOOD PRESSURE: 132 MMHG | TEMPERATURE: 98.2 F

## 2024-08-14 DIAGNOSIS — K14.8 TONGUE MASS: ICD-10-CM

## 2024-08-14 DIAGNOSIS — R79.89 ELEVATED LFTS: ICD-10-CM

## 2024-08-14 DIAGNOSIS — C02.9 CANCER OF TONGUE (HCC): ICD-10-CM

## 2024-08-14 DIAGNOSIS — F32.9 REACTIVE DEPRESSION: ICD-10-CM

## 2024-08-14 DIAGNOSIS — I10 BENIGN ESSENTIAL HYPERTENSION: ICD-10-CM

## 2024-08-14 DIAGNOSIS — J42 CHRONIC BRONCHITIS, UNSPECIFIED CHRONIC BRONCHITIS TYPE (HCC): ICD-10-CM

## 2024-08-14 DIAGNOSIS — Z01.818 VISIT FOR PRE-OPERATIVE EXAMINATION: Primary | ICD-10-CM

## 2024-08-14 DIAGNOSIS — I10 PRIMARY HYPERTENSION: ICD-10-CM

## 2024-08-14 DIAGNOSIS — M50.120 CERVICAL DISC DISORDER WITH RADICULOPATHY OF MID-CERVICAL REGION: ICD-10-CM

## 2024-08-14 DIAGNOSIS — Z72.0 TOBACCO USE: ICD-10-CM

## 2024-08-14 DIAGNOSIS — J01.10 ACUTE NON-RECURRENT FRONTAL SINUSITIS: ICD-10-CM

## 2024-08-14 DIAGNOSIS — G47.33 OBSTRUCTIVE SLEEP APNEA: ICD-10-CM

## 2024-08-14 PROCEDURE — 93000 ELECTROCARDIOGRAM COMPLETE: CPT | Performed by: INTERNAL MEDICINE

## 2024-08-14 PROCEDURE — 99244 OFF/OP CNSLTJ NEW/EST MOD 40: CPT | Performed by: INTERNAL MEDICINE

## 2024-08-14 RX ORDER — SPIRONOLACTONE 50 MG/1
50 TABLET, FILM COATED ORAL DAILY
Start: 2024-08-14

## 2024-08-14 RX ORDER — FLUTICASONE PROPIONATE 50 MCG
1 SPRAY, SUSPENSION (ML) NASAL DAILY
Start: 2024-08-14

## 2024-08-14 RX ORDER — DIPHENOXYLATE HYDROCHLORIDE AND ATROPINE SULFATE 2.5; .025 MG/1; MG/1
1 TABLET ORAL DAILY
Start: 2024-08-14

## 2024-08-14 RX ORDER — GABAPENTIN 300 MG/1
CAPSULE ORAL
Start: 2024-08-14

## 2024-08-14 RX ORDER — ESCITALOPRAM OXALATE 10 MG/1
10 TABLET ORAL DAILY
Start: 2024-08-14 | End: 2025-08-09

## 2024-08-14 NOTE — PROGRESS NOTES
Ambulatory Visit  Name: Raymundo Weller      : 1960      MRN: [id number]  Encounter Provider: Lisandro Gauthier DO  Encounter Date: 2024   Encounter department: [facility]    Assessment & Plan   1. Visit for pre-operative examination  -     POCT ECG  2. Tongue mass  3. Benign essential hypertension  4. Chronic bronchitis, unspecified chronic bronchitis type (HCC)  5. Obstructive sleep apnea  6. Cervical disc disorder with radiculopathy of mid-cervical region  7. Tobacco use  8. Cancer of tongue (HCC)  -     gabapentin (NEURONTIN) 300 mg capsule; Take one tablet two time a day for pain  9. Primary hypertension  -     spironolactone (ALDACTONE) 50 mg tablet; Take 1 tablet (50 mg total) by mouth daily  10. Acute non-recurrent frontal sinusitis  -     fluticasone (FLONASE) 50 mcg/act nasal spray; 1 spray into each nostril daily  11. Reactive depression  -     escitalopram (LEXAPRO) 10 mg tablet; Take 1 tablet (10 mg total) by mouth daily  12. Elevated LFTs  -     multivitamin (THERAGRAN) TABS; Take 1 tablet by mouth daily  A/P: PAT tests in the form of labs, CXR, and EKG reviewed and acceptable.. Pt and co-morbidities are stable. Pt is a Angela's Class I and carries a cardiac risk of about 1%. Recommend holding any ASA, NSAID's, omega 3, and MVT one week prior to the procedure. Pt stopped all his meds and has none to be taken on the day of sx. Watch for hypoxia/hypoventilation and aggressive pulmonary secretion control given DOT, COPD, smoking, and body habitus. Watch for nicotine withdraw if pt to be admitted.  Avoid hyperextension of the C spine. If admitted, should bring in his CPAP machine. Recommend ATC OTC tylenol 48 hours prior to sx to aide with pain management. No other recs at this time. Ok to proceed with planned sx. Thanks and good luck.      History of Present Illness     WM presents at the request of Dr. Singh for pre-op eval for upcoming glossal lesion extraction   "tentatively scheduled for 8/20/24. Since last visit, doing well and no recent illnesses. Remains active w/o difficulty and no falls. No travel history. Denies depression. No recent illnesses. No fever, chills, or sweats. No unexplained wt changes. Denies CP, SOB, or palpitations. No edema. No orthopnea or PND. No sz or syncope. No changes in bowel or bladder habits. PMH includes HTN, AAA, DOT, COPD, tobacco use, hepatomegaly, Cervical DDD, thrombocytopenia, gout, MDD, HLD, ETOH use, edema, and iron overload. Past sx include CTR, liver bx, colonoscopy, and hemorrhoids and reports no problems with prior procedures or anesthesia. Daily smoking and recently stopped ETOH use. No history of DVT or PE. NO history of bleeding issues and is not on anti-coagulants. Denies dental plates. Has h/o  C spine issues. No objections to getting blood products if deemed necessary. Had PAT testing done.            Review of Systems   Constitutional:  Negative for activity change, chills, diaphoresis, fatigue and fever.   HENT: Negative.          Tongue lesion   Eyes:  Negative for visual disturbance.   Respiratory:  Negative for cough, chest tightness, shortness of breath and wheezing.    Cardiovascular:  Negative for chest pain, palpitations and leg swelling.   Gastrointestinal:  Negative for abdominal pain, constipation, diarrhea, nausea and vomiting.   Endocrine: Negative for cold intolerance and heat intolerance.   Genitourinary:  Negative for difficulty urinating, dysuria and frequency.   Musculoskeletal:  Negative for arthralgias, gait problem and myalgias.   Neurological:  Negative for dizziness, seizures, syncope, weakness, light-headedness and headaches.   Psychiatric/Behavioral:  Negative for confusion. The patient is not nervous/anxious.        Objective     /82   Pulse 87   Temp 98.2 °F (36.8 °C)   Ht 5' 11\" (1.803 m)   Wt 123 kg (271 lb 6 oz)   SpO2 98%   BMI 37.85 kg/m²     Physical Exam  Vitals and nursing " note reviewed.   Constitutional:       General: He is not in acute distress.     Appearance: Normal appearance. He is obese. He is not ill-appearing.   HENT:      Head: Normocephalic and atraumatic.      Comments: NO oropharyngeal obstructions.      Mouth/Throat:      Mouth: Mucous membranes are moist.   Eyes:      Extraocular Movements: Extraocular movements intact.      Conjunctiva/sclera: Conjunctivae normal.      Pupils: Pupils are equal, round, and reactive to light.   Neck:      Vascular: No carotid bruit.      Comments:  C spine restrictions with extension..   Cardiovascular:      Rate and Rhythm: Normal rate and regular rhythm.      Heart sounds: Normal heart sounds. No murmur heard.  Pulmonary:      Effort: Pulmonary effort is normal. No respiratory distress.      Breath sounds: Normal breath sounds. No wheezing, rhonchi or rales.   Abdominal:      General: Bowel sounds are normal. There is no distension.      Palpations: Abdomen is soft.      Tenderness: There is no abdominal tenderness.   Musculoskeletal:      Cervical back: Normal range of motion and neck supple. No rigidity or tenderness.      Right lower leg: Edema present.      Left lower leg: Edema present.      Comments: Bilat LE edeam 1-2/4   Lymphadenopathy:      Cervical: No cervical adenopathy.   Neurological:      General: No focal deficit present.      Mental Status: He is alert and oriented to person, place, and time. Mental status is at baseline.   Psychiatric:         Mood and Affect: Mood normal.         Behavior: Behavior normal.         Thought Content: Thought content normal.         Judgment: Judgment normal.       Administrative Statements

## 2024-08-20 ENCOUNTER — ANESTHESIA (OUTPATIENT)
Dept: PERIOP | Facility: HOSPITAL | Age: 64
End: 2024-08-20
Payer: COMMERCIAL

## 2024-08-20 ENCOUNTER — HOSPITAL ENCOUNTER (OUTPATIENT)
Facility: HOSPITAL | Age: 64
Setting detail: OUTPATIENT SURGERY
Discharge: HOME | End: 2024-08-20
Attending: OTOLARYNGOLOGY | Admitting: OTOLARYNGOLOGY
Payer: COMMERCIAL

## 2024-08-20 VITALS
RESPIRATION RATE: 16 BRPM | BODY MASS INDEX: 38.61 KG/M2 | HEIGHT: 71 IN | DIASTOLIC BLOOD PRESSURE: 77 MMHG | WEIGHT: 275.8 LBS | HEART RATE: 76 BPM | TEMPERATURE: 98.9 F | OXYGEN SATURATION: 95 % | SYSTOLIC BLOOD PRESSURE: 171 MMHG

## 2024-08-20 DIAGNOSIS — C02.9 SQUAMOUS CELL CANCER OF TONGUE (HCC): ICD-10-CM

## 2024-08-20 PROCEDURE — 88342 IMHCHEM/IMCYTCHM 1ST ANTB: CPT | Performed by: STUDENT IN AN ORGANIZED HEALTH CARE EDUCATION/TRAINING PROGRAM

## 2024-08-20 PROCEDURE — 88331 PATH CONSLTJ SURG 1 BLK 1SPC: CPT | Performed by: STUDENT IN AN ORGANIZED HEALTH CARE EDUCATION/TRAINING PROGRAM

## 2024-08-20 PROCEDURE — C1781 MESH (IMPLANTABLE): HCPCS | Performed by: OTOLARYNGOLOGY

## 2024-08-20 PROCEDURE — 88341 IMHCHEM/IMCYTCHM EA ADD ANTB: CPT | Performed by: STUDENT IN AN ORGANIZED HEALTH CARE EDUCATION/TRAINING PROGRAM

## 2024-08-20 PROCEDURE — 88344 IMHCHEM/IMCYTCHM EA MLT ANTB: CPT | Performed by: STUDENT IN AN ORGANIZED HEALTH CARE EDUCATION/TRAINING PROGRAM

## 2024-08-20 PROCEDURE — 88309 TISSUE EXAM BY PATHOLOGIST: CPT | Performed by: STUDENT IN AN ORGANIZED HEALTH CARE EDUCATION/TRAINING PROGRAM

## 2024-08-20 PROCEDURE — 41112 EXCISION OF TONGUE LESION: CPT | Performed by: OTOLARYNGOLOGY

## 2024-08-20 DEVICE — IMPLANTABLE DEVICE: Type: IMPLANTABLE DEVICE | Site: TONGUE | Status: FUNCTIONAL

## 2024-08-20 RX ORDER — ONDANSETRON 2 MG/ML
INJECTION INTRAMUSCULAR; INTRAVENOUS AS NEEDED
Status: DISCONTINUED | OUTPATIENT
Start: 2024-08-20 | End: 2024-08-20

## 2024-08-20 RX ORDER — ONDANSETRON 2 MG/ML
4 INJECTION INTRAMUSCULAR; INTRAVENOUS ONCE AS NEEDED
Status: DISCONTINUED | OUTPATIENT
Start: 2024-08-20 | End: 2024-08-20 | Stop reason: HOSPADM

## 2024-08-20 RX ORDER — MIDAZOLAM HYDROCHLORIDE 2 MG/2ML
INJECTION, SOLUTION INTRAMUSCULAR; INTRAVENOUS AS NEEDED
Status: DISCONTINUED | OUTPATIENT
Start: 2024-08-20 | End: 2024-08-20

## 2024-08-20 RX ORDER — ALBUTEROL SULFATE 0.83 MG/ML
2.5 SOLUTION RESPIRATORY (INHALATION) ONCE AS NEEDED
Status: DISCONTINUED | OUTPATIENT
Start: 2024-08-20 | End: 2024-08-20 | Stop reason: HOSPADM

## 2024-08-20 RX ORDER — CEFAZOLIN SODIUM 1 G/3ML
INJECTION, POWDER, FOR SOLUTION INTRAMUSCULAR; INTRAVENOUS AS NEEDED
Status: DISCONTINUED | OUTPATIENT
Start: 2024-08-20 | End: 2024-08-20 | Stop reason: HOSPADM

## 2024-08-20 RX ORDER — LIDOCAINE HYDROCHLORIDE 20 MG/ML
INJECTION, SOLUTION EPIDURAL; INFILTRATION; INTRACAUDAL; PERINEURAL AS NEEDED
Status: DISCONTINUED | OUTPATIENT
Start: 2024-08-20 | End: 2024-08-20

## 2024-08-20 RX ORDER — MAGNESIUM HYDROXIDE 1200 MG/15ML
LIQUID ORAL AS NEEDED
Status: DISCONTINUED | OUTPATIENT
Start: 2024-08-20 | End: 2024-08-20 | Stop reason: HOSPADM

## 2024-08-20 RX ORDER — FENTANYL CITRATE 50 UG/ML
INJECTION, SOLUTION INTRAMUSCULAR; INTRAVENOUS AS NEEDED
Status: DISCONTINUED | OUTPATIENT
Start: 2024-08-20 | End: 2024-08-20

## 2024-08-20 RX ORDER — FENTANYL CITRATE/PF 50 MCG/ML
25 SYRINGE (ML) INJECTION
Status: DISCONTINUED | OUTPATIENT
Start: 2024-08-20 | End: 2024-08-20 | Stop reason: HOSPADM

## 2024-08-20 RX ORDER — ACETAMINOPHEN 325 MG/1
650 TABLET ORAL EVERY 4 HOURS PRN
Status: DISCONTINUED | OUTPATIENT
Start: 2024-08-20 | End: 2024-08-20 | Stop reason: HOSPADM

## 2024-08-20 RX ORDER — PROPOFOL 10 MG/ML
INJECTION, EMULSION INTRAVENOUS AS NEEDED
Status: DISCONTINUED | OUTPATIENT
Start: 2024-08-20 | End: 2024-08-20

## 2024-08-20 RX ORDER — DEXAMETHASONE SODIUM PHOSPHATE 10 MG/ML
INJECTION, SOLUTION INTRAMUSCULAR; INTRAVENOUS AS NEEDED
Status: DISCONTINUED | OUTPATIENT
Start: 2024-08-20 | End: 2024-08-20

## 2024-08-20 RX ORDER — OXYCODONE AND ACETAMINOPHEN 5; 325 MG/1; MG/1
2 TABLET ORAL EVERY 4 HOURS PRN
Status: DISCONTINUED | OUTPATIENT
Start: 2024-08-20 | End: 2024-08-20 | Stop reason: HOSPADM

## 2024-08-20 RX ORDER — SODIUM CHLORIDE, SODIUM LACTATE, POTASSIUM CHLORIDE, CALCIUM CHLORIDE 600; 310; 30; 20 MG/100ML; MG/100ML; MG/100ML; MG/100ML
125 INJECTION, SOLUTION INTRAVENOUS CONTINUOUS
Status: DISCONTINUED | OUTPATIENT
Start: 2024-08-20 | End: 2024-08-20 | Stop reason: HOSPADM

## 2024-08-20 RX ORDER — ROCURONIUM BROMIDE 10 MG/ML
INJECTION, SOLUTION INTRAVENOUS AS NEEDED
Status: DISCONTINUED | OUTPATIENT
Start: 2024-08-20 | End: 2024-08-20

## 2024-08-20 RX ORDER — ONDANSETRON 2 MG/ML
4 INJECTION INTRAMUSCULAR; INTRAVENOUS EVERY 6 HOURS PRN
Status: DISCONTINUED | OUTPATIENT
Start: 2024-08-20 | End: 2024-08-20 | Stop reason: HOSPADM

## 2024-08-20 RX ORDER — LIDOCAINE HYDROCHLORIDE AND EPINEPHRINE 10; 10 MG/ML; UG/ML
INJECTION, SOLUTION INFILTRATION; PERINEURAL AS NEEDED
Status: DISCONTINUED | OUTPATIENT
Start: 2024-08-20 | End: 2024-08-20 | Stop reason: HOSPADM

## 2024-08-20 RX ORDER — SODIUM CHLORIDE 9 MG/ML
125 INJECTION, SOLUTION INTRAVENOUS CONTINUOUS
Status: DISCONTINUED | OUTPATIENT
Start: 2024-08-20 | End: 2024-08-20 | Stop reason: HOSPADM

## 2024-08-20 RX ORDER — ACETAMINOPHEN 10 MG/ML
1000 INJECTION, SOLUTION INTRAVENOUS ONCE
Status: COMPLETED | OUTPATIENT
Start: 2024-08-20 | End: 2024-08-20

## 2024-08-20 RX ADMIN — ACETAMINOPHEN 1000 MG: 10 INJECTION INTRAVENOUS at 07:40

## 2024-08-20 RX ADMIN — PROPOFOL 100 MG: 10 INJECTION, EMULSION INTRAVENOUS at 08:34

## 2024-08-20 RX ADMIN — OXYCODONE HYDROCHLORIDE AND ACETAMINOPHEN 1 TABLET: 5; 325 TABLET ORAL at 10:24

## 2024-08-20 RX ADMIN — ONDANSETRON 4 MG: 2 INJECTION INTRAMUSCULAR; INTRAVENOUS at 07:33

## 2024-08-20 RX ADMIN — SODIUM CHLORIDE, SODIUM LACTATE, POTASSIUM CHLORIDE, AND CALCIUM CHLORIDE 125 ML/HR: .6; .31; .03; .02 INJECTION, SOLUTION INTRAVENOUS at 05:47

## 2024-08-20 RX ADMIN — ROCURONIUM BROMIDE 10 MG: 10 INJECTION, SOLUTION INTRAVENOUS at 08:15

## 2024-08-20 RX ADMIN — DEXAMETHASONE SODIUM PHOSPHATE 10 MG: 10 INJECTION INTRAMUSCULAR; INTRAVENOUS at 07:33

## 2024-08-20 RX ADMIN — SUGAMMADEX 200 MG: 100 INJECTION, SOLUTION INTRAVENOUS at 08:37

## 2024-08-20 RX ADMIN — MIDAZOLAM 2 MG: 1 INJECTION INTRAMUSCULAR; INTRAVENOUS at 07:25

## 2024-08-20 RX ADMIN — PROPOFOL 200 MG: 10 INJECTION, EMULSION INTRAVENOUS at 07:29

## 2024-08-20 RX ADMIN — ROCURONIUM BROMIDE 50 MG: 10 INJECTION, SOLUTION INTRAVENOUS at 07:30

## 2024-08-20 RX ADMIN — LIDOCAINE HYDROCHLORIDE 100 MG: 20 INJECTION, SOLUTION EPIDURAL; INFILTRATION; INTRACAUDAL at 07:29

## 2024-08-20 RX ADMIN — FENTANYL CITRATE 50 MCG: 50 INJECTION INTRAMUSCULAR; INTRAVENOUS at 07:47

## 2024-08-20 RX ADMIN — FENTANYL CITRATE 50 MCG: 50 INJECTION INTRAMUSCULAR; INTRAVENOUS at 07:29

## 2024-08-20 NOTE — INTERVAL H&P NOTE
H&P reviewed. After examining the patient I find no changes in the patients condition since the H&P had been written. Alloderm no longer used - now using Flex HD structural grafting material.    Vitals:    08/20/24 0537   BP: 166/88   Pulse: 76   Resp: 16   Temp: 98.4 °F (36.9 °C)   SpO2: 95%

## 2024-08-20 NOTE — DISCHARGE INSTR - AVS FIRST PAGE
Post Operative Oral Biopsy / Oral Excision Instructions    1.  If there is any bleeding he may tamponade this with pressure and a gauze.  Hold in place for 5 minutes prior to removal to make sure bleeding stops.  He may also gargle and spit with ice water or suck on ice cube to help stop bleeding.  If bleeding continues please notify [facility] at [phone] or [phone].    2.  Certain foods or drinks may irritate the operative site.  Please avoid the following:  Citrus fruits or juices, spicy foods, salty foods, sharp foods and alcohol based products.    3.  If you have any sutures in place it is important to note that they sometimes fall out prior to your follow-up appointment.  Do not become alarmed.  The sutures generally do not need to be replaced.    4.  If you were given a prescription for postoperative pain medication please follow appropriate directions on medication label.  If no prescription was given you may take over-the-counter pain medication as needed.    5.  If a follow-up appointment was made, please keep follow-up appointment as scheduled.    6.  If there are any questions or concerns regarding her surgical site or if you develop any suspected complications please notify our [facility] at the above number.

## 2024-08-20 NOTE — ANESTHESIA PREPROCEDURE EVALUATION
Procedure:  EXCISION BX LESION/MASS ORAL/TONGUE; FROZEN SECTION (Right: Mouth)    Relevant Problems   CARDIO  04/2023    ECHO       Findings    Left Ventricle Left ventricular cavity size is normal. Wall thickness is mildly increased. There is mild concentric hypertrophy. The left ventricular ejection fraction is 60%. Systolic function is normal.  Wall motion is normal. Diastolic function is normal.  Right Ventricle Right ventricular cavity size is normal. Systolic function is normal. Wall thickness is normal.  Left Atrium The atrium is mildly dilated (35-41 mL/m2).  Right Atrium The atrium is normal in size.  Atrial Septum There is lipomatous hypertrophy of the interatrial septum.  Aortic Valve The aortic valve is trileaflet. The leaflets are not thickened. The leaflets are not calcified. The leaflets exhibit normal mobility. There is no evidence of regurgitation. The aortic valve has no significant stenosis.  Mitral Valve There is mild annular calcification. There is no evidence of regurgitation. There is no evidence of stenosis. The valve has normal function.  Tricuspid Valve Tricuspid valve structure is normal. There is no evidence of regurgitation. There is no evidence of stenosis.  Pulmonic Valve The pulmonic valve was not well visualized. There is no evidence of regurgitation. There is no evidence of stenosis.  Ascending Aorta The aortic root is normal in size.  IVC/SVC Mechanically ventilated; cannot use inferior caval vein diameter to estimate central venous pressure.  Pericardium There is no pericardial effusion. The pericardium is normal in appearance.       (+) Abdominal aortic aneurysm (AAA) without rupture (HCC)   (+) Benign essential hypertension   (+) Hyperlipidemia   (+) Mild mitral regurgitation      GI/HEPATIC   (+) Hepatomegaly      /RENAL   (+) Renal cyst, right      HEMATOLOGY   (+) Thrombocytopenia (HCC)      MUSCULOSKELETAL   (+) Gout      NEURO/PSYCH   (+) Depression   (+) Pain syndrome,  chronic      PULMONARY   (+) COPD (chronic obstructive pulmonary disease) (HCC)   (+) Obstructive sleep apnea      Other   (+) Splenomegaly        Physical Exam    Airway    Mallampati score: III  TM Distance: >3 FB  Neck ROM: full     Dental   No notable dental hx     Cardiovascular  Rhythm: regular, Rate: normal, Cardiovascular exam normal    Pulmonary  Pulmonary exam normal Breath sounds clear to auscultation    Other Findings        Anesthesia Plan  ASA Score- 3     Anesthesia Type- general with ASA Monitors.         Additional Monitors:     Airway Plan: ETT.           Plan Factors-Exercise tolerance (METS): >4 METS.    Chart reviewed. EKG reviewed.  Existing labs reviewed. Patient summary reviewed.    Patient is a current smoker.  Patient instructed to abstain from smoking on day of procedure. Patient did not smoke on day of surgery.            Induction- intravenous.    Postoperative Plan- Plan for postoperative opioid use.         Informed Consent- Anesthetic plan and risks discussed with patient.

## 2024-08-20 NOTE — ANESTHESIA POSTPROCEDURE EVALUATION
"Post-Op Assessment Note    CV Status:  Stable    Pain management: adequate       Mental Status:  Alert and awake   Hydration Status:  Euvolemic   PONV Controlled:  Controlled   Airway Patency:  Patent     Post Op Vitals Reviewed: Yes    No anethesia notable event occurred.    Staff: Anesthesiologist, CRNA               BP      Temp      Pulse     Resp      SpO2      BP (!) 171/77   Pulse 76   Temp 98.9 °F (37.2 °C) (Temporal)   Resp 16   Ht 5' 11\" (1.803 m)   Wt 125 kg (275 lb 12.7 oz)   SpO2 95%   BMI 38.47 kg/m²     "

## 2024-08-20 NOTE — OP NOTE
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  He was marked on the right cheek to denote the laterality of the case.  He was taken to the operating room and placed on the OR table in spine position.  He was placed under general anesthesia without any difficulty using an endotracheal tube.  Formal timeout was obtained and all information was noted to be correct.  Oral cavity was opened using a half Meza mouthgag.  Towel clip was used to grasp the tip of the tongue and pulled out laterally into the left side 1% Xylocaine with 1 100,000 epinephrine was injected into the right lateral tongue and the area of the ulcer to a total of 8 mL.  Throat gag was placed with a sponge.  While this was working the Flex HD grafting material was opened and placed in saline as per insert guidelines.  A separate bowl with antibiotic soaking material was placed next to this.  I then made an incision elliptically around the prior biopsy site which measured approximately 3.5 x 1.7 cm in diameter.  This was taken  deep into the tongue musculature and sent as a fresh specimen.  The closest border was between the anterior and inferior margins and a separate biopsy was obtained of this rim of tissue.  While this was being evaluated I cauterized the base to make sure there was no significant bleeding.  Graft material was then switched to an antibiotic soaking bin.  While this was in place frozen section was returned.  There was no evidence of cancer on frozen section.  There was some minimal atypia on the second specimen at the anterior inferior margin.  Deep margin was negative for any cancer.  The grafting material was then positioned correctly on the tongue and sutured in place with interrupted 4-0 Vicryl sutures.  It was trimmed appropriately.  Throat gag was removed.  No blood was noted.  Towel clip was removed from the tongue.  At this point the patient was awoken, extubated, and taken to recovery in stable condition.   I was present for the entire procedure.    Patient Disposition:  PACU         SIGNATURE: Sylvester Luis DO  DATE: August 20, 2024  TIME: 8:40 AM

## 2024-08-22 ENCOUNTER — TELEPHONE (OUTPATIENT)
Dept: INTERNAL MEDICINE CLINIC | Facility: CLINIC | Age: 64
End: 2024-08-22

## 2024-08-27 PROCEDURE — 88341 IMHCHEM/IMCYTCHM EA ADD ANTB: CPT | Performed by: STUDENT IN AN ORGANIZED HEALTH CARE EDUCATION/TRAINING PROGRAM

## 2024-08-27 PROCEDURE — 88309 TISSUE EXAM BY PATHOLOGIST: CPT | Performed by: STUDENT IN AN ORGANIZED HEALTH CARE EDUCATION/TRAINING PROGRAM

## 2024-08-27 PROCEDURE — 88344 IMHCHEM/IMCYTCHM EA MLT ANTB: CPT | Performed by: STUDENT IN AN ORGANIZED HEALTH CARE EDUCATION/TRAINING PROGRAM

## 2024-08-27 PROCEDURE — 88342 IMHCHEM/IMCYTCHM 1ST ANTB: CPT | Performed by: STUDENT IN AN ORGANIZED HEALTH CARE EDUCATION/TRAINING PROGRAM

## 2024-09-10 ENCOUNTER — OFFICE VISIT (OUTPATIENT)
Dept: INTERNAL MEDICINE CLINIC | Facility: CLINIC | Age: 64
End: 2024-09-10
Payer: COMMERCIAL

## 2024-09-10 VITALS
BODY MASS INDEX: 37.68 KG/M2 | SYSTOLIC BLOOD PRESSURE: 130 MMHG | HEIGHT: 71 IN | HEART RATE: 97 BPM | WEIGHT: 269.13 LBS | OXYGEN SATURATION: 96 % | TEMPERATURE: 97.5 F | DIASTOLIC BLOOD PRESSURE: 72 MMHG

## 2024-09-10 DIAGNOSIS — E78.1 PURE HYPERTRIGLYCERIDEMIA: ICD-10-CM

## 2024-09-10 DIAGNOSIS — Z23 ENCOUNTER FOR IMMUNIZATION: ICD-10-CM

## 2024-09-10 DIAGNOSIS — Z72.0 TOBACCO USE: ICD-10-CM

## 2024-09-10 DIAGNOSIS — E66.01 CLASS 2 SEVERE OBESITY DUE TO EXCESS CALORIES WITH SERIOUS COMORBIDITY AND BODY MASS INDEX (BMI) OF 35.0 TO 35.9 IN ADULT (HCC): ICD-10-CM

## 2024-09-10 DIAGNOSIS — R80.9 MICROALBUMINURIA: ICD-10-CM

## 2024-09-10 DIAGNOSIS — F32.A DEPRESSION, UNSPECIFIED DEPRESSION TYPE: ICD-10-CM

## 2024-09-10 DIAGNOSIS — R16.0 HEPATOMEGALY: ICD-10-CM

## 2024-09-10 DIAGNOSIS — G47.33 OBSTRUCTIVE SLEEP APNEA: ICD-10-CM

## 2024-09-10 DIAGNOSIS — J42 CHRONIC BRONCHITIS, UNSPECIFIED CHRONIC BRONCHITIS TYPE (HCC): ICD-10-CM

## 2024-09-10 DIAGNOSIS — M1A.9XX0 CHRONIC GOUT WITHOUT TOPHUS, UNSPECIFIED CAUSE, UNSPECIFIED SITE: ICD-10-CM

## 2024-09-10 DIAGNOSIS — I10 BENIGN ESSENTIAL HYPERTENSION: Primary | ICD-10-CM

## 2024-09-10 DIAGNOSIS — F17.210 SMOKING GREATER THAN 20 PACK YEARS: ICD-10-CM

## 2024-09-10 PROCEDURE — 99214 OFFICE O/P EST MOD 30 MIN: CPT | Performed by: INTERNAL MEDICINE

## 2024-09-10 NOTE — PROGRESS NOTES
Ambulatory Visit  Name: Raymundo Weller      : 1960      MRN: [id number]  Encounter Provider: Lisandro Gauthier DO  Encounter Date: 9/10/2024   Encounter department: [facility]    Assessment & Plan   1. Benign essential hypertension  2. Encounter for immunization  3. Smoking greater than 20 pack years  -     CT lung screening program; Future; Expected date: 2024  4. Chronic bronchitis, unspecified chronic bronchitis type (HCC)  5. Obstructive sleep apnea  6. Hepatomegaly  7. Depression, unspecified depression type  8. Tobacco use  9. Chronic gout without tophus, unspecified cause, unspecified site  10. Class 2 severe obesity due to excess calories with serious comorbidity and body mass index (BMI) of 35.0 to 35.9 in adult (HCC)  11. Pure hypertriglyceridemia  12. Microalbuminuria    A/P: Doing ok and labs are up to date. Appreciate specialists input. Will order lung ca screening. Continue current treatment and RTC four months for routine.        History of Present Illness     WM RTC for f/u HTN, COPD, etc. Doing ok and no new issues. Remains active w/o difficulty and no falls. Breathing is stable and limited rescue med use. Chronic pain is manageable. No gout attacks. Still smoking. MDD/DEYSI is controlled. Labs are up to date. Due for lung ca screening.         Review of Systems   Constitutional:  Negative for activity change, chills, diaphoresis, fatigue and fever.   HENT: Negative.     Eyes:  Negative for visual disturbance.   Respiratory:  Negative for cough, chest tightness, shortness of breath and wheezing.    Cardiovascular:  Negative for chest pain, palpitations and leg swelling.   Gastrointestinal:  Negative for abdominal pain, constipation, diarrhea, nausea and vomiting.   Endocrine: Negative for cold intolerance and heat intolerance.   Genitourinary:  Negative for difficulty urinating, dysuria and frequency.   Musculoskeletal:  Negative for arthralgias, gait problem and  "myalgias.   Neurological:  Negative for dizziness, seizures, syncope, weakness, light-headedness and headaches.   Psychiatric/Behavioral:  Negative for confusion, dysphoric mood and sleep disturbance. The patient is not nervous/anxious.        Objective     /72   Pulse 97   Temp 97.5 °F (36.4 °C)   Ht 5' 11\" (1.803 m)   Wt 122 kg (269 lb 2 oz)   SpO2 96%   BMI 37.54 kg/m²     Physical Exam  Vitals and nursing note reviewed.   Constitutional:       General: He is not in acute distress.     Appearance: Normal appearance. He is not ill-appearing.   HENT:      Head: Normocephalic and atraumatic.      Mouth/Throat:      Mouth: Mucous membranes are moist.   Eyes:      Extraocular Movements: Extraocular movements intact.      Conjunctiva/sclera: Conjunctivae normal.      Pupils: Pupils are equal, round, and reactive to light.   Neck:      Vascular: No carotid bruit.   Cardiovascular:      Rate and Rhythm: Normal rate and regular rhythm.      Heart sounds: Normal heart sounds. No murmur heard.  Pulmonary:      Effort: Pulmonary effort is normal. No respiratory distress.      Breath sounds: Normal breath sounds. No wheezing, rhonchi or rales.   Abdominal:      General: Bowel sounds are normal. There is no distension.      Palpations: Abdomen is soft.      Tenderness: There is no abdominal tenderness.   Musculoskeletal:      Cervical back: Neck supple.      Right lower leg: No edema.      Left lower leg: No edema.   Neurological:      General: No focal deficit present.      Mental Status: He is alert and oriented to person, place, and time. Mental status is at baseline.   Psychiatric:         Mood and Affect: Mood normal.         Behavior: Behavior normal.         Thought Content: Thought content normal.         Judgment: Judgment normal.       Administrative Statements           "

## 2024-09-10 NOTE — PATIENT INSTRUCTIONS
"Patient Education     High blood pressure in adults   The Basics   Written by the doctors and editors at [facility]   What is high blood pressure? -- High blood pressure is a condition that puts you at risk for heart attack, stroke, and kidney disease. It does not usually cause symptoms. But it can be serious.  When your doctor or nurse tells you your blood pressure, they say 2 numbers. For instance, your doctor or nurse might say that your blood pressure is \"130 over 80.\" The top number is the pressure inside your arteries when your heart is evert. The bottom number is the pressure inside your arteries when your heart is relaxed.  \"Elevated blood pressure\" is a term doctors or nurses use as a warning. People with elevated blood pressure do not yet have high blood pressure. But their blood pressure is not as low as it should be for good health.  Many experts define high, elevated, and normal blood pressure as follows:   High - Top number of 130 or above and/or bottom number of 80 or above.   Elevated - Top number between 120 and 129 and bottom number of 79 or below.   Normal - Top number of 119 or below and bottom number of 79 or below.  This information is also in the table (table 1).  How can I lower my blood pressure? -- If your doctor or nurse prescribed blood pressure medicine, the most important thing you can do is to take it. If it causes side effects, do not just stop taking it. Instead, talk to your doctor or nurse about the problems it causes. They might be able to lower your dose or switch you to another medicine. If cost is a problem, mention that, too. They might be able to put you on a less expensive medicine. Taking your blood pressure medicine can keep you from having a heart attack or stroke, and it can save your life!  Can I do anything on my own? -- You have a lot of control over your blood pressure. To lower it:   Lose weight (if you are overweight).   Choose a diet low in fat and rich in " "fruits, vegetables, and low-fat dairy products.   Eat less salt.   Do something active for at least 30 minutes a day on most days of the week.   Drink less alcohol (if you drink more than 2 alcoholic drinks per day).  It's also a good idea to get a home blood pressure meter. People who check their own blood pressure at home do better at keeping it low and can sometimes even reduce the amount of medicine they take.  All topics are updated as new evidence becomes available and our peer review process is complete.  This topic retrieved from [employer] on: Feb 26, 2024.  Topic 41961 Version 23.0  Release: 32.2.4 - C32.56  © 2024 UpToDate, Inc. and/or its affiliates. All rights reserved.  table 1: Definition of normal and high blood pressure  Level  Top number  Bottom number    High 130 or above 80 or above   Elevated 120 to 129 79 or below   Normal 119 or below 79 or below   These definitions are from the American College of Cardiology/American Heart Association. Other expert groups might use slightly different definitions.  \"Elevated blood pressure\" is a term doctor or nurses use as a warning. It means you do not yet have high blood pressure, but your blood pressure is not as low as it should be for good health.  Graphic 48184 Version 6.0  Consumer Information Use and Disclaimer   Disclaimer: This generalized information is a limited summary of diagnosis, treatment, and/or medication information. It is not meant to be comprehensive and should be used as a tool to help the user understand and/or assess potential diagnostic and treatment options. It does NOT include all information about conditions, treatments, medications, side effects, or risks that may apply to a specific patient. It is not intended to be medical advice or a substitute for the medical advice, diagnosis, or treatment of a health care provider based on the health care provider's examination and assessment of a patient's specific and unique circumstances. " Patients must speak with a health care provider for complete information about their health, medical questions, and treatment options, including any risks or benefits regarding use of medications. This information does not endorse any treatments or medications as safe, effective, or approved for treating a specific patient. UpToDate, Inc. and its affiliates disclaim any warranty or liability relating to this information or the use thereof.The use of this information is governed by the Terms of Use, available at https://www.woltersfarmhoppinguwer.com/en/know/clinical-effectiveness-terms. 2024© UpToDate, Inc. and its affiliates and/or licensors. All rights reserved.  Copyright   © 2024 UpToDate, Inc. and/or its affiliates. All rights reserved.

## 2024-09-16 DIAGNOSIS — I10 PRIMARY HYPERTENSION: ICD-10-CM

## 2024-09-18 RX ORDER — SPIRONOLACTONE 50 MG/1
50 TABLET, FILM COATED ORAL DAILY
Qty: 90 TABLET | Refills: 1 | Status: SHIPPED | OUTPATIENT
Start: 2024-09-18

## 2024-10-22 ENCOUNTER — HOSPITAL ENCOUNTER (EMERGENCY)
Facility: HOSPITAL | Age: 64
Discharge: HOME | End: 2024-10-22
Attending: EMERGENCY MEDICINE | Admitting: EMERGENCY MEDICINE
Payer: COMMERCIAL

## 2024-10-22 ENCOUNTER — APPOINTMENT (EMERGENCY)
Dept: CT IMAGING | Facility: HOSPITAL | Age: 64
End: 2024-10-22
Payer: COMMERCIAL

## 2024-10-22 VITALS
SYSTOLIC BLOOD PRESSURE: 158 MMHG | HEART RATE: 104 BPM | OXYGEN SATURATION: 88 % | BODY MASS INDEX: 37.66 KG/M2 | WEIGHT: 269 LBS | RESPIRATION RATE: 18 BRPM | TEMPERATURE: 99.1 F | DIASTOLIC BLOOD PRESSURE: 77 MMHG | HEIGHT: 71 IN
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
## 2024-10-22 DIAGNOSIS — F10.929 ALCOHOL INTOXICATION (HCC): ICD-10-CM

## 2024-10-22 DIAGNOSIS — T14.8XXA ABRASION: ICD-10-CM

## 2024-10-22 LAB — GLUCOSE SERPL-MCNC: 157 MG/DL (ref 65–140)

## 2024-10-22 PROCEDURE — 99284 EMERGENCY DEPT VISIT MOD MDM: CPT

## 2024-10-22 PROCEDURE — 72125 CT NECK SPINE W/O DYE: CPT

## 2024-10-22 PROCEDURE — 82948 REAGENT STRIP/BLOOD GLUCOSE: CPT

## 2024-10-22 PROCEDURE — 90715 TDAP VACCINE 7 YRS/> IM: CPT | Performed by: EMERGENCY MEDICINE

## 2024-10-22 PROCEDURE — 90471 IMMUNIZATION ADMIN: CPT

## 2024-10-22 PROCEDURE — 99284 EMERGENCY DEPT VISIT MOD MDM: CPT | Performed by: EMERGENCY MEDICINE

## 2024-10-22 PROCEDURE — 70450 CT HEAD/BRAIN W/O DYE: CPT

## 2024-10-22 RX ADMIN — TETANUS TOXOID, REDUCED DIPHTHERIA TOXOID AND ACELLULAR PERTUSSIS VACCINE, ADSORBED 0.5 ML: 5; 2.5; 8; 8; 2.5 SUSPENSION INTRAMUSCULAR at 14:54

## 2024-10-22 NOTE — ED PROVIDER NOTES
Emergency Department Trauma Note  Raymundo Weller 63 y.o. male MRN: [id number]  Unit/Bed#: TR 04/TR 04 Encounter: [id number]      Trauma Alert: Trauma Acuity: Trauma Evaluation  Model of Arrival: Mode of Arrival: BLS via Trauma Squad Name and Number: [facility]  Trauma Team: Current Providers  Attending Provider: Raymundo Rudolph DO  Registered Nurse: Stephanie Nicole RN  Consultants:     None      History of Present Illness     Chief Complaint:   Chief Complaint   Patient presents with    Fall     According to EMS, the patient had fallen out of the car and hit his head.     HPI:  Raymundo Weller is a 63 y.o. male who presents with none.  Mechanism:Details of Incident: Patient had fallen ouit of his car; has laceration to left eye Injury Date: 10/22/24 Injury Time: 1244      63-year-old male with history of alcoholism presents level intoxication.  Got out of his car and fell striking his head.  A passerby called 911.  Patient denies LOC however is intoxicated.  Denies additional injury.      Fall    Review of Systems    Historical Information     Immunizations:   Immunization History   Administered Date(s) Administered    COVID-19 MODERNA VACC 0.25 ML IM BOOSTER 11/22/2021    COVID-19 MODERNA VACC 0.5 ML IM 03/19/2021, 04/21/2021, 11/22/2021    DT (pediatric) 04/23/2008    INFLUENZA 11/04/2019, 10/18/2020, 09/27/2022    Influenza Quadrivalent 3 years and older 11/13/2017    Influenza Quadrivalent Recombinant Preservative Free IM 11/04/2019, 10/18/2020    Influenza Quadrivalent, 6-35 Months IM 11/13/2017    Influenza, high dose seasonal 0.7 mL 10/17/2023    Influenza, injectable, quadrivalent, preservative free 0.5 mL 01/23/2019    Influenza, recombinant, quadrivalent,injectable, preservative free 09/28/2021, 09/27/2022    Pneumococcal Conjugate Vaccine 20-valent (Pcv20), Polysace 01/03/2024    Tdap 07/27/2018, 10/22/2024    Zoster Vaccine Recombinant 03/30/2024       Past Medical History:   Diagnosis  "Date    AAA (abdominal aortic aneurysm) (HCC)     sees [facility]    Abnormal kidney function     last assessed: Feb 25, 2016    Allergic rhinitis     Arthritis     Benign colon polyp     Bruises easily     \"not new\"    Cervical radiculopathy     last assessed: Feb 11, 2015    Chronic fatigue     \"not chronic'    Chronic pain disorder     Cirrhosis (HCC)     COPD (chronic obstructive pulmonary disease) (HCC)     COVID 01/05/2024    CPAP (continuous positive airway pressure) dependence     Depression 04/07/2023    per pt in the past    Edema     Elevated liver enzymes     Gout     Herniated cervical disc     \"bulging\"    Hyperlipidemia     Hypertension     Hypotension     last assessed: March 22, 2017    Left ventricular hypertrophy     Low platelet count (HCC)     \"usually runs low\"    Lumbar herniated disc     Obesity (BMI 30-39.9) 09/20/2018    Sleep apnea     Tongue lesion        Family History   Problem Relation Age of Onset    Cancer Mother     No Known Problems Father     No Known Problems Sister     No Known Problems Brother     No Known Problems Maternal Grandmother     No Known Problems Maternal Grandfather     No Known Problems Paternal Grandmother     No Known Problems Paternal Grandfather      Past Surgical History:   Procedure Laterality Date    BUCCAL MASS EXCISION Right 8/20/2024    Procedure: EXCISION BX LESION, TONGUE; FROZEN SECTION; flex hd grafting;  Surgeon: Sylvester Luis DO;  Location: [facility];  Service: ENT    CARPAL TUNNEL RELEASE Bilateral     COLONOSCOPY      HEMORRHOID SURGERY      LIVER BIOPSY       Social History     Tobacco Use    Smoking status: Some Days     Current packs/day: 0.50     Average packs/day: 0.5 packs/day for 40.8 years (20.4 ttl pk-yrs)     Types: Cigarettes     Start date: 1/2/1988     Passive exposure: Current    Smokeless tobacco: Never    Tobacco comments:     Given [employer] \"how to quit\" info --has cut back.. last smoked 8/18   Vaping Use    Vaping status: " Never Used   Substance Use Topics    Alcohol use: Yes     Alcohol/week: 6.0 standard drinks of alcohol     Types: 6 Cans of beer per week     Comment: per pt not drinking currently. last drank 8/16- glass of wine    Drug use: No     E-Cigarette/Vaping    E-Cigarette Use Never User      E-Cigarette/Vaping Substances    Nicotine No     THC No     CBD No     Flavoring No     Other No     Unknown No        Family History:   Family History   Problem Relation Age of Onset    Cancer Mother     No Known Problems Father     No Known Problems Sister     No Known Problems Brother     No Known Problems Maternal Grandmother     No Known Problems Maternal Grandfather     No Known Problems Paternal Grandmother     No Known Problems Paternal Grandfather        Meds/Allergies   Prior to Admission Medications   Prescriptions Last Dose Informant Patient Reported? Taking?   fluticasone (FLONASE) 50 mcg/act nasal spray   No No   Si spray into each nostril daily   multivitamin (THERAGRAN) TABS   No No   Sig: Take 1 tablet by mouth daily   spironolactone (ALDACTONE) 50 mg tablet   No No   Sig: Take 1 tablet (50 mg total) by mouth daily      Facility-Administered Medications: None       No Known Allergies    PHYSICAL EXAM    PE limited by: none    Objective   Vitals:   First set: Temperature: 99.1 °F (37.3 °C) (10/22/24 1339)  Pulse: (!) 112 (10/22/24 1339)  Respirations: 18 (10/22/24 1339)  Blood Pressure: 141/74 (10/22/24 1339)  SpO2: 94 % (10/22/24 1339)    Primary Survey:   (A) Airway: intact  (B) Breathing: intact  (C) Circulation: Pulses:   normal  (D) Disabliity:  GCS Total:  15  (E) Expose:  Completed    Secondary Survey: (Click on Physical Exam tab above)  Physical Exam  Vitals and nursing note reviewed.   Constitutional:       Appearance: Normal appearance. He is well-developed.   HENT:      Head: Normocephalic.      Comments: Superficial abrasion the left supraorbital area  Eyes:      Conjunctiva/sclera: Conjunctivae normal.       Pupils: Pupils are equal, round, and reactive to light.   Neck:      Trachea: No tracheal deviation.   Cardiovascular:      Rate and Rhythm: Normal rate and regular rhythm.      Heart sounds: Normal heart sounds. No murmur heard.  Pulmonary:      Effort: Pulmonary effort is normal. No respiratory distress.      Breath sounds: Normal breath sounds. No wheezing or rales.   Abdominal:      General: Bowel sounds are normal. There is no distension.      Palpations: Abdomen is soft.      Tenderness: There is no abdominal tenderness.   Musculoskeletal:         General: No deformity.      Cervical back: Normal range of motion and neck supple.      Comments: Superficial abrasion left elbow without bony tenderness.  No midline tenderness in the CT or L-spine.   Skin:     General: Skin is warm and dry.      Capillary Refill: Capillary refill takes less than 2 seconds.   Neurological:      General: No focal deficit present.      Mental Status: He is alert and oriented to person, place, and time.      Sensory: No sensory deficit.   Psychiatric:         Mood and Affect: Mood normal.         Judgment: Judgment normal.      Comments: Mildly agitated/frustrated with being in the emergency department         Cervical spine cleared by clinical criteria? No (imaging required)      Invasive Devices       None                   Lab Results:   Results Reviewed       Procedure Component Value Units Date/Time    Fingerstick Glucose (POCT) [[id number]]  (Abnormal) Collected: 10/22/24 1349    Lab Status: Final result Specimen: Blood Updated: 10/22/24 1351     POC Glucose 157 mg/dl                    Imaging Studies:   Direct to CT: No  TRAUMA - CT head wo contrast   Final Result by Kelsi Cuadra MD (10/22 1426)      No acute intracranial hemorrhage.                  Workstation performed: [id number]         TRAUMA - CT spine cervical wo contrast   Final Result by Kelsi Cuadra MD (10/22 1431)      No acute cervical spine fracture or  traumatic malalignment.      The study was marked in EPIC for immediate notification.                  Workstation performed: [id number]               Procedures  Procedures         ED Course           Medical Decision Making  63-year-old male presents with intoxication and fall.  Will get CT head to rule out intracranial hemorrhage as well as cervical spine.  He is refusing C-spine precautions.    CT scan viewed interpreted and by me no acute disease was appreciated.  Some transient hypoxia which was related to his position which improved spontaneously.  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  No injury to the torso chest abdomen pelvis.    CT scans formally read is reassuring.  Patient family here say that he is at his baseline slightly intoxicated, he has a stable gait at this time and is comfortable with discharge home.    Problems Addressed:  Abrasion: acute illness or injury  Alcohol intoxication (HCC): acute illness or injury  Fall, initial encounter: acute illness or injury    Amount and/or Complexity of Data Reviewed  Labs: ordered.  Radiology: ordered.    Risk  Prescription drug management.                Disposition  [facility] Transfer: No  Final diagnoses:   Fall, initial encounter   Abrasion   Alcohol intoxication (HCC)     Time reflects when diagnosis was documented in both MDM as applicable and the Disposition within this note       Time User Action Codes Description Comment    10/22/2024  2:36 PM Raymundo Rudolph [W19.XXXA] Fall, initial encounter     10/22/2024  2:36 PM Raymundo Rudolph [T14.8XXA] Abrasion     10/22/2024  2:36 PM Raymundo Rudolph [F10.929] Alcohol intoxication (HCC)           ED Disposition       ED Disposition   Discharge    Condition   Stable    Date/Time   Tue Oct 22, 2024  2:36 PM    Comment   Raymundo Weller discharge to home/self care.                   Follow-up Information       Follow up With Specialties Details Why Contact Sandor Gauthier DO  Internal Medicine   [address]  [facility] [address]  [phone]            Discharge Medication List as of 10/22/2024  2:51 PM        CONTINUE these medications which have NOT CHANGED    Details   fluticasone (FLONASE) 50 mcg/act nasal spray 1 spray into each nostril daily, Starting Wed 8/14/2024, No Print      multivitamin (THERAGRAN) TABS Take 1 tablet by mouth daily, Starting Wed 8/14/2024, No Print      spironolactone (ALDACTONE) 50 mg tablet Take 1 tablet (50 mg total) by mouth daily, Starting Wed 9/18/2024, Normal           No discharge procedures on file.    PDMP Review         Value Time User    PDMP Reviewed  Yes 7/15/2024  4:07 PM Sylvester Luis DO            ED Provider  Electronically Signed by           Raymundo Rudolph DO  10/22/24 [id number]

## 2024-10-22 NOTE — DISCHARGE INSTRUCTIONS
Apply neosporin to your wounds 2-4 times daily  Return if symptoms worsen or if new symptoms develop

## 2024-10-24 ENCOUNTER — OFFICE VISIT (OUTPATIENT)
Dept: INTERNAL MEDICINE CLINIC | Facility: CLINIC | Age: 64
End: 2024-10-24
Payer: COMMERCIAL

## 2024-10-24 VITALS
HEIGHT: 71 IN | HEART RATE: 105 BPM | DIASTOLIC BLOOD PRESSURE: 72 MMHG | SYSTOLIC BLOOD PRESSURE: 162 MMHG | TEMPERATURE: 99.1 F | WEIGHT: 270.5 LBS | BODY MASS INDEX: 37.87 KG/M2 | OXYGEN SATURATION: 97 %

## 2024-10-24 DIAGNOSIS — S01.01XD LACERATION OF SCALP WITHOUT FOREIGN BODY, SUBSEQUENT ENCOUNTER: ICD-10-CM

## 2024-10-24 DIAGNOSIS — T14.8XXA ABRASION: ICD-10-CM

## 2024-10-24 DIAGNOSIS — Z23 ENCOUNTER FOR IMMUNIZATION: ICD-10-CM

## 2024-10-24 DIAGNOSIS — S00.03XD CONTUSION OF SCALP, SUBSEQUENT ENCOUNTER: ICD-10-CM
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
## 2024-10-24 PROCEDURE — 90471 IMMUNIZATION ADMIN: CPT

## 2024-10-24 PROCEDURE — 99213 OFFICE O/P EST LOW 20 MIN: CPT | Performed by: INTERNAL MEDICINE

## 2024-10-24 PROCEDURE — 90662 IIV NO PRSV INCREASED AG IM: CPT

## 2024-10-24 NOTE — LETTER
October 24, 2024     Patient: Raymundo Weller  YOB: 1960  Date of Visit: 10/24/2024      To Whom it May Concern:    Raymundo Weller is under my professional care. Raymundo was seen in my office on 10/24/2024. Raymundo may return to work on 10/28/24 .    If you have any questions or concerns, please don't hesitate to call.         Sincerely,          Lisandro Gauthier,         CC: No Recipients   Vitals capture started with the following parameters, Patient=Adult, Interval=5 min, Initial Pressure=170 mmHg, Deflation Rate=5 mmHg, Cuff placed on Left Arm

## 2024-10-24 NOTE — PROGRESS NOTES
Ambulatory Visit  Name: Raymundo Weller      : 1960      MRN: [id number]  Encounter Provider: Lisandro Gauthier DO  Encounter Date: 10/24/2024   Encounter department: [facility]    Assessment & Plan  Encounter for immunization    Orders:    influenza vaccine, high-dose, PF 0.5 mL (Fluzone High Dose)    Fall, subsequent encounter         Abrasion         Contusion of scalp, subsequent encounter         Laceration of scalp without foreign body, subsequent encounter       A/P: Stable and appears to be back to baseline. No neurologic deficits. Inquired about ETOH use and denies. Chart reports possible intoxication, but no blood levels can be found at this point in time, so have to give the benefit of the doubt to the pt. DIscussed the avoidance of ETOH. DIscussed the fall and given his report, appears to be mechanical while exiting a MV with packages. Pt denies any other gait issues or near falls. Defers PT eval. Ok to RTW. Continue current treatment and RTC as scheduled.      History of Present Illness     WM presents for ER f/u after reportedly falling out of his car while possibly being intoxicated. W/u in the ER was negative for any significant injury. NO LOC and CT head was negative. Pt with bruising and laceration. D/c'd to home. Since d/c, doing well. No fever or chills. No headaches, visual changes, n/v, and is back to baseline physically and mentally. No further events. Feels capable of returning to work.           Review of Systems   Constitutional:  Negative for activity change, chills, diaphoresis, fatigue and fever.   Respiratory:  Negative for cough, chest tightness, shortness of breath and wheezing.    Cardiovascular:  Negative for chest pain, palpitations and leg swelling.   Gastrointestinal:  Negative for abdominal pain, constipation, diarrhea, nausea and vomiting.   Genitourinary:  Negative for difficulty urinating, dysuria and frequency.   Musculoskeletal:  Negative for  "arthralgias, gait problem and myalgias.   Skin:  Positive for wound.   Neurological:  Negative for dizziness, seizures, syncope, weakness, light-headedness and headaches.   Psychiatric/Behavioral:  Negative for confusion, dysphoric mood and sleep disturbance. The patient is not nervous/anxious.            Objective     /72   Pulse 105   Temp 99.1 °F (37.3 °C)   Ht 5' 11\" (1.803 m)   Wt 123 kg (270 lb 8 oz)   SpO2 97%   BMI 37.73 kg/m²     Physical Exam  Vitals and nursing note reviewed.   Constitutional:       General: He is not in acute distress.     Appearance: Normal appearance. He is not ill-appearing.   HENT:      Head: Normocephalic and atraumatic.      Mouth/Throat:      Mouth: Mucous membranes are moist.   Eyes:      Extraocular Movements: Extraocular movements intact.      Conjunctiva/sclera: Conjunctivae normal.      Pupils: Pupils are equal, round, and reactive to light.   Cardiovascular:      Rate and Rhythm: Normal rate and regular rhythm.      Heart sounds: Normal heart sounds. No murmur heard.  Pulmonary:      Effort: Pulmonary effort is normal. No respiratory distress.      Breath sounds: Normal breath sounds. No wheezing, rhonchi or rales.   Abdominal:      General: Bowel sounds are normal. There is no distension.      Palpations: Abdomen is soft.      Tenderness: There is no abdominal tenderness.   Musculoskeletal:         General: No swelling, tenderness or deformity.      Right lower leg: Edema present.      Left lower leg: Edema present.      Comments: No deficits. Strength 5/5. ROM wnl. Bilat LE edema 1/4   Skin:     Findings: Bruising and lesion (laceration healing.) present.   Neurological:      General: No focal deficit present.      Mental Status: He is alert and oriented to person, place, and time. Mental status is at baseline.      Cranial Nerves: No cranial nerve deficit.      Motor: No weakness.      Coordination: Coordination normal.      Gait: Gait normal.      Deep Tendon " Reflexes: Reflexes normal.   Psychiatric:         Mood and Affect: Mood normal.         Behavior: Behavior normal.         Thought Content: Thought content normal.         Judgment: Judgment normal.

## 2024-11-11 ENCOUNTER — HOSPITAL ENCOUNTER (INPATIENT)
Facility: HOSPITAL | Age: 64
LOS: 4 days | Discharge: HOME | DRG: 442 | End: 2024-11-16
Attending: INTERNAL MEDICINE | Admitting: INTERNAL MEDICINE
Payer: COMMERCIAL

## 2024-11-11 ENCOUNTER — APPOINTMENT (EMERGENCY)
Dept: RADIOLOGY | Facility: HOSPITAL | Age: 64
DRG: 442 | End: 2024-11-11
Payer: COMMERCIAL

## 2024-11-11 DIAGNOSIS — K92.2 GI BLEED: Primary | ICD-10-CM

## 2024-11-11 DIAGNOSIS — E87.5 HYPERKALEMIA: ICD-10-CM

## 2024-11-11 DIAGNOSIS — K70.30 ALCOHOLIC CIRRHOSIS OF LIVER (HCC): ICD-10-CM

## 2024-11-11 LAB
ALBUMIN SERPL BCG-MCNC: 3 G/DL (ref 3.5–5)
ALP SERPL-CCNC: 84 U/L (ref 34–104)
ALT SERPL W P-5'-P-CCNC: 29 U/L (ref 7–52)
ANION GAP SERPL CALCULATED.3IONS-SCNC: 14 MMOL/L (ref 4–13)
APTT PPP: 32 SECONDS (ref 23–34)
AST SERPL W P-5'-P-CCNC: 65 U/L (ref 13–39)
BASOPHILS # BLD AUTO: 0.01 THOUSANDS/ÂΜL (ref 0–0.1)
BASOPHILS NFR BLD AUTO: 0 % (ref 0–1)
BILIRUB SERPL-MCNC: 3.17 MG/DL (ref 0.2–1)
BNP SERPL-MCNC: 146 PG/ML (ref 0–100)
BUN SERPL-MCNC: 38 MG/DL (ref 5–25)
CALCIUM ALBUM COR SERPL-MCNC: 9 MG/DL (ref 8.3–10.1)
CALCIUM SERPL-MCNC: 8.2 MG/DL (ref 8.4–10.2)
CARDIAC TROPONIN I PNL SERPL HS: 191 NG/L
CHLORIDE SERPL-SCNC: 102 MMOL/L (ref 96–108)
CO2 SERPL-SCNC: 19 MMOL/L (ref 21–32)
CREAT SERPL-MCNC: 0.97 MG/DL (ref 0.6–1.3)
D DIMER PPP FEU-MCNC: 3.17 UG/ML FEU
EOSINOPHIL # BLD AUTO: 0 THOUSAND/ÂΜL (ref 0–0.61)
EOSINOPHIL NFR BLD AUTO: 0 % (ref 0–6)
ERYTHROCYTE [DISTWIDTH] IN BLOOD BY AUTOMATED COUNT: 16.4 % (ref 11.6–15.1)
GFR SERPL CREATININE-BSD FRML MDRD: 81 ML/MIN/1.73SQ M
GLUCOSE SERPL-MCNC: 178 MG/DL (ref 65–140)
HCT VFR BLD AUTO: 27.8 % (ref 36.5–49.3)
HGB BLD-MCNC: 9.1 G/DL (ref 12–17)
IMM GRANULOCYTES # BLD AUTO: 0.08 THOUSAND/UL (ref 0–0.2)
IMM GRANULOCYTES NFR BLD AUTO: 1 % (ref 0–2)
INR PPP: 1.56 (ref 0.85–1.19)
LYMPHOCYTES # BLD AUTO: 1.11 THOUSANDS/ÂΜL (ref 0.6–4.47)
LYMPHOCYTES NFR BLD AUTO: 8 % (ref 14–44)
MCH RBC QN AUTO: 35.7 PG (ref 26.8–34.3)
MCHC RBC AUTO-ENTMCNC: 32.7 G/DL (ref 31.4–37.4)
MCV RBC AUTO: 109 FL (ref 82–98)
MONOCYTES # BLD AUTO: 0.66 THOUSAND/ÂΜL (ref 0.17–1.22)
MONOCYTES NFR BLD AUTO: 5 % (ref 4–12)
NEUTROPHILS # BLD AUTO: 11.63 THOUSANDS/ÂΜL (ref 1.85–7.62)
NEUTS SEG NFR BLD AUTO: 86 % (ref 43–75)
NRBC BLD AUTO-RTO: 0 /100 WBCS
PLATELET # BLD AUTO: 166 THOUSANDS/UL (ref 149–390)
PMV BLD AUTO: 10 FL (ref 8.9–12.7)
POTASSIUM SERPL-SCNC: 5.6 MMOL/L (ref 3.5–5.3)
PROT SERPL-MCNC: 5.6 G/DL (ref 6.4–8.4)
PROTHROMBIN TIME: 19.1 SECONDS (ref 12.3–15)
RBC # BLD AUTO: 2.55 MILLION/UL (ref 3.88–5.62)
SODIUM SERPL-SCNC: 135 MMOL/L (ref 135–147)
WBC # BLD AUTO: 13.49 THOUSAND/UL (ref 4.31–10.16)

## 2024-11-11 PROCEDURE — 83880 ASSAY OF NATRIURETIC PEPTIDE: CPT | Performed by: INTERNAL MEDICINE

## 2024-11-11 PROCEDURE — 85379 FIBRIN DEGRADATION QUANT: CPT | Performed by: INTERNAL MEDICINE

## 2024-11-11 PROCEDURE — 99285 EMERGENCY DEPT VISIT HI MDM: CPT

## 2024-11-11 PROCEDURE — 85025 COMPLETE CBC W/AUTO DIFF WBC: CPT | Performed by: INTERNAL MEDICINE

## 2024-11-11 PROCEDURE — 84484 ASSAY OF TROPONIN QUANT: CPT | Performed by: INTERNAL MEDICINE

## 2024-11-11 PROCEDURE — 93005 ELECTROCARDIOGRAM TRACING: CPT

## 2024-11-11 PROCEDURE — 80053 COMPREHEN METABOLIC PANEL: CPT | Performed by: INTERNAL MEDICINE

## 2024-11-11 PROCEDURE — 85730 THROMBOPLASTIN TIME PARTIAL: CPT | Performed by: INTERNAL MEDICINE

## 2024-11-11 PROCEDURE — 99285 EMERGENCY DEPT VISIT HI MDM: CPT | Performed by: INTERNAL MEDICINE

## 2024-11-11 PROCEDURE — 85610 PROTHROMBIN TIME: CPT | Performed by: INTERNAL MEDICINE

## 2024-11-11 PROCEDURE — 36415 COLL VENOUS BLD VENIPUNCTURE: CPT | Performed by: INTERNAL MEDICINE

## 2024-11-11 PROCEDURE — 71045 X-RAY EXAM CHEST 1 VIEW: CPT

## 2024-11-11 RX ORDER — PANTOPRAZOLE SODIUM 40 MG/10ML
40 INJECTION, POWDER, LYOPHILIZED, FOR SOLUTION INTRAVENOUS ONCE
Status: COMPLETED | OUTPATIENT
Start: 2024-11-12 | End: 2024-11-12

## 2024-11-11 RX ORDER — ONDANSETRON 2 MG/ML
4 INJECTION INTRAMUSCULAR; INTRAVENOUS ONCE AS NEEDED
Status: COMPLETED | OUTPATIENT
Start: 2024-11-11 | End: 2024-11-12

## 2024-11-12 ENCOUNTER — APPOINTMENT (OUTPATIENT)
Dept: PERIOP | Facility: HOSPITAL | Age: 64
DRG: 442 | End: 2024-11-12
Attending: INTERNAL MEDICINE
Payer: COMMERCIAL

## 2024-11-12 ENCOUNTER — ANESTHESIA (INPATIENT)
Dept: PERIOP | Facility: HOSPITAL | Age: 64
DRG: 442 | End: 2024-11-12
Payer: COMMERCIAL

## 2024-11-12 ENCOUNTER — ANESTHESIA EVENT (INPATIENT)
Dept: PERIOP | Facility: HOSPITAL | Age: 64
DRG: 442 | End: 2024-11-12
Payer: COMMERCIAL

## 2024-11-12 PROBLEM — K70.30 ALCOHOLIC CIRRHOSIS OF LIVER (HCC): Status: ACTIVE | Noted: 2024-11-12

## 2024-11-12 PROBLEM — E66.01 CLASS 2 SEVERE OBESITY DUE TO EXCESS CALORIES WITH SERIOUS COMORBIDITY AND BODY MASS INDEX (BMI) OF 35.0 TO 35.9 IN ADULT (HCC): Status: RESOLVED | Noted: 2018-09-20 | Resolved: 2024-11-12

## 2024-11-12 PROBLEM — R79.89 ELEVATED TROPONIN: Status: ACTIVE | Noted: 2024-11-12

## 2024-11-12 PROBLEM — E66.812 CLASS 2 SEVERE OBESITY DUE TO EXCESS CALORIES WITH SERIOUS COMORBIDITY AND BODY MASS INDEX (BMI) OF 35.0 TO 35.9 IN ADULT (HCC): Status: RESOLVED | Noted: 2018-09-20 | Resolved: 2024-11-12

## 2024-11-12 PROBLEM — C02.9 SQUAMOUS CELL CARCINOMA OF TONGUE (HCC): Status: ACTIVE | Noted: 2024-11-12

## 2024-11-12 PROBLEM — D62 ACUTE BLOOD LOSS ANEMIA: Status: ACTIVE | Noted: 2024-11-12

## 2024-11-12 PROBLEM — K92.2 GI BLEED: Status: ACTIVE | Noted: 2024-11-12

## 2024-11-12 PROBLEM — E87.5 HYPERKALEMIA: Status: ACTIVE | Noted: 2024-11-12

## 2024-11-12 LAB
2HR DELTA HS TROPONIN: 19 NG/L
4HR DELTA HS TROPONIN: -12 NG/L
ABO GROUP BLD: NORMAL
ALBUMIN SERPL BCG-MCNC: 2.5 G/DL (ref 3.5–5)
ALP SERPL-CCNC: 63 U/L (ref 34–104)
ALT SERPL W P-5'-P-CCNC: 27 U/L (ref 7–52)
ANION GAP SERPL CALCULATED.3IONS-SCNC: 5 MMOL/L (ref 4–13)
AST SERPL W P-5'-P-CCNC: 61 U/L (ref 13–39)
ATRIAL RATE: 119 BPM
ATRIAL RATE: 121 BPM
BILIRUB SERPL-MCNC: 2.7 MG/DL (ref 0.2–1)
BLD GP AB SCN SERPL QL: NEGATIVE
BUN SERPL-MCNC: 45 MG/DL (ref 5–25)
CALCIUM ALBUM COR SERPL-MCNC: 8.5 MG/DL (ref 8.3–10.1)
CALCIUM SERPL-MCNC: 7.3 MG/DL (ref 8.4–10.2)
CARDIAC TROPONIN I PNL SERPL HS: 179 NG/L
CARDIAC TROPONIN I PNL SERPL HS: 210 NG/L
CHLORIDE SERPL-SCNC: 106 MMOL/L (ref 96–108)
CO2 SERPL-SCNC: 24 MMOL/L (ref 21–32)
CREAT SERPL-MCNC: 0.92 MG/DL (ref 0.6–1.3)
FERRITIN SERPL-MCNC: 239 NG/ML (ref 24–336)
GFR SERPL CREATININE-BSD FRML MDRD: 86 ML/MIN/1.73SQ M
GLUCOSE SERPL-MCNC: 156 MG/DL (ref 65–140)
HCT VFR BLD AUTO: 22.8 % (ref 36.5–49.3)
HCT VFR BLD AUTO: 22.8 % (ref 36.5–49.3)
HCT VFR BLD AUTO: 22.9 % (ref 36.5–49.3)
HGB BLD-MCNC: 7.3 G/DL (ref 12–17)
HGB BLD-MCNC: 7.3 G/DL (ref 12–17)
HGB BLD-MCNC: 7.5 G/DL (ref 12–17)
IRON SERPL-MCNC: 212 UG/DL (ref 50–212)
P AXIS: -2 DEGREES
P AXIS: 25 DEGREES
POTASSIUM SERPL-SCNC: 4.8 MMOL/L (ref 3.5–5.3)
POTASSIUM SERPL-SCNC: 6 MMOL/L (ref 3.5–5.3)
POTASSIUM SERPL-SCNC: 6.1 MMOL/L (ref 3.5–5.3)
PR INTERVAL: 126 MS
PR INTERVAL: 130 MS
PROT SERPL-MCNC: 4.8 G/DL (ref 6.4–8.4)
QRS AXIS: -21 DEGREES
QRS AXIS: -25 DEGREES
QRSD INTERVAL: 78 MS
QRSD INTERVAL: 80 MS
QT INTERVAL: 322 MS
QT INTERVAL: 324 MS
QTC INTERVAL: 455 MS
QTC INTERVAL: 457 MS
RH BLD: POSITIVE
SODIUM SERPL-SCNC: 135 MMOL/L (ref 135–147)
SPECIMEN EXPIRATION DATE: NORMAL
T WAVE AXIS: 51 DEGREES
T WAVE AXIS: 64 DEGREES
UIBC SERPL-MCNC: <55 UG/DL (ref 155–355)
VENTRICULAR RATE: 119 BPM
VENTRICULAR RATE: 121 BPM

## 2024-11-12 PROCEDURE — 93010 ELECTROCARDIOGRAM REPORT: CPT | Performed by: INTERNAL MEDICINE

## 2024-11-12 PROCEDURE — P9016 RBC LEUKOCYTES REDUCED: HCPCS

## 2024-11-12 PROCEDURE — 85018 HEMOGLOBIN: CPT

## 2024-11-12 PROCEDURE — 84484 ASSAY OF TROPONIN QUANT: CPT | Performed by: INTERNAL MEDICINE

## 2024-11-12 PROCEDURE — 30233N1 TRANSFUSION OF NONAUTOLOGOUS RED BLOOD CELLS INTO PERIPHERAL VEIN, PERCUTANEOUS APPROACH: ICD-10-PCS | Performed by: INTERNAL MEDICINE

## 2024-11-12 PROCEDURE — 85014 HEMATOCRIT: CPT

## 2024-11-12 PROCEDURE — 86850 RBC ANTIBODY SCREEN: CPT | Performed by: INTERNAL MEDICINE

## 2024-11-12 PROCEDURE — 43235 EGD DIAGNOSTIC BRUSH WASH: CPT | Performed by: INTERNAL MEDICINE

## 2024-11-12 PROCEDURE — 82728 ASSAY OF FERRITIN: CPT

## 2024-11-12 PROCEDURE — 0DJ08ZZ INSPECTION OF UPPER INTESTINAL TRACT, VIA NATURAL OR ARTIFICIAL OPENING ENDOSCOPIC: ICD-10-PCS | Performed by: INTERNAL MEDICINE

## 2024-11-12 PROCEDURE — 86901 BLOOD TYPING SEROLOGIC RH(D): CPT | Performed by: INTERNAL MEDICINE

## 2024-11-12 PROCEDURE — 84132 ASSAY OF SERUM POTASSIUM: CPT | Performed by: STUDENT IN AN ORGANIZED HEALTH CARE EDUCATION/TRAINING PROGRAM

## 2024-11-12 PROCEDURE — 85018 HEMOGLOBIN: CPT | Performed by: INTERNAL MEDICINE

## 2024-11-12 PROCEDURE — 86900 BLOOD TYPING SEROLOGIC ABO: CPT | Performed by: INTERNAL MEDICINE

## 2024-11-12 PROCEDURE — 96375 TX/PRO/DX INJ NEW DRUG ADDON: CPT

## 2024-11-12 PROCEDURE — 85014 HEMATOCRIT: CPT | Performed by: INTERNAL MEDICINE

## 2024-11-12 PROCEDURE — 80053 COMPREHEN METABOLIC PANEL: CPT

## 2024-11-12 PROCEDURE — 86923 COMPATIBILITY TEST ELECTRIC: CPT

## 2024-11-12 PROCEDURE — 83550 IRON BINDING TEST: CPT

## 2024-11-12 PROCEDURE — 99223 1ST HOSP IP/OBS HIGH 75: CPT | Performed by: INTERNAL MEDICINE

## 2024-11-12 PROCEDURE — 99255 IP/OBS CONSLTJ NEW/EST HI 80: CPT | Performed by: INTERNAL MEDICINE

## 2024-11-12 PROCEDURE — 94640 AIRWAY INHALATION TREATMENT: CPT

## 2024-11-12 PROCEDURE — 96365 THER/PROPH/DIAG IV INF INIT: CPT

## 2024-11-12 PROCEDURE — 83540 ASSAY OF IRON: CPT

## 2024-11-12 PROCEDURE — 36415 COLL VENOUS BLD VENIPUNCTURE: CPT | Performed by: INTERNAL MEDICINE

## 2024-11-12 PROCEDURE — 94760 N-INVAS EAR/PLS OXIMETRY 1: CPT

## 2024-11-12 PROCEDURE — 84132 ASSAY OF SERUM POTASSIUM: CPT

## 2024-11-12 PROCEDURE — 93005 ELECTROCARDIOGRAM TRACING: CPT

## 2024-11-12 RX ORDER — SODIUM CHLORIDE, SODIUM LACTATE, POTASSIUM CHLORIDE, CALCIUM CHLORIDE 600; 310; 30; 20 MG/100ML; MG/100ML; MG/100ML; MG/100ML
125 INJECTION, SOLUTION INTRAVENOUS CONTINUOUS
Status: DISCONTINUED | OUTPATIENT
Start: 2024-11-12 | End: 2024-11-13

## 2024-11-12 RX ORDER — OCTREOTIDE ACETATE 50 UG/ML
50 INJECTION, SOLUTION INTRAVENOUS; SUBCUTANEOUS ONCE
Status: COMPLETED | OUTPATIENT
Start: 2024-11-12 | End: 2024-11-12

## 2024-11-12 RX ORDER — FENTANYL CITRATE 50 UG/ML
INJECTION, SOLUTION INTRAMUSCULAR; INTRAVENOUS AS NEEDED
Status: DISCONTINUED | OUTPATIENT
Start: 2024-11-12 | End: 2024-11-12

## 2024-11-12 RX ORDER — DEXTROSE MONOHYDRATE 25 G/50ML
25 INJECTION, SOLUTION INTRAVENOUS ONCE
Status: COMPLETED | OUTPATIENT
Start: 2024-11-12 | End: 2024-11-12

## 2024-11-12 RX ORDER — LIDOCAINE HYDROCHLORIDE 10 MG/ML
INJECTION, SOLUTION EPIDURAL; INFILTRATION; INTRACAUDAL; PERINEURAL AS NEEDED
Status: DISCONTINUED | OUTPATIENT
Start: 2024-11-12 | End: 2024-11-12

## 2024-11-12 RX ORDER — CEFTRIAXONE 1 G/50ML
1000 INJECTION, SOLUTION INTRAVENOUS ONCE
Status: COMPLETED | OUTPATIENT
Start: 2024-11-12 | End: 2024-11-12

## 2024-11-12 RX ORDER — SODIUM CHLORIDE 9 MG/ML
INJECTION, SOLUTION INTRAVENOUS CONTINUOUS PRN
Status: DISCONTINUED | OUTPATIENT
Start: 2024-11-12 | End: 2024-11-12

## 2024-11-12 RX ORDER — PROPOFOL 10 MG/ML
INJECTION, EMULSION INTRAVENOUS AS NEEDED
Status: DISCONTINUED | OUTPATIENT
Start: 2024-11-12 | End: 2024-11-12

## 2024-11-12 RX ORDER — METOCLOPRAMIDE HYDROCHLORIDE 5 MG/ML
10 INJECTION INTRAMUSCULAR; INTRAVENOUS EVERY 6 HOURS SCHEDULED
Status: DISPENSED | OUTPATIENT
Start: 2024-11-12 | End: 2024-11-12

## 2024-11-12 RX ORDER — CEFTRIAXONE 1 G/50ML
1000 INJECTION, SOLUTION INTRAVENOUS EVERY 24 HOURS
Status: DISCONTINUED | OUTPATIENT
Start: 2024-11-12 | End: 2024-11-13

## 2024-11-12 RX ORDER — ALBUTEROL SULFATE 5 MG/ML
10 SOLUTION RESPIRATORY (INHALATION) ONCE
Status: COMPLETED | OUTPATIENT
Start: 2024-11-12 | End: 2024-11-12

## 2024-11-12 RX ORDER — SODIUM CHLORIDE, SODIUM LACTATE, POTASSIUM CHLORIDE, CALCIUM CHLORIDE 600; 310; 30; 20 MG/100ML; MG/100ML; MG/100ML; MG/100ML
125 INJECTION, SOLUTION INTRAVENOUS CONTINUOUS
Status: DISCONTINUED | OUTPATIENT
Start: 2024-11-12 | End: 2024-11-12

## 2024-11-12 RX ORDER — METOCLOPRAMIDE HYDROCHLORIDE 5 MG/ML
10 INJECTION INTRAMUSCULAR; INTRAVENOUS ONCE
Status: COMPLETED | OUTPATIENT
Start: 2024-11-12 | End: 2024-11-12

## 2024-11-12 RX ORDER — SODIUM CHLORIDE 9 MG/ML
75 INJECTION, SOLUTION INTRAVENOUS ONCE
Status: COMPLETED | OUTPATIENT
Start: 2024-11-12 | End: 2024-11-12

## 2024-11-12 RX ORDER — CALCIUM GLUCONATE 20 MG/ML
1 INJECTION, SOLUTION INTRAVENOUS ONCE
Status: COMPLETED | OUTPATIENT
Start: 2024-11-12 | End: 2024-11-12

## 2024-11-12 RX ORDER — IPRATROPIUM BROMIDE AND ALBUTEROL SULFATE 2.5; .5 MG/3ML; MG/3ML
3 SOLUTION RESPIRATORY (INHALATION) ONCE AS NEEDED
Status: DISCONTINUED | OUTPATIENT
Start: 2024-11-12 | End: 2024-11-13

## 2024-11-12 RX ORDER — SODIUM CHLORIDE 9 MG/ML
20 INJECTION, SOLUTION INTRAVENOUS CONTINUOUS
Status: DISCONTINUED | OUTPATIENT
Start: 2024-11-12 | End: 2024-11-13

## 2024-11-12 RX ADMIN — PANTOPRAZOLE SODIUM 40 MG: 40 INJECTION, POWDER, FOR SOLUTION INTRAVENOUS at 00:32

## 2024-11-12 RX ADMIN — FENTANYL CITRATE 100 MCG: 50 INJECTION, SOLUTION INTRAMUSCULAR; INTRAVENOUS at 15:21

## 2024-11-12 RX ADMIN — SODIUM CHLORIDE: 0.9 INJECTION, SOLUTION INTRAVENOUS at 15:00

## 2024-11-12 RX ADMIN — CALCIUM GLUCONATE 1 G: 20 INJECTION, SOLUTION INTRAVENOUS at 08:03

## 2024-11-12 RX ADMIN — PROPOFOL 300 MG: 10 INJECTION, EMULSION INTRAVENOUS at 15:21

## 2024-11-12 RX ADMIN — SODIUM CHLORIDE 1000 ML: 0.9 INJECTION, SOLUTION INTRAVENOUS at 00:30

## 2024-11-12 RX ADMIN — Medication 8 MG/HR: at 01:50

## 2024-11-12 RX ADMIN — OCTREOTIDE ACETATE 50 MCG/HR: 500 INJECTION, SOLUTION INTRAVENOUS; SUBCUTANEOUS at 01:49

## 2024-11-12 RX ADMIN — Medication 8 MG/HR: at 10:35

## 2024-11-12 RX ADMIN — OCTREOTIDE ACETATE 50 MCG/HR: 500 INJECTION, SOLUTION INTRAVENOUS; SUBCUTANEOUS at 10:36

## 2024-11-12 RX ADMIN — OCTREOTIDE ACETATE 50 MCG: 50 INJECTION, SOLUTION INTRAVENOUS; SUBCUTANEOUS at 01:39

## 2024-11-12 RX ADMIN — ONDANSETRON 4 MG: 2 INJECTION INTRAMUSCULAR; INTRAVENOUS at 07:30

## 2024-11-12 RX ADMIN — CEFTRIAXONE 1000 MG: 1 INJECTION, SOLUTION INTRAVENOUS at 16:53

## 2024-11-12 RX ADMIN — METOCLOPRAMIDE 10 MG: 5 INJECTION, SOLUTION INTRAMUSCULAR; INTRAVENOUS at 06:11

## 2024-11-12 RX ADMIN — SODIUM CHLORIDE 75 ML/HR: 0.9 INJECTION, SOLUTION INTRAVENOUS at 09:06

## 2024-11-12 RX ADMIN — METOCLOPRAMIDE 10 MG: 5 INJECTION, SOLUTION INTRAMUSCULAR; INTRAVENOUS at 01:44

## 2024-11-12 RX ADMIN — LIDOCAINE HYDROCHLORIDE 50 MG: 10 INJECTION, SOLUTION EPIDURAL; INFILTRATION; INTRACAUDAL; PERINEURAL at 15:21

## 2024-11-12 RX ADMIN — ALBUTEROL SULFATE 10 MG: 2.5 SOLUTION RESPIRATORY (INHALATION) at 07:40

## 2024-11-12 RX ADMIN — INSULIN HUMAN 10 UNITS: 100 INJECTION, SOLUTION PARENTERAL at 08:02

## 2024-11-12 RX ADMIN — DEXTROSE MONOHYDRATE 25 ML: 25 INJECTION, SOLUTION INTRAVENOUS at 08:03

## 2024-11-12 RX ADMIN — CEFTRIAXONE 1000 MG: 1 INJECTION, SOLUTION INTRAVENOUS at 00:32

## 2024-11-12 RX ADMIN — SODIUM CHLORIDE 20 ML/HR: 0.9 INJECTION, SOLUTION INTRAVENOUS at 16:52

## 2024-11-12 NOTE — ED PROVIDER NOTES
Time reflects when diagnosis was documented in both MDM as applicable and the Disposition within this note       Time User Action Codes Description Comment    11/12/2024 12:54 AM Aris Fernadnes Add [K92.2] GI bleed     11/12/2024  1:49 AM Dave Luu Add [K70.30] Alcoholic cirrhosis of liver (HCC)     11/12/2024  7:21 AM Earle Marine Add [E87.5] Hyperkalemia           ED Disposition       ED Disposition   Admit    Condition   Stable    Date/Time   Tue Nov 12, 2024 12:54 AM    Comment   Case was discussed with Niraj  and the patient's admission status was agreed to be Admission Status: inpatient status to the service of Dr. Stewart               Assessment & Plan       Medical Decision Making  Patient was admitted with GI bleed complaining of rectal bleeding and hematemesis.  Case was discussed with Roseline arredondo and agreed upon admission    Amount and/or Complexity of Data Reviewed  Labs: ordered.  Radiology: ordered.    Risk  Prescription drug management.  Decision regarding hospitalization.             Medications   metoclopramide (REGLAN) injection 10 mg (has no administration in time range)   pantoprazole (PROTONIX) injection 40 mg (40 mg Intravenous Given 11/12/24 0032)   ondansetron (ZOFRAN) injection 4 mg (has no administration in time range)   sodium chloride 0.9 % bolus 1,000 mL (1,000 mL Intravenous New Bag 11/12/24 0030)   cefTRIAXone (ROCEPHIN) IVPB (premix in dextrose) 1,000 mg 50 mL (0 mg Intravenous Stopped 11/12/24 0110)   octreotide (SandoSTATIN) injection 50 mcg (has no administration in time range)   metoclopramide (REGLAN) injection 10 mg (has no administration in time range)       ED Risk Strat Scores                                               History of Present Illness       Chief Complaint   Patient presents with    Shortness of Breath     Sob, N/V, dizziness, started last night    Rectal Bleeding     Black stools with some blood started last night    Abdominal Pain     Upper  "abdominal pain         Past Medical History:   Diagnosis Date    AAA (abdominal aortic aneurysm) (HCC)     sees  Vascular    Abnormal kidney function     last assessed: Feb 25, 2016    Allergic rhinitis     Arthritis     Benign colon polyp     Bruises easily     \"not new\"    Cervical radiculopathy     last assessed: Feb 11, 2015    Chronic fatigue     \"not chronic'    Chronic pain disorder     Cirrhosis (HCC)     COPD (chronic obstructive pulmonary disease) (HCC)     COVID 01/05/2024    CPAP (continuous positive airway pressure) dependence     Depression 04/07/2023    per pt in the past    Edema     Elevated liver enzymes     Gout     Herniated cervical disc     \"bulging\"    Hyperlipidemia     Hypertension     Hypotension     last assessed: March 22, 2017    Left ventricular hypertrophy     Low platelet count (HCC)     \"usually runs low\"    Lumbar herniated disc     Obesity (BMI 30-39.9) 09/20/2018    Sleep apnea     Tongue lesion       Past Surgical History:   Procedure Laterality Date    BUCCAL MASS EXCISION Right 8/20/2024    Procedure: EXCISION BX LESION, TONGUE; FROZEN SECTION; flex hd grafting;  Surgeon: Sylvester Luis DO;  Location: [facility];  Service: ENT    CARPAL TUNNEL RELEASE Bilateral     COLONOSCOPY      HEMORRHOID SURGERY      IR PARACENTESIS  11/14/2024    LIVER BIOPSY        Family History   Problem Relation Age of Onset    Cancer Mother     No Known Problems Father     No Known Problems Sister     No Known Problems Brother     No Known Problems Maternal Grandmother     No Known Problems Maternal Grandfather     No Known Problems Paternal Grandmother     No Known Problems Paternal Grandfather       Social History     Tobacco Use    Smoking status: Some Days     Current packs/day: 0.50     Average packs/day: 0.5 packs/day for 40.9 years (20.4 ttl pk-yrs)     Types: Cigarettes     Start date: 1/2/1988     Passive exposure: Current    Smokeless tobacco: Never    Tobacco comments:     Given " "[facility] \"how to quit\" info --has cut back.. last smoked 8/18   Vaping Use    Vaping status: Never Used   Substance Use Topics    Alcohol use: Yes     Alcohol/week: 6.0 standard drinks of alcohol     Types: 6 Cans of beer per week     Comment: per pt not drinking currently. last drank 8/16- glass of wine    Drug use: No      E-Cigarette/Vaping    E-Cigarette Use Never User       E-Cigarette/Vaping Substances    Nicotine No     THC No     CBD No     Flavoring No     Other No     Unknown No       I have reviewed and agree with the history as documented.     64-year-old male presents with chief complaint of hematemesis, and rectal bleeding which started last evening.  Patient reports approximately 4 episodes of hematemesis he notes it was coffee-ground in color, his last episode was around 6 PM.  He has had several dark bowel movements.  Patient has no prior history of GI bleed, he is a smoker approximately half a pack a day had been over a pack a day smoker, he states he used to drink alcohol he describes himself as a heavy drinker until a couple of months ago when he really cut back to almost nothing.  Patient states 40 years ago he was told he had the start of the out peptic ulcer or an ulcer but did not have any further workup performed.  Patient has had a colonoscopy and an endoscopy performed the last 6 months the colonoscopy revealed 2 polyps the endoscopy was told was negative.        Review of Systems   Constitutional: Negative.    Respiratory: Negative.     Cardiovascular: Negative.    Gastrointestinal:  Positive for anal bleeding, blood in stool and vomiting.   Endocrine: Negative.    Genitourinary: Negative.    Musculoskeletal: Negative.    Neurological: Negative.    Hematological: Negative.    Psychiatric/Behavioral: Negative.             Objective       ED Triage Vitals   Temperature Pulse Blood Pressure Respirations SpO2 Patient Position - Orthostatic VS   11/12/24 0132 11/11/24 2309 11/11/24 2309 11/11/24 " 2309 11/11/24 2309 11/12/24 0132   99.3 °F (37.4 °C) (!) 120 97/58 20 98 % Lying      Temp Source Heart Rate Source BP Location FiO2 (%) Pain Score    11/12/24 0132 11/11/24 2309 11/12/24 0132 -- 11/11/24 2309    Tympanic Monitor Left arm  8      Vitals      Date and Time Temp Pulse SpO2 Resp BP Pain Score FACES Pain Rating User   11/16/24 1117 -- 72 95 % -- 117/59 -- -- KF   11/16/24 1000 -- 69 98 % -- 116/61 -- -- KF   11/16/24 0923 -- 70 -- -- 125/58 -- -- KF   11/16/24 0830 -- 73 97 % 23 94/60 -- -- KF   11/16/24 0829 -- 72 -- -- 125/58 -- -- KF   11/16/24 0800 98.4 °F (36.9 °C) 76 96 % 20 112/55 No Pain -- KF   11/16/24 0600 -- 67 96 % 15 106/54 -- -- AR   11/16/24 0500 -- 71 97 % 15 -- -- -- AR   11/16/24 0400 -- 66 97 % 19 107/52 -- -- AR   11/16/24 0300 -- 68 97 % 15 -- -- -- AR   11/16/24 0200 -- 66 97 % 14 149/67 -- -- AR   11/16/24 0100 -- 68 96 % 15 -- -- -- AR   11/16/24 0000 98.2 °F (36.8 °C) 69 95 % 19 124/59 No Pain -- AR   11/15/24 2300 -- 90 98 % 20 -- -- -- AR   11/15/24 2200 -- 74 99 % 20 122/58 -- -- AR   11/15/24 2100 -- 69 98 % 16 113/59 -- -- AR   11/15/24 2000 98.5 °F (36.9 °C) 69 99 % 21 -- No Pain -- AR   11/15/24 1830 -- 73 99 % 23 95/52 -- -- BR   11/15/24 1600 98.3 °F (36.8 °C) 66 98 % 19 -- -- -- BR   11/15/24 1521 -- 67 99 % 26 109/54 -- -- BR   11/15/24 1315 -- 68 -- 21 100/54 -- -- BR   11/15/24 1200 98 °F (36.7 °C) 79 -- 48 -- -- -- BR   11/15/24 1155 -- 72 -- 29 108/54 -- -- BR   11/15/24 1115 -- 69 -- 24 95/44 -- -- BR   11/15/24 0904 -- 70 97 % 33 100/55 No Pain -- BR   11/15/24 0800 -- 67 96 % 32 -- -- -- BR   11/15/24 0600 -- 65 96 % 14 -- -- -- AR   11/15/24 0545 -- 65 97 % 15 -- -- -- AR   11/15/24 0500 -- 67 96 % 20 -- -- -- AR   11/15/24 0400 98.6 °F (37 °C) 59 97 % 13 101/52 -- -- AR   11/15/24 0300 -- 62 97 % 13 -- -- -- AR   11/15/24 0200 -- 60 97 % 13 -- -- -- AR   11/15/24 0100 -- 61 98 % 12 -- -- -- AR   11/15/24 0000 -- 63 98 % 13 107/57 -- -- AR   11/14/24 2300 --  71 97 % 20 -- -- -- AR   11/14/24 2200 -- 72 97 % 20 -- -- -- AR   11/14/24 2152 98.7 °F (37.1 °C) 73 99 % 20 107/55 No Pain -- AR   11/14/24 2100 -- 70 99 % 20 -- -- -- AR   11/14/24 2000 -- 71 97 % 20 -- No Pain -- AR   11/14/24 1541 98.4 °F (36.9 °C) -- -- -- -- -- -- NA   11/14/24 1535 -- 72 96 % 18 113/51 -- -- LM   11/14/24 1508 -- 69 96 % 18 128/58 -- -- LM   11/14/24 1200 -- 64 97 % 15 116/56 -- -- KF   11/14/24 0900 -- 72 97 % 22 113/57 -- -- KF   11/14/24 0856 -- 71 -- -- 113/57 -- -- KF   11/14/24 0854 -- -- -- -- -- No Pain -- KF   11/14/24 0800 -- 72 95 % 19 103/53 -- -- KF   11/14/24 0722 98.5 °F (36.9 °C) -- -- -- 106/53 -- -- NA   11/14/24 0722 -- 65 95 % 16 -- No Pain -- KF   11/14/24 0600 -- 64 93 % 15 -- -- -- LA   11/14/24 0500 -- 65 96 % 12 -- -- -- LA   11/14/24 0400 -- 64 94 % 18 109/53 -- -- LA   11/14/24 0300 -- 62 95 % 14 -- -- -- LA   11/14/24 0200 -- 67 94 % 17 -- -- -- LA   11/14/24 0100 -- 64 97 % 12 -- -- -- LA   11/14/24 0000 -- 69 93 % 18 109/54 -- -- LA   11/13/24 2300 -- 72 93 % 24 -- -- -- LA   11/13/24 2100 -- 69 95 % 16 -- -- -- LA   11/13/24 2000 97.7 °F (36.5 °C) -- -- -- -- -- -- NV   11/13/24 2000 -- 70 98 % 17 124/57 No Pain -- LA   11/13/24 1900 -- 69 100 % 22 -- -- -- LA   11/13/24 1500 98.5 °F (36.9 °C) -- -- -- -- -- -- CWB   11/13/24 1348 98.7 °F (37.1 °C) 79 96 % 18 144/64 No Pain -- LMF   11/13/24 1330 -- 80 98 % 18 150/69 -- -- LMF   11/13/24 1315 -- 78 94 % 17 141/64 -- -- LMF   11/13/24 1300 -- 81 97 % 18 140/65 -- -- LMF   11/13/24 1245 -- 77 96 % 17 137/63 -- -- LMF   11/13/24 1230 -- 77 96 % 19 139/65 -- -- LMF   11/13/24 1215 -- 79 96 % 24 129/61 -- -- LMF   11/13/24 1200 -- 80 96 % 34 123/58 -- -- LMF   11/13/24 1145 -- 77 95 % 16 133/60 -- -- LMF   11/13/24 1130 -- 80 98 % 20 138/65 -- -- LMF   11/13/24 1115 -- 80 95 % 18 134/63 -- -- LMF   11/13/24 1100 -- 80 95 % 19 133/63 -- -- LMF   11/13/24 1050 -- 80 98 % 23 -- -- -- LMF   11/13/24 1045 -- 79 96 % 17  130/61 -- -- LMF   11/13/24 1044 97.9 °F (36.6 °C) 80 96 % 16 130/61 No Pain -- LMF   11/13/24 1033 97.9 °F (36.6 °C) 79 97 % 37 128/61 -- -- LMF   11/13/24 0723 98.1 °F (36.7 °C) 98 98 % 23 108/55 No Pain -- LMF   11/13/24 0617 -- 83 97 % 19 120/58 -- -- LA   11/13/24 0600 -- 74 98 % 13 -- -- -- LA   11/13/24 0500 -- 80 98 % 19 -- -- -- LA   11/13/24 0400 -- 81 99 % 18 -- -- -- LA   11/13/24 0300 -- 107 98 % 40 -- -- -- LA   11/13/24 0200 -- 83 97 % 14 -- -- -- LA   11/13/24 0100 -- 93 93 % 29 -- -- -- LA   11/13/24 0000 98.1 °F (36.7 °C) -- -- -- -- -- -- NV   11/13/24 0000 -- 94 94 % 22 -- -- -- LA   11/12/24 2300 -- 94 92 % 20 110/56 -- -- LA   11/12/24 2200 -- 96 94 % 19 117/62 -- -- LA   11/12/24 2100 99.1 °F (37.3 °C) 100 97 % 20 128/60 No Pain -- LA   11/12/24 2000 -- 96 98 % 24 114/56 -- -- LA   11/12/24 1900 -- 102 98 % 31 -- -- -- LA   11/12/24 1610 -- 92 97 % 19 122/58 No Pain -- MLD   11/12/24 1607 -- 93 96 % 20 114/58 No Pain -- MLD   11/12/24 1555 -- 93 99 % 21 114/56 No Pain -- MLD   11/12/24 1550 -- 92 97 % 20 93/54 No Pain -- MLD   11/12/24 1548 -- 93 97 % 20 93/54 No Pain -- MLD   11/12/24 1546 -- 93 97 % 22 83/45 No Pain -- MLD   11/12/24 1545 -- 94 98 % 26 99/50 No Pain -- MLD   11/12/24 1401 98.8 °F (37.1 °C) 98 96 % 18 146/61 No Pain -- DAH   11/12/24 1200 -- 96 98 % 22 127/58 -- -- RDC   11/12/24 1100 -- 102 94 % 20 128/63 -- -- RDC   11/12/24 1100 98.4 °F (36.9 °C) -- -- -- -- -- -- NA   11/12/24 1000 -- 106 94 % 22 99/58 -- -- RDC   11/12/24 0945 98.1 °F (36.7 °C) 108 94 % 24 95/56 -- -- RDC   11/12/24 0930 -- 119 97 % 29 94/54 -- -- RDC   11/12/24 0915 99.1 °F (37.3 °C) 116 95 % 18 110/59 -- -- RDC   11/12/24 0900 -- 115 96 % 24 107/54 -- -- RDC   11/12/24 0845 -- 114 95 % 21 105/54 -- -- RDC   11/12/24 0830 -- 129 99 % 30 129/58 -- -- RDC   11/12/24 0820 99.4 °F (37.4 °C) 109 94 % 26 116/55 -- -- RDC   11/12/24 0815 99.5 °F (37.5 °C) 113 93 % 24 109/54 -- -- RDC   11/12/24 0810 -- 111 89  % -- 109/54 -- -- RDC   11/12/24 0800 99.5 °F (37.5 °C) 113 93 % 22 131/51 -- -- RDC   11/12/24 0750 99.6 °F (37.6 °C) 107 90 % 19 129/60 -- -- RDC   11/12/24 0740 -- -- 98 % -- -- -- -- L   11/12/24 0740 99.4 °F (37.4 °C) 103 -- 12 120/56 -- -- RDC   11/12/24 0730 99.2 °F (37.3 °C) 104 96 % 22 124/58 -- -- RDC   11/12/24 0725 99.1 °F (37.3 °C) 107 98 % 22 124/58 -- -- RDC   11/12/24 0720 99.1 °F (37.3 °C) 105 94 % 27 122/56 -- -- RDC   11/12/24 0717 98.9 °F (37.2 °C) 109 94 % 31 122/56 -- -- JG   11/12/24 0710 98.9 °F (37.2 °C) 105 95 % 13 120/51 -- -- JG   11/12/24 0650 98.4 °F (36.9 °C) -- -- -- -- -- -- JG   11/12/24 0645 -- 102 93 % 20 130/57 -- -- JG   11/12/24 0630 -- 119 97 % 25 83/54 -- -- JG   11/12/24 0615 -- 106 95 % 20 83/52 -- -- JG   11/12/24 0600 -- 104 93 % 23 121/53 -- -- JG   11/12/24 0545 -- 108 96 % 24 116/53 -- -- JG   11/12/24 0530 -- 107 96 % 18 112/57 -- -- JG   11/12/24 0515 -- 110 95 % 23 108/57 -- -- JG   11/12/24 0500 -- 112 98 % 30 103/53 -- -- JG   11/12/24 0445 -- 120 96 % -- -- -- -- JG   11/12/24 0430 -- 110 97 % 20 109/58 -- -- JG   11/12/24 0415 -- 109 95 % 21 106/57 -- -- JG   11/12/24 0400 -- 104 94 % 13 128/58 -- -- J   11/12/24 0345 -- 119 94 % 38 96/48 -- -- JG   11/12/24 0330 -- 118 97 % 24 101/55 -- -- JG   11/12/24 0315 -- 111 96 % 21 124/56 -- -- J   11/12/24 0300 -- 107 93 % 20 107/59 -- --    11/12/24 0245 -- 114 95 % 22 121/59 -- --    11/12/24 0230 -- 114 97 % 22 115/54 -- --    11/12/24 0215 -- 107 96 % 28 107/59 -- --    11/12/24 0200 -- 103 95 % 21 125/58 -- --    11/12/24 0145 -- 128 97 % -- 101/63 -- --    11/12/24 0132 99.3 °F (37.4 °C) 122 98 % 18 128/64 No Pain -- G   11/12/24 0045 -- 116 98 % 18 102/61 -- -- KL   11/12/24 0030 -- 116 98 % 22 103/59 -- -- KL   11/12/24 0015 -- 120 96 % 22 106/58 -- -- KL   11/12/24 0000 -- 120 96 % 22 95/52 -- -- KL   11/11/24 2345 -- 120 94 % 21 102/54 -- -- KL   11/11/24 2330 -- 119 97 % 20 119/55 -- -- KL    11/11/24 2315 -- 118 99 % 22 96/51 -- -- KL   11/11/24 2309 -- 120 98 % 20 97/58 8 -- AF            Physical Exam  Vitals and nursing note reviewed.   Constitutional:       General: He is not in acute distress.     Appearance: He is well-developed. He is not ill-appearing or toxic-appearing.   HENT:      Head: Normocephalic and atraumatic.      Mouth/Throat:      Mouth: Mucous membranes are moist.   Cardiovascular:      Rate and Rhythm: Tachycardia present.   Pulmonary:      Effort: Pulmonary effort is normal.      Breath sounds: Normal breath sounds. No decreased breath sounds, wheezing, rhonchi or rales.   Chest:      Chest wall: No mass, deformity or tenderness.   Abdominal:      General: Bowel sounds are normal.      Palpations: Abdomen is soft. There is no hepatomegaly, splenomegaly or mass.      Tenderness: There is no abdominal tenderness. There is no guarding or rebound.   Genitourinary:     Rectum: Guaiac result positive.   Musculoskeletal:      Cervical back: Normal range of motion and neck supple.      Right lower leg: No edema.      Left lower leg: No edema.   Skin:     General: Skin is dry.      Capillary Refill: Capillary refill takes less than 2 seconds.   Neurological:      General: No focal deficit present.      Mental Status: He is alert.      Cranial Nerves: No cranial nerve deficit.   Psychiatric:         Mood and Affect: Mood normal.         Behavior: Behavior normal.         Results Reviewed       Procedure Component Value Units Date/Time    Comprehensive metabolic panel [[id number]]  (Abnormal) Collected: 11/12/24 6979    Lab Status: Final result Specimen: Blood from Arm, Right Updated: 11/12/24 8971     Sodium 135 mmol/L      Potassium 6.0 mmol/L      Chloride 106 mmol/L      CO2 24 mmol/L      ANION GAP 5 mmol/L      BUN 45 mg/dL      Creatinine 0.92 mg/dL      Glucose 156 mg/dL      Calcium 7.3 mg/dL      Corrected Calcium 8.5 mg/dL      AST 61 U/L      ALT 27 U/L      Alkaline Phosphatase  63 U/L      Total Protein 4.8 g/dL      Albumin 2.5 g/dL      Total Bilirubin 2.70 mg/dL      eGFR 86 ml/min/1.73sq m     Narrative:      National Kid[employer] Foundation guidelines for Chronic Kidney Disease (CKD):     Stage 1 with normal or high GFR (GFR > 90 mL/min/1.73 square meters)    Stage 2 Mild CKD (GFR = 60-89 mL/min/1.73 square meters)    Stage 3A Moderate CKD (GFR = 45-59 mL/min/1.73 square meters)    Stage 3B Moderate CKD (GFR = 30-44 mL/min/1.73 square meters)    Stage 4 Severe CKD (GFR = 15-29 mL/min/1.73 square meters)    Stage 5 End Stage CKD (GFR <15 mL/min/1.73 square meters)  Note: GFR calculation is accurate only with a steady state creatinine    HS Troponin I 4hr [[id number]]  (Abnormal) Collected: 11/12/24 0433    Lab Status: Final result Specimen: Blood from Arm, Right Updated: 11/12/24 0503     hs TnI 4hr 179 ng/L      Delta 4hr hsTnI -12 ng/L     HS Troponin I 2hr [[id number]]  (Abnormal) Collected: 11/12/24 0113    Lab Status: Final result Specimen: Blood from Arm, Right Updated: 11/12/24 0148     hs TnI 2hr 210 ng/L      Delta 2hr hsTnI 19 ng/L     HS Troponin 0hr (reflex protocol) [[id number]]  (Abnormal) Collected: 11/11/24 2313    Lab Status: Final result Specimen: Blood from Arm, Right Updated: 11/11/24 2348     hs TnI 0hr 191 ng/L     B-Type Natriuretic Peptide(BNP) [[id number]]  (Abnormal) Collected: 11/11/24 2313    Lab Status: Final result Specimen: Blood from Arm, Right Updated: 11/11/24 2347      pg/mL     Comprehensive metabolic panel [[id number]]  (Abnormal) Collected: 11/11/24 2313    Lab Status: Final result Specimen: Blood from Arm, Right Updated: 11/11/24 2339     Sodium 135 mmol/L      Potassium 5.6 mmol/L      Chloride 102 mmol/L      CO2 19 mmol/L      ANION GAP 14 mmol/L      BUN 38 mg/dL      Creatinine 0.97 mg/dL      Glucose 178 mg/dL      Calcium 8.2 mg/dL      Corrected Calcium 9.0 mg/dL      AST 65 U/L      ALT 29 U/L      Alkaline Phosphatase 84 U/L       Total Protein 5.6 g/dL      Albumin 3.0 g/dL      Total Bilirubin 3.17 mg/dL      eGFR 81 ml/min/1.73sq m     Narrative:      National Kidney Disease Foundation guidelines for Chronic Kidney Disease (CKD):     Stage 1 with normal or high GFR (GFR > 90 mL/min/1.73 square meters)    Stage 2 Mild CKD (GFR = 60-89 mL/min/1.73 square meters)    Stage 3A Moderate CKD (GFR = 45-59 mL/min/1.73 square meters)    Stage 3B Moderate CKD (GFR = 30-44 mL/min/1.73 square meters)    Stage 4 Severe CKD (GFR = 15-29 mL/min/1.73 square meters)    Stage 5 End Stage CKD (GFR <15 mL/min/1.73 square meters)  Note: GFR calculation is accurate only with a steady state creatinine    Protime-INR [[id number]]  (Abnormal) Collected: 11/11/24 2313    Lab Status: Final result Specimen: Blood from Arm, Right Updated: 11/11/24 2338     Protime 19.1 seconds      INR 1.56    Narrative:      INR Therapeutic Range    Indication                                             INR Range      Atrial Fibrillation                                               2.0-3.0  Hypercoagulable State                                    2.0.2.3  Left Ventricular Asist Device                            2.0-3.0  Mechanical Heart Valve                                  -    Aortic(with afib, MI, embolism, HF, LA enlargement,    and/or coagulopathy)                                     2.0-3.0 (2.5-3.5)     Mitral                                                             2.5-3.5  Prosthetic/Bioprosthetic Heart Valve               2.0-3.0  Venous thromboembolism (VTE: VT, PE        2.0-3.0    APTT [[id number]]  (Normal) Collected: 11/11/24 2313    Lab Status: Final result Specimen: Blood from Arm, Right Updated: 11/11/24 2338     PTT 32 seconds     D-dimer, quantitative [[id number]]  (Abnormal) Collected: 11/11/24 2313    Lab Status: Final result Specimen: Blood from Arm, Right Updated: 11/11/24 2338     D-Dimer, Quant 3.17 ug/ml FEU     Narrative:      In the evaluation for  possible pulmonary embolism, in the appropriate (Well's Score of 4 or less) patient, the age adjusted d-dimer cutoff for this patient can be calculated as:    Age x 0.01 (in ug/mL) for Age-adjusted D-dimer exclusion threshold for a patient over 50 years.    CBC and differential [[id number]]  (Abnormal) Collected: 11/11/24 2313    Lab Status: Final result Specimen: Blood from Arm, Right Updated: 11/11/24 2318     WBC 13.49 Thousand/uL      RBC 2.55 Million/uL      Hemoglobin 9.1 g/dL      Hematocrit 27.8 %       fL      MCH 35.7 pg      MCHC 32.7 g/dL      RDW 16.4 %      MPV 10.0 fL      Platelets 166 Thousands/uL      nRBC 0 /100 WBCs      Segmented % 86 %      Immature Grans % 1 %      Lymphocytes % 8 %      Monocytes % 5 %      Eosinophils Relative 0 %      Basophils Relative 0 %      Absolute Neutrophils 11.63 Thousands/µL      Absolute Immature Grans 0.08 Thousand/uL      Absolute Lymphocytes 1.11 Thousands/µL      Absolute Monocytes 0.66 Thousand/µL      Eosinophils Absolute 0.00 Thousand/µL      Basophils Absolute 0.01 Thousands/µL             IR INPATIENT Paracentesis   Final Interpretation by Miky Gayle MD (11/15 1536)   Therapeutic paracentesis.      Procedure was performed by:   Delano Cabrera PA-C      Supervising Physician: Dr. Gayle      Workstation performed: [id number]         US abdomen complete   Final Interpretation by Mateus Mcallister MD (11/13 1824)      Limited study secondary to difficult penetration.      Poor visualization of the portal veins for which the presence of portal venous thrombosis is not entirely excluded. Apparent pulsatile flow within the main portal vein. Consider contrast-enhanced CT of the abdomen for further evaluation of the portal    veins.      Cirrhosis with evidence of ascites.                  Workstation performed: [id number]         XR chest 1 view portable   Final Interpretation by Ashok Early MD (11/12 3031)      No acute cardiopulmonary  disease.            Workstation performed: [id number]             ECG 12 Lead Documentation Only    Date/Time: 11/11/2024 11:58 PM    Performed by: Aris Fernandes MD  Authorized by: Aris Fernandes MD    Indications / Diagnosis:  Tachycardia  ECG reviewed by me, the ED Provider: yes    Patient location:  ED  Interpretation:     Interpretation: abnormal    Rate:     ECG rate:  120    ECG rate assessment: tachycardic    Rhythm:     Rhythm: sinus tachycardia    Ectopy:     Ectopy: none    QRS:     QRS axis:  Normal  Conduction:     Conduction: normal    ST segments:     ST segments:  Normal  T waves:     T waves: normal        ED Medication and Procedure Management   Prior to Admission Medications   Prescriptions Last Dose Informant Patient Reported? Taking?   multivitamin (THERAGRAN) TABS 11/11/2024 Self No Yes   Sig: Take 1 tablet by mouth daily   spironolactone (ALDACTONE) 50 mg tablet 11/11/2024  No Yes   Sig: Take 1 tablet (50 mg total) by mouth daily      Facility-Administered Medications: None     Discharge Medication List as of 11/16/2024 11:41 AM        START taking these medications    Details   pantoprazole (PROTONIX) 40 mg tablet Take 1 tablet (40 mg total) by mouth daily in the early morning, Starting Fri 11/15/2024, Normal           CONTINUE these medications which have CHANGED    Details   carvedilol (COREG) 3.125 mg tablet Take 1 tablet (3.125 mg total) by mouth in the morning, Starting Sat 11/16/2024, Normal           CONTINUE these medications which have NOT CHANGED    Details   multivitamin (THERAGRAN) TABS Take 1 tablet by mouth daily, Starting Wed 8/14/2024, No Print      fluticasone (FLONASE) 50 mcg/act nasal spray 1 spray into each nostril daily, Starting Wed 8/14/2024, No Print           STOP taking these medications       spironolactone (ALDACTONE) 50 mg tablet Comments:   Reason for Stopping:             Outpatient Discharge Orders   Discharge Diet     Activity as tolerated     Call  provider for:  persistent dizziness or light-headedness     ED SEPSIS DOCUMENTATION   Time reflects when diagnosis was documented in both MDM as applicable and the Disposition within this note       Time User Action Codes Description Comment    11/12/2024 12:54 AM Aris Fernandes Add [K92.2] GI bleed     11/12/2024  1:49 AM Dave Luu Add [K70.30] Alcoholic cirrhosis of liver (HCC)     11/12/2024  7:21 AM Marine Og Add [E87.5] Hyperkalemia                  Aris Fernandes MD  11/18/24 1536       Aris Fernandes MD  11/23/24 1914

## 2024-11-12 NOTE — NURSING NOTE
Received pt from pacu. Report received from Aster Oshea RN. Pt A&O x4. Pt reported no c/o pain or discomfort. Vitals stable and entered in epic. Family at bedside. Call bell with in reach. Will continue to monitor.

## 2024-11-12 NOTE — ANESTHESIA PREPROCEDURE EVALUATION
"Procedure:  EGD    Relevant Problems   ANESTHESIA (within normal limits)      CARDIO   (+) Abdominal aortic aneurysm (AAA) without rupture (HCC)   (+) Benign essential hypertension   (+) Hyperlipidemia   (+) Mild mitral regurgitation      ENDO (within normal limits)      GI/HEPATIC  Confirmed NPO appropriate  Reports last episode of vomiting \"a couple hours\" ago  Endorses hx of EtOH, but reports he has cut back to 2-3 drinks/week   (+) Alcoholic cirrhosis of liver (HCC)   (+) GI bleed   (+) Hepatomegaly      /RENAL   (+) Renal cyst, right      HEMATOLOGY   (+) Acute blood loss anemia   (+) Thrombocytopenia (HCC)      MUSCULOSKELETAL   (+) Gout      NEURO/PSYCH   (+) Depression   (+) Pain syndrome, chronic      PULMONARY   (+) COPD (chronic obstructive pulmonary disease) (HCC)   (+) Obstructive sleep apnea   (-) URI (upper respiratory infection)      Behavioral Health   (+) Tobacco use      Oncology   (+) Squamous cell carcinoma of tongue (HCC)      Other   (+) BMI 39.0-39.9,adult   (+) History of alcoholism (HCC)   (+) Hyperkalemia   (+) Splenomegaly        Physical Exam    Airway    Mallampati score: III  TM Distance: >3 FB  Neck ROM: full     Dental       Cardiovascular  Rhythm: regular, Rate: abnormal    Pulmonary  Pulmonary exam normal     Other Findings        Anesthesia Plan  ASA Score- 3     Anesthesia Type- general with ASA Monitors.         Additional Monitors:     Airway Plan: ETT.           Plan Factors-Exercise tolerance (METS): >4 METS.    Chart reviewed. EKG reviewed.  Existing labs reviewed.           Obstructive sleep apnea risk education given perioperatively.        Induction- intravenous and rapid sequence induction.    Postoperative Plan- . Planned trial extubation    Perioperative Resuscitation Plan - Level 1 - Full Code.       Informed Consent- Anesthetic plan and risks discussed with patient.  I personally reviewed this patient with the CRNA. Discussed and agreed on the Anesthesia Plan with " the CRNA..      TTE 4/4/23:  Interpretation Summary    Left Ventricle: Left ventricular cavity size is normal. Wall thickness is mildly increased. There is mild concentric hypertrophy. The left ventricular ejection fraction is 60%. Systolic function is normal. Wall motion is normal. Diastolic function is normal.    Left Atrium: The atrium is mildly dilated (35-41 mL/m2).    Mitral Valve: There is mild annular calcification.      Lab Results   Component Value Date    WBC 13.49 (H) 11/11/2024    HGB 7.5 (L) 11/12/2024    HCT 22.8 (L) 11/12/2024     (H) 11/11/2024     11/11/2024     Lab Results   Component Value Date    SODIUM 135 11/12/2024    K 4.8 11/12/2024     11/12/2024    CO2 24 11/12/2024    BUN 45 (H) 11/12/2024    CREATININE 0.92 11/12/2024    GLUC 156 (H) 11/12/2024    CALCIUM 7.3 (L) 11/12/2024     Lab Results   Component Value Date    ALT 27 11/12/2024    AST 61 (H) 11/12/2024     (H) 11/11/2023    ALKPHOS 63 11/12/2024    BILITOT 0.3 02/11/2015     Lab Results   Component Value Date    INR 1.56 (H) 11/11/2024    INR 1.20 (H) 08/12/2024    INR 1.21 (H) 05/18/2024    PROTIME 19.1 (H) 11/11/2024    PROTIME 15.4 (H) 08/12/2024    PROTIME 15.2 (H) 05/18/2024     Lab Results   Component Value Date    HGBA1C 5.5 05/18/2024

## 2024-11-12 NOTE — NURSING NOTE
0130: Admitted to ICU 5 at this time. Allowed pt to use the bathroom where he was noted to have a bowel movement with a large mixture of black and bright red stool. Assisted back to bed. VSS at this time. Shortly after transfer back to bed pt noted to vomit 350 mL of bright red blood with clots. Dyspnea noted on exertion along with at rest. SLIM made aware of episodes. Patient given medication and is now resting with call bell within reach.    0345: Pt requesting commode again at this time. Noted to be pale, dizzy, and tachypneic. Assisted to commode and noted with 200 mL of maroon bloody BM. Helped back to bed at this time.     0615: Labs resulted and pRBCs ordered per SLIM.

## 2024-11-12 NOTE — ANESTHESIA POSTPROCEDURE EVALUATION
Post-Op Assessment Note    CV Status:  Stable    Pain management: adequate       Mental Status:  Alert and awake   Hydration Status:  Stable   PONV Controlled:  None   Airway Patency:  Patent     Post Op Vitals Reviewed: Yes    No anethesia notable event occurred.    Staff: Anesthesiologist, CRNA           Last Filed PACU Vitals:  Vitals Value Taken Time   Temp     Pulse 92 11/12/24 1610   /58 11/12/24 1610   Resp 19 11/12/24 1610   SpO2 97 % 11/12/24 1610       Modified Tashia:  Activity: 2 (11/12/2024  4:02 PM)  Respiration: 2 (11/12/2024  4:02 PM)  Circulation: 2 (11/12/2024  4:02 PM)  Consciousness: 2 (11/12/2024  4:02 PM)  Oxygen Saturation: 2 (11/12/2024  4:02 PM)  Modified Tashia Score: 10 (11/12/2024  4:02 PM)

## 2024-11-12 NOTE — CASE MANAGEMENT
Case Management Assessment & Discharge Planning Note    Patient name Raymundo Weller  Location ICU 05/ICU 05- MRN [id number]  : 1960 Date 2024       Current Admission Date: 2024  Current Admission Diagnosis:GI bleed   Patient Active Problem List    Diagnosis Date Noted Date Diagnosed    Alcoholic cirrhosis of liver (HCC) 2024     Squamous cell carcinoma of tongue (HCC) 2024     Acute blood loss anemia 2024     GI bleed 2024     Elevated troponin 2024     Hyperkalemia 2024     Bloating 2023     Hepatomegaly 2023     Elevated bilirubin 2023     Splenomegaly 2023     Elevated alkaline phosphatase level 2023     Other ascites 2023     History of colon polyps 2023     History of alcoholism (HCC) 2023     Abdominal aortic aneurysm (AAA) without rupture (HCC) 2023     Adrenal abnormality (HCC) 2023     Depression 2023     Thrombocytopenia (HCC) 2023     Lumbar disc disease with radiculopathy 01/10/2023     Cervical disc disorder with radiculopathy of mid-cervical region 01/10/2023     Cervical radiculopathy 01/10/2023     Elevated LFTs 10/18/2021     Microalbuminuria 2021     Chronic rhinitis 2018     Iron overload 2017     Tobacco use 2017     Hyponatremia 2017     COPD (chronic obstructive pulmonary disease) (HCC) 2017     Renal cyst, right 10/12/2016     Left atrial enlargement 2016     Left ventricular hypertrophy 2016     Mild mitral regurgitation 2016     Pain syndrome, chronic 2015     Benign colon polyp 2012     Hyperlipidemia 2012     Herniated disc, cervical 2012     Benign essential hypertension 2012     Gout 2012     Macrocytosis 2012     Obstructive sleep apnea 2012     Bilateral lower extremity edema 2012       LOS (days): 0  Geometric Mean LOS (GMLOS) (days):   Days  to LOS:     OBJECTIVE:    Risk of Unplanned Readmission Score: 20.02     Current admission status: Inpatient    Preferred Pharmacy:   [facility] [place] [place] - [address]  [address] [address]  Phone: [phone] Fax: [phone]    [facility] [place] - [address]  [address]  [place] [address]  Phone: [phone] Fax: [phone]    [facility] [place] - [address]  [facility],  [address]  [facility],  [address] [place] [facility] [place] [address]  Phone: [phone] Fax: [phone]    Primary Care Provider: Lisandro Gauthier DO    Primary Insurance: CAPITAL  Secondary Insurance:     ASSESSMENT:  Active Health Care Proxies       Shannan Weller Health Care Representative - Spouse   Primary Phone: [phone] (Home)                 Advance Directives  Does patient have a Health Care POA?: No  Was patient offered paperwork?: Yes (paperwork provided)  Does patient currently have a Health Care decision maker?: Yes, please see Health Care Proxy section  Does patient have Advance Directives?: No  Was patient offered paperwork?: Yes (paperwork provided)  Primary Contact: Shannan Weller wife    Readmission Root Cause  30 Day Readmission: No    Patient Information  Admitted from:: Home  Mental Status: Alert  During Assessment patient was accompanied by: Spouse, Son  Assessment information provided by:: Patient  Primary Caregiver: Self  Support Systems: Spouse/significant other  Living Arrangements: Lives w/ Spouse/significant other  Is patient a ?: No    Activities of Daily Living Prior to Admission  Functional Status: Independent  Completes ADLs independently?: Yes  Ambulates independently?: Yes  Does patient use assisted devices?: No  Does patient currently own DME?: Yes  What DME does the patient currently own?: [employer]  Does patient have a history of Outpatient Therapy  (PT/OT)?: No  Does the patient have a history of Short-Term Rehab?: No  Does patient have a history of HHC?: Yes ([facility])  Does patient currently have HHC?: No    Patient Information Continued  Income Source: Employed  Does patient have prescription coverage?: Yes ([facility])  Does patient receive dialysis treatments?: No  Does patient have a history of substance abuse?: Yes  Historical substance use preference: Alcohol/ETOH  History of Withdrawal Symptoms: Seizures  Does patient have a history of Mental Health Diagnosis?: No      Means of Transportation  Means of Transport to Appts:: Drives Self  DISCHARGE DETAILS:    Discharge planning discussed with:: Pt  Freedom of Choice: Yes  Comments - Freedom of Choice: Offered information on D&A resources, however pt declined same    Contacts  Patient Contacts: Shannan Swansonz wife  Relationship to Patient:: Family  Contact Method: Phone  Phone Number: [phone]  Reason/Outcome: Emergency Contact    Requested Home Health Care         Is the patient interested in HHC at discharge?: No    DME Referral Provided  Referral made for DME?: No    Treatment Team Recommendation: Home  Discharge Destination Plan:: Home  Transport at Discharge : Family  Pt was IPA and drives.  Declined any D&A information.  Family will transport home upon DC.

## 2024-11-12 NOTE — PLAN OF CARE
Problem: GASTROINTESTINAL - ADULT  Goal: Minimal or absence of nausea and/or vomiting  Description: INTERVENTIONS:  - Administer IV fluids if ordered to ensure adequate hydration  - Maintain NPO status until nausea and vomiting are resolved  - Nasogastric tube if ordered  - Administer ordered antiemetic medications as needed  - Provide nonpharmacologic comfort measures as appropriate  - Advance diet as tolerated, if ordered  - Consider nutrition services referral to assist patient with adequate nutrition and appropriate food choices  Outcome: Progressing  Goal: Maintains or returns to baseline bowel function  Description: INTERVENTIONS:  - Assess bowel function  - Encourage oral fluids to ensure adequate hydration  - Administer IV fluids if ordered to ensure adequate hydration  - Administer ordered medications as needed  - Encourage mobilization and activity  - Consider nutritional services referral to assist patient with adequate nutrition and appropriate food choices  Outcome: Progressing  Goal: Maintains adequate nutritional intake  Description: INTERVENTIONS:  - Monitor percentage of each meal consumed  - Identify factors contributing to decreased intake, treat as appropriate  - Assist with meals as needed  - Monitor I&O, weight, and lab values if indicated  - Obtain nutrition services referral as needed  Outcome: Progressing  Goal: Establish and maintain optimal ostomy function  Description: INTERVENTIONS:  - Assess bowel function  - Encourage oral fluids to ensure adequate hydration  - Administer IV fluids if ordered to ensure adequate hydration   - Administer ordered medications as needed  - Encourage mobilization and activity  - Nutrition services referral to assist patient with appropriate food choices  - Assess stoma site  - Consider wound care consult   Outcome: Progressing  Goal: Oral mucous membranes remain intact  Description: INTERVENTIONS  - Assess oral mucosa and hygiene practices  - Implement  preventative oral hygiene regimen  - Implement oral medicated treatments as ordered  - Initiate Nutrition services referral as needed  Outcome: Progressi  Admitted for GI bleed  Monitor for hemoptysis/rectal bleeding

## 2024-11-12 NOTE — ASSESSMENT & PLAN NOTE
Four episodes of coffee-ground emesis and maroon/black stools starting 11/11   and BP 97/58 on arrival. BP improved to 112/62 with 1 liter IVF. Hgb 9.1 down from 15.  GI consulted and started empiric tx with PPI, octreotide, ceftriaxone. Reglan Q6H x 3 doses.  NPO for upper endoscopy 11/12

## 2024-11-12 NOTE — ASSESSMENT & PLAN NOTE
Hgb 9.1, down from baseline of 15.1  Monitor Hgb Q6H and transfuse for goal 7-8 PRN, per GI's recommendations

## 2024-11-12 NOTE — H&P
H&P - Hospitalist   Name: Raymundo Weller 64 y.o. male I MRN: [id number]  Unit/Bed#: ICU 05-01 I Date of Admission: 11/11/2024   Date of Service: 11/12/2024 I Hospital Day: 0     Assessment & Plan  GI bleed  Four episodes of coffee-ground emesis and maroon/black stools starting 11/11   and BP 97/58 on arrival. BP improved to 112/62 with 1 liter IVF. Hgb 9.1 down from 15.  GI consulted and started empiric tx with PPI, octreotide, ceftriaxone. Reglan Q6H x 3 doses.  NPO for upper endoscopy 11/12  Acute blood loss anemia  Hgb 9.1, down from baseline of 15.1  Monitor Hgb Q6H and transfuse for goal 7-8 PRN, per GI's recommendations  Alcoholic cirrhosis of liver (HCC)  Esophageal varices screening on 1/26/24 with only one small varix. Repeat screening due in Jan 2025.  HCC Screening - CT w/contrast done 4/2024 without mass  Repeat US was due in October 2024. Not in our records if completed.  AFP elevated in past, normal today.  Currently MELD 3.0 score = 17  Resume spironolactone when able.   Abdominal aortic aneurysm (AAA) without rupture (HCC)  3.7 cm infrarenal abdominal aortic ectasia seen on CT 04/08/2024  Squamous cell carcinoma of tongue (HCC)  S/p revision with positive margins in September 2024. Patient elected to closely monitor.  Elevated troponin  Likely a result of tachycardia. Continue to trend. No EKG changers from prior.  Hyperkalemia  Mildly elevated at 5.6 on arrival. Will hold PTA spironolactone temporarily.      VTE Pharmacologic Prophylaxis:   High Risk (Score >/= 5) - Pharmacological DVT Prophylaxis Contraindicated. Sequential Compression Devices Ordered.  Code Status: Level 1 - Full Code Level 1 - Full code  Discussion with family: Patient declined call to .     Anticipated Length of Stay: Patient will be admitted on an inpatient basis with an anticipated length of stay of greater than 2 midnights secondary to GI bleed.    History of Present Illness   Chief Complaint:  "coffee-ground emesis/melena    Raymundo Weller is a 64 y.o. male with a PMH of alcoholic cirrhosis, recent oropharyngeal squamous cell carcinoma, HTN, COPD, and DOT  who presents with coffee-ground emesis and melena. Patient reports 4 episodes of coffee-ground emesis in the last day with melena, n/v, dizziness, and abdominal pain. He describes his stool as maroon/black in color. Patient reports he had a colonoscopy and endoscopy performed in the last 6 months and was told both were unremarkable with the exception of 2 colon polyps. He admits a 40 pack-year smoking history and has been a heavy drinker until recently.    Review of Systems   Constitutional:  Negative for chills and fever.   Eyes:  Negative for pain and visual disturbance.   Respiratory:  Positive for shortness of breath. Negative for cough.    Cardiovascular:  Negative for chest pain and palpitations.   Gastrointestinal:  Positive for abdominal pain, anal bleeding, blood in stool, nausea and vomiting.   Genitourinary:  Negative for dysuria and hematuria.   Musculoskeletal:  Negative for arthralgias and back pain.   Skin:  Negative for color change and rash.   Neurological:  Positive for dizziness. Negative for seizures and syncope.   Psychiatric/Behavioral:  Negative for confusion and hallucinations.    All other systems reviewed and are negative.      Historical Information   Past Medical History:   Diagnosis Date    AAA (abdominal aortic aneurysm) (HCC)     sees  Vascular    Abnormal kidney function     last assessed: Feb 25, 2016    Allergic rhinitis     Arthritis     Benign colon polyp     Bruises easily     \"not new\"    Cervical radiculopathy     last assessed: Feb 11, 2015    Chronic fatigue     \"not chronic'    Chronic pain disorder     Cirrhosis (HCC)     COPD (chronic obstructive pulmonary disease) (HCC)     COVID 01/05/2024    CPAP (continuous positive airway pressure) dependence     Depression 04/07/2023    per pt in the past    Edema  " "   Elevated liver enzymes     Gout     Herniated cervical disc     \"bulging\"    Hyperlipidemia     Hypertension     Hypotension     last assessed: March 22, 2017    Left ventricular hypertrophy     Low platelet count (HCC)     \"usually runs low\"    Lumbar herniated disc     Obesity (BMI 30-39.9) 09/20/2018    Sleep apnea     Tongue lesion      Past Surgical History:   Procedure Laterality Date    BUCCAL MASS EXCISION Right 8/20/2024    Procedure: EXCISION BX LESION, TONGUE; FROZEN SECTION; flex hd grafting;  Surgeon: Sylvester Luis DO;  Location: [facility];  Service: ENT    CARPAL TUNNEL RELEASE Bilateral     COLONOSCOPY      HEMORRHOID SURGERY      LIVER BIOPSY       Social History     Tobacco Use    Smoking status: Some Days     Current packs/day: 0.50     Average packs/day: 0.5 packs/day for 40.9 years (20.4 ttl pk-yrs)     Types: Cigarettes     Start date: 1/2/1988     Passive exposure: Current    Smokeless tobacco: Never    Tobacco comments:     Given [email or web address] \"how to quit\" info --has cut back.. last smoked 8/18   Vaping Use    Vaping status: Never Used   Substance and Sexual Activity    Alcohol use: Yes     Alcohol/week: 6.0 standard drinks of alcohol     Types: 6 Cans of beer per week     Comment: per pt not drinking currently. last drank 8/16- glass of wine    Drug use: No    Sexual activity: Not on file     Comment: defer     E-Cigarette/Vaping    E-Cigarette Use Never User      E-Cigarette/Vaping Substances    Nicotine No     THC No     CBD No     Flavoring No     Other No     Unknown No        Social History:  Marital Status: /Civil Union   Occupation:   Patient Pre-hospital Living Situation: Home  Patient Pre-hospital Level of Mobility: walks  Patient Pre-hospital Diet Restrictions:     Meds/Allergies   I have reviewed home medications with patient personally.  Prior to Admission medications    Medication Sig Start Date End Date Taking? Authorizing Provider   multivitamin (THERAGRAN) TABS Take 1 " tablet by mouth daily 8/14/24  Yes Lisandro Gauthier DO   spironolactone (ALDACTONE) 50 mg tablet Take 1 tablet (50 mg total) by mouth daily 9/18/24  Yes Lisandro Gauthier DO   fluticasone (FLONASE) 50 mcg/act nasal spray 1 spray into each nostril daily  Patient not taking: Reported on 11/12/2024 8/14/24   Lisandro Gauthier DO     No Known Allergies    Objective :  Temp:  [99.3 °F (37.4 °C)] 99.3 °F (37.4 °C)  HR:  [116-122] 122  BP: ()/(51-64) 128/64  Resp:  [18-22] 18  SpO2:  [94 %-99 %] 98 %    Physical Exam  Vitals and nursing note reviewed.   Constitutional:       Appearance: He is well-developed. He is obese.   HENT:      Head: Normocephalic and atraumatic.   Eyes:      Conjunctiva/sclera: Conjunctivae normal.   Cardiovascular:      Rate and Rhythm: Regular rhythm. Tachycardia present.   Pulmonary:      Effort: Pulmonary effort is normal. No respiratory distress.      Breath sounds: Normal breath sounds.   Abdominal:      Palpations: Abdomen is soft.      Tenderness: There is no guarding.   Musculoskeletal:         General: No swelling.      Cervical back: Neck supple.   Skin:     General: Skin is warm and dry.   Neurological:      General: No focal deficit present.      Mental Status: He is alert and oriented to person, place, and time.   Psychiatric:         Mood and Affect: Mood normal.          Lines/Drains:            Lab Results: I have reviewed the following results:  Results from last 7 days   Lab Units 11/11/24  2313   WBC Thousand/uL 13.49*   HEMOGLOBIN g/dL 9.1*   HEMATOCRIT % 27.8*   PLATELETS Thousands/uL 166   SEGS PCT % 86*   LYMPHO PCT % 8*   MONO PCT % 5   EOS PCT % 0     Results from last 7 days   Lab Units 11/11/24  2313   SODIUM mmol/L 135   POTASSIUM mmol/L 5.6*   CHLORIDE mmol/L 102   CO2 mmol/L 19*   BUN mg/dL 38*   CREATININE mg/dL 0.97   ANION GAP mmol/L 14*   CALCIUM mg/dL 8.2*   ALBUMIN g/dL 3.0*   TOTAL BILIRUBIN mg/dL 3.17*   ALK PHOS U/L 84   ALT U/L 29   AST U/L 65*   GLUCOSE RANDOM  mg/dL 178*     Results from last 7 days   Lab Units 11/11/24  2313   INR  1.56*         Lab Results   Component Value Date    HGBA1C 5.5 05/18/2024    HGBA1C 5.9 (H) 10/03/2023    HGBA1C 5.6 04/19/2023           Imaging Results Review: I reviewed radiology reports from this admission including: chest xray.  Other Study Results Review: EKG was reviewed.     Administrative Statements   I have spent a total time of 75 minutes in caring for this patient on the day of the visit/encounter including Diagnostic results, Documenting in the medical record, Reviewing / ordering tests, medicine, procedures  , and Obtaining or reviewing history  .    ** Please Note: This note has been constructed using a voice recognition system. **

## 2024-11-12 NOTE — ASSESSMENT & PLAN NOTE
Esophageal varices screening on 1/26/24 with only one small varix. Repeat screening due in Jan 2025.  HCC Screening - CT w/contrast done 4/2024 without mass  Repeat US was due in October 2024. Not in our records if completed.  AFP elevated in past, normal today.  Currently MELD 3.0 score = 17  Resume spironolactone when able.

## 2024-11-12 NOTE — QUICK NOTE
Gastroenterology Quick Note:    Contacted by ED provider in regard to Mr. Weller presenting with rectal bleeding, abdominal pain, N/V.    In summary, Mr. Weller is a 65yo male with PMH decompensated alcoholic cirrhosis complicated by ascites with history of NBEV, recent oropharyngeal CA with resection (positive margin), htn, COPD, DOT, tobacco use who presented to [facility] ED for concern of SOB, N/V, black stools, abdominal pain since night prior to arrival. This was also associated with 3-4 episodes of coffee-ground emesis. Stool described as maroon/black.     Vital signs initially significant for tachycardia with  and BP 97/58. Improved to 112/62 with 1L IVF per ED provider report. Labs on presentation notable for Hgb 9.1 from baseline 15.1 with leukocytosis of 13.49, K 5.6, BUN 38 with Cr 0.97 (from baseline 0.5), AST 65, ALT 29, Tbili 3.17, INR 1.56. Given one dose of IV protonix in ED and 1L NSS. Requesting GI recommendation in regard to admission/further management.    Case discussed with GI attending Dr. Arizmendi.    Etiology of melena/coffee-ground emesis with broad differential including - PUD, erosive gastritis/esophagitis, AVM, less likely variceal bleed given prolonged course of symptoms over the last 24 hours and no jodi hematemesis, malignancy, possible known tongue malignancy  Admit to [facility] - recommend discussing with SLIM/CC and admission based on their recommendation  Would empirically start PPI drip, octreotide drip, ceftriaxone  Keep NPO  IV Reglan Q6 hours x3 doses - Qtc appropriate  Request management of electrolyte abnormalities with medical optimization prior to EGD  Will plan for upper endoscopy today with timing TBD based on resuscitation and clinical status  Monitor Q6 hour hemoglobin and transfuse for goal 7-8 - avoid overtransfusion in setting of cirrhosis  Maintain 2 large bore IV, active type and screen  Further resuscitation with IVF and correction of electrolyte abnormalities per  primary team  Please reach out to on-call provider with any signs of decompensation or changes in clinical status. Please also reach out if patient develops recurrent coffee-ground emesis/jodi hematemesis while admitted.    Cassi Tim DO  Gastroenterology Fellow  [facility]  Division of Gastroenterology and Hepatology  Available on [employer]

## 2024-11-12 NOTE — CONSULTS
Consultation - Nephrology   Raymundo Weller 64 y.o. male MRN: [id number]  Unit/Bed#: ICU 05-01 Encounter: [id number]      A/P:  1.  Acute kidney injury on top of chronic kidney disease, present on admission   Creatinine significantly increased at 0.97 mg/dL presentation, this is above his baseline which is around 0.6 mg/dL.  Continue to optimize care especially from a volume standpoint as this is the presumed etiology of the patient's acute kidney injury.  Currently being given packed red blood cells x 2, provide volume expansion with saline.  Avoid nephrotoxins.  Patient has not had exposure to IV contrast with imaging.  Will check postvoid residual.  2.  Chronic kidney disease stage II with baseline creatinine around 0.6 mg/dL  3.  Hyperkalemia   Serum potassium level continues to slowly increase, may potentially require additional distal delivery of sodium if the patient remains in a prerenal state.  As noted in history of present illness, patient denies NSAIDs exposure, as well as other high dose potassium supplementation.  Will continue to provide supportive care.  Unfortunately due to the patient's clinical circumstances, a potassium resin binder such as Lokelma or Kayexalate are relatively contraindicated.  Will continue to hold these at this time.  Agree holding spironolactone at this time.  4.  Acute blood loss anemia due to GI bleed   Continue with supportive care and intervention according to hospitalist and gastroenterology colleagues.  5.  Squamous cell cancer of the tongue   Patient with recent further excision in September 2024 and noted to still have positive margins, further care and treatment as indicated in the outpatient setting.       Thank you for allowing us to participate in the care of your patient.     Please feel free to contact us regarding the care of this patient, or any other questions/concerns that may be applicable.    Patient Active Problem List   Diagnosis    Benign colon polyp     Benign essential hypertension    COPD (chronic obstructive pulmonary disease) (HCC)    Bilateral lower extremity edema    Gout    Hyperlipidemia    Hyponatremia    Herniated disc, cervical    Iron overload    Left atrial enlargement    Left ventricular hypertrophy    Macrocytosis    Mild mitral regurgitation    Obstructive sleep apnea    Pain syndrome, chronic    Renal cyst, right    Tobacco use    Chronic rhinitis    Microalbuminuria    Elevated LFTs    Lumbar disc disease with radiculopathy    Cervical disc disorder with radiculopathy of mid-cervical region    Cervical radiculopathy    Thrombocytopenia (HCC)    Depression    Abdominal aortic aneurysm (AAA) without rupture (HCC)    Adrenal abnormality (HCC)    Hepatomegaly    Elevated bilirubin    Splenomegaly    Elevated alkaline phosphatase level    Other ascites    History of colon polyps    History of alcoholism (HCC)    Bloating    Alcoholic cirrhosis of liver (HCC)    Squamous cell carcinoma of tongue (HCC)    Acute blood loss anemia    GI bleed    Elevated troponin    Hyperkalemia       History of Present Illness   Physician Requesting Consult: Marine Og, *  Reason for Consult / Principal Problem: Hyperkalemia  Hx and PE limited by:   HPI: Raymundo Weller is a 64 y.o. year old male who presented to the emergency department earlier this morning after having episodes of coffee-ground emesis.  Patient also noted a change in the color of his stool which has now transition to black as well as maroon color.  Patient denies use of nonsteroidal anti-inflammatory medications, he was taking Tylenol due to epigastric pain.  He also denies use of potassium supplementation, although he is on spironolactone 50 mg once daily in the outpatient setting.  He has a history of varices.  He was admitted into the intensive care unit, currently on an octreotide and pantoprazole drip, also being transfused with blood.    At presentation, the patient was noted to  "have a serum potassium level of 5.6 mmol/liter, this was repeated this morning twice with a repeat at 6.1 mmol/L.  Patient was given interventions to assist with movement of potassium intracellularly.  EKG performed earlier this morning did not note a significant history of with the exception of a sinus tachycardia at 121.    History obtained from chart review and the patient    Constitutional ROS- Denies fatigue, fever, chills, night sweats, weight changes.  HEENT ROS- Denies history of eye surgeries, glaucoma, headaches or history of trauma, blurred vision..  Endocrine ROS- No history diabetes mellitus or thyroid disease.  Cardiovascular ROS- Denies chest pain, palpitation, dyspnea exertion, orthopnea, claudication.  Pulmonary ROS- Denies history of COPD, asthma.  Denies cough, hemoptysis, shortness of breath.  GI ROS-positive for abdominal pain, as well as what is noted above.  Hematological ROS- Denies history of easy bruising, blood clots, bleeding or blood transfusions.  Genitourinary ROS- Denies recent hematuria, pyuria, flank pain, change in urinary stream, decreased urinary output, increased urinary frequency, nocturia, foamy urine, or urinary incontinence.  Lymphatic ROS- Denies lymphadenopathy.  Musculoskeletal ROS- Denies history of muscle weakness, joint pain.  Dermatological ROS- Denies rash, wounds, ulcers, itching, jaundice.  Psychiatric ROS- Denies anxiety, depression, hallucinations, disorientation.  Neurological ROS- No stroke or TIA symptoms. .    Historical Information   Past Medical History:   Diagnosis Date    AAA (abdominal aortic aneurysm) (HCC)     sees  Vascular    Abnormal kidney function     last assessed: Feb 25, 2016    Allergic rhinitis     Arthritis     Benign colon polyp     Bruises easily     \"not new\"    Cervical radiculopathy     last assessed: Feb 11, 2015    Chronic fatigue     \"not chronic'    Chronic pain disorder     Cirrhosis (HCC)     COPD (chronic obstructive pulmonary " "disease) (HCC)     COVID 01/05/2024    CPAP (continuous positive airway pressure) dependence     Depression 04/07/2023    per pt in the past    Edema     Elevated liver enzymes     Gout     Herniated cervical disc     \"bulging\"    Hyperlipidemia     Hypertension     Hypotension     last assessed: March 22, 2017    Left ventricular hypertrophy     Low platelet count (HCC)     \"usually runs low\"    Lumbar herniated disc     Obesity (BMI 30-39.9) 09/20/2018    Sleep apnea     Tongue lesion      Past Surgical History:   Procedure Laterality Date    BUCCAL MASS EXCISION Right 8/20/2024    Procedure: EXCISION BX LESION, TONGUE; FROZEN SECTION; flex hd grafting;  Surgeon: Sylvester Luis DO;  Location: [facility];  Service: ENT    CARPAL TUNNEL RELEASE Bilateral     COLONOSCOPY      HEMORRHOID SURGERY      LIVER BIOPSY       Social History   Social History     Substance and Sexual Activity   Alcohol Use Yes    Alcohol/week: 6.0 standard drinks of alcohol    Types: 6 Cans of beer per week    Comment: per pt not drinking currently. last drank 8/16- glass of wine     Social History     Substance and Sexual Activity   Drug Use No     Social History     Tobacco Use   Smoking Status Some Days    Current packs/day: 0.50    Average packs/day: 0.5 packs/day for 40.9 years (20.4 ttl pk-yrs)    Types: Cigarettes    Start date: 1/2/1988    Passive exposure: Current   Smokeless Tobacco Never   Tobacco Comments    Given [employer] \"how to quit\" info --has cut back.. last smoked 8/18     Family History   Problem Relation Age of Onset    Cancer Mother     No Known Problems Father     No Known Problems Sister     No Known Problems Brother     No Known Problems Maternal Grandmother     No Known Problems Maternal Grandfather     No Known Problems Paternal Grandmother     No Known Problems Paternal Grandfather        Meds/Allergies   all current active meds have been reviewed, current meds:   Current Facility-Administered Medications:     " cefTRIAXone (ROCEPHIN) IVPB (premix in dextrose) 1,000 mg 50 mL, Q24H    metoclopramide (REGLAN) injection 10 mg, Q6H JENNIFER    [COMPLETED] octreotide (SandoSTATIN) injection 50 mcg, Once **FOLLOWED BY** octreotide (SandoSTATIN) 500 mcg in sodium chloride 0.9 % 250 mL infusion, Continuous, Last Rate: 50 mcg/hr (11/12/24 0600)    pantoprazole (PROTONIX) 80 mg in sodium chloride 0.9 % 100 mL infusion, Continuous, Last Rate: 8 mg/hr (11/12/24 0600) and PTA meds:    Medications Prior to Admission:     multivitamin (THERAGRAN) TABS    spironolactone (ALDACTONE) 50 mg tablet    fluticasone (FLONASE) 50 mcg/act nasal spray      No Known Allergies    Objective     Intake/Output Summary (Last 24 hours) at 11/12/2024 0928  Last data filed at 11/12/2024 0600  Gross per 24 hour   Intake 1146.25 ml   Output 200 ml   Net 946.25 ml       Invasive Devices:        Physical Exam      I/O last 3 completed shifts:  In: 1146.3 [I.V.:146.3; IV Piggyback:1000]  Out: 200 [Stool:200]    Vitals:    11/12/24 0900   BP:    Pulse:    Resp: (!) 24   Temp:    SpO2:        General Appearance:    No acute distress. Cooperative. Appears stated age.   Head:    Normocephalic. Atraumatic. Normal jaw occlusion.   Eyes:    Lids, conjunctiva normal. No scleral icterus.   Ears:    Normal external ears.   Nose:   Nares normal. No drainage.   Mouth:   Lips, tongue normal. Mucosa normal. Phonation normal.   Neck:   Supple. Symmetrical.   Back:     Symmetric. No CVA tenderness.   Lungs:     Normal respiratory effort. Clear to auscultation bilaterally.   Chest wall:    No tenderness or deformity.   Heart:    Regular rate and rhythm. Normal S1 and S2. No murmur. No JVD. No edema.   Abdomen:     Soft. Non-tender. Bowel sounds active.   Genitourinary:   No Boyer catheter present.   Extremities:   Extremities normal. Atraumatic. No cyanosis.   Skin:   Warm and dry. No pallor, jaundice, rash, ecchymoses.   Neurologic:   Alert and oriented to person, place, time. No  "focal deficit.         Current Weight: Weight - Scale: 126 kg (277 lb 1.9 oz)  First Weight: Weight - Scale: 126 kg (278 lb 3.5 oz)    Lab Results:  I have personally reviewed pertinent labs.    CBC:   Lab Results   Component Value Date    WBC 13.49 (H) 11/11/2024    HGB 7.3 (L) 11/12/2024    HCT 22.9 (L) 11/12/2024     (H) 11/11/2024     11/11/2024    RBC 2.55 (L) 11/11/2024    MCH 35.7 (H) 11/11/2024    MCHC 32.7 11/11/2024    RDW 16.4 (H) 11/11/2024    MPV 10.0 11/11/2024    NRBC 0 11/11/2024     CMP:   Lab Results   Component Value Date    K 6.1 (H) 11/12/2024     11/12/2024    CO2 24 11/12/2024    BUN 45 (H) 11/12/2024    CREATININE 0.92 11/12/2024    CALCIUM 7.3 (L) 11/12/2024    AST 61 (H) 11/12/2024    ALT 27 11/12/2024    ALKPHOS 63 11/12/2024    EGFR 86 11/12/2024     Phosphorus: No results found for: \"PHOS\"  Magnesium: No results found for: \"MG\"  Urinalysis: No results found for: \"COLORU\", \"CLARITYU\", \"SPECGRAV\", \"PHUR\", \"LEUKOCYTESUR\", \"NITRITE\", \"PROTEINUA\", \"GLUCOSEU\", \"KETONESU\", \"BILIRUBINUR\", \"BLOODU\"  Ionized Calcium: No results found for: \"CAION\"  Coagulation:   Lab Results   Component Value Date    INR 1.56 (H) 11/11/2024     Troponin: No results found for: \"TROPONINI\"  ABG: No results found for: \"PHART\", \"OVE8VDK\", \"PO2ART\", \"LPP2ZMB\", \"F3GZJANT\", \"BEART\", \"SOURCE\"    Results from last 7 days   Lab Units 11/12/24  0607 11/12/24  0433 11/11/24  2313   POTASSIUM mmol/L 6.1* 6.0* 5.6*   CHLORIDE mmol/L  --  106 102   CO2 mmol/L  --  24 19*   BUN mg/dL  --  45* 38*   CREATININE mg/dL  --  0.92 0.97   CALCIUM mg/dL  --  7.3* 8.2*   ALK PHOS U/L  --  63 84   ALT U/L  --  27 29   AST U/L  --  61* 65*       Radiology review:  Procedure: XR chest 1 view portable    Result Date: 11/12/2024  Narrative: XR CHEST PORTABLE INDICATION: SOB. COMPARISON: 08/12/2024 FINDINGS: Clear lungs. No pneumothorax or pleural effusion. Normal cardiomediastinal silhouette. Bones are unremarkable for age. " Normal upper abdomen.     Impression: No acute cardiopulmonary disease. Workstation performed: [id number]         Delano Modi,       This consultation note was produced in part using a dictation device which may document imprecise wording from author's original intent.

## 2024-11-12 NOTE — CONSULTS
[facility] - Inpatient Consultation    PATIENT INFORMATION      Raymundo Weller 64 y.o. male MRN: [id number]  Unit/Bed#: ICU 05-01 Encounter: [id number]  PCP: Lisandro Gauthier DO  Date of Admission:  11/11/2024  Date of Consultation: 11/12/24  Requesting Physician: Marine Og, *       ASSESSMENT & PLAN     Raymundo Weller is a 64 y.o. old male with PMH of decompensated alcohol cirrhosis C/B ascites, esophageal varices, oropharyngeal carcinoma with resection, COPD, DOT, tobacco use, and hypertension who presented with 4 episodes of coffee-ground emesis with hematochezia and melena.    Coffee-ground emesis/hematemesis 2/2 possible esophageal varices bleed  Alcohol cirrhosis C/B ascites with active alcohol use  Hematochezia and melena  Patient is presenting with 3 episodes of coffee-ground emesis as well as hematemesis since Sunday.  Patient's last drink was on Friday where he endorses drinking of beer he states he still drinks multiple beers throughout the week despite being told not to do so.  Currently patient denies any nausea or any further episodes of vomiting.  Patient would benefit from an EGD to evaluate for possible esophageal varices bleed however differential is not limited, other causes can be AVMs, PUD, Dieulafoy lesion, or gastritis.    -The patient n.p.o. today for EGD this afternoon  -Continue IV PPI and octreotide therapy  -Continue ceftriaxone for SBP prophylaxis  -Patient will need extensive counseling regarding alcohol cessation  -Daily MELD labs      Hyperkalemia  Patient found to be hyperkalemic medic on admission, currently receiving insulin and dextrose along with albuterol to help with lowering potassium so patient can safely get anesthesia for an EGD.    MELD 3.0: MELD 3.0: 18 at 11/12/2024  4:33 AM  MELD-Na: 17 at 11/12/2024  4:33 AM  Calculated from:  Serum Creatinine: 0.92 mg/dL (Using min of 1 mg/dL) at 11/12/2024  4:33 AM  Serum Sodium: 135  mmol/L at 2024  4:33 AM  Total Bilirubin: 2.70 mg/dL at 2024  4:33 AM  Serum Albumin: 2.5 g/dL at 2024  4:33 AM  INR(ratio): 1.56 at 2024 11:13 PM  Age at listing (hypothetical): 64 years  Sex: Male at 2024  4:33 AM     Etiology: Alcohol with active alcohol use, last drink     Transplant Status: n/a    Ascites: history of ascites   Diet: Low-sodium (< 2 g daily)    Hepatic Encephalopathy: No history   Diet: High protein/high calorie diet to prevent catabolic state w/ further ammonia production   Varices:   EGD 2024:  Abnormal mucosa in the lower third of the esophagus; performed cold forceps biopsy to rule out Nelson's esophagus  One small varix in the lower third of the esophagus  Cobblestone, friable and mosaic mucosa in the cardia, fundus of the stomach, body of the stomach, greater curve of the stomach, lesser curve of the stomach, incisura, antrum and pylorus. Consistent with portal gastropathy  The duodenum appeared normal.    Hepatoma screening: no evidence of mass 2024  Patient will require q.6-12month dedicated liver imaging for HCC monitoring    Colon cancer screenin2024:  Diverticulosis in the sigmoid colon  3 subcentimeter polyps in the transverse colon and splenic flexure were removed with cold snare  Angioectasia in the ascending colon  Repeat in 1 year due to inadequate prep      REASON FOR CONSULTATION      Hematemesis      HISTORY OF PRESENT ILLNESS    Raymundo Weller is a 64 y.o. old male with PMH of decompensated alcohol cirrhosis C/B ascites, esophageal varices, oropharyngeal carcinoma with resection, COPD, DOT, tobacco use, and hypertension who presented with 4 episodes of coffee-ground emesis with hematochezia and melena.    Patient presenting after having 3-4 episodes of coffee-ground emesis as well as bright red blood in vomit for evaluation to the hospital.  Patient states his last drink was on Friday when he had 1 beer, however he  "continues to endorse weekly beer use.  Upon arrival to the hospital patient was found to have a drop in hemoglobin to 9.1 from baseline of 15 with a BUN of 38 and an INR of 1.56.  Patient was given 1 unit of PRBC.  Patient has had no further episodes of coffee-ground emesis since being admitted.  Initial presentation patient was significantly tachycardic to 120 and hypotensive to 97/58.    Patient currently feeling okay denies any abdominal discomfort.      REVIEW OF SYSTEMS     CONSTITUTIONAL: Denies any fever, chills, rigors, and weight loss  HEENT: No earache or tinnitus, denies hearing loss or visual disturbances  CARDIOVASCULAR: No chest pain or palpitations   RESPIRATORY: Denies any cough, hemoptysis, shortness of breath or dyspnea on exertion  GASTROINTESTINAL: As noted in the History of Present Illness   GENITOURINARY: No problems with urination, denies any hematuria or dysuria  NEUROLOGIC: No dizziness or vertigo, denies headaches   MUSCULOSKELETAL: Denies any muscle or joint pain   SKIN: Denies skin rashes or itching   ENDOCRINE: Denies excessive thirst, denies intolerance to heat or cold  PSYCHOSOCIAL: Denies depression or anxiety, denies any recent memory loss     PAST MEDICAL & SURGICAL HISTORY      Past Medical History:   Diagnosis Date    AAA (abdominal aortic aneurysm) (HCC)     sees  Vascular    Abnormal kidney function     last assessed: Feb 25, 2016    Allergic rhinitis     Arthritis     Benign colon polyp     Bruises easily     \"not new\"    Cervical radiculopathy     last assessed: Feb 11, 2015    Chronic fatigue     \"not chronic'    Chronic pain disorder     Cirrhosis (HCC)     COPD (chronic obstructive pulmonary disease) ([facility])     COVID 01/05/2024    CPAP (continuous positive airway pressure) dependence     Depression 04/07/2023    per pt in the past    Edema     Elevated liver enzymes     Gout     Herniated cervical disc     \"bulging\"    Hyperlipidemia     Hypertension     Hypotension     " "last assessed: March 22, 2017    Left ventricular hypertrophy     Low platelet count (HCC)     \"usually runs low\"    Lumbar herniated disc     Obesity (BMI 30-39.9) 09/20/2018    Sleep apnea     Tongue lesion        Past Surgical History:   Procedure Laterality Date    BUCCAL MASS EXCISION Right 8/20/2024    Procedure: EXCISION BX LESION, TONGUE; FROZEN SECTION; flex hd grafting;  Surgeon: Sylvester Luis DO;  Location: [facility];  Service: ENT    CARPAL TUNNEL RELEASE Bilateral     COLONOSCOPY      HEMORRHOID SURGERY      LIVER BIOPSY         MEDICATIONS & ALLERGIES       Medications:     Medications Prior to Admission:     multivitamin (THERAGRAN) TABS    spironolactone (ALDACTONE) 50 mg tablet    fluticasone (FLONASE) 50 mcg/act nasal spray    Current Facility-Administered Medications:     cefTRIAXone (ROCEPHIN) IVPB (premix in dextrose) 1,000 mg 50 mL, Q24H    metoclopramide (REGLAN) injection 10 mg, Q6H JENNIFER    [COMPLETED] octreotide (SandoSTATIN) injection 50 mcg, Once **FOLLOWED BY** octreotide (SandoSTATIN) 500 mcg in sodium chloride 0.9 % 250 mL infusion, Continuous, Last Rate: 50 mcg/hr (11/12/24 0600)    pantoprazole (PROTONIX) 80 mg in sodium chloride 0.9 % 100 mL infusion, Continuous, Last Rate: 8 mg/hr (11/12/24 0600)    sodium chloride 0.9 % infusion, Once    Allergies:   No Known Allergies    SOCIAL HISTORY      Social History     Marital Status: /Civil Union    Substance Use History:   Social History     Substance and Sexual Activity   Alcohol Use Yes    Alcohol/week: 6.0 standard drinks of alcohol    Types: 6 Cans of beer per week    Comment: per pt not drinking currently. last drank 8/16- glass of wine     Social History     Tobacco Use   Smoking Status Some Days    Current packs/day: 0.50    Average packs/day: 0.5 packs/day for 40.9 years (20.4 ttl pk-yrs)    Types: Cigarettes    Start date: 1/2/1988    Passive exposure: Current   Smokeless Tobacco Never   Tobacco Comments    Given " "[facility] \"how to quit\" info --has cut back.. last smoked 8/18     Social History     Substance and Sexual Activity   Drug Use No       FAMILY HISTORY      Family History   Problem Relation Age of Onset    Cancer Mother     No Known Problems Father     No Known Problems Sister     No Known Problems Brother     No Known Problems Maternal Grandmother     No Known Problems Maternal Grandfather     No Known Problems Paternal Grandmother     No Known Problems Paternal Grandfather        PHYSICAL EXAM     Objective   Blood pressure 129/60, pulse (!) 107, temperature 99.6 °F (37.6 °C), temperature source Tympanic, resp. rate 19, height 5' 10\" (1.778 m), weight 126 kg (277 lb 1.9 oz), SpO2 90%. Body mass index is 39.76 kg/m².    Intake/Output Summary (Last 24 hours) at 11/12/2024 0856  Last data filed at 11/12/2024 0600  Gross per 24 hour   Intake 1146.25 ml   Output 200 ml   Net 946.25 ml       General Appearance:   Alert, cooperative, no distress   HEENT:   Normocephalic, atraumatic, anicteric     Neck:   Supple, symmetrical, trachea midline   Lungs:   Equal chest rise, respirations unlabored    Heart:   Regular rate and rhythm   Abdomen:   Soft, non-tender,   Rectal:   Deferred    Extremities:   No cyanosis, clubbing or edema    Neuro:   Moves all 4 extremities    Skin:   No jaundice, rashes, or lesions      ADDITIONAL DATA     Lab Results:     Results from last 7 days   Lab Units 11/12/24  0433 11/11/24  2313   WBC Thousand/uL  --  13.49*   HEMOGLOBIN g/dL 7.3* 9.1*   HEMATOCRIT % 22.9* 27.8*   PLATELETS Thousands/uL  --  166   SEGS PCT %  --  86*   LYMPHO PCT %  --  8*   MONO PCT %  --  5   EOS PCT %  --  0     Results from last 7 days   Lab Units 11/12/24  0607 11/12/24  0433   POTASSIUM mmol/L 6.1* 6.0*   CHLORIDE mmol/L  --  106   CO2 mmol/L  --  24   BUN mg/dL  --  45*   CREATININE mg/dL  --  0.92   CALCIUM mg/dL  --  7.3*   ALK PHOS U/L  --  63   ALT U/L  --  27   AST U/L  --  61*     Results from last 7 days   Lab " Units 11/11/24  2313   INR  1.56*       Imaging:    XR chest 1 view portable    Result Date: 11/12/2024  Narrative: XR CHEST PORTABLE INDICATION: SOB. COMPARISON: 08/12/2024 FINDINGS: Clear lungs. No pneumothorax or pleural effusion. Normal cardiomediastinal silhouette. Bones are unremarkable for age. Normal upper abdomen.     Impression: No acute cardiopulmonary disease. Workstation performed: [id number]     TRAUMA - CT spine cervical wo contrast    Result Date: 10/22/2024  Narrative: CT CERVICAL SPINE - WITHOUT CONTRAST INDICATION:   TRAUMA. COMPARISON: CT cervical spine dated 4/3/2023. TECHNIQUE:  CT examination of the cervical spine was performed without intravenous contrast.  Contiguous axial images were obtained. Multiplanar 2D reformatted images were created from the source data. Radiation dose length product (DLP) for this visit:  558 mGy-cm .  This examination, like all CT scans performed in the [facility], was performed utilizing techniques to minimize radiation dose exposure, including the use of iterative reconstruction and automated exposure control. IMAGE QUALITY:  Diagnostic. FINDINGS: ALIGNMENT: Mild reversal of the normal cervical lordosis. No subluxation. VERTEBRAE:  No fracture. DEGENERATIVE CHANGES: Moderately advanced multilevel cervical degenerative changes. No critical central canal stenosis. Mild multilevel central canal stenosis secondary to degenerative changes. PREVERTEBRAL AND PARASPINAL SOFT TISSUES: Atherosclerotic vascular calcifications. THORACIC INLET: Unremarkable.     Impression: No acute cervical spine fracture or traumatic malalignment. The study was marked in EPIC for immediate notification. Workstation performed: [id number]     TRAUMA - CT head wo contrast    Result Date: 10/22/2024  Narrative: CT BRAIN - WITHOUT CONTRAST INDICATION:   TRAUMA. COMPARISON: CT head dated 4/3/2023. TECHNIQUE:  CT examination of the brain was performed.  Multiplanar 2D reformatted  images were created from the source data. Radiation dose length product (DLP) for this visit:  864 mGy-cm .  This examination, like all CT scans performed in the [facility], was performed utilizing techniques to minimize radiation dose exposure, including the use of iterative reconstruction and automated exposure control. IMAGE QUALITY:  Diagnostic. FINDINGS: PARENCHYMA: Decreased attenuation is noted in periventricular and subcortical white matter demonstrating an appearance that is statistically most likely to represent mild to moderate microangiopathic change. No CT signs of acute infarction.  No intracranial mass, mass effect or midline shift.  No acute parenchymal hemorrhage. VENTRICLES AND EXTRA-AXIAL SPACES: Unremarkable for the patient's age. VISUALIZED ORBITS: Unremarkable visualized orbits. PARANASAL SINUSES: Unremarkable visualized paranasal sinuses. CALVARIUM AND EXTRACRANIAL SOFT TISSUES: No acute calvarial fracture.     Impression: No acute intracranial hemorrhage. Workstation performed: [id number]       EKG, Pathology, and Other Studies Reviewed on Admission:   EKG: Reviewed      Counseling / Coordination of Care Time: 30 total mins spent in consult. Greater than 50% of total time spent on patient counseling and coordination of care.    Roseanne Beltre MD  Gastroenterology Fellow  [facility]  Division of Gastroenterology and Hepatology    ...............................................................................................................................................  ** Please Note: This note is constructed using a voice recognition dictation system. **

## 2024-11-13 ENCOUNTER — APPOINTMENT (INPATIENT)
Dept: ULTRASOUND IMAGING | Facility: HOSPITAL | Age: 64
DRG: 442 | End: 2024-11-13
Payer: COMMERCIAL

## 2024-11-13 PROBLEM — R65.10 SIRS (SYSTEMIC INFLAMMATORY RESPONSE SYNDROME) (HCC): Status: ACTIVE | Noted: 2024-11-13

## 2024-11-13 PROBLEM — N18.2 CKD (CHRONIC KIDNEY DISEASE) STAGE 2, GFR 60-89 ML/MIN: Status: ACTIVE | Noted: 2024-11-13

## 2024-11-13 PROBLEM — N17.9 AKI (ACUTE KIDNEY INJURY) (HCC): Status: ACTIVE | Noted: 2024-11-13

## 2024-11-13 LAB
ALBUMIN SERPL BCG-MCNC: 2.6 G/DL (ref 3.5–5)
ALP SERPL-CCNC: 67 U/L (ref 34–104)
ALT SERPL W P-5'-P-CCNC: 25 U/L (ref 7–52)
ANION GAP SERPL CALCULATED.3IONS-SCNC: 3 MMOL/L (ref 4–13)
AST SERPL W P-5'-P-CCNC: 61 U/L (ref 13–39)
BASOPHILS # BLD AUTO: 0.04 THOUSANDS/ÂΜL (ref 0–0.1)
BASOPHILS NFR BLD AUTO: 0 % (ref 0–1)
BILIRUB SERPL-MCNC: 1.58 MG/DL (ref 0.2–1)
BUN SERPL-MCNC: 38 MG/DL (ref 5–25)
CALCIUM ALBUM COR SERPL-MCNC: 8.5 MG/DL (ref 8.3–10.1)
CALCIUM SERPL-MCNC: 7.4 MG/DL (ref 8.4–10.2)
CHLORIDE SERPL-SCNC: 108 MMOL/L (ref 96–108)
CO2 SERPL-SCNC: 24 MMOL/L (ref 21–32)
CREAT SERPL-MCNC: 0.75 MG/DL (ref 0.6–1.3)
EOSINOPHIL # BLD AUTO: 0.12 THOUSAND/ÂΜL (ref 0–0.61)
EOSINOPHIL NFR BLD AUTO: 1 % (ref 0–6)
ERYTHROCYTE [DISTWIDTH] IN BLOOD BY AUTOMATED COUNT: 18.5 % (ref 11.6–15.1)
GFR SERPL CREATININE-BSD FRML MDRD: 95 ML/MIN/1.73SQ M
GLUCOSE SERPL-MCNC: 123 MG/DL (ref 65–140)
HCT VFR BLD AUTO: 21.9 % (ref 36.5–49.3)
HCT VFR BLD AUTO: 24.3 % (ref 36.5–49.3)
HGB BLD-MCNC: 7.1 G/DL (ref 12–17)
HGB BLD-MCNC: 8.1 G/DL (ref 12–17)
IMM GRANULOCYTES # BLD AUTO: 0.08 THOUSAND/UL (ref 0–0.2)
IMM GRANULOCYTES NFR BLD AUTO: 1 % (ref 0–2)
LYMPHOCYTES # BLD AUTO: 2.83 THOUSANDS/ÂΜL (ref 0.6–4.47)
LYMPHOCYTES NFR BLD AUTO: 23 % (ref 14–44)
MAGNESIUM SERPL-MCNC: 1.7 MG/DL (ref 1.9–2.7)
MCH RBC QN AUTO: 34.1 PG (ref 26.8–34.3)
MCHC RBC AUTO-ENTMCNC: 32.4 G/DL (ref 31.4–37.4)
MCV RBC AUTO: 105 FL (ref 82–98)
MONOCYTES # BLD AUTO: 1.07 THOUSAND/ÂΜL (ref 0.17–1.22)
MONOCYTES NFR BLD AUTO: 9 % (ref 4–12)
NEUTROPHILS # BLD AUTO: 8.03 THOUSANDS/ÂΜL (ref 1.85–7.62)
NEUTS SEG NFR BLD AUTO: 66 % (ref 43–75)
NRBC BLD AUTO-RTO: 0 /100 WBCS
PLATELET # BLD AUTO: 100 THOUSANDS/UL (ref 149–390)
PMV BLD AUTO: 10.5 FL (ref 8.9–12.7)
POTASSIUM SERPL-SCNC: 4.1 MMOL/L (ref 3.5–5.3)
PROT SERPL-MCNC: 4.9 G/DL (ref 6.4–8.4)
RBC # BLD AUTO: 2.08 MILLION/UL (ref 3.88–5.62)
SODIUM SERPL-SCNC: 135 MMOL/L (ref 135–147)
WBC # BLD AUTO: 12.17 THOUSAND/UL (ref 4.31–10.16)

## 2024-11-13 PROCEDURE — P9016 RBC LEUKOCYTES REDUCED: HCPCS

## 2024-11-13 PROCEDURE — 94760 N-INVAS EAR/PLS OXIMETRY 1: CPT

## 2024-11-13 PROCEDURE — 99232 SBSQ HOSP IP/OBS MODERATE 35: CPT | Performed by: PHYSICIAN ASSISTANT

## 2024-11-13 PROCEDURE — 85018 HEMOGLOBIN: CPT | Performed by: INTERNAL MEDICINE

## 2024-11-13 PROCEDURE — 30233N1 TRANSFUSION OF NONAUTOLOGOUS RED BLOOD CELLS INTO PERIPHERAL VEIN, PERCUTANEOUS APPROACH: ICD-10-PCS | Performed by: PHYSICIAN ASSISTANT

## 2024-11-13 PROCEDURE — 85014 HEMATOCRIT: CPT | Performed by: INTERNAL MEDICINE

## 2024-11-13 PROCEDURE — 83735 ASSAY OF MAGNESIUM: CPT | Performed by: INTERNAL MEDICINE

## 2024-11-13 PROCEDURE — 76700 US EXAM ABDOM COMPLETE: CPT

## 2024-11-13 PROCEDURE — 99232 SBSQ HOSP IP/OBS MODERATE 35: CPT | Performed by: INTERNAL MEDICINE

## 2024-11-13 PROCEDURE — 85025 COMPLETE CBC W/AUTO DIFF WBC: CPT | Performed by: INTERNAL MEDICINE

## 2024-11-13 PROCEDURE — 80053 COMPREHEN METABOLIC PANEL: CPT | Performed by: INTERNAL MEDICINE

## 2024-11-13 PROCEDURE — 99233 SBSQ HOSP IP/OBS HIGH 50: CPT | Performed by: INTERNAL MEDICINE

## 2024-11-13 RX ORDER — LANOLIN ALCOHOL/MO/W.PET/CERES
800 CREAM (GRAM) TOPICAL ONCE
Status: COMPLETED | OUTPATIENT
Start: 2024-11-13 | End: 2024-11-13

## 2024-11-13 RX ORDER — ONDANSETRON 2 MG/ML
4 INJECTION INTRAMUSCULAR; INTRAVENOUS EVERY 6 HOURS PRN
Status: DISCONTINUED | OUTPATIENT
Start: 2024-11-13 | End: 2024-11-16 | Stop reason: HOSPADM

## 2024-11-13 RX ORDER — CEFTRIAXONE 2 G/50ML
2000 INJECTION, SOLUTION INTRAVENOUS EVERY 24 HOURS
Status: DISCONTINUED | OUTPATIENT
Start: 2024-11-13 | End: 2024-11-16 | Stop reason: HOSPADM

## 2024-11-13 RX ORDER — PANTOPRAZOLE SODIUM 40 MG/1
40 TABLET, DELAYED RELEASE ORAL
Status: DISCONTINUED | OUTPATIENT
Start: 2024-11-13 | End: 2024-11-16 | Stop reason: HOSPADM

## 2024-11-13 RX ORDER — CARVEDILOL 3.12 MG/1
6.25 TABLET ORAL DAILY
Status: DISCONTINUED | OUTPATIENT
Start: 2024-11-13 | End: 2024-11-15

## 2024-11-13 RX ADMIN — CARVEDILOL 6.25 MG: 3.12 TABLET, FILM COATED ORAL at 15:46

## 2024-11-13 RX ADMIN — CEFTRIAXONE 2000 MG: 2 INJECTION, SOLUTION INTRAVENOUS at 15:08

## 2024-11-13 RX ADMIN — Medication 800 MG: at 09:36

## 2024-11-13 RX ADMIN — OCTREOTIDE ACETATE 50 MCG/HR: 500 INJECTION, SOLUTION INTRAVENOUS; SUBCUTANEOUS at 10:05

## 2024-11-13 RX ADMIN — PANTOPRAZOLE SODIUM 40 MG: 40 TABLET, DELAYED RELEASE ORAL at 09:17

## 2024-11-13 RX ADMIN — OCTREOTIDE ACETATE 50 MCG/HR: 500 INJECTION, SOLUTION INTRAVENOUS; SUBCUTANEOUS at 00:28

## 2024-11-13 NOTE — PROGRESS NOTES
[facility] - Inpatient Progress Note    PATIENT INFORMATION      Raymundo Weller 64 y.o. male MRN: [id number]  Unit/Bed#: ICU 05-01 Encounter: [id number]    ASSESSMENT & PLAN   Raymundo Weller is a 64 y.o. old male with PMH of decompensated alcohol cirrhosis C/B ascites, esophageal varices, oropharyngeal carcinoma with resection, COPD, DOT, tobacco use, and hypertension who presented with 4 episodes of coffee-ground emesis with hematochezia and melena.     Coffee-ground emesis/hematemesis 2/2 PHG  Alcohol cirrhosis C/B ascites with active alcohol use  Patient is presented with 3 episodes of coffee-ground emesis as well as hematemesis since Sunday. Patient's last drink was on 5 days ago where he endorses drinking of beer he states he still drinks multiple beers throughout the week despite being told not to do so.  Currently patient denies any nausea or any further episodes of vomiting.  He underwent an EGD on 11/12 which was notable for significant PHG which most likely is the cause of bleeding.     -Continue IV PPI and octreotide therapy  -Start carvedilol 6.25 mg once a day on discharge, patient will need to follow-up with Dr. Bonilla in hepatology office outpatient  -Can consider medications to help with alcohol cessation outpatient  -Continue ceftriaxone for SBP prophylaxis  -Patient will need extensive counseling regarding alcohol cessation  -Daily MELD labs      MELD: MELD 3.0: 15 at 11/13/2024  6:15 AM  MELD-Na: 15 at 11/13/2024  6:15 AM  Calculated from:  Serum Creatinine: 0.75 mg/dL (Using min of 1 mg/dL) at 11/13/2024  6:15 AM  Serum Sodium: 135 mmol/L at 11/13/2024  6:15 AM  Total Bilirubin: 1.58 mg/dL at 11/13/2024  6:15 AM  Serum Albumin: 2.6 g/dL at 11/13/2024  6:15 AM  INR(ratio): 1.56 at 11/11/2024 11:13 PM  Age at listing (hypothetical): 64 years  Sex: Male at 11/13/2024  6:15 AM    Etiology: Alcohol with active alcohol use, last drink 11/08     Transplant Status:  n/a     Ascites: history of ascites   Diet: Low-sodium (< 2 g daily)     Hepatic Encephalopathy: No history   Diet: High protein/high calorie diet to prevent catabolic state w/ further ammonia production   Varices:   EGD 2024  Multiple small varices in the lower third of the esophagus  C2M3 Nelson's esophagus  Severe, friable and mosaic portal hypertensive gastropathy in the body of the stomach, greater curve of the stomach and lesser curve of the stomach  The duodenal bulb, 1st part of the duodenum and 2nd part of the duodenum appeared normal.     Hepatoma screening: no evidence of mass 2024  Patient will require q.6-12month dedicated liver imaging for HCC monitoring     Colon cancer screenin2024:  Diverticulosis in the sigmoid colon  3 subcentimeter polyps in the transverse colon and splenic flexure were removed with cold snare  Angioectasia in the ascending colon  Repeat in 1 year due to inadequate prep            SUBJECTIVE     Patient seen and evaluated at bedside.  Patient states he is feeling okay endorses some mild abdominal discomfort.    MEDICATIONS & ALLERGIES       Medications:     Medications Prior to Admission:     multivitamin (THERAGRAN) TABS    spironolactone (ALDACTONE) 50 mg tablet    fluticasone (FLONASE) 50 mcg/act nasal spray    Current Facility-Administered Medications:     cefTRIAXone (ROCEPHIN) IVPB (premix in dextrose) 2,000 mg 50 mL, Q24H    ipratropium-albuterol (DUO-NEB) 0.5-2.5 mg/3 mL inhalation solution 3 mL, Once PRN    [COMPLETED] octreotide (SandoSTATIN) injection 50 mcg, Once **FOLLOWED BY** octreotide (SandoSTATIN) 500 mcg in sodium chloride 0.9 % 250 mL infusion, Continuous, Last Rate: 50 mcg/hr (24 0028)    ondansetron (ZOFRAN) injection 4 mg, Q6H PRN    pantoprazole (PROTONIX) 80 mg in sodium chloride 0.9 % 100 mL infusion, Continuous, Last Rate: 8 mg/hr (24 1517)    sodium chloride 0.9 % infusion, Continuous, Last Rate: 20 mL/hr (24  "1652)    Allergies:   No Known Allergies    PHYSICAL EXAM     Objective   Blood pressure 108/55, pulse 98, temperature 98.1 °F (36.7 °C), temperature source Tympanic, resp. rate (!) 23, height 5' 10\" (1.778 m), weight 126 kg (277 lb 12.5 oz), SpO2 98%. Body mass index is 39.86 kg/m².    Intake/Output Summary (Last 24 hours) at 11/13/2024 0857  Last data filed at 11/13/2024 0824  Gross per 24 hour   Intake 1552.67 ml   Output 1026 ml   Net 526.67 ml       General Appearance:   Alert, cooperative, no distress   HEENT:   Normocephalic, atraumatic, anicteric     Neck:   Supple, symmetrical, trachea midline   Lungs:   Equal chest rise, respirations unlabored    Heart:   Regular rate and rhythm   Abdomen:   Soft but distended   Rectal:   Deferred    Extremities:   No cyanosis, clubbing or edema    Neuro:   Moves all 4 extremities    Skin:   No jaundice, rashes, or lesions      ADDITIONAL DATA     Lab Results:     Results from last 7 days   Lab Units 11/13/24  0615   WBC Thousand/uL 12.17*   HEMOGLOBIN g/dL 7.1*   HEMATOCRIT % 21.9*   PLATELETS Thousands/uL 100*   SEGS PCT % 66   LYMPHO PCT % 23   MONO PCT % 9   EOS PCT % 1     Results from last 7 days   Lab Units 11/13/24  0615   POTASSIUM mmol/L 4.1   CHLORIDE mmol/L 108   CO2 mmol/L 24   BUN mg/dL 38*   CREATININE mg/dL 0.75   CALCIUM mg/dL 7.4*   ALK PHOS U/L 67   ALT U/L 25   AST U/L 61*     Results from last 7 days   Lab Units 11/11/24  2313   INR  1.56*       Imaging:    EGD  Addendum Date: 11/12/2024  Addendum: [facility] Operating Room [address] [place] [id number] [phone] DATE OF SERVICE: 11/12/24 PHYSICIAN(S): Attending: Miranda Hernandez DO Fellow: Roseanne Beltre MD INDICATION: Alcoholic cirrhosis of liver (HCC), GI bleed POST-OP DIAGNOSIS: See the impression below. PREPROCEDURE: Informed consent was obtained for the procedure, including sedation.  Risks of perforation, hemorrhage, adverse drug reaction and aspiration were " discussed. The patient was placed in the left lateral decubitus position. Patient was explained about the risks and benefits of the procedure. Risks including but not limited to bleeding, infection, and perforation were explained in detail. Also explained about less than 100% sensitivity with the exam and other alternatives. PROCEDURE: EGD DETAILS OF PROCEDURE: Patient was taken to the procedure room where a time out was performed to confirm correct patient and correct procedure. The patient underwent monitored anesthesia care, which was administered by an anesthesia professional. The patient's blood pressure, heart rate, level of consciousness, respirations, oxygen, ECG and ETCO2 were monitored throughout the procedure. The scope was introduced through the mouth and advanced to the second part of the duodenum. Retroflexion was performed in the fundus. The patient experienced no blood loss. The procedure was not difficult. The patient tolerated the procedure well. There were no apparent adverse events. ANESTHESIA INFORMATION: ASA: III Anesthesia Type: General MEDICATIONS: No administrations occurring from 1355 to 1535 on 11/12/24 FINDINGS: Multiple small varices (no red clifford sign) in the lower third of the esophagus; no bleeding was identified. Small varices seen which were fully collapsible with insufflation, no signs of bleeding seen. 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 Significant PHG seen, most likely source of hematemesis The duodenal bulb, 1st part of the duodenum and 2nd part of the duodenum appeared normal. 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 RECOMMENDATION: Continue with Octreotide for total 24 hours Can transition to oral PPI 40 mg once a day Clear liquid diet today Extensive counseling for alcohol cessation Repeat EGD in 1 year    Miranda Hernandez DO     Result Date: 11/12/2024  Narrative: Table formatting from the original result was not included. [facility] Operating Room [address] [place] [id number] [phone] DATE OF SERVICE: 11/12/24 PHYSICIAN(S): Attending: Miranda Hernandez DO Fellow: Roseanne Beltre MD INDICATION: Alcoholic cirrhosis of liver (HCC), GI bleed POST-OP DIAGNOSIS: See the impression below. PREPROCEDURE: Informed consent was obtained for the procedure, including sedation.  Risks of perforation, hemorrhage, adverse drug reaction and aspiration were discussed. The patient was placed in the left lateral decubitus position. Patient was explained about the risks and benefits of the procedure. Risks including but not limited to bleeding, infection, and perforation were explained in detail. Also explained about less than 100% sensitivity with the exam and other alternatives. PROCEDURE: EGD DETAILS OF PROCEDURE: Patient was taken to the procedure room where a time out was performed to confirm correct patient and correct procedure. The patient underwent monitored anesthesia care, which was administered by an anesthesia professional. The patient's blood pressure, heart rate, level of consciousness, respirations, oxygen, ECG and ETCO2 were monitored throughout the procedure. The scope was introduced through the mouth and advanced to the second part of the duodenum. Retroflexion was performed in the fundus. The patient experienced no blood loss. The procedure was not difficult. The patient tolerated the procedure well. There were no apparent adverse events. ANESTHESIA INFORMATION: ASA: III  Anesthesia Type: General MEDICATIONS: No administrations occurring from 1355 to 1535 on 11/12/24 FINDINGS: Multiple small varices (no red clifford sign) in the lower third of the esophagus; no bleeding was identified. Small varices seen which were fully collapsible with insufflation, no signs of bleeding seen. 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 Significant PHG seen, most likely source of hematemesis The duodenal bulb, 1st part of the duodenum and 2nd part of the duodenum appeared normal. 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 RECOMMENDATION: Continue with Octreotide for total 24 hours Can transition to oral PPI 40 mg once a day Clear liquid diet today Extensive counseling for alcohol cessation Repeat EGD in 1 year    Miranda Hernandez DO     XR chest 1 view portable  Result Date: 11/12/2024  Narrative: XR CHEST PORTABLE INDICATION: SOB. COMPARISON: 08/12/2024 FINDINGS: Clear lungs. No pneumothorax or pleural effusion. Normal cardiomediastinal silhouette. Bones are unremarkable for age. Normal upper abdomen.     Impression: No acute cardiopulmonary disease. Workstation performed: [id number]     TRAUMA - CT spine cervical wo contrast  Result Date: 10/22/2024  Narrative: CT CERVICAL SPINE - WITHOUT CONTRAST INDICATION:   TRAUMA. COMPARISON: CT cervical spine dated 4/3/2023. TECHNIQUE:  CT examination of the cervical spine was performed without intravenous contrast.  Contiguous axial images were obtained. Multiplanar 2D reformatted images were  created from the source data. Radiation dose length product (DLP) for this visit:  558 mGy-cm .  This examination, like all CT scans performed in the [facility], was performed utilizing techniques to minimize radiation dose exposure, including the use of iterative reconstruction and automated exposure control. IMAGE QUALITY:  Diagnostic. FINDINGS: ALIGNMENT: Mild reversal of the normal cervical lordosis. No subluxation. VERTEBRAE:  No fracture. DEGENERATIVE CHANGES: Moderately advanced multilevel cervical degenerative changes. No critical central canal stenosis. Mild multilevel central canal stenosis secondary to degenerative changes. PREVERTEBRAL AND PARASPINAL SOFT TISSUES: Atherosclerotic vascular calcifications. THORACIC INLET: Unremarkable.     Impression: No acute cervical spine fracture or traumatic malalignment. The study was marked in EPIC for immediate notification. Workstation performed: [id number]     TRAUMA - CT head wo contrast  Result Date: 10/22/2024  Narrative: CT BRAIN - WITHOUT CONTRAST INDICATION:   TRAUMA. COMPARISON: CT head dated 4/3/2023. TECHNIQUE:  CT examination of the brain was performed.  Multiplanar 2D reformatted images were created from the source data. Radiation dose length product (DLP) for this visit:  864 mGy-cm .  This examination, like all CT scans performed in the [facility], was performed utilizing techniques to minimize radiation dose exposure, including the use of iterative reconstruction and automated exposure control. IMAGE QUALITY:  Diagnostic. FINDINGS: PARENCHYMA: Decreased attenuation is noted in periventricular and subcortical white matter demonstrating an appearance that is statistically most likely to represent mild to moderate microangiopathic change. No CT signs of acute infarction.  No intracranial mass, mass effect or midline shift.  No acute parenchymal hemorrhage. VENTRICLES AND EXTRA-AXIAL SPACES: Unremarkable for the  patient's age. VISUALIZED ORBITS: Unremarkable visualized orbits. PARANASAL SINUSES: Unremarkable visualized paranasal sinuses. CALVARIUM AND EXTRACRANIAL SOFT TISSUES: No acute calvarial fracture.     Impression: No acute intracranial hemorrhage. Workstation performed: [id number]       EKG, Pathology, and Other Studies Reviewed on Admission:   EKG: Reviewed      Counseling / Coordination of Care Time: 30 total mins spent n consult. Greater than 50% of total time spent on patient counseling and coordination of care.    Roseanne Beltre MD  Gastroenterology Fellow  [facility]  Division of Gastroenterology and Hepatology    ...............................................................................................................................................  ** Please Note: This note is constructed using a voice recognition dictation system. **

## 2024-11-13 NOTE — PLAN OF CARE
Problem: PAIN - ADULT  Goal: Verbalizes/displays adequate comfort level or baseline comfort level  Description: Interventions:  - Encourage patient to monitor pain and request assistance  - Assess pain using appropriate pain scale  - Administer analgesics based on type and severity of pain and evaluate response  - Implement non-pharmacological measures as appropriate and evaluate response  - Consider cultural and social influences on pain and pain management  - Notify physician/advanced practitioner if interventions unsuccessful or patient reports new pain  Outcome: Progressing     Problem: INFECTION - ADULT  Goal: Absence or prevention of progression during hospitalization  Description: INTERVENTIONS:  - Assess and monitor for signs and symptoms of infection  - Monitor lab/diagnostic results  - Monitor all insertion sites, i.e. indwelling lines, tubes, and drains  - Monitor endotracheal if appropriate and nasal secretions for changes in amount and color  - [place] appropriate cooling/warming therapies per order  - Administer medications as ordered  - Instruct and encourage patient and family to use good hand hygiene technique  - Identify and instruct in appropriate isolation precautions for identified infection/condition  Outcome: Progressing  Goal: Absence of fever/infection during neutropenic period  Description: INTERVENTIONS:  - Monitor WBC    Outcome: Progressing     Problem: SAFETY ADULT  Goal: Patient will remain free of falls  Description: INTERVENTIONS:  - Educate patient/family on patient safety including physical limitations  - Instruct patient to call for assistance with activity   - Consult OT/PT to assist with strengthening/mobility   - Keep Call bell within reach  - Keep bed low and locked with side rails adjusted as appropriate  - Keep care items and personal belongings within reach  - Initiate and maintain comfort rounds  - Make Fall Risk Sign visible to staff  - Apply yellow socks and bracelet  for high fall risk patients  - Consider moving patient to room near nurses station  Outcome: Progressing  Goal: Maintain or return to baseline ADL function  Description: INTERVENTIONS:  -  Assess patient's ability to carry out ADLs; assess patient's baseline for ADL function and identify physical deficits which impact ability to perform ADLs (bathing, care of mouth/teeth, toileting, grooming, dressing, etc.)  - Assess/evaluate cause of self-care deficits   - Assess range of motion  - Assess patient's mobility; develop plan if impaired  - Assess patient's need for assistive devices and provide as appropriate  - Encourage maximum independence but intervene and supervise when necessary  - Involve family in performance of ADLs  - Assess for home care needs following discharge   - Consider OT consult to assist with ADL evaluation and planning for discharge  - Provide patient education as appropriate  Outcome: Progressing  Goal: Maintains/Returns to pre admission functional level  Description: INTERVENTIONS:  - Perform AM-PAC 6 Click Basic Mobility/ Daily Activity assessment daily.  - Set and communicate daily mobility goal to care team and patient/family/caregiver.   - Out of bed for toileting  - Record patient progress and toleration of activity level   Outcome: Progressing     Problem: DISCHARGE PLANNING  Goal: Discharge to home or other facility with appropriate resources  Description: INTERVENTIONS:  - Identify barriers to discharge w/patient and caregiver  - Arrange for needed discharge resources and transportation as appropriate  - Identify discharge learning needs (meds, wound care, etc.)  - Arrange for interpretive services to assist at discharge as needed  - Refer to Case Management Department for coordinating discharge planning if the patient needs post-hospital services based on physician/advanced practitioner order or complex needs related to functional status, cognitive ability, or social support  system  Outcome: Progressing     Problem: Knowledge Deficit  Goal: Patient/family/caregiver demonstrates understanding of disease process, treatment plan, medications, and discharge instructions  Description: Complete learning assessment and assess knowledge base.  Interventions:  - Provide teaching at level of understanding  - Provide teaching via preferred learning methods  Outcome: Progressing     Problem: RESPIRATORY - ADULT  Goal: Achieves optimal ventilation and oxygenation  Description: INTERVENTIONS:  - Assess for changes in respiratory status  - Assess for changes in mentation and behavior  - Position to facilitate oxygenation and minimize respiratory effort  - Oxygen administered by appropriate delivery if ordered  - Initiate smoking cessation education as indicated  - Encourage broncho-pulmonary hygiene including cough, deep breathe, Incentive Spirometry  - Assess the need for suctioning and aspirate as needed  - Assess and instruct to report SOB or any respiratory difficulty  - Respiratory Therapy support as indicated  Outcome: Progressing     Problem: GASTROINTESTINAL - ADULT  Goal: Minimal or absence of nausea and/or vomiting  Description: INTERVENTIONS:  - Administer IV fluids if ordered to ensure adequate hydration  - Maintain NPO status until nausea and vomiting are resolved  - Nasogastric tube if ordered  - Administer ordered antiemetic medications as needed  - Provide nonpharmacologic comfort measures as appropriate  - Advance diet as tolerated, if ordered  - Consider nutrition services referral to assist patient with adequate nutrition and appropriate food choices  Outcome: Progressing  Goal: Maintains or returns to baseline bowel function  Description: INTERVENTIONS:  - Assess bowel function  - Encourage oral fluids to ensure adequate hydration  - Administer IV fluids if ordered to ensure adequate hydration  - Administer ordered medications as needed  - Encourage mobilization and activity  -  Consider nutritional services referral to assist patient with adequate nutrition and appropriate food choices  Outcome: Progressing  Goal: Maintains adequate nutritional intake  Description: INTERVENTIONS:  - Monitor percentage of each meal consumed  - Identify factors contributing to decreased intake, treat as appropriate  - Assist with meals as needed  - Monitor I&O, weight, and lab values if indicated  - Obtain nutrition services referral as needed  Outcome: Progressing  Goal: Establish and maintain optimal ostomy function  Description: INTERVENTIONS:  - Assess bowel function  - Encourage oral fluids to ensure adequate hydration  - Administer IV fluids if ordered to ensure adequate hydration   - Administer ordered medications as needed  - Encourage mobilization and activity  - Nutrition services referral to assist patient with appropriate food choices  - Assess stoma site  - Consider wound care consult   Outcome: Progressing  Goal: Oral mucous membranes remain intact  Description: INTERVENTIONS  - Assess oral mucosa and hygiene practices  - Implement preventative oral hygiene regimen  - Implement oral medicated treatments as ordered  - Initiate Nutrition services referral as needed  Outcome: Progressing     Problem: HEMATOLOGIC - ADULT  Goal: Maintains hematologic stability  Description: INTERVENTIONS  - Assess for signs and symptoms of bleeding or hemorrhage  - Monitor labs  - Administer supportive blood products/factors as ordered and appropriate  Outcome: Progressing

## 2024-11-13 NOTE — PLAN OF CARE
Problem: SAFETY ADULT  Goal: Patient will remain free of falls  Description: INTERVENTIONS:  - Educate patient/family on patient safety including physical limitations  - Instruct patient to call for assistance with activity   - Consult OT/PT to assist with strengthening/mobility   - Keep Call bell within reach  - Keep bed low and locked with side rails adjusted as appropriate  - Keep care items and personal belongings within reach  - Initiate and maintain comfort rounds  - Make Fall Risk Sign visible to staff  - Offer Toileting every 2 Hours, in advance of need  - Initiate/Maintain bed alarm  - Obtain necessary fall risk management equipment: non skid socks  - Apply yellow socks and bracelet for high fall risk patients  - Consider moving patient to room near nurses station  Outcome: Progressing     Problem: GASTROINTESTINAL - ADULT  Goal: Minimal or absence of nausea and/or vomiting  Description: INTERVENTIONS:  - Administer IV fluids if ordered to ensure adequate hydration  - Maintain NPO status until nausea and vomiting are resolved  - Nasogastric tube if ordered  - Administer ordered antiemetic medications as needed  - Provide nonpharmacologic comfort measures as appropriate  - Advance diet as tolerated, if ordered  - Consider nutrition services referral to assist patient with adequate nutrition and appropriate food choices  Outcome: Progressing     Problem: GASTROINTESTINAL - ADULT  Goal: Maintains or returns to baseline bowel function  Description: INTERVENTIONS:  - Assess bowel function  - Encourage oral fluids to ensure adequate hydration  - Administer IV fluids if ordered to ensure adequate hydration  - Administer ordered medications as needed  - Encourage mobilization and activity  - Consider nutritional services referral to assist patient with adequate nutrition and appropriate food choices  Outcome: Progressing     Problem: HEMATOLOGIC - ADULT  Goal: Maintains hematologic stability  Description:  INTERVENTIONS  - Assess for signs and symptoms of bleeding or hemorrhage  - Monitor labs  - Administer supportive blood products/factors as ordered and appropriate  Outcome: Progressing

## 2024-11-13 NOTE — UTILIZATION REVIEW
Initial Clinical Review    Admission: Date/Time/Statement:   Admission Orders (From admission, onward)       Ordered        11/12/24 0053  INPATIENT ADMISSION  Once                          Orders Placed This Encounter   Procedures    INPATIENT ADMISSION     Standing Status:   Standing     Number of Occurrences:   1     Level of Care:   Level 2 Stepdown / HOT [14]     Estimated length of stay:   More than 2 Midnights     Certification:   I certify that inpatient services are medically necessary for this patient for a duration of greater than two midnights. See H&P and MD Progress Notes for additional information about the patient's course of treatment.     ED Arrival Information       Expected   -    Arrival   11/11/2024 23:03    Acuity   Emergent              Means of arrival   Walk-In    Escorted by   Family Member    Service   Hospitalist    Admission type   Emergency              Arrival complaint   -             Chief Complaint   Patient presents with    Shortness of Breath     Sob, N/V, dizziness, started last night    Rectal Bleeding     Black stools with some blood started last night    Abdominal Pain     Upper abdominal pain         Initial Presentation: 64 y.o. male with PMHx includes alcoholic cirrhosis, recent oropharyngeal squamous cell carcinoma, HTN, COPD, and DOT, who presented on 11/11/24 to [facility] ED due to abdominal pain, coffee-ground emesis x4 and melena for one day.  In the ED, tachycardic at 120. EKG ST. Labs revealed wbc 13.49, hgb 9.1, hct 27.8, K 5.6, anion gap 14, BUN 38, Ca 8.2, AST 65, total protein 5.6, alb 3.0, total bilirubin 3.17, trop 191, D-dimer 3.17, , PT 19.1, INR 1.56. Given 1L IVF, IV protonix and started on IV ceftriaxone in ED.    Plan:  Admit Inpatient status to ICU dt GI Bleed:   GI consult, keep NPO, monitor hgb q6h and transfuse less than 7. Monitor VS and labs. CM following.     Anticipated Length of Stay/Certification Statement: Patient will be  admitted on an inpatient basis with an anticipated length of stay of greater than 2 midnights secondary to GI bleed.     11/12 Per GI: baseline hgb 15. Hematemesis and melena-highly concerning for variceal bleed given evidence of decompensation on labs and ongoing alcohol use. Other differentials include peptic ulcer disease, angioectasias, dieulofoy lesion, much less likely malignancy. Plan for urgent EGD today. Keep NPO. Octreotide and Protonix drip. Ceftriaxone 1 g daily. Further recommendations to follow after EGD. Decompensated alcohol-related cirrhosis-ongoing alcohol use with evidence of further decline in liver function now with an elevated MELD to 18, thrombocytopenia, increased INR.     11/11 Per EGD:  IMPRESSION:  Multiple small varices in the lower third of the esophagus  C2M3 Nelson's esophagus  Severe, friable and mosaic portal hypertensive gastropathy in the body of the stomach, greater curve of the stomach and lesser curve of the stomach  The duodenal bulb, 1st part of the duodenum and 2nd part of the duodenum appeared normal.    RECOMMENDATION:  Continue with Octreotide for total 24 hours  Can transition to oral PPI 40 mg once a day  Clear liquid diet today   Extensive counseling for alcohol cessation   Repeat EGD in 1 year        11/12 Per Nephrology: Acute kidney injury on top of chronic kidney disease: Creatinine significantly increased at 0.97 mg/dL presentation, this is above his baseline which is around 0.6 mg/dL. Continue to optimize care especially from a volume standpoint as this is the presumed etiology of the patient's acute kidney injury. Currently being given packed red blood cells x 2, provide volume expansion with saline. Avoid nephrotoxins. Patient has not had exposure to IV contrast with imaging. Will check postvoid residual.     Date: 11/3   Day 2: c/o feeling shaky and dizzy today, abd cramping last night and had BM with continued melena. Continue IV ceftriaxone, octreotide  infusion, clear liquid diet today, monitor labs including hgb, VS.     ED Treatment-Medication Administration from 11/11/2024 2303 to 11/12/2024 0124         Date/Time Order Dose Route Action     11/12/2024 0032 pantoprazole (PROTONIX) injection 40 mg 40 mg Intravenous Given     11/12/2024 0030 sodium chloride 0.9 % bolus 1,000 mL 1,000 mL Intravenous New Bag     11/12/2024 0032 cefTRIAXone (ROCEPHIN) IVPB (premix in dextrose) 1,000 mg 50 mL 1,000 mg Intravenous New Bag            Scheduled Medications:  cefTRIAXone, 2,000 mg, Intravenous, Q24H  pantoprazole, 40 mg, Oral, Early Morning      Continuous IV Infusions:  octreotide, 50 mcg/hr, Intravenous, Continuous      PRN Meds:  ipratropium-albuterol, 3 mL, Nebulization, Once PRN  ondansetron, 4 mg, Intravenous, Q6H PRN      ED Triage Vitals   Temperature Pulse Respirations Blood Pressure SpO2 Pain Score   11/12/24 0132 11/11/24 2309 11/11/24 2309 11/11/24 2309 11/11/24 2309 11/11/24 2309   99.3 °F (37.4 °C) (!) 120 20 97/58 98 % 8     Weight (last 2 days)       Date/Time Weight    11/13/24 0617 126 (277.78)    11/12/24 1401 126 (277)    11/12/24 0132 126 (277.12)    11/11/24 2309 126 (278.22)            Vital Signs (last 3 days)       Date/Time Temp Pulse Resp BP MAP (mmHg) SpO2 O2 Flow Rate (L/min) O2 Device O2 Interface Device Patient Position - Orthostatic VS Wm Coma Scale Score Pain    11/13/24 0800 -- -- -- -- -- -- -- -- -- -- 15 --    11/13/24 0723 98.1 °F (36.7 °C) 98 23 108/55 76 98 % -- None (Room air) -- Lying -- No Pain    11/13/24 0617 -- 83 19 120/58 82 97 % -- -- -- Lying -- --    11/13/24 0600 -- 74 13 -- -- 98 % -- -- -- -- -- --    11/13/24 0500 -- 80 19 -- -- 98 % -- -- -- -- -- --    11/13/24 0400 -- 81 18 -- -- 99 % -- -- -- -- -- --    11/13/24 0300 -- 107 40 -- -- 98 % -- -- -- -- -- --    11/13/24 0200 -- 83 14 -- -- 97 % -- -- -- -- -- --    11/13/24 0121 -- -- -- -- -- -- -- -- Full face mask -- -- --    11/13/24 0100 -- 93 29 -- -- 93  % -- -- -- -- -- --    11/13/24 0000 98.1 °F (36.7 °C) 94 22 -- -- 94 % -- -- -- -- 15 --    11/12/24 2300 -- 94 20 110/56 80 92 % -- -- -- -- -- --    11/12/24 2200 -- 96 19 117/62 84 94 % -- -- -- -- -- --    11/12/24 2100 99.1 °F (37.3 °C) 100 20 128/60 86 97 % -- None (Room air) -- Lying -- No Pain    11/12/24 2000 -- 96 24 114/56 81 98 % -- -- -- -- 15 --    11/12/24 1900 -- 102 31 -- -- 98 % -- -- -- -- -- --    11/12/24 1610 -- 92 19 122/58 -- 97 % 96 L/min None (Room air) -- -- -- No Pain    11/12/24 1607 -- 93 20 114/58 -- 96 % -- None (Room air) -- -- -- No Pain    11/12/24 1555 -- 93 21 114/56 -- 99 % 4 L/min Simple mask -- -- -- No Pain    11/12/24 1550 -- 92 20 93/54 -- 97 % 97 L/min Simple mask -- -- -- No Pain    11/12/24 1548 -- 93 20 93/54 -- 97 % 5 L/min Simple mask -- -- -- No Pain    11/12/24 1546 -- 93 22 83/45 -- 97 % 5 L/min Simple mask -- -- -- No Pain    11/12/24 1545 -- 94 26 99/50 -- 98 % -- None (Room air) -- -- -- No Pain    11/12/24 1401 98.8 °F (37.1 °C) 98 18 146/61 -- 96 % -- None (Room air) -- -- -- No Pain    11/12/24 1200 -- 96 22 127/58 83 98 % -- -- -- -- -- --    11/12/24 1100 98.4 °F (36.9 °C) 102 20 128/63 86 94 % -- -- -- -- -- --    11/12/24 1000 -- 106 22 99/58 72 94 % -- -- -- -- -- --    11/12/24 0945 98.1 °F (36.7 °C) 108 24 95/56 -- 94 % -- -- -- -- -- --    11/12/24 0930 -- 119 29 94/54 -- 97 % -- -- -- -- -- --    11/12/24 0915 99.1 °F (37.3 °C) 116 18 110/59 -- 95 % -- -- -- -- -- --    11/12/24 0900 -- 115 24 107/54 -- 96 % -- -- -- -- -- --    11/12/24 0845 -- 114 21 105/54 -- 95 % -- -- -- -- -- --    11/12/24 0830 -- 129 30 129/58 -- 99 % -- -- -- -- -- --    11/12/24 0820 99.4 °F (37.4 °C) 109 26 116/55 79 94 % -- -- -- Lying -- --    11/12/24 0815 99.5 °F (37.5 °C) 113 24 109/54 -- 93 % -- -- -- -- -- --    11/12/24 0810 -- 111 -- 109/54 78 89 % -- -- -- -- -- --    11/12/24 0800 99.5 °F (37.5 °C) 113 22 131/51 79 93 % -- -- -- -- 15 --    11/12/24 0750 99.6  °F (37.6 °C) 107 19 129/60 -- 90 % -- -- -- -- -- --    11/12/24 0740 99.4 °F (37.4 °C) 103 12 120/56 -- 98 % -- -- -- -- -- --    11/12/24 0730 99.2 °F (37.3 °C) 104 22 124/58 -- 96 % -- -- -- -- -- --    11/12/24 0725 99.1 °F (37.3 °C) 107 22 124/58 -- 98 % -- -- -- -- -- --    11/12/24 0720 99.1 °F (37.3 °C) 105 27 122/56 -- 94 % -- -- -- -- -- --    11/12/24 0717 98.9 °F (37.2 °C) 109 31 122/56 -- 94 % -- -- -- -- -- --    11/12/24 0710 98.9 °F (37.2 °C) 105 13 120/51 74 95 % -- -- -- -- -- --    11/12/24 0650 98.4 °F (36.9 °C) -- -- -- -- -- -- -- -- -- -- --    11/12/24 0645 -- 102 20 130/57 -- 93 % -- -- -- -- -- --    11/12/24 0630 -- 119 25 83/54 -- 97 % -- -- -- -- -- --    11/12/24 0615 -- 106 20 83/52 63 95 % -- -- -- -- -- --    11/12/24 0600 -- 104 23 121/53 77 93 % -- -- -- -- -- --    11/12/24 0545 -- 108 24 116/53 75 96 % -- -- -- -- -- --    11/12/24 0530 -- 107 18 112/57 76 96 % -- -- -- -- -- --    11/12/24 0515 -- 110 23 108/57 77 95 % -- -- -- -- -- --    11/12/24 0500 -- 112 30 103/53 76 98 % -- -- -- -- -- --    11/12/24 0445 -- 120 -- -- -- 96 % -- -- -- -- -- --    11/12/24 0430 -- 110 20 109/58 78 97 % -- -- -- -- -- --    11/12/24 0415 -- 109 21 106/57 80 95 % -- -- -- -- -- --    11/12/24 0400 -- 104 13 128/58 84 94 % -- -- -- -- 15 --    11/12/24 0345 -- 119 38 96/48 67 94 % -- -- -- -- -- --    11/12/24 0330 -- 118 24 101/55 74 97 % -- -- -- -- -- --    11/12/24 0315 -- 111 21 124/56 81 96 % -- -- -- -- -- --    11/12/24 0300 -- 107 20 107/59 81 93 % -- -- -- -- -- --    11/12/24 0245 -- 114 22 121/59 85 95 % -- -- -- -- -- --    11/12/24 0230 -- 114 22 115/54 78 97 % -- -- -- -- -- --    11/12/24 0215 -- 107 28 107/59 82 96 % -- -- -- -- -- --    11/12/24 0200 -- 103 21 125/58 84 95 % -- -- -- -- -- --    11/12/24 0145 -- 128 -- 101/63 75 97 % -- -- -- -- -- --    11/12/24 0132 99.3 °F (37.4 °C) 122 18 128/64 90 98 % -- -- -- Lying 15 No Pain    11/12/24 0054 -- -- -- -- -- -- -- --  -- -- 15 --    11/12/24 0045 -- 116 18 102/61 76 98 % -- -- -- -- -- --    11/12/24 0030 -- 116 22 103/59 73 98 % -- -- -- -- -- --    11/12/24 0015 -- 120 22 106/58 75 96 % -- -- -- -- -- --    11/12/24 0000 -- 120 22 95/52 71 96 % -- -- -- -- -- --    11/11/24 2345 -- 120 21 102/54 74 94 % -- -- -- -- -- --    11/11/24 2330 -- 119 20 119/55 76 97 % -- -- -- -- -- --    11/11/24 2315 -- 118 22 96/51 68 99 % -- -- -- -- -- --    11/11/24 2309 -- 120 20 97/58 -- 98 % -- -- -- -- -- 8              Pertinent Labs/Diagnostic Test Results:   Radiology:  XR chest 1 view portable   Final Interpretation by Ashok Early MD (11/12 0742)      No acute cardiopulmonary disease.            Workstation performed: [id number]           Cardiology:  ECG 12 lead   Final Result by Rasta Spain MD (11/12 0913)   Sinus tachycardia   Otherwise normal ECG   When compared with ECG of 11-Nov-2024 23:07, (unconfirmed)   No significant change was found   Confirmed by Rasta Spain ([id number]) on 11/12/2024 9:13:41 AM      ECG 12 lead   Final Result by Rasta Spain MD (11/12 0913)   Sinus tachycardia   Otherwise normal ECG   When compared with ECG of 07-Apr-2023 04:31,   Vent. rate has increased by  39 bpm   Confirmed by Rasta Spain ([id number]) on 11/12/2024 9:13:55 AM        GI:  EGD   Final Result by Miranda Hernandez DO (11/12 1548)   Addendum (preliminary) 1 of 1 by Miranda Hernandez DO (11/12 1548)   [facility] Operating Room   [address] [place] [id number] [phone]         DATE OF SERVICE:   11/12/24      PHYSICIAN(S):   Attending:    Miranda Hernandez DO       Fellow:    Roseanne Beltre MD          INDICATION:   Alcoholic cirrhosis of liver (HCC), GI bleed      POST-OP DIAGNOSIS:   See the impression below.      PREPROCEDURE:   Informed consent was obtained for the procedure, including sedation.     Risks of perforation, hemorrhage, adverse drug reaction and aspiration    were  discussed. The patient was placed in the left lateral decubitus    position.      Patient was explained about the risks and benefits of the procedure. Risks    including but not limited to bleeding, infection, and perforation were    explained in detail. Also explained about less than 100% sensitivity with    the exam and other alternatives.      PROCEDURE: EGD      DETAILS OF PROCEDURE:    Patient was taken to the procedure room where a time out was performed to    confirm correct patient and correct procedure. The patient underwent    monitored anesthesia care, which was administered by an anesthesia    professional. The patient's blood pressure, heart rate, level of    consciousness, respirations, oxygen, ECG and ETCO2 were monitored    throughout the procedure. The scope was introduced through the mouth and    advanced to the second part of the duodenum. Retroflexion was performed in    the fundus. The patient experienced no blood loss. The procedure was not    difficult. The patient tolerated the procedure well. There were no    apparent adverse events.       ANESTHESIA INFORMATION:   ASA: III   Anesthesia Type: General      MEDICATIONS:   No administrations occurring from 1355 to 1535 on 11/12/24          FINDINGS:   Multiple small varices (no red clifford sign) in the lower third of the    esophagus; no bleeding was identified. Small varices seen which were fully    collapsible with insufflation, no signs of bleeding seen.   C2M3 Nelson's esophagus observed   Severe, friable and mosaic portal hypertensive gastropathy in the body of    the stomach, greater curve of the stomach and lesser curve of the stomach.    Significant PHG seen, most likely source of hematemesis   The duodenal bulb, 1st part of the duodenum and 2nd part of the duodenum    appeared normal.         SPECIMENS:   * No specimens in log *         IMPRESSION:   Multiple small varices in the lower third of the esophagus   C2M3 Nelson's esophagus    Severe, friable and mosaic portal hypertensive gastropathy in the body of    the stomach, greater curve of the stomach and lesser curve of the stomach   The duodenal bulb, 1st part of the duodenum and 2nd part of the duodenum    appeared normal.         RECOMMENDATION:   Continue with Octreotide for total 24 hours   Can transition to oral PPI 40 mg once a day   Clear liquid diet today    Extensive counseling for alcohol cessation    Repeat EGD in 1 year                     Miranda Hernandez DO                     Results from last 7 days   Lab Units 11/13/24  0615 11/12/24  1643 11/12/24  1119 11/12/24 0433 11/11/24  2313   WBC Thousand/uL 12.17*  --   --   --  13.49*   HEMOGLOBIN g/dL 7.1* 7.3* 7.5* 7.3* 9.1*   HEMATOCRIT % 21.9* 22.8* 22.8* 22.9* 27.8*   PLATELETS Thousands/uL 100*  --   --   --  166   TOTAL NEUT ABS Thousands/µL 8.03*  --   --   --  11.63*         Results from last 7 days   Lab Units 11/13/24  0615 11/12/24  1119 11/12/24  0607 11/12/24 0433 11/11/24  2313   SODIUM mmol/L 135  --   --  135 135   POTASSIUM mmol/L 4.1 4.8 6.1* 6.0* 5.6*   CHLORIDE mmol/L 108  --   --  106 102   CO2 mmol/L 24  --   --  24 19*   ANION GAP mmol/L 3*  --   --  5 14*   BUN mg/dL 38*  --   --  45* 38*   CREATININE mg/dL 0.75  --   --  0.92 0.97   EGFR ml/min/1.73sq m 95  --   --  86 81   CALCIUM mg/dL 7.4*  --   --  7.3* 8.2*   MAGNESIUM mg/dL 1.7*  --   --   --   --      Results from last 7 days   Lab Units 11/13/24  0615 11/12/24 0433 11/11/24  2313   AST U/L 61* 61* 65*   ALT U/L 25 27 29   ALK PHOS U/L 67 63 84   TOTAL PROTEIN g/dL 4.9* 4.8* 5.6*   ALBUMIN g/dL 2.6* 2.5* 3.0*   TOTAL BILIRUBIN mg/dL 1.58* 2.70* 3.17*         Results from last 7 days   Lab Units 11/13/24  0615 11/12/24  0433 11/11/24  2313   GLUCOSE RANDOM mg/dL 123 156* 178*     Results from last 7 days   Lab Units 11/12/24  0433 11/12/24  0113 11/11/24  2313   HS TNI 0HR ng/L  --   --  191*   HS TNI 2HR ng/L  --  210*  --    HSTNI D2 ng/L   "--  19  --    HS TNI 4HR ng/L 179*  --   --    HSTNI D4 ng/L -12  --   --      Results from last 7 days   Lab Units 11/11/24  2313   D-DIMER QUANTITATIVE ug/ml FEU 3.17*     Results from last 7 days   Lab Units 11/11/24  2313   PROTIME seconds 19.1*   INR  1.56*   PTT seconds 32     Results from last 7 days   Lab Units 11/11/24  2313   BNP pg/mL 146*     Results from last 7 days   Lab Units 11/12/24  0433   FERRITIN ng/mL 239   IRON ug/dL 212         Results from last 7 days   Lab Units 11/13/24  0530   UNIT PRODUCT CODE  [id number]  [id number]   UNIT NUMBER  [id number]  [id number]   UNITABO  A  A   UNITRH  POS  POS   CROSSMATCH  Compatible  Compatible   UNIT DISPENSE STATUS  Presumed Trans  Crossmatched   UNIT PRODUCT VOL ml 275  350     Past Medical History:   Diagnosis Date    AAA (abdominal aortic aneurysm) (HCC)     sees [facility]    Abnormal kidney function     last assessed: Feb 25, 2016    Allergic rhinitis     Arthritis     Benign colon polyp     Bruises easily     \"not new\"    Cervical radiculopathy     last assessed: Feb 11, 2015    Chronic fatigue     \"not chronic'    Chronic pain disorder     Cirrhosis (HCC)     COPD (chronic obstructive pulmonary disease) (HCC)     COVID 01/05/2024    CPAP (continuous positive airway pressure) dependence     Depression 04/07/2023    per pt in the past    Edema     Elevated liver enzymes     Gout     Herniated cervical disc     \"bulging\"    Hyperlipidemia     Hypertension     Hypotension     last assessed: March 22, 2017    Left ventricular hypertrophy     Low platelet count (HCC)     \"usually runs low\"    Lumbar herniated disc     Obesity (BMI 30-39.9) 09/20/2018    Sleep apnea     Tongue lesion      Present on Admission:   Abdominal aortic aneurysm (AAA) without rupture (HCC)   Alcoholic cirrhosis of liver (HCC)   Squamous cell carcinoma of tongue (HCC)   Acute blood loss anemia   GI bleed   Elevated troponin   Hyperkalemia      Admitting Diagnosis: " Shortness of breath [R06.02]  GI bleed [K92.2]  Age/Sex: 64 y.o. male    Network Utilization Review Department  ATTENTION: Please call with any questions or concerns to [phone] and carefully listen to the prompts so that you are directed to the right person. All voicemails are confidential.   For Discharge needs, contact Care Management DC Support Team at [phone] opt. 2  Send all requests for admission clinical reviews, approved or denied determinations and any other requests to dedicated fax number below belonging to the campus where the patient is receiving treatment. List of dedicated fax numbers for the Facilities:  FACILITY NAME UR FAX NUMBER   ADMISSION DENIALS (Administrative/Medical Necessity) [phone]   DISCHARGE SUPPORT TEAM (NETWORK) [phone]   PARENT CHILD HEALTH (Maternity/NICU/Pediatrics) [phone]   [facility] [phone]   [facility] [phone]   [facility] [phone]   [facility] [phone]   [facility] [phone]   [facility] [phone]   [facility] [phone]   [facility] [phone]   [facility] [phone]   [facility] [phone]   [facility] [phone]   [place] [facility] [phone]

## 2024-11-13 NOTE — ASSESSMENT & PLAN NOTE
Hgb baseline 15 > 9.1 on admission > 7.1 today  Received 1 unit PRBC on 11/12 and will receive another unit 11/13  Continue to frequently monitor blood counts

## 2024-11-13 NOTE — PROGRESS NOTES
Progress Note - Nephrology   Name: Rayumndo Weller 64 y.o. male I MRN: [id number]  Unit/Bed#: ICU 05-01 I Date of Admission: 11/11/2024   Date of Service: 11/13/2024 I Hospital Day: 1     Assessment & Plan  YUNIOR (acute kidney injury) (HCC)  Creatinine improved, now down 0.75 mg/dL from 0.9 to mg/dL status post volume expansion.  Continue to optimize care and avoid potential nephrotoxins.  CKD (chronic kidney disease) stage 2, GFR 60-89 ml/min  Lab Results   Component Value Date    EGFR 95 11/13/2024    EGFR 86 11/12/2024    EGFR 81 11/11/2024    CREATININE 0.75 11/13/2024    CREATININE 0.92 11/12/2024    CREATININE 0.97 11/11/2024   Baseline creatinine likely around 0.6 mg/dL.  Will continue to optimize care and expect for him to return to his previous baseline.  Hyperkalemia  Serum potassium levels have leveled out and are appropriate this morning at 4.1 mmol/L.  Continue to monitor for now, but expect that we should be okay going forward.  GI bleed  Patient had endoscopy yesterday which noted small varices as well as Nelson's esophagus associate with severe gastropathy.  He remains on octreotide but this will be weaned off today.  Remains on pantoprazole, but this was converted from a pantoprazole infusion to a 40 mg tablet once daily.  Acute blood loss anemia  Status post packed red blood cells, hemoglobin appears to be stable.  Alcoholic cirrhosis of liver (HCC)  Stable at this time.  Abdominal aortic aneurysm (AAA) without rupture (HCC)    Squamous cell carcinoma of tongue (HCC)    Elevated troponin    BMI 39.0-39.9,adult    SIRS (systemic inflammatory response syndrome) (HCC)          Case discussed with hospitalist.  Patient has improved and is likely okay from the renal standpoint.  Will sign off on the patient at this time, please inform the nephrology service if patient requires our services while he remains in the inpatient setting.    Follow up reason for today's visit: Acute kidney injury/electrolyte  "disorders    GI bleed    Patient Active Problem List   Diagnosis    Benign colon polyp    Benign essential hypertension    COPD (chronic obstructive pulmonary disease) (HCC)    Bilateral lower extremity edema    Gout    Hyperlipidemia    Hyponatremia    Herniated disc, cervical    Iron overload    Left atrial enlargement    Left ventricular hypertrophy    Macrocytosis    Mild mitral regurgitation    Obstructive sleep apnea    Pain syndrome, chronic    Renal cyst, right    Tobacco use    Chronic rhinitis    Microalbuminuria    Elevated LFTs    Lumbar disc disease with radiculopathy    Cervical disc disorder with radiculopathy of mid-cervical region    Cervical radiculopathy    Thrombocytopenia (HCC)    Depression    Abdominal aortic aneurysm (AAA) without rupture (HCC)    Adrenal abnormality (HCC)    Hepatomegaly    Elevated bilirubin    Splenomegaly    Elevated alkaline phosphatase level    Other ascites    History of colon polyps    History of alcoholism (HCC)    Bloating    Alcoholic cirrhosis of liver (HCC)    Squamous cell carcinoma of tongue (HCC)    Acute blood loss anemia    GI bleed    Elevated troponin    Hyperkalemia    BMI 39.0-39.9,adult    SIRS (systemic inflammatory response syndrome) (HCC)         Subjective:   Patient with no acute complaints this morning.    Objective:     Vitals: Blood pressure 108/55, pulse 98, temperature 98.1 °F (36.7 °C), temperature source Tympanic, resp. rate (!) 23, height 5' 10\" (1.778 m), weight 126 kg (277 lb 12.5 oz), SpO2 98%.,Body mass index is 39.86 kg/m².    Weight (last 2 days)       Date/Time Weight    11/13/24 0617 126 (277.78)    11/12/24 1401 126 (277)    11/12/24 0132 126 (277.12)    11/11/24 2309 126 (278.22)              Intake/Output Summary (Last 24 hours) at 11/13/2024 0932  Last data filed at 11/13/2024 0915  Gross per 24 hour   Intake 1585.17 ml   Output 1026 ml   Net 559.17 ml     I/O last 3 completed shifts:  In: 2698.9 [P.O.:50; I.V.:1248.9; " "Blood:350; IV Piggyback:1050]  Out: 1225 [Urine:1025; Stool:200]         Physical Exam: /55 (BP Location: Left arm)   Pulse 98   Temp 98.1 °F (36.7 °C) (Tympanic)   Resp (!) 23   Ht 5' 10\" (1.778 m)   Wt 126 kg (277 lb 12.5 oz)   SpO2 98%   BMI 39.86 kg/m²     General Appearance:    Alert, cooperative, no distress, appears stated age   Head:    Normocephalic, without obvious abnormality, atraumatic   Eyes:    Conjunctiva/corneas clear   Ears:    Normal external ears   Nose:   Nares normal, septum midline, mucosa normal, no drainage    or sinus tenderness   Throat:   Lips, mucosa, and tongue normal; teeth and gums normal   Neck:   Supple   Back:     Symmetric, no curvature, ROM normal, no CVA tenderness   Lungs:     Clear to auscultation bilaterally, respirations unlabored   Chest wall:    No tenderness or deformity   Heart:    Regular rate and rhythm, S1 and S2 normal, no murmur, rub   or gallop   Abdomen:     Soft, non-tender, bowel sounds active   Extremities:   Extremities normal, atraumatic, no cyanosis or edema   Skin:   Skin color, texture, turgor normal, no rashes or lesions   Lymph nodes:   Cervical normal   Neurologic:   CNII-XII intact            Lab, Imaging and other studies: I have personally reviewed pertinent labs.  CBC:   Lab Results   Component Value Date    WBC 12.17 (H) 11/13/2024    HGB 7.1 (L) 11/13/2024    HCT 21.9 (L) 11/13/2024     (H) 11/13/2024     (L) 11/13/2024    RBC 2.08 (L) 11/13/2024    MCH 34.1 11/13/2024    MCHC 32.4 11/13/2024    RDW 18.5 (H) 11/13/2024    MPV 10.5 11/13/2024    NRBC 0 11/13/2024     CMP:   Lab Results   Component Value Date    K 4.1 11/13/2024     11/13/2024    CO2 24 11/13/2024    BUN 38 (H) 11/13/2024    CREATININE 0.75 11/13/2024    CALCIUM 7.4 (L) 11/13/2024    AST 61 (H) 11/13/2024    ALT 25 11/13/2024    ALKPHOS 67 11/13/2024    EGFR 95 11/13/2024       .  Results from last 7 days   Lab Units 11/13/24  0615 11/12/24  1119 " "11/12/24  0607 11/12/24  0433 11/11/24  2313   POTASSIUM mmol/L 4.1 4.8 6.1* 6.0* 5.6*   CHLORIDE mmol/L 108  --   --  106 102   CO2 mmol/L 24  --   --  24 19*   BUN mg/dL 38*  --   --  45* 38*   CREATININE mg/dL 0.75  --   --  0.92 0.97   CALCIUM mg/dL 7.4*  --   --  7.3* 8.2*   ALK PHOS U/L 67  --   --  63 84   ALT U/L 25  --   --  27 29   AST U/L 61*  --   --  61* 65*         Phosphorus: No results found for: \"PHOS\"  Magnesium:   Lab Results   Component Value Date    MG 1.7 (L) 11/13/2024     Urinalysis: No results found for: \"COLORU\", \"CLARITYU\", \"SPECGRAV\", \"PHUR\", \"LEUKOCYTESUR\", \"NITRITE\", \"PROTEINUA\", \"GLUCOSEU\", \"KETONESU\", \"BILIRUBINUR\", \"BLOODU\"  Ionized Calcium: No results found for: \"CAION\"  Coagulation: No results found for: \"PT\", \"INR\", \"APTT\"  Troponin: No results found for: \"TROPONINI\"  ABG: No results found for: \"PHART\", \"HCW1HZL\", \"PO2ART\", \"YIN5OPT\", \"N6LPMVVN\", \"BEART\", \"SOURCE\"  Radiology review:     IMAGING  Procedure: EGD  Addendum Date: 11/12/2024  Addendum: [facility] Operating Room [address] [place] [id number] [phone] DATE OF SERVICE: 11/12/24 PHYSICIAN(S): Attending: Miranda Hernandez DO Fellow: Roseanne Beltre MD INDICATION: Alcoholic cirrhosis of liver (HCC), GI bleed POST-OP DIAGNOSIS: See the impression below. PREPROCEDURE: Informed consent was obtained for the procedure, including sedation.  Risks of perforation, hemorrhage, adverse drug reaction and aspiration were discussed. The patient was placed in the left lateral decubitus position. Patient was explained about the risks and benefits of the procedure. Risks including but not limited to bleeding, infection, and perforation were explained in detail. Also explained about less than 100% sensitivity with the exam and other alternatives. PROCEDURE: EGD DETAILS OF PROCEDURE: Patient was taken to the procedure room where a time out was performed to confirm correct patient and correct procedure. The " patient underwent monitored anesthesia care, which was administered by an anesthesia professional. The patient's blood pressure, heart rate, level of consciousness, respirations, oxygen, ECG and ETCO2 were monitored throughout the procedure. The scope was introduced through the mouth and advanced to the second part of the duodenum. Retroflexion was performed in the fundus. The patient experienced no blood loss. The procedure was not difficult. The patient tolerated the procedure well. There were no apparent adverse events. ANESTHESIA INFORMATION: ASA: III Anesthesia Type: General MEDICATIONS: No administrations occurring from 1355 to 1535 on 11/12/24 FINDINGS: Multiple small varices (no red clifford sign) in the lower third of the esophagus; no bleeding was identified. Small varices seen which were fully collapsible with insufflation, no signs of bleeding seen. 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 Significant PHG seen, most likely source of hematemesis The duodenal bulb, 1st part of the duodenum and 2nd part of the duodenum appeared normal. SPECIMENS: * No specimens in log * IMPRESSION: Multiple small varices in the lower third of the esophagus C2M3 Nelson's esophagus Severe, friable and mosaic portal hypertensive gastropathy in the body of the stomach, greater curve of the stomach and lesser curve of the stomach The duodenal bulb, 1st part of the duodenum and 2nd part of the duodenum appeared normal. RECOMMENDATION: Continue with Octreotide for total 24 hours Can transition to oral PPI 40 mg once a day Clear liquid diet today Extensive counseling for alcohol cessation Repeat EGD in 1 year    Miranda Hernandez DO     Result Date: 11/12/2024  Narrative: Table formatting from the original result was not included. [facility] Operating Room [address]   [place] [id number] [phone] DATE OF SERVICE: 11/12/24 PHYSICIAN(S): Attending: Miranda Hernandez DO Fellow: Roseanne Beltre MD INDICATION: Alcoholic cirrhosis of liver (HCC), GI bleed POST-OP DIAGNOSIS: See the impression below. PREPROCEDURE: Informed consent was obtained for the procedure, including sedation.  Risks of perforation, hemorrhage, adverse drug reaction and aspiration were discussed. The patient was placed in the left lateral decubitus position. Patient was explained about the risks and benefits of the procedure. Risks including but not limited to bleeding, infection, and perforation were explained in detail. Also explained about less than 100% sensitivity with the exam and other alternatives. PROCEDURE: EGD DETAILS OF PROCEDURE: Patient was taken to the procedure room where a time out was performed to confirm correct patient and correct procedure. The patient underwent monitored anesthesia care, which was administered by an anesthesia professional. The patient's blood pressure, heart rate, level of consciousness, respirations, oxygen, ECG and ETCO2 were monitored throughout the procedure. The scope was introduced through the mouth and advanced to the second part of the duodenum. Retroflexion was performed in the fundus. The patient experienced no blood loss. The procedure was not difficult. The patient tolerated the procedure well. There were no apparent adverse events. ANESTHESIA INFORMATION: ASA: III Anesthesia Type: General MEDICATIONS: No administrations occurring from 1355 to 1535 on 11/12/24 FINDINGS: Multiple small varices (no red clifford sign) in the lower third of the esophagus; no bleeding was identified. Small varices seen which were fully collapsible with insufflation, no signs of bleeding seen. 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  Significant PHG seen, most likely source of hematemesis The duodenal bulb, 1st part of the duodenum and 2nd part of the duodenum appeared normal. 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 RECOMMENDATION: Continue with Octreotide for total 24 hours Can transition to oral PPI 40 mg once a day Clear liquid diet today Extensive counseling for alcohol cessation Repeat EGD in 1 year    Miranda Hernandez DO     Procedure: XR chest 1 view portable  Result Date: 11/12/2024  Narrative: XR CHEST PORTABLE INDICATION: SOB. COMPARISON: 08/12/2024 FINDINGS: Clear lungs. No pneumothorax or pleural effusion. Normal cardiomediastinal silhouette. Bones are unremarkable for age. Normal upper abdomen.     Impression: No acute cardiopulmonary disease. Workstation performed: [id number]         Current Facility-Administered Medications:     cefTRIAXone (ROCEPHIN) IVPB (premix in dextrose) 2,000 mg 50 mL, Q24H    ipratropium-albuterol (DUO-NEB) 0.5-2.5 mg/3 mL inhalation solution 3 mL, Once PRN    magnesium Oxide (MAG-OX) tablet 800 mg, Once    [COMPLETED] octreotide (SandoSTATIN) injection 50 mcg, Once **FOLLOWED BY** octreotide (SandoSTATIN) 500 mcg in sodium chloride 0.9 % 250 mL infusion, Continuous    ondansetron (ZOFRAN) injection 4 mg, Q6H PRN    pantoprazole (PROTONIX) EC tablet 40 mg, Early Morning  Medications Discontinued During This Encounter   Medication Reason    octreotide (SandoSTATIN) 500 mcg in sodium chloride 0.9 % 250 mL infusion     insulin regular (HumuLIN R,NovoLIN R) IV syringe 10 Units     insulin regular (HumuLIN R,NovoLIN R) IV syringe 10 Units     lactated ringers infusion     lactated  ringers infusion     cefTRIAXone (ROCEPHIN) IVPB (premix in dextrose) 1,000 mg 50 mL     pantoprazole (PROTONIX) 80 mg in sodium chloride 0.9 % 100 mL infusion     sodium chloride 0.9 % infusion     octreotide (SandoSTATIN) 500 mcg in sodium chloride 0.9 % 250 mL infusion        Delano Modi,       This progress note was produced in part using a dictation device which may document imprecise wording from author's original intent.

## 2024-11-13 NOTE — ASSESSMENT & PLAN NOTE
Patient had endoscopy yesterday which noted small varices as well as Nelson's esophagus associate with severe gastropathy.  He remains on octreotide but this will be weaned off today.  Remains on pantoprazole, but this was converted from a pantoprazole infusion to a 40 mg tablet once daily.

## 2024-11-13 NOTE — ASSESSMENT & PLAN NOTE
Serum potassium levels have leveled out and are appropriate this morning at 4.1 mmol/L.  Continue to monitor for now, but expect that we should be okay going forward.

## 2024-11-13 NOTE — ASSESSMENT & PLAN NOTE
Lab Results   Component Value Date    EGFR 95 11/13/2024    EGFR 86 11/12/2024    EGFR 81 11/11/2024    CREATININE 0.75 11/13/2024    CREATININE 0.92 11/12/2024    CREATININE 0.97 11/11/2024   Baseline creatinine likely around 0.6 mg/dL.  Will continue to optimize care and expect for him to return to his previous baseline.

## 2024-11-13 NOTE — ASSESSMENT & PLAN NOTE
Esophageal varices screening on 1/26/24 with only one small varix.   HCC Screening - CT w/contrast done 4/2024 without mass  AFP elevated in past, normal today.  Resume spironolactone when able.     MELD 3.0: 15 at 11/13/2024  6:15 AM  MELD-Na: 15 at 11/13/2024  6:15 AM  Calculated from:  Serum Creatinine: 0.75 mg/dL (Using min of 1 mg/dL) at 11/13/2024  6:15 AM  Serum Sodium: 135 mmol/L at 11/13/2024  6:15 AM  Total Bilirubin: 1.58 mg/dL at 11/13/2024  6:15 AM  Serum Albumin: 2.6 g/dL at 11/13/2024  6:15 AM  INR(ratio): 1.56 at 11/11/2024 11:13 PM  Age at listing (hypothetical): 64 years  Sex: Male at 11/13/2024  6:15 AM

## 2024-11-13 NOTE — ASSESSMENT & PLAN NOTE
Four episodes of coffee-ground emesis and maroon/black stools starting 11/11   and BP 97/58 on arrival. BP improved to 112/62 with 1 liter IVF.   Baseline hgb 15, at 9.1 on admission and today at 7.1 - will receive 1 unit PRBC  GI consulted and started empiric tx with PPI, octreotide, ceftriaxone.   EGD completed 11/12 showing small varices, barretts esophagus, severe gastropathy   Octreotide scheduled to stop at 1600 today  Continue on ceftriaxone   Clear liquid diet today, likely advance to regulars tomorrow

## 2024-11-13 NOTE — CASE MANAGEMENT
Case Management Discharge Planning Note    Patient name Raymundo Weller  Location ICU 05/ICU 05 MRN [id number]  : 1960 Date 2024       Current Admission Date: 2024  Current Admission Diagnosis:GI bleed   Patient Active Problem List    Diagnosis Date Noted Date Diagnosed    SIRS (systemic inflammatory response syndrome) (HCC) 2024     YUNIOR (acute kidney injury) (HCC) 2024     CKD (chronic kidney disease) stage 2, GFR 60-89 ml/min 2024     Alcoholic cirrhosis of liver (HCC) 2024     Squamous cell carcinoma of tongue (HCC) 2024     Acute blood loss anemia 2024     GI bleed 2024     Elevated troponin 2024     Hyperkalemia 2024     BMI 39.0-39.9,adult 2024     Bloating 2023     Hepatomegaly 2023     Elevated bilirubin 2023     Splenomegaly 2023     Elevated alkaline phosphatase level 2023     Other ascites 2023     History of colon polyps 2023     History of alcoholism (HCC) 2023     Abdominal aortic aneurysm (AAA) without rupture (HCC) 2023     Adrenal abnormality (HCC) 2023     Depression 2023     Thrombocytopenia (HCC) 2023     Lumbar disc disease with radiculopathy 01/10/2023     Cervical disc disorder with radiculopathy of mid-cervical region 01/10/2023     Cervical radiculopathy 01/10/2023     Elevated LFTs 10/18/2021     Microalbuminuria 2021     Chronic rhinitis 2018     Iron overload 2017     Tobacco use 2017     Hyponatremia 2017     COPD (chronic obstructive pulmonary disease) (HCC) 2017     Renal cyst, right 10/12/2016     Left atrial enlargement 2016     Left ventricular hypertrophy 2016     Mild mitral regurgitation 2016     Pain syndrome, chronic 2015     Benign colon polyp 2012     Hyperlipidemia 2012     Herniated disc, cervical 2012     Benign essential hypertension  08/30/2012     Gout 08/30/2012     Macrocytosis 08/30/2012     Obstructive sleep apnea 08/30/2012     Bilateral lower extremity edema 06/27/2012       LOS (days): 1  Geometric Mean LOS (GMLOS) (days): 4.8  Days to GMLOS:3.4     OBJECTIVE:  Risk of Unplanned Readmission Score: 19.46         Current admission status: Inpatient   Preferred Pharmacy:   [facility] [place] - [address]  [address] [place] [address]  Phone: [phone] Fax: [phone]    [facility] [place]  [place], [place] - [address]  [address]  [place] [address]  Phone: [phone] Fax: [phone]    [facility] [place] [address],  [address]  [address] [facility] [place] [address]  Phone: [phone] Fax: [phone]    Primary Care Provider: Lisandro Gauthier DO    Primary Insurance: CAPITAL  Secondary Insurance:     DISCHARGE DETAILS:  Pt is requesting OP D&A information.  Provided the contact information to [facility].

## 2024-11-13 NOTE — PROGRESS NOTES
Progress Note - Hospitalist   Name: Raymundo Weller 64 y.o. male I MRN: [id number]  Unit/Bed#: ICU 05-01 I Date of Admission: 11/11/2024   Date of Service: 11/13/2024 I Hospital Day: 1    Assessment & Plan  GI bleed  Four episodes of coffee-ground emesis and maroon/black stools starting 11/11   and BP 97/58 on arrival. BP improved to 112/62 with 1 liter IVF.   Baseline hgb 15, at 9.1 on admission and today at 7.1 - will receive 1 unit PRBC  GI consulted and started empiric tx with PPI, octreotide, ceftriaxone.   EGD completed 11/12 showing small varices, barretts esophagus, severe gastropathy   Octreotide scheduled to stop at 1600 today  Continue on ceftriaxone   Clear liquid diet today, likely advance to regulars tomorrow  Acute blood loss anemia  Hgb baseline 15 > 9.1 on admission > 7.1 today  Received 1 unit PRBC on 11/12 and will receive another unit 11/13  Continue to frequently monitor blood counts  Alcoholic cirrhosis of liver (HCC)  Esophageal varices screening on 1/26/24 with only one small varix.   HCC Screening - CT w/contrast done 4/2024 without mass  AFP elevated in past, normal today.  Resume spironolactone when able.     MELD 3.0: 15 at 11/13/2024  6:15 AM  MELD-Na: 15 at 11/13/2024  6:15 AM  Calculated from:  Serum Creatinine: 0.75 mg/dL (Using min of 1 mg/dL) at 11/13/2024  6:15 AM  Serum Sodium: 135 mmol/L at 11/13/2024  6:15 AM  Total Bilirubin: 1.58 mg/dL at 11/13/2024  6:15 AM  Serum Albumin: 2.6 g/dL at 11/13/2024  6:15 AM  INR(ratio): 1.56 at 11/11/2024 11:13 PM  Age at listing (hypothetical): 64 years  Sex: Male at 11/13/2024  6:15 AM    SIRS (systemic inflammatory response syndrome) (HCC)  Meeting SIRS with leukocytosis and tachycardia  Most likely reactive to GI bleed  However covering with abx given varices and ascites   Leukocytosis improved slightly, continue to monitor  Abdominal aortic aneurysm (AAA) without rupture (HCC)  3.7 cm infrarenal abdominal aortic ectasia seen on CT  "04/08/2024  Squamous cell carcinoma of tongue (HCC)  S/p revision with positive margins in September 2024. Patient elected to closely monitor.  Elevated troponin  Likely nonischemic elevation due to demand from tachycardia due to GI bleed  Hyperkalemia  Mildly elevated at 5.6 on arrival. Will hold PTA spironolactone temporarily.  Now normalized  BMI 39.0-39.9,adult  Lifestyle modifications recommended once acute illness improved    VTE Pharmacologic Prophylaxis: VTE Score: 6 High Risk (Score >/= 5) - Pharmacological DVT Prophylaxis Contraindicated. Sequential Compression Devices Ordered.    Mobility:   Basic Mobility Inpatient Raw Score: 22  JH-HLM Goal: 7: Walk 25 feet or more  JH-HLM Achieved: 4: Move to chair/commode  JH-HLM Goal NOT achieved. Continue with multidisciplinary rounding and encourage appropriate mobility to improve upon JH-HLM goals.    Patient Centered Rounds: I performed bedside rounds with nursing staff today.   Discussions with Specialists or Other Care Team Provider: GI, CM    Education and Discussions with Family / Patient:  will discuss with wife at bedside later today.     Current Length of Stay: 1 day(s)  Current Patient Status: Inpatient   Certification Statement: The patient will continue to require additional inpatient hospital stay due to monitoring hgb, advancing diet  Discharge Plan: Anticipate discharge in 24-48 hrs to home.    Code Status: Level 1 - Full Code    Subjective   Patient reports that he still feels a little shaky today and dizzy when standing. He noted mild abdominal cramping last night and a bowel movement thereafter but no further abdominal pain since. He does continue to have melena but reports it is \"slowing down\" and denies any further hematemesis.     Objective :  Temp:  [98.1 °F (36.7 °C)-99.1 °F (37.3 °C)] 98.1 °F (36.7 °C)  HR:  [] 98  BP: ()/(45-63) 108/55  Resp:  [13-40] 23  SpO2:  [92 %-99 %] 98 %  O2 Device: None (Room air)    Body mass index is " 39.86 kg/m².     Input and Output Summary (last 24 hours):     Intake/Output Summary (Last 24 hours) at 11/13/2024 0858  Last data filed at 11/13/2024 0824  Gross per 24 hour   Intake 1552.67 ml   Output 1026 ml   Net 526.67 ml       Physical Exam  Vitals and nursing note reviewed.   Constitutional:       General: He is not in acute distress.     Appearance: He is obese. He is ill-appearing.   Cardiovascular:      Rate and Rhythm: Normal rate and regular rhythm.      Pulses: Normal pulses.      Heart sounds: Normal heart sounds. No murmur heard.     No gallop.   Pulmonary:      Effort: Pulmonary effort is normal.      Breath sounds: Normal breath sounds. No wheezing or rales.   Abdominal:      General: Bowel sounds are normal. There is distension.      Palpations: Abdomen is soft.      Tenderness: There is no abdominal tenderness. There is no guarding.   Skin:     Coloration: Skin is pale.   Neurological:      Mental Status: He is alert and oriented to person, place, and time. Mental status is at baseline.   Psychiatric:         Mood and Affect: Mood normal.         Behavior: Behavior normal.           Lines/Drains:              Lab Results: I have reviewed the following results:   Results from last 7 days   Lab Units 11/13/24  0615   WBC Thousand/uL 12.17*   HEMOGLOBIN g/dL 7.1*   HEMATOCRIT % 21.9*   PLATELETS Thousands/uL 100*   SEGS PCT % 66   LYMPHO PCT % 23   MONO PCT % 9   EOS PCT % 1     Results from last 7 days   Lab Units 11/13/24  0615   SODIUM mmol/L 135   POTASSIUM mmol/L 4.1   CHLORIDE mmol/L 108   CO2 mmol/L 24   BUN mg/dL 38*   CREATININE mg/dL 0.75   ANION GAP mmol/L 3*   CALCIUM mg/dL 7.4*   ALBUMIN g/dL 2.6*   TOTAL BILIRUBIN mg/dL 1.58*   ALK PHOS U/L 67   ALT U/L 25   AST U/L 61*   GLUCOSE RANDOM mg/dL 123     Results from last 7 days   Lab Units 11/11/24  2313   INR  1.56*                   Recent Cultures (last 7 days):         Imaging Results Review: I reviewed radiology reports from this  admission including: procedure reports.  Other Study Results Review: No additional pertinent studies reviewed.    Last 24 Hours Medication List:     Current Facility-Administered Medications:     cefTRIAXone (ROCEPHIN) IVPB (premix in dextrose) 2,000 mg 50 mL, Q24H    ipratropium-albuterol (DUO-NEB) 0.5-2.5 mg/3 mL inhalation solution 3 mL, Once PRN    [COMPLETED] octreotide (SandoSTATIN) injection 50 mcg, Once **FOLLOWED BY** octreotide (SandoSTATIN) 500 mcg in sodium chloride 0.9 % 250 mL infusion, Continuous, Last Rate: 50 mcg/hr (11/13/24 0028)    ondansetron (ZOFRAN) injection 4 mg, Q6H PRN    pantoprazole (PROTONIX) 80 mg in sodium chloride 0.9 % 100 mL infusion, Continuous, Last Rate: 8 mg/hr (11/12/24 1517)    sodium chloride 0.9 % infusion, Continuous, Last Rate: 20 mL/hr (11/12/24 1652)    Administrative Statements   Today, Patient Was Seen By: Amara Rico PA-C  I have spent a total time of 60 minutes in caring for this patient on the day of the visit/encounter including Diagnostic results, Prognosis, Risks and benefits of tx options, Instructions for management, Patient and family education, Importance of tx compliance, Risk factor reductions, Impressions, Counseling / Coordination of care, Documenting in the medical record, Reviewing / ordering tests, medicine, procedures  , Obtaining or reviewing history  , and Communicating with other healthcare professionals .    **Please Note: This note may have been constructed using a voice recognition system.**

## 2024-11-13 NOTE — ASSESSMENT & PLAN NOTE
Meeting SIRS with leukocytosis and tachycardia  Most likely reactive to GI bleed  However covering with abx given varices and ascites   Leukocytosis improved slightly, continue to monitor

## 2024-11-13 NOTE — ASSESSMENT & PLAN NOTE
Creatinine improved, now down 0.75 mg/dL from 0.9 to mg/dL status post volume expansion.  Continue to optimize care and avoid potential nephrotoxins.

## 2024-11-13 NOTE — UTILIZATION REVIEW
NOTIFICATION OF INPATIENT ADMISSION   AUTHORIZATION REQUEST   SERVICING FACILITY:   [place] [address]  Tax ID: [id number]  NPI: [id number]   ATTENDING PROVIDER:  Attending Name and NPI#: Kartik Stewart Do [[id number]]  Address: [address] [place] [address]  Phone: [phone]     ADMISSION INFORMATION:  Place of Service: Inpatient [facility]  Place of Service Code: 21  Inpatient Admission Date/Time: 11/12/24 12:53 AM  Discharge Date/Time: No discharge date for patient encounter.  Admitting Diagnosis Code/Description:  Shortness of breath [R06.02]  GI bleed [K92.2]     UTILIZATION REVIEW CONTACT:  Allegra Mena, Utilization   Network Utilization Review Department  Phone: [phone]  Fax: [phone]  Email: [email or web address]  Contact for approvals/pending authorizations, clinical reviews, and discharge.     PHYSICIAN ADVISORY SERVICES:  Medical Necessity Denial & [facility] Review  Phone: [phone]  Fax: [phone]  Email: [email or web address]     DISCHARGE SUPPORT TEAM:  For Patients Discharge Needs & Updates  Phone: [phone] opt. 2 Fax: [phone]  Email: [email or web address]

## 2024-11-14 ENCOUNTER — APPOINTMENT (INPATIENT)
Dept: INTERVENTIONAL RADIOLOGY/VASCULAR | Facility: HOSPITAL | Age: 64
DRG: 442 | End: 2024-11-14
Payer: COMMERCIAL

## 2024-11-14 LAB
ABO GROUP BLD BPU: NORMAL
ABO GROUP BLD BPU: NORMAL
ALBUMIN FLD-MCNC: <1.5 G/DL
ALBUMIN SERPL BCG-MCNC: 2.7 G/DL (ref 3.5–5)
ALP SERPL-CCNC: 70 U/L (ref 34–104)
ALT SERPL W P-5'-P-CCNC: 30 U/L (ref 7–52)
ANION GAP SERPL CALCULATED.3IONS-SCNC: 3 MMOL/L (ref 4–13)
APPEARANCE FLD: CLEAR
AST SERPL W P-5'-P-CCNC: 78 U/L (ref 13–39)
BASOPHILS # BLD AUTO: 0.04 THOUSANDS/ÂΜL (ref 0–0.1)
BASOPHILS NFR BLD AUTO: 1 % (ref 0–1)
BILIRUB SERPL-MCNC: 1.66 MG/DL (ref 0.2–1)
BPU ID: NORMAL
BPU ID: NORMAL
BUN SERPL-MCNC: 26 MG/DL (ref 5–25)
CALCIUM ALBUM COR SERPL-MCNC: 8.4 MG/DL (ref 8.3–10.1)
CALCIUM SERPL-MCNC: 7.4 MG/DL (ref 8.4–10.2)
CHLORIDE SERPL-SCNC: 107 MMOL/L (ref 96–108)
CO2 SERPL-SCNC: 24 MMOL/L (ref 21–32)
COLOR FLD: NORMAL
CREAT SERPL-MCNC: 0.7 MG/DL (ref 0.6–1.3)
CROSSMATCH: NORMAL
CROSSMATCH: NORMAL
EOSINOPHIL # BLD AUTO: 0.35 THOUSAND/ÂΜL (ref 0–0.61)
EOSINOPHIL NFR BLD AUTO: 4 % (ref 0–6)
ERYTHROCYTE [DISTWIDTH] IN BLOOD BY AUTOMATED COUNT: 19.4 % (ref 11.6–15.1)
GFR SERPL CREATININE-BSD FRML MDRD: 98 ML/MIN/1.73SQ M
GLUCOSE FLD-MCNC: 126 MG/DL
GLUCOSE SERPL-MCNC: 108 MG/DL (ref 65–140)
GLUCOSE SERPL-MCNC: 114 MG/DL (ref 65–140)
GLUCOSE SERPL-MCNC: 133 MG/DL (ref 65–140)
HCT VFR BLD AUTO: 23.2 % (ref 36.5–49.3)
HGB BLD-MCNC: 7.6 G/DL (ref 12–17)
HISTIOCYTES NFR FLD: 48 %
IMM GRANULOCYTES # BLD AUTO: 0.07 THOUSAND/UL (ref 0–0.2)
IMM GRANULOCYTES NFR BLD AUTO: 1 % (ref 0–2)
LDH FLD L TO P-CCNC: 81 U/L
LYMPHOCYTES # BLD AUTO: 2.28 THOUSANDS/ÂΜL (ref 0.6–4.47)
LYMPHOCYTES NFR BLD AUTO: 16 %
LYMPHOCYTES NFR BLD AUTO: 26 % (ref 14–44)
MCH RBC QN AUTO: 34.5 PG (ref 26.8–34.3)
MCHC RBC AUTO-ENTMCNC: 32.8 G/DL (ref 31.4–37.4)
MCV RBC AUTO: 106 FL (ref 82–98)
MONO+MESO NFR FLD MANUAL: 31 %
MONOCYTES # BLD AUTO: 0.6 THOUSAND/ÂΜL (ref 0.17–1.22)
MONOCYTES NFR BLD AUTO: 3 %
MONOCYTES NFR BLD AUTO: 7 % (ref 4–12)
NEUTROPHILS # BLD AUTO: 5.51 THOUSANDS/ÂΜL (ref 1.85–7.62)
NEUTS SEG NFR BLD AUTO: 2 %
NEUTS SEG NFR BLD AUTO: 61 % (ref 43–75)
NRBC BLD AUTO-RTO: 0 /100 WBCS
PLATELET # BLD AUTO: 79 THOUSANDS/UL (ref 149–390)
PMV BLD AUTO: 10.3 FL (ref 8.9–12.7)
POTASSIUM SERPL-SCNC: 3.7 MMOL/L (ref 3.5–5.3)
PROT FLD-MCNC: <3 G/DL
PROT SERPL-MCNC: 5.1 G/DL (ref 6.4–8.4)
RBC # BLD AUTO: 2.2 MILLION/UL (ref 3.88–5.62)
SITE: NORMAL
SODIUM SERPL-SCNC: 134 MMOL/L (ref 135–147)
TOTAL CELLS COUNTED SPEC: 100
TOTAL PROTEIN FLUID: 1.3 G/DL
UNIT DISPENSE STATUS: NORMAL
UNIT DISPENSE STATUS: NORMAL
UNIT PRODUCT CODE: NORMAL
UNIT PRODUCT CODE: NORMAL
UNIT PRODUCT VOLUME: 275 ML
UNIT PRODUCT VOLUME: 350 ML
UNIT RH: NORMAL
UNIT RH: NORMAL
WBC # BLD AUTO: 8.85 THOUSAND/UL (ref 4.31–10.16)
WBC # FLD MANUAL: 185 /UL

## 2024-11-14 PROCEDURE — 0W9G3ZZ DRAINAGE OF PERITONEAL CAVITY, PERCUTANEOUS APPROACH: ICD-10-PCS | Performed by: PHYSICIAN ASSISTANT

## 2024-11-14 PROCEDURE — 49083 ABD PARACENTESIS W/IMAGING: CPT

## 2024-11-14 PROCEDURE — 87205 SMEAR GRAM STAIN: CPT | Performed by: PHYSICIAN ASSISTANT

## 2024-11-14 PROCEDURE — 88112 CYTOPATH CELL ENHANCE TECH: CPT | Performed by: STUDENT IN AN ORGANIZED HEALTH CARE EDUCATION/TRAINING PROGRAM

## 2024-11-14 PROCEDURE — 84157 ASSAY OF PROTEIN OTHER: CPT | Performed by: PHYSICIAN ASSISTANT

## 2024-11-14 PROCEDURE — 82042 OTHER SOURCE ALBUMIN QUAN EA: CPT | Performed by: PHYSICIAN ASSISTANT

## 2024-11-14 PROCEDURE — 85025 COMPLETE CBC W/AUTO DIFF WBC: CPT | Performed by: PHYSICIAN ASSISTANT

## 2024-11-14 PROCEDURE — 82948 REAGENT STRIP/BLOOD GLUCOSE: CPT

## 2024-11-14 PROCEDURE — 89051 BODY FLUID CELL COUNT: CPT | Performed by: PHYSICIAN ASSISTANT

## 2024-11-14 PROCEDURE — 83615 LACTATE (LD) (LDH) ENZYME: CPT | Performed by: PHYSICIAN ASSISTANT

## 2024-11-14 PROCEDURE — 87070 CULTURE OTHR SPECIMN AEROBIC: CPT | Performed by: PHYSICIAN ASSISTANT

## 2024-11-14 PROCEDURE — 99232 SBSQ HOSP IP/OBS MODERATE 35: CPT | Performed by: PHYSICIAN ASSISTANT

## 2024-11-14 PROCEDURE — 99232 SBSQ HOSP IP/OBS MODERATE 35: CPT | Performed by: INTERNAL MEDICINE

## 2024-11-14 PROCEDURE — 49083 ABD PARACENTESIS W/IMAGING: CPT | Performed by: PHYSICIAN ASSISTANT

## 2024-11-14 PROCEDURE — 80053 COMPREHEN METABOLIC PANEL: CPT | Performed by: PHYSICIAN ASSISTANT

## 2024-11-14 PROCEDURE — 82945 GLUCOSE OTHER FLUID: CPT | Performed by: PHYSICIAN ASSISTANT

## 2024-11-14 PROCEDURE — 88305 TISSUE EXAM BY PATHOLOGIST: CPT | Performed by: STUDENT IN AN ORGANIZED HEALTH CARE EDUCATION/TRAINING PROGRAM

## 2024-11-14 RX ORDER — LIDOCAINE WITH 8.4% SOD BICARB 0.9%(10ML)
SYRINGE (ML) INJECTION AS NEEDED
Status: COMPLETED | OUTPATIENT
Start: 2024-11-14 | End: 2024-11-14

## 2024-11-14 RX ORDER — PANTOPRAZOLE SODIUM 40 MG/1
40 TABLET, DELAYED RELEASE ORAL
Qty: 30 TABLET | Refills: 0 | Status: SHIPPED | OUTPATIENT
Start: 2024-11-15

## 2024-11-14 RX ORDER — CARVEDILOL 6.25 MG/1
6.25 TABLET ORAL DAILY
Qty: 30 TABLET | Refills: 0 | Status: SHIPPED | OUTPATIENT
Start: 2024-11-15 | End: 2024-11-16

## 2024-11-14 RX ORDER — SPIRONOLACTONE 25 MG/1
50 TABLET ORAL DAILY
Status: DISCONTINUED | OUTPATIENT
Start: 2024-11-14 | End: 2024-11-16

## 2024-11-14 RX ADMIN — Medication 10 ML: at 15:14

## 2024-11-14 RX ADMIN — SPIRONOLACTONE 50 MG: 25 TABLET ORAL at 13:51

## 2024-11-14 RX ADMIN — CEFTRIAXONE 2000 MG: 2 INJECTION, SOLUTION INTRAVENOUS at 14:11

## 2024-11-14 RX ADMIN — CARVEDILOL 6.25 MG: 3.12 TABLET, FILM COATED ORAL at 08:56

## 2024-11-14 RX ADMIN — PANTOPRAZOLE SODIUM 40 MG: 40 TABLET, DELAYED RELEASE ORAL at 08:56

## 2024-11-14 NOTE — SEDATION DOCUMENTATION
Right sided paracentesis completed by Rick PEREA without complication. 4200 ml clear yellow fluid removed. Pt has no complaints at this time, bandaid applied to site

## 2024-11-14 NOTE — PLAN OF CARE
Problem: PAIN - ADULT  Goal: Verbalizes/displays adequate comfort level or baseline comfort level  Description: Interventions:  - Encourage patient to monitor pain and request assistance  - Assess pain using appropriate pain scale  - Administer analgesics based on type and severity of pain and evaluate response  - Implement non-pharmacological measures as appropriate and evaluate response  - Consider cultural and social influences on pain and pain management  - Notify physician/advanced practitioner if interventions unsuccessful or patient reports new pain  11/13/2024 2104 by Gera Caceres RN  Outcome: Progressing  11/13/2024 2104 by Gera Caceres RN  Outcome: Not Progressing     Problem: INFECTION - ADULT  Goal: Absence or prevention of progression during hospitalization  Description: INTERVENTIONS:  - Assess and monitor for signs and symptoms of infection  - Monitor lab/diagnostic results  - Monitor all insertion sites, i.e. indwelling lines, tubes, and drains  - Monitor endotracheal if appropriate and nasal secretions for changes in amount and color  - [place] appropriate cooling/warming therapies per order  - Administer medications as ordered  - Instruct and encourage patient and family to use good hand hygiene technique  - Identify and instruct in appropriate isolation precautions for identified infection/condition  11/13/2024 2104 by Gera Caceres RN  Outcome: Progressing  11/13/2024 2104 by Gera Caceres RN  Outcome: Not Progressing  Goal: Absence of fever/infection during neutropenic period  Description: INTERVENTIONS:  - Monitor WBC    11/13/2024 2104 by Gera Caceres RN  Outcome: Progressing  11/13/2024 2104 by Gera Caceres RN  Outcome: Not Progressing     Problem: SAFETY ADULT  Goal: Patient will remain free of falls  Description: INTERVENTIONS:  - Educate patient/family on patient safety including physical limitations  - Instruct patient to call for assistance with activity   - Consult OT/PT  to assist with strengthening/mobility   - Keep Call bell within reach  - Keep bed low and locked with side rails adjusted as appropriate  - Keep care items and personal belongings within reach  - Initiate and maintain comfort rounds  - Make Fall Risk Sign visible to staff  - Apply yellow socks and bracelet for high fall risk patients  - Consider moving patient to room near nurses station  11/13/2024 2104 by Gera Caceres RN  Outcome: Progressing  11/13/2024 2104 by Gera Caceres RN  Outcome: Not Progressing  Goal: Maintain or return to baseline ADL function  Description: INTERVENTIONS:  -  Assess patient's ability to carry out ADLs; assess patient's baseline for ADL function and identify physical deficits which impact ability to perform ADLs (bathing, care of mouth/teeth, toileting, grooming, dressing, etc.)  - Assess/evaluate cause of self-care deficits   - Assess range of motion  - Assess patient's mobility; develop plan if impaired  - Assess patient's need for assistive devices and provide as appropriate  - Encourage maximum independence but intervene and supervise when necessary  - Involve family in performance of ADLs  - Assess for home care needs following discharge   - Consider OT consult to assist with ADL evaluation and planning for discharge  - Provide patient education as appropriate  11/13/2024 2104 by Gera Caceres RN  Outcome: Progressing  11/13/2024 2104 by Gera Caceres RN  Outcome: Not Progressing  Goal: Maintains/Returns to pre admission functional level  Description: INTERVENTIONS:  - Perform AM-PAC 6 Click Basic Mobility/ Daily Activity assessment daily.  - Set and communicate daily mobility goal to care team and patient/family/caregiver.   - Collaborate with rehabilitation services on mobility goals if consulted  - Out of bed for toileting  - Record patient progress and toleration of activity level   11/13/2024 2104 by Gera Caceres RN  Outcome: Progressing  11/13/2024 2104 by Gera  SANJEEV Caceres  Outcome: Not Progressing     Problem: DISCHARGE PLANNING  Goal: Discharge to home or other facility with appropriate resources  Description: INTERVENTIONS:  - Identify barriers to discharge w/patient and caregiver  - Arrange for needed discharge resources and transportation as appropriate  - Identify discharge learning needs (meds, wound care, etc.)  - Arrange for interpretive services to assist at discharge as needed  - Refer to Case Management Department for coordinating discharge planning if the patient needs post-hospital services based on physician/advanced practitioner order or complex needs related to functional status, cognitive ability, or social support system  11/13/2024 2104 by Gera Caceres RN  Outcome: Progressing  11/13/2024 2104 by Gera Caceres RN  Outcome: Not Progressing     Problem: Knowledge Deficit  Goal: Patient/family/caregiver demonstrates understanding of disease process, treatment plan, medications, and discharge instructions  Description: Complete learning assessment and assess knowledge base.  Interventions:  - Provide teaching at level of understanding  - Provide teaching via preferred learning methods  11/13/2024 2104 by Gera Caceres RN  Outcome: Progressing  11/13/2024 2104 by Gera Caceres RN  Outcome: Not Progressing     Problem: RESPIRATORY - ADULT  Goal: Achieves optimal ventilation and oxygenation  Description: INTERVENTIONS:  - Assess for changes in respiratory status  - Assess for changes in mentation and behavior  - Position to facilitate oxygenation and minimize respiratory effort  - Oxygen administered by appropriate delivery if ordered  - Initiate smoking cessation education as indicated  - Encourage broncho-pulmonary hygiene including cough, deep breathe, Incentive Spirometry  - Assess the need for suctioning and aspirate as needed  - Assess and instruct to report SOB or any respiratory difficulty  - Respiratory Therapy support as indicated  11/13/2024  2104 by Gera Caceres RN  Outcome: Progressing  11/13/2024 2104 by Gera Caceres RN  Outcome: Not Progressing     Problem: GASTROINTESTINAL - ADULT  Goal: Minimal or absence of nausea and/or vomiting  Description: INTERVENTIONS:  - Administer IV fluids if ordered to ensure adequate hydration  - Maintain NPO status until nausea and vomiting are resolved  - Nasogastric tube if ordered  - Administer ordered antiemetic medications as needed  - Provide nonpharmacologic comfort measures as appropriate  - Advance diet as tolerated, if ordered  - Consider nutrition services referral to assist patient with adequate nutrition and appropriate food choices  11/13/2024 2104 by Gera Caceres RN  Outcome: Progressing  11/13/2024 2104 by Gera Caceres RN  Outcome: Not Progressing  Goal: Maintains or returns to baseline bowel function  Description: INTERVENTIONS:  - Assess bowel function  - Encourage oral fluids to ensure adequate hydration  - Administer IV fluids if ordered to ensure adequate hydration  - Administer ordered medications as needed  - Encourage mobilization and activity  - Consider nutritional services referral to assist patient with adequate nutrition and appropriate food choices  11/13/2024 2104 by Gera Caceres RN  Outcome: Progressing  11/13/2024 2104 by Gera Caceres RN  Outcome: Not Progressing  Goal: Maintains adequate nutritional intake  Description: INTERVENTIONS:  - Monitor percentage of each meal consumed  - Identify factors contributing to decreased intake, treat as appropriate  - Assist with meals as needed  - Monitor I&O, weight, and lab values if indicated  - Obtain nutrition services referral as needed  11/13/2024 2104 by Gera Caceres RN  Outcome: Progressing  11/13/2024 2104 by Gera Caceres RN  Outcome: Not Progressing  Goal: Establish and maintain optimal ostomy function  Description: INTERVENTIONS:  - Assess bowel function  - Encourage oral fluids to ensure adequate hydration  - Administer  IV fluids if ordered to ensure adequate hydration   - Administer ordered medications as needed  - Encourage mobilization and activity  - Nutrition services referral to assist patient with appropriate food choices  - Assess stoma site  - Consider wound care consult   11/13/2024 2104 by Gera Caceres RN  Outcome: Progressing  11/13/2024 2104 by Gera Caceres RN  Outcome: Not Progressing  Goal: Oral mucous membranes remain intact  Description: INTERVENTIONS  - Assess oral mucosa and hygiene practices  - Implement preventative oral hygiene regimen  - Implement oral medicated treatments as ordered  - Initiate Nutrition services referral as needed  11/13/2024 2104 by Gera Caceres RN  Outcome: Progressing  11/13/2024 2104 by Gera Caceres RN  Outcome: Not Progressing     Problem: HEMATOLOGIC - ADULT  Goal: Maintains hematologic stability  Description: INTERVENTIONS  - Assess for signs and symptoms of bleeding or hemorrhage  - Monitor labs  - Administer supportive blood products/factors as ordered and appropriate  11/13/2024 2104 by Gera Caceres RN  Outcome: Progressing  11/13/2024 2104 by Gera Caceres RN  Outcome: Not Progressing

## 2024-11-14 NOTE — ASSESSMENT & PLAN NOTE
Hgb baseline 15 > 9.1 on admission > 7.1 > 7.6  Received 1 unit PRBC on 11/12 and will receive another unit 11/13  Continue to frequently monitor blood counts  Stable hgb now

## 2024-11-14 NOTE — ASSESSMENT & PLAN NOTE
Meeting SIRS with leukocytosis and tachycardia  Most likely reactive to GI bleed  However covering with abx given varices and ascites   Leukocytosis resolved today

## 2024-11-14 NOTE — PROGRESS NOTES
[facility] - Inpatient Progress Note    PATIENT INFORMATION      Raymundo Weller 64 y.o. male MRN: [id number]  Unit/Bed#: ICU 05-01 Encounter: [id number]    ASSESSMENT & PLAN   Raymundo Weller is a 64 y.o. old male with PMH of decompensated alcohol cirrhosis C/B ascites, esophageal varices, oropharyngeal carcinoma with resection, COPD, DOT, tobacco use, and hypertension who presented with 4 episodes of coffee-ground emesis with hematochezia and melena.     Coffee-ground emesis/hematemesis 2/2 PHG  Alcohol cirrhosis C/B ascites with active alcohol use  Patient is presented with 3 episodes of coffee-ground emesis as well as hematemesis since Sunday. Patient's last drink was on 5 days ago where he endorses drinking of beer he states he still drinks multiple beers throughout the week despite being told not to do so.  Currently patient denies any nausea or any further episodes of vomiting.  He underwent an EGD on 11/12 which was notable for significant PHG which most likely is the cause of bleeding.     -Continue IV PPI and octreotide therapy  -Carvedilol 6.25 mg once a day for 2 more day, then increase from 11/17/2024 to twice a day   -IR paracentesis with fluid studies  -Can consider medications to help with alcohol cessation outpatient  -Continue ceftriaxone for SBP prophylaxis  -Patient will need extensive counseling regarding alcohol cessation  -Will need to follow up with Hepatology outpatient.   -Daily MELD labs      MELD: MELD 3.0: 15 at 11/13/2024  6:15 AM  MELD-Na: 15 at 11/13/2024  6:15 AM  Calculated from:  Serum Creatinine: 0.75 mg/dL (Using min of 1 mg/dL) at 11/13/2024  6:15 AM  Serum Sodium: 135 mmol/L at 11/13/2024  6:15 AM  Total Bilirubin: 1.58 mg/dL at 11/13/2024  6:15 AM  Serum Albumin: 2.6 g/dL at 11/13/2024  6:15 AM  INR(ratio): 1.56 at 11/11/2024 11:13 PM  Age at listing (hypothetical): 64 years  Sex: Male at 11/13/2024  6:15 AM    Etiology: Alcohol with active  "alcohol use, last drink      Transplant Status: n/a     Ascites: history of ascites   Diet: Low-sodium (< 2 g daily)     Hepatic Encephalopathy: No history   Diet: High protein/high calorie diet to prevent catabolic state w/ further ammonia production   Varices:   EGD 2024  Multiple small varices in the lower third of the esophagus  C2M3 Nelson's esophagus  Severe, friable and mosaic portal hypertensive gastropathy in the body of the stomach, greater curve of the stomach and lesser curve of the stomach  The duodenal bulb, 1st part of the duodenum and 2nd part of the duodenum appeared normal.     Hepatoma screening: no evidence of mass 2024  Patient will require q.6-12month dedicated liver imaging for HCC monitoring     Colon cancer screenin2024:  Diverticulosis in the sigmoid colon  3 subcentimeter polyps in the transverse colon and splenic flexure were removed with cold snare  Angioectasia in the ascending colon  Repeat in 1 year due to inadequate prep            SUBJECTIVE     Patient seen and evaluated at bedside.  Patient states he is feeling better    MEDICATIONS & ALLERGIES       Medications:     Medications Prior to Admission:     multivitamin (THERAGRAN) TABS    spironolactone (ALDACTONE) 50 mg tablet    fluticasone (FLONASE) 50 mcg/act nasal spray    Current Facility-Administered Medications:     carvedilol (COREG) tablet 6.25 mg, Daily    cefTRIAXone (ROCEPHIN) IVPB (premix in dextrose) 2,000 mg 50 mL, Q24H, Last Rate: Stopped (24 1538)    ondansetron (ZOFRAN) injection 4 mg, Q6H PRN    pantoprazole (PROTONIX) EC tablet 40 mg, Early Morning    spironolactone (ALDACTONE) tablet 50 mg, Daily    Allergies:   No Known Allergies    PHYSICAL EXAM     Objective   Blood pressure 116/56, pulse 64, temperature 98.5 °F (36.9 °C), temperature source Tympanic, resp. rate 15, height 5' 10\" (1.778 m), weight 128 kg (282 lb 3 oz), SpO2 97%. Body mass index is 40.49 kg/m².    Intake/Output " Summary (Last 24 hours) at 11/14/2024 1355  Last data filed at 11/14/2024 1222  Gross per 24 hour   Intake 899.92 ml   Output 500 ml   Net 399.92 ml       General Appearance:   Alert, cooperative, no distress   HEENT:   Normocephalic, atraumatic, anicteric     Neck:   Supple, symmetrical, trachea midline   Lungs:   Equal chest rise, respirations unlabored    Heart:   Regular rate and rhythm   Abdomen:   Distended with no pain   Rectal:   Deferred    Extremities:   No cyanosis, clubbing or edema    Neuro:   Moves all 4 extremities    Skin:   No jaundice, rashes, or lesions      ADDITIONAL DATA     Lab Results:     Results from last 7 days   Lab Units 11/14/24  0552   WBC Thousand/uL 8.85   HEMOGLOBIN g/dL 7.6*   HEMATOCRIT % 23.2*   PLATELETS Thousands/uL 79*   SEGS PCT % 61   LYMPHO PCT % 26   MONO PCT % 7   EOS PCT % 4     Results from last 7 days   Lab Units 11/14/24  0552   POTASSIUM mmol/L 3.7   CHLORIDE mmol/L 107   CO2 mmol/L 24   BUN mg/dL 26*   CREATININE mg/dL 0.70   CALCIUM mg/dL 7.4*   ALK PHOS U/L 70   ALT U/L 30   AST U/L 78*     Results from last 7 days   Lab Units 11/11/24  2313   INR  1.56*       Imaging:    EGD  Addendum Date: 11/12/2024  Addendum: [facility] Operating Room [address] [place] [id number] [phone] DATE OF SERVICE: 11/12/24 PHYSICIAN(S): Attending: Miranda Hernandez DO Fellow: Roseanne Beltre MD INDICATION: Alcoholic cirrhosis of liver (HCC), GI bleed POST-OP DIAGNOSIS: See the impression below. PREPROCEDURE: Informed consent was obtained for the procedure, including sedation.  Risks of perforation, hemorrhage, adverse drug reaction and aspiration were discussed. The patient was placed in the left lateral decubitus position. Patient was explained about the risks and benefits of the procedure. Risks including but not limited to bleeding, infection, and perforation were explained in detail. Also explained about less than 100% sensitivity with the exam  and other alternatives. PROCEDURE: EGD DETAILS OF PROCEDURE: Patient was taken to the procedure room where a time out was performed to confirm correct patient and correct procedure. The patient underwent monitored anesthesia care, which was administered by an anesthesia professional. The patient's blood pressure, heart rate, level of consciousness, respirations, oxygen, ECG and ETCO2 were monitored throughout the procedure. The scope was introduced through the mouth and advanced to the second part of the duodenum. Retroflexion was performed in the fundus. The patient experienced no blood loss. The procedure was not difficult. The patient tolerated the procedure well. There were no apparent adverse events. ANESTHESIA INFORMATION: ASA: III Anesthesia Type: General MEDICATIONS: No administrations occurring from 1355 to 1535 on 11/12/24 FINDINGS: Multiple small varices (no red clifford sign) in the lower third of the esophagus; no bleeding was identified. Small varices seen which were fully collapsible with insufflation, no signs of bleeding seen. 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 Significant PHG seen, most likely source of hematemesis The duodenal bulb, 1st part of the duodenum and 2nd part of the duodenum appeared normal. SPECIMENS: * No specimens in log * IMPRESSION: Multiple small varices in the lower third of the esophagus C2M3 Nelson's esophagus Severe, friable and mosaic portal hypertensive gastropathy in the body of the stomach, greater curve of the stomach and lesser curve of the stomach The duodenal bulb, 1st part of the duodenum and 2nd part of the duodenum appeared normal. RECOMMENDATION: Continue with Octreotide for total 24 hours Can transition to oral PPI 40 mg once a day Clear liquid diet today Extensive counseling for alcohol cessation Repeat EGD in 1 year     Miranda Hernandez DO     Result Date: 11/12/2024  Narrative: Table formatting from the original result was not included. [facility] Operating Room [address] [place] [id number] [phone] DATE OF SERVICE: 11/12/24 PHYSICIAN(S): Attending: Miranda Hernandez DO Fellow: Roseanne Beltre MD INDICATION: Alcoholic cirrhosis of liver (HCC), GI bleed POST-OP DIAGNOSIS: See the impression below. PREPROCEDURE: Informed consent was obtained for the procedure, including sedation.  Risks of perforation, hemorrhage, adverse drug reaction and aspiration were discussed. The patient was placed in the left lateral decubitus position. Patient was explained about the risks and benefits of the procedure. Risks including but not limited to bleeding, infection, and perforation were explained in detail. Also explained about less than 100% sensitivity with the exam and other alternatives. PROCEDURE: EGD DETAILS OF PROCEDURE: Patient was taken to the procedure room where a time out was performed to confirm correct patient and correct procedure. The patient underwent monitored anesthesia care, which was administered by an anesthesia professional. The patient's blood pressure, heart rate, level of consciousness, respirations, oxygen, ECG and ETCO2 were monitored throughout the procedure. The scope was introduced through the mouth and advanced to the second part of the duodenum. Retroflexion was performed in the fundus. The patient experienced no blood loss. The procedure was not difficult. The patient tolerated the procedure well. There were no apparent adverse events. ANESTHESIA INFORMATION: ASA: III Anesthesia Type: General MEDICATIONS: No administrations occurring from 1355 to 1535 on 11/12/24 FINDINGS: Multiple small varices (no red clifford sign) in the lower third of the esophagus; no bleeding was identified. Small varices seen which were fully collapsible with insufflation, no signs of bleeding seen. C2M3  Nelson's esophagus observed Severe, friable and mosaic portal hypertensive gastropathy in the body of the stomach, greater curve of the stomach and lesser curve of the stomach. Significant PHG seen, most likely source of hematemesis The duodenal bulb, 1st part of the duodenum and 2nd part of the duodenum appeared normal. 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 RECOMMENDATION: Continue with Octreotide for total 24 hours Can transition to oral PPI 40 mg once a day Clear liquid diet today Extensive counseling for alcohol cessation Repeat EGD in 1 year    Miranda Hernandez DO     XR chest 1 view portable  Result Date: 11/12/2024  Narrative: XR CHEST PORTABLE INDICATION: SOB. COMPARISON: 08/12/2024 FINDINGS: Clear lungs. No pneumothorax or pleural effusion. Normal cardiomediastinal silhouette. Bones are unremarkable for age. Normal upper abdomen.     Impression: No acute cardiopulmonary disease. Workstation performed: [id number]     TRAUMA - CT spine cervical wo contrast  Result Date: 10/22/2024  Narrative: CT CERVICAL SPINE - WITHOUT CONTRAST INDICATION:   TRAUMA. COMPARISON: CT cervical spine dated 4/3/2023. TECHNIQUE:  CT examination of the cervical spine was performed without intravenous contrast.  Contiguous axial images were obtained. Multiplanar 2D reformatted images were created from the source data. Radiation dose length product (DLP) for this visit:  558 mGy-cm .  This examination, like all CT scans performed in the [facility], was performed utilizing techniques to minimize radiation dose exposure, including the use of iterative reconstruction and automated  exposure control. IMAGE QUALITY:  Diagnostic. FINDINGS: ALIGNMENT: Mild reversal of the normal cervical lordosis. No subluxation. VERTEBRAE:  No fracture. DEGENERATIVE CHANGES: Moderately advanced multilevel cervical degenerative changes. No critical central canal stenosis. Mild multilevel central canal stenosis secondary to degenerative changes. PREVERTEBRAL AND PARASPINAL SOFT TISSUES: Atherosclerotic vascular calcifications. THORACIC INLET: Unremarkable.     Impression: No acute cervical spine fracture or traumatic malalignment. The study was marked in EPIC for immediate notification. Workstation performed: [id number]     TRAUMA - CT head wo contrast  Result Date: 10/22/2024  Narrative: CT BRAIN - WITHOUT CONTRAST INDICATION:   TRAUMA. COMPARISON: CT head dated 4/3/2023. TECHNIQUE:  CT examination of the brain was performed.  Multiplanar 2D reformatted images were created from the source data. Radiation dose length product (DLP) for this visit:  864 mGy-cm .  This examination, like all CT scans performed in the [facility], was performed utilizing techniques to minimize radiation dose exposure, including the use of iterative reconstruction and automated exposure control. IMAGE QUALITY:  Diagnostic. FINDINGS: PARENCHYMA: Decreased attenuation is noted in periventricular and subcortical white matter demonstrating an appearance that is statistically most likely to represent mild to moderate microangiopathic change. No CT signs of acute infarction.  No intracranial mass, mass effect or midline shift.  No acute parenchymal hemorrhage. VENTRICLES AND EXTRA-AXIAL SPACES: Unremarkable for the patient's age. VISUALIZED ORBITS: Unremarkable visualized orbits. PARANASAL SINUSES: Unremarkable visualized paranasal sinuses. CALVARIUM AND EXTRACRANIAL SOFT TISSUES: No acute calvarial fracture.     Impression: No acute intracranial hemorrhage. Workstation performed: [id number]       EKG, Pathology, and Other  Studies Reviewed on Admission:   EKG: Reviewed      Counseling / Coordination of Care Time: 30 total mins spent n consult. Greater than 50% of total time spent on patient counseling and coordination of care.    Roseanne Beltre MD  Gastroenterology Fellow  [facility]  Division of Gastroenterology and Hepatology    ...............................................................................................................................................  ** Please Note: This note is constructed using a voice recognition dictation system. **

## 2024-11-14 NOTE — PLAN OF CARE
Problem: PAIN - ADULT  Goal: Verbalizes/displays adequate comfort level or baseline comfort level  Description: Interventions:  - Encourage patient to monitor pain and request assistance  - Assess pain using appropriate pain scale  - Administer analgesics based on type and severity of pain and evaluate response  - Implement non-pharmacological measures as appropriate and evaluate response  - Consider cultural and social influences on pain and pain management  - Notify physician/advanced practitioner if interventions unsuccessful or patient reports new pain  Outcome: Progressing     Problem: INFECTION - ADULT  Goal: Absence or prevention of progression during hospitalization  Description: INTERVENTIONS:  - Assess and monitor for signs and symptoms of infection  - Monitor lab/diagnostic results  - Monitor all insertion sites, i.e. indwelling lines, tubes, and drains  - Monitor endotracheal if appropriate and nasal secretions for changes in amount and color  - [place] appropriate cooling/warming therapies per order  - Administer medications as ordered  - Instruct and encourage patient and family to use good hand hygiene technique  - Identify and instruct in appropriate isolation precautions for identified infection/condition  Outcome: Progressing  Goal: Absence of fever/infection during neutropenic period  Description: INTERVENTIONS:  - Monitor WBC    Outcome: Progressing     Problem: SAFETY ADULT  Goal: Patient will remain free of falls  Description: INTERVENTIONS:  - Educate patient/family on patient safety including physical limitations  - Instruct patient to call for assistance with activity   - Consult OT/PT to assist with strengthening/mobility   - Keep Call bell within reach  - Keep bed low and locked with side rails adjusted as appropriate  - Keep care items and personal belongings within reach  - Initiate and maintain comfort rounds  - Make Fall Risk Sign visible to staff  - Offer Toileting every 2 Hours,  in advance of need  - Initiate/Maintain bed alarm  - Obtain necessary fall risk management equipment  - Apply yellow socks and bracelet for high fall risk patients  - Consider moving patient to room near nurses station  Outcome: Progressing  Goal: Maintain or return to baseline ADL function  Description: INTERVENTIONS:  -  Assess patient's ability to carry out ADLs; assess patient's baseline for ADL function and identify physical deficits which impact ability to perform ADLs (bathing, care of mouth/teeth, toileting, grooming, dressing, etc.)  - Assess/evaluate cause of self-care deficits   - Assess range of motion  - Assess patient's mobility; develop plan if impaired  - Assess patient's need for assistive devices and provide as appropriate  - Encourage maximum independence but intervene and supervise when necessary  - Involve family in performance of ADLs  - Assess for home care needs following discharge   - Consider OT consult to assist with ADL evaluation and planning for discharge  - Provide patient education as appropriate  Outcome: Progressing  Goal: Maintains/Returns to pre admission functional level  Description: INTERVENTIONS:  - Perform AM-PAC 6 Click Basic Mobility/ Daily Activity assessment daily.  - Set and communicate daily mobility goal to care team and patient/family/caregiver.   - Collaborate with rehabilitation services on mobility goals if consulted  - Perform Range of Motion 3 times a day.  - Reposition patient every 2 hours.  - Dangle patient 3 times a day  - Stand patient 3 times a day  - Ambulate patient 3 times a day  - Out of bed to chair 3 times a day   - Out of bed for meals 3 times a day  - Out of bed for toileting  - Record patient progress and toleration of activity level   Outcome: Progressing     Problem: DISCHARGE PLANNING  Goal: Discharge to home or other facility with appropriate resources  Description: INTERVENTIONS:  - Identify barriers to discharge w/patient and caregiver  -  Arrange for needed discharge resources and transportation as appropriate  - Identify discharge learning needs (meds, wound care, etc.)  - Arrange for interpretive services to assist at discharge as needed  - Refer to Case Management Department for coordinating discharge planning if the patient needs post-hospital services based on physician/advanced practitioner order or complex needs related to functional status, cognitive ability, or social support system  Outcome: Progressing     Problem: Knowledge Deficit  Goal: Patient/family/caregiver demonstrates understanding of disease process, treatment plan, medications, and discharge instructions  Description: Complete learning assessment and assess knowledge base.  Interventions:  - Provide teaching at level of understanding  - Provide teaching via preferred learning methods  Outcome: Progressing     Problem: RESPIRATORY - ADULT  Goal: Achieves optimal ventilation and oxygenation  Description: INTERVENTIONS:  - Assess for changes in respiratory status  - Assess for changes in mentation and behavior  - Position to facilitate oxygenation and minimize respiratory effort  - Oxygen administered by appropriate delivery if ordered  - Initiate smoking cessation education as indicated  - Encourage broncho-pulmonary hygiene including cough, deep breathe, Incentive Spirometry  - Assess the need for suctioning and aspirate as needed  - Assess and instruct to report SOB or any respiratory difficulty  - Respiratory Therapy support as indicated  Outcome: Progressing     Problem: GASTROINTESTINAL - ADULT  Goal: Minimal or absence of nausea and/or vomiting  Description: INTERVENTIONS:  - Administer IV fluids if ordered to ensure adequate hydration  - Maintain NPO status until nausea and vomiting are resolved  - Nasogastric tube if ordered  - Administer ordered antiemetic medications as needed  - Provide nonpharmacologic comfort measures as appropriate  - Advance diet as tolerated, if  ordered  - Consider nutrition services referral to assist patient with adequate nutrition and appropriate food choices  Outcome: Progressing  Goal: Maintains or returns to baseline bowel function  Description: INTERVENTIONS:  - Assess bowel function  - Encourage oral fluids to ensure adequate hydration  - Administer IV fluids if ordered to ensure adequate hydration  - Administer ordered medications as needed  - Encourage mobilization and activity  - Consider nutritional services referral to assist patient with adequate nutrition and appropriate food choices  Outcome: Progressing  Goal: Maintains adequate nutritional intake  Description: INTERVENTIONS:  - Monitor percentage of each meal consumed  - Identify factors contributing to decreased intake, treat as appropriate  - Assist with meals as needed  - Monitor I&O, weight, and lab values if indicated  - Obtain nutrition services referral as needed  Outcome: Progressing  Goal: Establish and maintain optimal ostomy function  Description: INTERVENTIONS:  - Assess bowel function  - Encourage oral fluids to ensure adequate hydration  - Administer IV fluids if ordered to ensure adequate hydration   - Administer ordered medications as needed  - Encourage mobilization and activity  - Nutrition services referral to assist patient with appropriate food choices  - Assess stoma site  - Consider wound care consult   Outcome: Progressing  Goal: Oral mucous membranes remain intact  Description: INTERVENTIONS  - Assess oral mucosa and hygiene practices  - Implement preventative oral hygiene regimen  - Implement oral medicated treatments as ordered  - Initiate Nutrition services referral as needed  Outcome: Progressing     Problem: HEMATOLOGIC - ADULT  Goal: Maintains hematologic stability  Description: INTERVENTIONS  - Assess for signs and symptoms of bleeding or hemorrhage  - Monitor labs  - Administer supportive blood products/factors as ordered and appropriate  Outcome:  Progressing

## 2024-11-14 NOTE — PROGRESS NOTES
Progress Note - Hospitalist   Name: Raymundo Weller 64 y.o. male I MRN: [id number]  Unit/Bed#: ICU 05-01 I Date of Admission: 11/11/2024   Date of Service: 11/14/2024 I Hospital Day: 2    Assessment & Plan  GI bleed  Four episodes of coffee-ground emesis and maroon/black stools starting 11/11   and BP 97/58 on arrival. BP improved to 112/62 with 1 liter IVF.   Baseline hgb 15, at 9.1 on admission and today at 7.1 - will receive 1 unit PRBC  GI consulted and started empiric tx with PPI, octreotide, ceftriaxone.   EGD completed 11/12 showing small varices, barretts esophagus, severe gastropathy   Octreotide sstopped, patient transitioned to coreg now  Continue on ceftriaxone 2g for GI bleed as well as questionable SBP  Advanced to regular diet today  Acute blood loss anemia  Hgb baseline 15 > 9.1 on admission > 7.1 > 7.6  Received 1 unit PRBC on 11/12 and will receive another unit 11/13  Continue to frequently monitor blood counts  Stable hgb now  Alcoholic cirrhosis of liver (HCC)  Esophageal varices screening on 1/26/24 with only one small varix.   HCC Screening - CT w/contrast done 4/2024 without mass  AFP elevated in past, normal today.  Resume spironolactone when able.   US abdomen does show ascites, obtain paracentesis today if able    MELD 3.0: 15 at 11/13/2024  6:15 AM  MELD-Na: 15 at 11/13/2024  6:15 AM  Calculated from:  Serum Creatinine: 0.75 mg/dL (Using min of 1 mg/dL) at 11/13/2024  6:15 AM  Serum Sodium: 135 mmol/L at 11/13/2024  6:15 AM  Total Bilirubin: 1.58 mg/dL at 11/13/2024  6:15 AM  Serum Albumin: 2.6 g/dL at 11/13/2024  6:15 AM  INR(ratio): 1.56 at 11/11/2024 11:13 PM  Age at listing (hypothetical): 64 years  Sex: Male at 11/13/2024  6:15 AM    SIRS (systemic inflammatory response syndrome) (HCC)  Meeting SIRS with leukocytosis and tachycardia  Most likely reactive to GI bleed  However covering with abx given varices and ascites   Leukocytosis resolved today  Abdominal aortic aneurysm  (AAA) without rupture (HCC)  3.7 cm infrarenal abdominal aortic ectasia seen on CT 04/08/2024  Squamous cell carcinoma of tongue (HCC)  S/p revision with positive margins in September 2024. Patient elected to closely monitor.  Elevated troponin  Likely nonischemic elevation due to demand from tachycardia due to GI bleed  Hyperkalemia  Mildly elevated at 5.6 on arrival.   Resume spironolactone today  Now normalized  BMI 39.0-39.9,adult  Lifestyle modifications recommended once acute illness improved  YUNIOR (acute kidney injury) (HCC)    CKD (chronic kidney disease) stage 2, GFR 60-89 ml/min  Lab Results   Component Value Date    EGFR 98 11/14/2024    EGFR 95 11/13/2024    EGFR 86 11/12/2024    CREATININE 0.70 11/14/2024    CREATININE 0.75 11/13/2024    CREATININE 0.92 11/12/2024     Appreciate nephro input    VTE Pharmacologic Prophylaxis: VTE Score: 6 High Risk (Score >/= 5) - Pharmacological DVT Prophylaxis Contraindicated. Sequential Compression Devices Ordered.    Mobility:   Basic Mobility Inpatient Raw Score: 21  JH-HLM Goal: 6: Walk 10 steps or more  JH-HLM Achieved: 7: Walk 25 feet or more  JH-HLM Goal achieved. Continue to encourage appropriate mobility.    Patient Centered Rounds: I performed bedside rounds with nursing staff today.   Discussions with Specialists or Other Care Team Provider: CM, GI    Education and Discussions with Family / Patient: Updated  (wife) at bedside.    Current Length of Stay: 2 day(s)  Current Patient Status: Inpatient   Certification Statement: The patient will continue to require additional inpatient hospital stay due to monitoring blood counts, paracentesis, advancing diet  Discharge Plan: Anticipate discharge tomorrow to home.    Code Status: Level 1 - Full Code    Subjective   Patient states he feels better today than yesterday. No vomiting, no further melena.     Objective :  Temp:  [97.7 °F (36.5 °C)-98.7 °F (37.1 °C)] 98.5 °F (36.9 °C)  HR:  [62-79] 72  BP:  (103-144)/(53-64) 113/57  Resp:  [12-24] 22  SpO2:  [93 %-100 %] 97 %  O2 Device: CPAP    Body mass index is 40.49 kg/m².     Input and Output Summary (last 24 hours):     Intake/Output Summary (Last 24 hours) at 11/14/2024 1331  Last data filed at 11/14/2024 1222  Gross per 24 hour   Intake 1306.17 ml   Output 500 ml   Net 806.17 ml       Physical Exam  Vitals and nursing note reviewed.   Constitutional:       General: He is not in acute distress.     Appearance: He is obese.   Cardiovascular:      Rate and Rhythm: Normal rate and regular rhythm.      Pulses: Normal pulses.      Heart sounds: Normal heart sounds.   Pulmonary:      Effort: Pulmonary effort is normal.      Breath sounds: Normal breath sounds.   Abdominal:      General: Bowel sounds are normal. There is distension.      Tenderness: There is no abdominal tenderness. There is no guarding.   Musculoskeletal:      Right lower leg: No edema.      Left lower leg: No edema.   Neurological:      Mental Status: He is alert and oriented to person, place, and time. Mental status is at baseline.   Psychiatric:         Mood and Affect: Mood normal.         Behavior: Behavior normal.           Lines/Drains:              Lab Results: I have reviewed the following results:   Results from last 7 days   Lab Units 11/14/24  0552   WBC Thousand/uL 8.85   HEMOGLOBIN g/dL 7.6*   HEMATOCRIT % 23.2*   PLATELETS Thousands/uL 79*   SEGS PCT % 61   LYMPHO PCT % 26   MONO PCT % 7   EOS PCT % 4     Results from last 7 days   Lab Units 11/14/24  0552   SODIUM mmol/L 134*   POTASSIUM mmol/L 3.7   CHLORIDE mmol/L 107   CO2 mmol/L 24   BUN mg/dL 26*   CREATININE mg/dL 0.70   ANION GAP mmol/L 3*   CALCIUM mg/dL 7.4*   ALBUMIN g/dL 2.7*   TOTAL BILIRUBIN mg/dL 1.66*   ALK PHOS U/L 70   ALT U/L 30   AST U/L 78*   GLUCOSE RANDOM mg/dL 108     Results from last 7 days   Lab Units 11/11/24  2313   INR  1.56*     Results from last 7 days   Lab Units 11/14/24  1104 11/14/24  0721   POC  GLUCOSE mg/dl 133 114               Recent Cultures (last 7 days):         Imaging Results Review: I reviewed radiology reports from this admission including: Ultrasound(s).  Other Study Results Review: No additional pertinent studies reviewed.    Last 24 Hours Medication List:     Current Facility-Administered Medications:     carvedilol (COREG) tablet 6.25 mg, Daily    cefTRIAXone (ROCEPHIN) IVPB (premix in dextrose) 2,000 mg 50 mL, Q24H, Last Rate: Stopped (11/13/24 6908)    ondansetron (ZOFRAN) injection 4 mg, Q6H PRN    pantoprazole (PROTONIX) EC tablet 40 mg, Early Morning    spironolactone (ALDACTONE) tablet 50 mg, Daily    Administrative Statements   Today, Patient Was Seen By: Amara Rico PA-C  I have spent a total time of 49 minutes in caring for this patient on the day of the visit/encounter including Diagnostic results, Risks and benefits of tx options, Instructions for management, Patient and family education, Importance of tx compliance, Impressions, Counseling / Coordination of care, Documenting in the medical record, Reviewing / ordering tests, medicine, procedures  , Obtaining or reviewing history  , and Communicating with other healthcare professionals .    **Please Note: This note may have been constructed using a voice recognition system.**

## 2024-11-14 NOTE — ASSESSMENT & PLAN NOTE
Lab Results   Component Value Date    EGFR 98 11/14/2024    EGFR 95 11/13/2024    EGFR 86 11/12/2024    CREATININE 0.70 11/14/2024    CREATININE 0.75 11/13/2024    CREATININE 0.92 11/12/2024     Appreciate nephro input

## 2024-11-14 NOTE — BRIEF OP NOTE (RAD/CATH)
IR PARACENTESIS Procedure Note    PATIENT NAME: Raymundo Weller  : 1960  MRN: [id number]    Pre-op Diagnosis:   1. GI bleed    2. Alcoholic cirrhosis of liver (HCC)    3. Hyperkalemia      Post-op Diagnosis:   1. GI bleed    2. Alcoholic cirrhosis of liver (HCC)    3. Hyperkalemia        Provider:   Delano Cabrera PA-C    Estimated Blood Loss: none    Findings: RLQ paracentesis, 4200cc clear yellw    Specimens: collected and sent    Complications:  none immediate    Anesthesia: local    Delano Cabrera PA-C     Date: 2024  Time: 3:57 PM

## 2024-11-14 NOTE — ASSESSMENT & PLAN NOTE
Esophageal varices screening on 1/26/24 with only one small varix.   HCC Screening - CT w/contrast done 4/2024 without mass  AFP elevated in past, normal today.  Resume spironolactone when able.   US abdomen does show ascites, obtain paracentesis today if able    MELD 3.0: 15 at 11/13/2024  6:15 AM  MELD-Na: 15 at 11/13/2024  6:15 AM  Calculated from:  Serum Creatinine: 0.75 mg/dL (Using min of 1 mg/dL) at 11/13/2024  6:15 AM  Serum Sodium: 135 mmol/L at 11/13/2024  6:15 AM  Total Bilirubin: 1.58 mg/dL at 11/13/2024  6:15 AM  Serum Albumin: 2.6 g/dL at 11/13/2024  6:15 AM  INR(ratio): 1.56 at 11/11/2024 11:13 PM  Age at listing (hypothetical): 64 years  Sex: Male at 11/13/2024  6:15 AM

## 2024-11-14 NOTE — ASSESSMENT & PLAN NOTE
Four episodes of coffee-ground emesis and maroon/black stools starting 11/11   and BP 97/58 on arrival. BP improved to 112/62 with 1 liter IVF.   Baseline hgb 15, at 9.1 on admission and today at 7.1 - will receive 1 unit PRBC  GI consulted and started empiric tx with PPI, octreotide, ceftriaxone.   EGD completed 11/12 showing small varices, barretts esophagus, severe gastropathy   Octreotide sstopped, patient transitioned to coreg now  Continue on ceftriaxone 2g for GI bleed as well as questionable SBP  Advanced to regular diet today

## 2024-11-15 PROBLEM — I95.9 HYPOTENSION: Status: ACTIVE | Noted: 2024-11-15

## 2024-11-15 LAB
ALBUMIN SERPL BCG-MCNC: 2.7 G/DL (ref 3.5–5)
ALP SERPL-CCNC: 80 U/L (ref 34–104)
ALT SERPL W P-5'-P-CCNC: 33 U/L (ref 7–52)
ANION GAP SERPL CALCULATED.3IONS-SCNC: 5 MMOL/L (ref 4–13)
ANISOCYTOSIS BLD QL SMEAR: PRESENT
AST SERPL W P-5'-P-CCNC: 77 U/L (ref 13–39)
BACTERIA UR QL AUTO: ABNORMAL /HPF
BASOPHILS # BLD MANUAL: 0 THOUSAND/UL (ref 0–0.1)
BASOPHILS NFR MAR MANUAL: 0 % (ref 0–1)
BILIRUB SERPL-MCNC: 1.32 MG/DL (ref 0.2–1)
BILIRUB UR QL STRIP: ABNORMAL
BUN SERPL-MCNC: 17 MG/DL (ref 5–25)
CALCIUM ALBUM COR SERPL-MCNC: 8.3 MG/DL (ref 8.3–10.1)
CALCIUM SERPL-MCNC: 7.3 MG/DL (ref 8.4–10.2)
CHLORIDE SERPL-SCNC: 106 MMOL/L (ref 96–108)
CLARITY UR: CLEAR
CO2 SERPL-SCNC: 23 MMOL/L (ref 21–32)
COLOR UR: ABNORMAL
CREAT SERPL-MCNC: 0.62 MG/DL (ref 0.6–1.3)
CREAT UR-MCNC: 183.2 MG/DL
EOSINOPHIL # BLD MANUAL: 0.24 THOUSAND/UL (ref 0–0.4)
EOSINOPHIL NFR BLD MANUAL: 3 % (ref 0–6)
ERYTHROCYTE [DISTWIDTH] IN BLOOD BY AUTOMATED COUNT: 18.4 % (ref 11.6–15.1)
GFR SERPL CREATININE-BSD FRML MDRD: 103 ML/MIN/1.73SQ M
GLUCOSE SERPL-MCNC: 110 MG/DL (ref 65–140)
GLUCOSE UR STRIP-MCNC: NEGATIVE MG/DL
HCT VFR BLD AUTO: 23.2 % (ref 36.5–49.3)
HGB BLD-MCNC: 7.4 G/DL (ref 12–17)
HGB UR QL STRIP.AUTO: NEGATIVE
INR PPP: 1.31 (ref 0.85–1.19)
KETONES UR STRIP-MCNC: NEGATIVE MG/DL
LEUKOCYTE ESTERASE UR QL STRIP: NEGATIVE
LYMPHOCYTES # BLD AUTO: 1.74 THOUSAND/UL (ref 0.6–4.47)
LYMPHOCYTES # BLD AUTO: 22 % (ref 14–44)
MACROCYTES BLD QL AUTO: PRESENT
MCH RBC QN AUTO: 33.9 PG (ref 26.8–34.3)
MCHC RBC AUTO-ENTMCNC: 31.9 G/DL (ref 31.4–37.4)
MCV RBC AUTO: 106 FL (ref 82–98)
METAMYELOCYTE ABSOLUTE CT: 0.16 THOUSAND/UL (ref 0–0.1)
METAMYELOCYTES NFR BLD MANUAL: 2 % (ref 0–1)
MONOCYTES # BLD AUTO: 0.16 THOUSAND/UL (ref 0–1.22)
MONOCYTES NFR BLD: 2 % (ref 4–12)
MUCOUS THREADS UR QL AUTO: ABNORMAL
MYELOCYTE ABSOLUTE CT: 0.08 THOUSAND/UL (ref 0–0.1)
MYELOCYTES NFR BLD MANUAL: 1 % (ref 0–1)
NEUTROPHILS # BLD MANUAL: 5.53 THOUSAND/UL (ref 1.85–7.62)
NEUTS BAND NFR BLD MANUAL: 1 % (ref 0–8)
NEUTS SEG NFR BLD AUTO: 69 % (ref 43–75)
NITRITE UR QL STRIP: NEGATIVE
NON-SQ EPI CELLS URNS QL MICRO: ABNORMAL /HPF
PH UR STRIP.AUTO: 6 [PH]
PLATELET # BLD AUTO: 80 THOUSANDS/UL (ref 149–390)
PLATELET BLD QL SMEAR: ABNORMAL
PMV BLD AUTO: 11 FL (ref 8.9–12.7)
POLYCHROMASIA BLD QL SMEAR: PRESENT
POTASSIUM SERPL-SCNC: 3.7 MMOL/L (ref 3.5–5.3)
POTASSIUM UR-SCNC: 54.1 MMOL/L
PROT SERPL-MCNC: 5.4 G/DL (ref 6.4–8.4)
PROT UR STRIP-MCNC: NEGATIVE MG/DL
PROTHROMBIN TIME: 16.8 SECONDS (ref 12.3–15)
RBC # BLD AUTO: 2.18 MILLION/UL (ref 3.88–5.62)
RBC #/AREA URNS AUTO: ABNORMAL /HPF
RBC MORPH BLD: PRESENT
SODIUM 24H UR-SCNC: 38 MOL/L
SODIUM SERPL-SCNC: 134 MMOL/L (ref 135–147)
SP GR UR STRIP.AUTO: 1.02
UROBILINOGEN UR QL STRIP.AUTO: 1 E.U./DL
WBC # BLD AUTO: 7.9 THOUSAND/UL (ref 4.31–10.16)
WBC #/AREA URNS AUTO: ABNORMAL /HPF

## 2024-11-15 PROCEDURE — 85007 BL SMEAR W/DIFF WBC COUNT: CPT | Performed by: PHYSICIAN ASSISTANT

## 2024-11-15 PROCEDURE — 84133 ASSAY OF URINE POTASSIUM: CPT

## 2024-11-15 PROCEDURE — 99232 SBSQ HOSP IP/OBS MODERATE 35: CPT

## 2024-11-15 PROCEDURE — 85027 COMPLETE CBC AUTOMATED: CPT | Performed by: PHYSICIAN ASSISTANT

## 2024-11-15 PROCEDURE — 80053 COMPREHEN METABOLIC PANEL: CPT | Performed by: PHYSICIAN ASSISTANT

## 2024-11-15 PROCEDURE — 81001 URINALYSIS AUTO W/SCOPE: CPT

## 2024-11-15 PROCEDURE — 85610 PROTHROMBIN TIME: CPT | Performed by: PHYSICIAN ASSISTANT

## 2024-11-15 PROCEDURE — 82570 ASSAY OF URINE CREATININE: CPT

## 2024-11-15 PROCEDURE — 84300 ASSAY OF URINE SODIUM: CPT

## 2024-11-15 PROCEDURE — 99232 SBSQ HOSP IP/OBS MODERATE 35: CPT | Performed by: INTERNAL MEDICINE

## 2024-11-15 RX ORDER — CARVEDILOL 3.12 MG/1
3.12 TABLET ORAL DAILY
Status: DISCONTINUED | OUTPATIENT
Start: 2024-11-16 | End: 2024-11-15

## 2024-11-15 RX ORDER — CARVEDILOL 3.12 MG/1
3.12 TABLET ORAL 2 TIMES DAILY WITH MEALS
Status: DISCONTINUED | OUTPATIENT
Start: 2024-11-16 | End: 2024-11-16

## 2024-11-15 RX ADMIN — CARVEDILOL 6.25 MG: 3.12 TABLET, FILM COATED ORAL at 09:04

## 2024-11-15 RX ADMIN — PANTOPRAZOLE SODIUM 40 MG: 40 TABLET, DELAYED RELEASE ORAL at 05:30

## 2024-11-15 RX ADMIN — CEFTRIAXONE 2000 MG: 2 INJECTION, SOLUTION INTRAVENOUS at 15:16

## 2024-11-15 NOTE — PROGRESS NOTES
Progress Note - Hospitalist   Name: Raymundo Weller 64 y.o. male I MRN: [id number]  Unit/Bed#: ICU 05-01 I Date of Admission: 11/11/2024   Date of Service: 11/15/2024 I Hospital Day: 3    Assessment & Plan  GI bleed  Four episodes of coffee-ground emesis and maroon/black stools starting 11/11   and BP 97/58 on arrival. BP improved to 112/62 with 1 liter IVF.   Baseline hgb 15, at 9.1 on admission and today at 7.1 - will receive 1 unit PRBC  GI consulted and started empiric tx with PPI, octreotide, ceftriaxone.   EGD completed 11/12 showing small varices, barretts esophagus, severe gastropathy   Octreotide stopped, patient transitioned to coreg now. Previously started on coreg 6.25 however w/ labile BP  Coreg 3.125 once daily x3 days then transition to BID dosing on discharge   Continue on ceftriaxone 2g for GI bleed as well as questionable SBP  Tolerating regular diet without difficulty  Acute blood loss anemia  Hgb baseline 15 > 9.1 on admission > 7.1 > 7.6  S/p 2U PRBC  Continue to frequently monitor blood counts  Stable hgb now  Alcoholic cirrhosis of liver (HCC)  Esophageal varices screening on 1/26/24 with only one small varix.   HCC Screening - CT w/contrast done 4/2024 without mass  AFP elevated in past, normal today.  US abdomen does show ascites  Paracentesis 11/14 w/ 4200 ml clear yellow fluid removed   11/15 patient with labile blood pressures. Coreg given however spironolactone held - see hypotension for plan  He does have an appointment w/ hepatology set up for 12/3    MELD 3.0: 13 at 11/15/2024  5:33 AM  MELD-Na: 10 at 11/15/2024  5:33 AM  Calculated from:  Serum Creatinine: 0.62 mg/dL (Using min of 1 mg/dL) at 11/15/2024  5:33 AM  Serum Sodium: 134 mmol/L at 11/15/2024  5:33 AM  Total Bilirubin: 1.32 mg/dL at 11/15/2024  5:33 AM  Serum Albumin: 2.7 g/dL at 11/15/2024  5:33 AM  INR(ratio): 1.31 at 11/15/2024  5:33 AM  Age at listing (hypothetical): 64 years  Sex: Male at 11/15/2024  5:33  AM    SIRS (systemic inflammatory response syndrome) (HCC)  Meeting SIRS with leukocytosis and tachycardia  Most likely reactive to GI bleed  However covering with abx given varices and ascites   Leukocytosis resolved   Hypotension  11/15 Bps are labile  Discussed with GI - will hold spironolactone on discharge for now and decrease coreg to 3.125 once daily until 11/17, then will increase to BID dosing   Abdominal aortic aneurysm (AAA) without rupture (HCC)  3.7 cm infrarenal abdominal aortic ectasia seen on CT 04/08/2024  Squamous cell carcinoma of tongue (HCC)  S/p revision with positive margins in September 2024. Patient elected to closely monitor.  Elevated troponin  Likely nonischemic elevation due to demand from tachycardia due to GI bleed  Hyperkalemia  Mildly elevated at 5.6 on arrival.   Now normalized  BMI 39.0-39.9,adult  Lifestyle modifications recommended once acute illness improved  YUNIOR (acute kidney injury) (HCC)  Presenting w/ Cr 0.97  Baseline Cr 0.6  Has returned to baseline   Continue to monitor on BMP daily   CKD (chronic kidney disease) stage 2, GFR 60-89 ml/min  Lab Results   Component Value Date    EGFR 103 11/15/2024    EGFR 98 11/14/2024    EGFR 95 11/13/2024    CREATININE 0.62 11/15/2024    CREATININE 0.70 11/14/2024    CREATININE 0.75 11/13/2024     Appreciate nephro input    VTE Pharmacologic Prophylaxis: VTE Score: 6 High Risk (Score >/= 5) - Pharmacological DVT Prophylaxis Contraindicated. Sequential Compression Devices Ordered.    Mobility:   Basic Mobility Inpatient Raw Score: 21  JH-HLM Goal: 6: Walk 10 steps or more  JH-HLM Achieved: 7: Walk 25 feet or more  JH-HLM Goal achieved. Continue to encourage appropriate mobility.    Patient Centered Rounds: I performed bedside rounds with nursing staff today.   Discussions with Specialists or Other Care Team Provider: GI     Education and Discussions with Family / Patient: Updated  (wife) at bedside.    Current Length of  Stay: 3 day(s)  Current Patient Status: Inpatient   Certification Statement: The patient will continue to require additional inpatient hospital stay due to continued BP monitoring, ongoing medication adjustments   Discharge Plan: Anticipate discharge in 24-48 hrs to home.    Code Status: Level 1 - Full Code    Subjective   Seen and examined.  No acute events overnight.  Patient states he did have some dizziness this morning with walking however is asymptomatic with his labile pressures while sitting.  He is eating and drinking a regular diet at this time.  Reports abdomen feels better after paracentesis yesterday.  He is eager for discharge, however I did discuss with him the concern for his low blood pressures and need for continued monitoring.    Objective :  Temp:  [98.4 °F (36.9 °C)-98.7 °F (37.1 °C)] 98.6 °F (37 °C)  HR:  [59-73] 70  BP: (100-128)/(51-58) 100/55  Resp:  [12-33] 33  SpO2:  [96 %-99 %] 97 %  O2 Device: None (Room air)    Body mass index is 39.03 kg/m².     Input and Output Summary (last 24 hours):     Intake/Output Summary (Last 24 hours) at 11/15/2024 1130  Last data filed at 11/15/2024 0600  Gross per 24 hour   Intake 1717 ml   Output 0 ml   Net 1717 ml       Physical Exam  Constitutional:       General: He is not in acute distress.     Appearance: He is ill-appearing. He is not toxic-appearing.   HENT:      Mouth/Throat:      Mouth: Mucous membranes are moist.   Eyes:      Conjunctiva/sclera: Conjunctivae normal.   Cardiovascular:      Rate and Rhythm: Normal rate.      Heart sounds: Normal heart sounds. No murmur heard.  Pulmonary:      Effort: Pulmonary effort is normal.      Breath sounds: No wheezing, rhonchi or rales.   Abdominal:      General: There is distension.      Tenderness: There is no abdominal tenderness.   Musculoskeletal:      Right lower leg: Edema present.      Left lower leg: Edema present.   Skin:     General: Skin is warm.   Neurological:      Mental Status: Mental status  is at baseline.       Lines/Drains:        Lab Results: I have reviewed the following results:   Results from last 7 days   Lab Units 11/15/24  0533 11/14/24  0552   WBC Thousand/uL 7.90 8.85   HEMOGLOBIN g/dL 7.4* 7.6*   HEMATOCRIT % 23.2* 23.2*   PLATELETS Thousands/uL 80* 79*   BANDS PCT % 1  --    SEGS PCT %  --  61   LYMPHO PCT % 22 26   MONO PCT % 2* 7   EOS PCT % 3 4     Results from last 7 days   Lab Units 11/15/24  0533   SODIUM mmol/L 134*   POTASSIUM mmol/L 3.7   CHLORIDE mmol/L 106   CO2 mmol/L 23   BUN mg/dL 17   CREATININE mg/dL 0.62   ANION GAP mmol/L 5   CALCIUM mg/dL 7.3*   ALBUMIN g/dL 2.7*   TOTAL BILIRUBIN mg/dL 1.32*   ALK PHOS U/L 80   ALT U/L 33   AST U/L 77*   GLUCOSE RANDOM mg/dL 110     Results from last 7 days   Lab Units 11/15/24  0533   INR  1.31*     Results from last 7 days   Lab Units 11/14/24  1104 11/14/24  0721   POC GLUCOSE mg/dl 133 114               Recent Cultures (last 7 days):   Results from last 7 days   Lab Units 11/14/24  1545   GRAM STAIN RESULT  Rare Mononuclear Cells  No bacteria seen       Imaging Results Review: I reviewed radiology reports from this admission including: xray(s).  Other Study Results Review: No additional pertinent studies reviewed.    Last 24 Hours Medication List:     Current Facility-Administered Medications:     [START ON 11/16/2024] carvedilol (COREG) tablet 3.125 mg, Daily    cefTRIAXone (ROCEPHIN) IVPB (premix in dextrose) 2,000 mg 50 mL, Q24H, Last Rate: 2,000 mg (11/14/24 1411)    ondansetron (ZOFRAN) injection 4 mg, Q6H PRN    pantoprazole (PROTONIX) EC tablet 40 mg, Early Morning    [Held by provider] spironolactone (ALDACTONE) tablet 50 mg, Daily    Administrative Statements   Today, Patient Was Seen By: Maye Limon PA-C    **Please Note: This note may have been constructed using a voice recognition system.**

## 2024-11-15 NOTE — ASSESSMENT & PLAN NOTE
Hgb baseline 15 > 9.1 on admission > 7.1 > 7.6  S/p 2U PRBC  Continue to frequently monitor blood counts  Stable hgb now

## 2024-11-15 NOTE — ASSESSMENT & PLAN NOTE
Lab Results   Component Value Date    EGFR 103 11/15/2024    EGFR 98 11/14/2024    EGFR 95 11/13/2024    CREATININE 0.62 11/15/2024    CREATININE 0.70 11/14/2024    CREATININE 0.75 11/13/2024     Appreciate nephro input

## 2024-11-15 NOTE — ASSESSMENT & PLAN NOTE
Presenting w/ Cr 0.97  Baseline Cr 0.6  Has returned to baseline   Continue to monitor on BMP daily

## 2024-11-15 NOTE — PLAN OF CARE
Problem: PAIN - ADULT  Goal: Verbalizes/displays adequate comfort level or baseline comfort level  Description: Interventions:  - Encourage patient to monitor pain and request assistance  - Assess pain using appropriate pain scale  - Administer analgesics based on type and severity of pain and evaluate response  - Implement non-pharmacological measures as appropriate and evaluate response  - Consider cultural and social influences on pain and pain management  - Notify physician/advanced practitioner if interventions unsuccessful or patient reports new pain  Outcome: Progressing     Problem: INFECTION - ADULT  Goal: Absence or prevention of progression during hospitalization  Description: INTERVENTIONS:  - Assess and monitor for signs and symptoms of infection  - Monitor lab/diagnostic results  - Monitor all insertion sites, i.e. indwelling lines, tubes, and drains  - Monitor endotracheal if appropriate and nasal secretions for changes in amount and color  - [place] appropriate cooling/warming therapies per order  - Administer medications as ordered  - Instruct and encourage patient and family to use good hand hygiene technique  - Identify and instruct in appropriate isolation precautions for identified infection/condition  Outcome: Progressing  Goal: Absence of fever/infection during neutropenic period  Description: INTERVENTIONS:  - Monitor WBC    Outcome: Progressing     Problem: SAFETY ADULT  Goal: Patient will remain free of falls  Description: INTERVENTIONS:  - Educate patient/family on patient safety including physical limitations  - Instruct patient to call for assistance with activity   - Consult OT/PT to assist with strengthening/mobility   - Keep Call bell within reach  - Keep bed low and locked with side rails adjusted as appropriate  - Keep care items and personal belongings within reach  - Initiate and maintain comfort rounds  - Make Fall Risk Sign visible to staff  - Offer Toileting every 2 Hours,  in advance of need  - Initiate/Maintain bed alarm  - Obtain necessary fall risk management equipment  - Apply yellow socks and bracelet for high fall risk patients  - Consider moving patient to room near nurses station  Outcome: Progressing  Goal: Maintain or return to baseline ADL function  Description: INTERVENTIONS:  -  Assess patient's ability to carry out ADLs; assess patient's baseline for ADL function and identify physical deficits which impact ability to perform ADLs (bathing, care of mouth/teeth, toileting, grooming, dressing, etc.)  - Assess/evaluate cause of self-care deficits   - Assess range of motion  - Assess patient's mobility; develop plan if impaired  - Assess patient's need for assistive devices and provide as appropriate  - Encourage maximum independence but intervene and supervise when necessary  - Involve family in performance of ADLs  - Assess for home care needs following discharge   - Consider OT consult to assist with ADL evaluation and planning for discharge  - Provide patient education as appropriate  Outcome: Progressing  Goal: Maintains/Returns to pre admission functional level  Description: INTERVENTIONS:  - Perform AM-PAC 6 Click Basic Mobility/ Daily Activity assessment daily.  - Set and communicate daily mobility goal to care team and patient/family/caregiver.   - Collaborate with rehabilitation services on mobility goals if consulted  - Perform Range of Motion 3 times a day.  - Reposition patient every 2 hours.  - Dangle patient 3 times a day  - Stand patient 3 times a day  - Ambulate patient 3 times a day  - Out of bed to chair 3 times a day   - Out of bed for meals 3 times a day  - Out of bed for toileting  - Record patient progress and toleration of activity level   Outcome: Progressing     Problem: DISCHARGE PLANNING  Goal: Discharge to home or other facility with appropriate resources  Description: INTERVENTIONS:  - Identify barriers to discharge w/patient and caregiver  -  Arrange for needed discharge resources and transportation as appropriate  - Identify discharge learning needs (meds, wound care, etc.)  - Arrange for interpretive services to assist at discharge as needed  - Refer to Case Management Department for coordinating discharge planning if the patient needs post-hospital services based on physician/advanced practitioner order or complex needs related to functional status, cognitive ability, or social support system  Outcome: Progressing     Problem: Knowledge Deficit  Goal: Patient/family/caregiver demonstrates understanding of disease process, treatment plan, medications, and discharge instructions  Description: Complete learning assessment and assess knowledge base.  Interventions:  - Provide teaching at level of understanding  - Provide teaching via preferred learning methods  Outcome: Progressing     Problem: RESPIRATORY - ADULT  Goal: Achieves optimal ventilation and oxygenation  Description: INTERVENTIONS:  - Assess for changes in respiratory status  - Assess for changes in mentation and behavior  - Position to facilitate oxygenation and minimize respiratory effort  - Oxygen administered by appropriate delivery if ordered  - Initiate smoking cessation education as indicated  - Encourage broncho-pulmonary hygiene including cough, deep breathe, Incentive Spirometry  - Assess the need for suctioning and aspirate as needed  - Assess and instruct to report SOB or any respiratory difficulty  - Respiratory Therapy support as indicated  Outcome: Progressing     Problem: GASTROINTESTINAL - ADULT  Goal: Minimal or absence of nausea and/or vomiting  Description: INTERVENTIONS:  - Administer IV fluids if ordered to ensure adequate hydration  - Maintain NPO status until nausea and vomiting are resolved  - Nasogastric tube if ordered  - Administer ordered antiemetic medications as needed  - Provide nonpharmacologic comfort measures as appropriate  - Advance diet as tolerated, if  ordered  - Consider nutrition services referral to assist patient with adequate nutrition and appropriate food choices  Outcome: Progressing  Goal: Maintains or returns to baseline bowel function  Description: INTERVENTIONS:  - Assess bowel function  - Encourage oral fluids to ensure adequate hydration  - Administer IV fluids if ordered to ensure adequate hydration  - Administer ordered medications as needed  - Encourage mobilization and activity  - Consider nutritional services referral to assist patient with adequate nutrition and appropriate food choices  Outcome: Progressing  Goal: Maintains adequate nutritional intake  Description: INTERVENTIONS:  - Monitor percentage of each meal consumed  - Identify factors contributing to decreased intake, treat as appropriate  - Assist with meals as needed  - Monitor I&O, weight, and lab values if indicated  - Obtain nutrition services referral as needed  Outcome: Progressing  Goal: Establish and maintain optimal ostomy function  Description: INTERVENTIONS:  - Assess bowel function  - Encourage oral fluids to ensure adequate hydration  - Administer IV fluids if ordered to ensure adequate hydration   - Administer ordered medications as needed  - Encourage mobilization and activity  - Nutrition services referral to assist patient with appropriate food choices  - Assess stoma site  - Consider wound care consult   Outcome: Progressing  Goal: Oral mucous membranes remain intact  Description: INTERVENTIONS  - Assess oral mucosa and hygiene practices  - Implement preventative oral hygiene regimen  - Implement oral medicated treatments as ordered  - Initiate Nutrition services referral as needed  Outcome: Progressing     Problem: HEMATOLOGIC - ADULT  Goal: Maintains hematologic stability  Description: INTERVENTIONS  - Assess for signs and symptoms of bleeding or hemorrhage  - Monitor labs  - Administer supportive blood products/factors as ordered and appropriate  Outcome:  Progressing

## 2024-11-15 NOTE — ASSESSMENT & PLAN NOTE
Esophageal varices screening on 1/26/24 with only one small varix.   HCC Screening - CT w/contrast done 4/2024 without mass  AFP elevated in past, normal today.  US abdomen does show ascites  Paracentesis 11/14 w/ 4200 ml clear yellow fluid removed   11/15 patient with labile blood pressures. Coreg given however spironolactone held - see hypotension for plan  He does have an appointment w/ hepatology set up for 12/3    MELD 3.0: 13 at 11/15/2024  5:33 AM  MELD-Na: 10 at 11/15/2024  5:33 AM  Calculated from:  Serum Creatinine: 0.62 mg/dL (Using min of 1 mg/dL) at 11/15/2024  5:33 AM  Serum Sodium: 134 mmol/L at 11/15/2024  5:33 AM  Total Bilirubin: 1.32 mg/dL at 11/15/2024  5:33 AM  Serum Albumin: 2.7 g/dL at 11/15/2024  5:33 AM  INR(ratio): 1.31 at 11/15/2024  5:33 AM  Age at listing (hypothetical): 64 years  Sex: Male at 11/15/2024  5:33 AM

## 2024-11-15 NOTE — ASSESSMENT & PLAN NOTE
Meeting SIRS with leukocytosis and tachycardia  Most likely reactive to GI bleed  However covering with abx given varices and ascites   Leukocytosis resolved

## 2024-11-15 NOTE — ASSESSMENT & PLAN NOTE
Four episodes of coffee-ground emesis and maroon/black stools starting 11/11   and BP 97/58 on arrival. BP improved to 112/62 with 1 liter IVF.   Baseline hgb 15, at 9.1 on admission and today at 7.1 - will receive 1 unit PRBC  GI consulted and started empiric tx with PPI, octreotide, ceftriaxone.   EGD completed 11/12 showing small varices, barretts esophagus, severe gastropathy   Octreotide stopped, patient transitioned to coreg now. Previously started on coreg 6.25 however w/ labile BP  Coreg 3.125 once daily x3 days then transition to BID dosing on discharge   Continue on ceftriaxone 2g for GI bleed as well as questionable SBP  Tolerating regular diet without difficulty

## 2024-11-15 NOTE — ASSESSMENT & PLAN NOTE
11/15 Bps are labile  Discussed with GI - will hold spironolactone on discharge for now and decrease coreg to 3.125 once daily until 11/17, then will increase to BID dosing

## 2024-11-15 NOTE — PLAN OF CARE
Problem: SAFETY ADULT  Goal: Patient will remain free of falls  Description: INTERVENTIONS:  - Educate patient/family on patient safety including physical limitations  - Instruct patient to call for assistance with activity   - Consult OT/PT to assist with strengthening/mobility   - Keep Call bell within reach  - Keep bed low and locked with side rails adjusted as appropriate  - Keep care items and personal belongings within reach  - Initiate and maintain comfort rounds  - Make Fall Risk Sign visible to staff  - Offer Toileting every 2 Hours, in advance of need  - Initiate/Maintain bed alarm  - Obtain necessary fall risk management equipment  - Apply yellow socks and bracelet for high fall risk patients  - Consider moving patient to room near nurses station  Outcome: Progressing     Problem: Knowledge Deficit  Goal: Patient/family/caregiver demonstrates understanding of disease process, treatment plan, medications, and discharge instructions  Description: Complete learning assessment and assess knowledge base.  Interventions:  - Provide teaching at level of understanding  - Provide teaching via preferred learning methods  Outcome: Progressing     Problem: GASTROINTESTINAL - ADULT  Goal: Minimal or absence of nausea and/or vomiting  Description: INTERVENTIONS:  - Administer IV fluids if ordered to ensure adequate hydration  - Maintain NPO status until nausea and vomiting are resolved  - Nasogastric tube if ordered  - Administer ordered antiemetic medications as needed  - Provide nonpharmacologic comfort measures as appropriate  - Advance diet as tolerated, if ordered  - Consider nutrition services referral to assist patient with adequate nutrition and appropriate food choices  Outcome: Progressing  Goal: Maintains adequate nutritional intake  Description: INTERVENTIONS:  - Monitor percentage of each meal consumed  - Identify factors contributing to decreased intake, treat as appropriate  - Assist with meals as  needed  - Monitor I&O, weight, and lab values if indicated  - Obtain nutrition services referral as needed  Outcome: Progressing     Problem: HEMATOLOGIC - ADULT  Goal: Maintains hematologic stability  Description: INTERVENTIONS  - Assess for signs and symptoms of bleeding or hemorrhage  - Monitor labs  - Administer supportive blood products/factors as ordered and appropriate  Outcome: Progressing

## 2024-11-15 NOTE — PROGRESS NOTES
[facility] - Inpatient Progress Note    PATIENT INFORMATION      Raymundo Weller 64 y.o. male MRN: [id number]  Unit/Bed#: ICU 05-01 Encounter: [id number]    ASSESSMENT & PLAN   Raymundo Weller is a 64 y.o. old male with PMH of decompensated alcohol cirrhosis C/B ascites, esophageal varices, oropharyngeal carcinoma with resection, COPD, DOT, tobacco use, and hypertension who presented with 4 episodes of coffee-ground emesis with hematochezia and melena.     Coffee-ground emesis/hematemesis 2/2 PHG  Alcohol cirrhosis C/B ascites with active alcohol use  Patient is presented with 3 episodes of coffee-ground emesis as well as hematemesis since Sunday. Patient's last drink was on 5 days ago where he endorses drinking of beer he states he still drinks multiple beers throughout the week despite being told not to do so.  Currently patient denies any nausea or any further episodes of vomiting.  He underwent an EGD on 11/12 which was notable for significant PHG which most likely is the cause of bleeding.     -Reduce carvedilol to 3.125 mg once a day until 11/17 then increase to twice a day  -Can hold PTA spironolactone to prevent hypotensive episodes will need to reassess at outpatient visit  -Can consider medications to help with alcohol cessation outpatient  -Continue ceftriaxone for SBP prophylaxis  -Patient will need extensive counseling regarding alcohol cessation  -Will need to follow up with Hepatology outpatient.   -Daily MELD labs  -Okay to discharge from GI standpoint  -Will sign off, please reach out with any questions      MELD: MELD 3.0: 13 at 11/15/2024  5:33 AM  MELD-Na: 10 at 11/15/2024  5:33 AM  Calculated from:  Serum Creatinine: 0.62 mg/dL (Using min of 1 mg/dL) at 11/15/2024  5:33 AM  Serum Sodium: 134 mmol/L at 11/15/2024  5:33 AM  Total Bilirubin: 1.32 mg/dL at 11/15/2024  5:33 AM  Serum Albumin: 2.7 g/dL at 11/15/2024  5:33 AM  INR(ratio): 1.31 at 11/15/2024  5:33  AM  Age at listing (hypothetical): 64 years  Sex: Male at 11/15/2024  5:33 AM    Etiology: Alcohol with active alcohol use, last drink      Transplant Status: n/a     Ascites: history of ascites   Diet: Low-sodium (< 2 g daily)     Hepatic Encephalopathy: No history   Diet: High protein/high calorie diet to prevent catabolic state w/ further ammonia production   Varices:   EGD 2024  Multiple small varices in the lower third of the esophagus  C2M3 Nelson's esophagus  Severe, friable and mosaic portal hypertensive gastropathy in the body of the stomach, greater curve of the stomach and lesser curve of the stomach  The duodenal bulb, 1st part of the duodenum and 2nd part of the duodenum appeared normal.     Hepatoma screening: no evidence of mass 2024  Patient will require q.6-12month dedicated liver imaging for HCC monitoring     Colon cancer screenin2024:  Diverticulosis in the sigmoid colon  3 subcentimeter polyps in the transverse colon and splenic flexure were removed with cold snare  Angioectasia in the ascending colon  Repeat in 1 year due to inadequate prep            SUBJECTIVE     Patient seen and evaluated at bedside.  Patient states he is feeling better    MEDICATIONS & ALLERGIES       Medications:     Medications Prior to Admission:     multivitamin (THERAGRAN) TABS    spironolactone (ALDACTONE) 50 mg tablet    fluticasone (FLONASE) 50 mcg/act nasal spray    Current Facility-Administered Medications:     carvedilol (COREG) tablet 6.25 mg, Daily    cefTRIAXone (ROCEPHIN) IVPB (premix in dextrose) 2,000 mg 50 mL, Q24H, Last Rate: 2,000 mg (24 1411)    ondansetron (ZOFRAN) injection 4 mg, Q6H PRN    pantoprazole (PROTONIX) EC tablet 40 mg, Early Morning    spironolactone (ALDACTONE) tablet 50 mg, Daily    Allergies:   No Known Allergies    PHYSICAL EXAM     Objective   Blood pressure 101/52, pulse 65, temperature 98.6 °F (37 °C), temperature source Temporal, resp. rate 14, height  "5' 10\" (1.778 m), weight 123 kg (272 lb 0.8 oz), SpO2 96%. Body mass index is 39.03 kg/m².    Intake/Output Summary (Last 24 hours) at 11/15/2024 0804  Last data filed at 11/15/2024 0600  Gross per 24 hour   Intake 1717 ml   Output 0 ml   Net 1717 ml       General Appearance:   Alert, cooperative, no distress   HEENT:   Normocephalic, atraumatic, anicteric     Neck:   Supple, symmetrical, trachea midline   Lungs:   Equal chest rise, respirations unlabored    Heart:   Regular rate and rhythm   Abdomen:   Distended with no pain   Rectal:   Deferred    Extremities:   No cyanosis, clubbing or edema    Neuro:   Moves all 4 extremities    Skin:   No jaundice, rashes, or lesions      ADDITIONAL DATA     Lab Results:     Results from last 7 days   Lab Units 11/14/24  0552   WBC Thousand/uL 8.85   HEMOGLOBIN g/dL 7.6*   HEMATOCRIT % 23.2*   PLATELETS Thousands/uL 79*   SEGS PCT % 61   LYMPHO PCT % 26   MONO PCT % 7   EOS PCT % 4     Results from last 7 days   Lab Units 11/15/24  0533   POTASSIUM mmol/L 3.7   CHLORIDE mmol/L 106   CO2 mmol/L 23   BUN mg/dL 17   CREATININE mg/dL 0.62   CALCIUM mg/dL 7.3*   ALK PHOS U/L 80   ALT U/L 33   AST U/L 77*     Results from last 7 days   Lab Units 11/15/24  0533   INR  1.31*       Imaging:    EGD  Addendum Date: 11/12/2024  Addendum: [facility] Operating Room [address] [place] [id number] [phone] DATE OF SERVICE: 11/12/24 PHYSICIAN(S): Attending: Miranda Hernandez DO Fellow: Roseanne Beltre MD INDICATION: Alcoholic cirrhosis of liver (HCC), GI bleed POST-OP DIAGNOSIS: See the impression below. PREPROCEDURE: Informed consent was obtained for the procedure, including sedation.  Risks of perforation, hemorrhage, adverse drug reaction and aspiration were discussed. The patient was placed in the left lateral decubitus position. Patient was explained about the risks and benefits of the procedure. Risks including but not limited to bleeding, infection, and " perforation were explained in detail. Also explained about less than 100% sensitivity with the exam and other alternatives. PROCEDURE: EGD DETAILS OF PROCEDURE: Patient was taken to the procedure room where a time out was performed to confirm correct patient and correct procedure. The patient underwent monitored anesthesia care, which was administered by an anesthesia professional. The patient's blood pressure, heart rate, level of consciousness, respirations, oxygen, ECG and ETCO2 were monitored throughout the procedure. The scope was introduced through the mouth and advanced to the second part of the duodenum. Retroflexion was performed in the fundus. The patient experienced no blood loss. The procedure was not difficult. The patient tolerated the procedure well. There were no apparent adverse events. ANESTHESIA INFORMATION: ASA: III Anesthesia Type: General MEDICATIONS: No administrations occurring from 1355 to 1535 on 11/12/24 FINDINGS: Multiple small varices (no red clifford sign) in the lower third of the esophagus; no bleeding was identified. Small varices seen which were fully collapsible with insufflation, no signs of bleeding seen. 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 Significant PHG seen, most likely source of hematemesis The duodenal bulb, 1st part of the duodenum and 2nd part of the duodenum appeared normal. SPECIMENS: * No specimens in log * IMPRESSION: Multiple small varices in the lower third of the esophagus C2M3 Nelson's esophagus Severe, friable and mosaic portal hypertensive gastropathy in the body of the stomach, greater curve of the stomach and lesser curve of the stomach The duodenal bulb, 1st part of the duodenum and 2nd part of the duodenum appeared normal. RECOMMENDATION: Continue with Octreotide for total 24 hours Can transition to oral PPI 40 mg  once a day Clear liquid diet today Extensive counseling for alcohol cessation Repeat EGD in 1 year    Miranda Hernandez DO     Result Date: 11/12/2024  Narrative: Table formatting from the original result was not included. [facility] Operating Room [address] [place] [id number] [phone] DATE OF SERVICE: 11/12/24 PHYSICIAN(S): Attending: Miranda Hernandez DO Fellow: Roseanne Beltre MD INDICATION: Alcoholic cirrhosis of liver (HCC), GI bleed POST-OP DIAGNOSIS: See the impression below. PREPROCEDURE: Informed consent was obtained for the procedure, including sedation.  Risks of perforation, hemorrhage, adverse drug reaction and aspiration were discussed. The patient was placed in the left lateral decubitus position. Patient was explained about the risks and benefits of the procedure. Risks including but not limited to bleeding, infection, and perforation were explained in detail. Also explained about less than 100% sensitivity with the exam and other alternatives. PROCEDURE: EGD DETAILS OF PROCEDURE: Patient was taken to the procedure room where a time out was performed to confirm correct patient and correct procedure. The patient underwent monitored anesthesia care, which was administered by an anesthesia professional. The patient's blood pressure, heart rate, level of consciousness, respirations, oxygen, ECG and ETCO2 were monitored throughout the procedure. The scope was introduced through the mouth and advanced to the second part of the duodenum. Retroflexion was performed in the fundus. The patient experienced no blood loss. The procedure was not difficult. The patient tolerated the procedure well. There were no apparent adverse events. ANESTHESIA INFORMATION: ASA: III Anesthesia Type: General MEDICATIONS: No administrations occurring from 1355 to 1535 on 11/12/24 FINDINGS: Multiple small varices (no red clifford sign) in the lower third of the esophagus; no bleeding was  identified. Small varices seen which were fully collapsible with insufflation, no signs of bleeding seen. 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 Significant PHG seen, most likely source of hematemesis The duodenal bulb, 1st part of the duodenum and 2nd part of the duodenum appeared normal. 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 RECOMMENDATION: Continue with Octreotide for total 24 hours Can transition to oral PPI 40 mg once a day Clear liquid diet today Extensive counseling for alcohol cessation Repeat EGD in 1 year    Miranda Hernandez, DO     XR chest 1 view portable  Result Date: 11/12/2024  Narrative: XR CHEST PORTABLE INDICATION: SOB. COMPARISON: 08/12/2024 FINDINGS: Clear lungs. No pneumothorax or pleural effusion. Normal cardiomediastinal silhouette. Bones are unremarkable for age. Normal upper abdomen.     Impression: No acute cardiopulmonary disease. Workstation performed: [id number]     TRAUMA - CT spine cervical wo contrast  Result Date: 10/22/2024  Narrative: CT CERVICAL SPINE - WITHOUT CONTRAST INDICATION:   TRAUMA. COMPARISON: CT cervical spine dated 4/3/2023. TECHNIQUE:  CT examination of the cervical spine was performed without intravenous contrast.  Contiguous axial images were obtained. Multiplanar 2D reformatted images were created from the source data. Radiation dose length product (DLP) for this visit:  558 mGy-cm .  This examination, like all CT scans performed in the [facility], was performed utilizing  techniques to minimize radiation dose exposure, including the use of iterative reconstruction and automated exposure control. IMAGE QUALITY:  Diagnostic. FINDINGS: ALIGNMENT: Mild reversal of the normal cervical lordosis. No subluxation. VERTEBRAE:  No fracture. DEGENERATIVE CHANGES: Moderately advanced multilevel cervical degenerative changes. No critical central canal stenosis. Mild multilevel central canal stenosis secondary to degenerative changes. PREVERTEBRAL AND PARASPINAL SOFT TISSUES: Atherosclerotic vascular calcifications. THORACIC INLET: Unremarkable.     Impression: No acute cervical spine fracture or traumatic malalignment. The study was marked in EPIC for immediate notification. Workstation performed: [id number]     TRAUMA - CT head wo contrast  Result Date: 10/22/2024  Narrative: CT BRAIN - WITHOUT CONTRAST INDICATION:   TRAUMA. COMPARISON: CT head dated 4/3/2023. TECHNIQUE:  CT examination of the brain was performed.  Multiplanar 2D reformatted images were created from the source data. Radiation dose length product (DLP) for this visit:  864 mGy-cm .  This examination, like all CT scans performed in the [facility], was performed utilizing techniques to minimize radiation dose exposure, including the use of iterative reconstruction and automated exposure control. IMAGE QUALITY:  Diagnostic. FINDINGS: PARENCHYMA: Decreased attenuation is noted in periventricular and subcortical white matter demonstrating an appearance that is statistically most likely to represent mild to moderate microangiopathic change. No CT signs of acute infarction.  No intracranial mass, mass effect or midline shift.  No acute parenchymal hemorrhage. VENTRICLES AND EXTRA-AXIAL SPACES: Unremarkable for the patient's age. VISUALIZED ORBITS: Unremarkable visualized orbits. PARANASAL SINUSES: Unremarkable visualized paranasal sinuses. CALVARIUM AND EXTRACRANIAL SOFT TISSUES: No acute calvarial fracture.      Impression: No acute intracranial hemorrhage. Workstation performed: [id number]       EKG, Pathology, and Other Studies Reviewed on Admission:   EKG: Reviewed      Counseling / Coordination of Care Time: 30 total mins spent n consult. Greater than 50% of total time spent on patient counseling and coordination of care.    Roseanne Beltre MD  Gastroenterology Fellow  [facility]  Division of Gastroenterology and Hepatology    ...............................................................................................................................................  ** Please Note: This note is constructed using a voice recognition dictation system. **

## 2024-11-16 VITALS
BODY MASS INDEX: 38.95 KG/M2 | RESPIRATION RATE: 23 BRPM | OXYGEN SATURATION: 95 % | HEIGHT: 70 IN | SYSTOLIC BLOOD PRESSURE: 117 MMHG | DIASTOLIC BLOOD PRESSURE: 59 MMHG | TEMPERATURE: 98.4 F | HEART RATE: 72 BPM | WEIGHT: 272.05 LBS

## 2024-11-16 LAB
ALBUMIN SERPL BCG-MCNC: 2.6 G/DL (ref 3.5–5)
ALP SERPL-CCNC: 86 U/L (ref 34–104)
ALT SERPL W P-5'-P-CCNC: 29 U/L (ref 7–52)
ANION GAP SERPL CALCULATED.3IONS-SCNC: 4 MMOL/L (ref 4–13)
AST SERPL W P-5'-P-CCNC: 64 U/L (ref 13–39)
BILIRUB SERPL-MCNC: 0.87 MG/DL (ref 0.2–1)
BUN SERPL-MCNC: 14 MG/DL (ref 5–25)
CALCIUM ALBUM COR SERPL-MCNC: 8.3 MG/DL (ref 8.3–10.1)
CALCIUM SERPL-MCNC: 7.2 MG/DL (ref 8.4–10.2)
CHLORIDE SERPL-SCNC: 106 MMOL/L (ref 96–108)
CO2 SERPL-SCNC: 23 MMOL/L (ref 21–32)
CREAT SERPL-MCNC: 0.56 MG/DL (ref 0.6–1.3)
ERYTHROCYTE [DISTWIDTH] IN BLOOD BY AUTOMATED COUNT: 18.6 % (ref 11.6–15.1)
GFR SERPL CREATININE-BSD FRML MDRD: 107 ML/MIN/1.73SQ M
GLUCOSE SERPL-MCNC: 112 MG/DL (ref 65–140)
HCT VFR BLD AUTO: 22.4 % (ref 36.5–49.3)
HGB BLD-MCNC: 7.2 G/DL (ref 12–17)
MCH RBC QN AUTO: 34.6 PG (ref 26.8–34.3)
MCHC RBC AUTO-ENTMCNC: 32.1 G/DL (ref 31.4–37.4)
MCV RBC AUTO: 108 FL (ref 82–98)
PLATELET # BLD AUTO: 80 THOUSANDS/UL (ref 149–390)
PMV BLD AUTO: 11.1 FL (ref 8.9–12.7)
POTASSIUM SERPL-SCNC: 3.4 MMOL/L (ref 3.5–5.3)
PROT SERPL-MCNC: 5.2 G/DL (ref 6.4–8.4)
RBC # BLD AUTO: 2.08 MILLION/UL (ref 3.88–5.62)
SODIUM SERPL-SCNC: 133 MMOL/L (ref 135–147)
WBC # BLD AUTO: 6.61 THOUSAND/UL (ref 4.31–10.16)

## 2024-11-16 PROCEDURE — 80053 COMPREHEN METABOLIC PANEL: CPT

## 2024-11-16 PROCEDURE — 85027 COMPLETE CBC AUTOMATED: CPT

## 2024-11-16 PROCEDURE — 99239 HOSP IP/OBS DSCHRG MGMT >30: CPT

## 2024-11-16 RX ORDER — CARVEDILOL 3.12 MG/1
3.12 TABLET ORAL ONCE
Status: DISCONTINUED | OUTPATIENT
Start: 2024-11-16 | End: 2024-11-16 | Stop reason: HOSPADM

## 2024-11-16 RX ORDER — CARVEDILOL 3.12 MG/1
3.12 TABLET ORAL DAILY
Qty: 30 TABLET | Refills: 0 | Status: SHIPPED | OUTPATIENT
Start: 2024-11-16

## 2024-11-16 RX ORDER — ALBUMIN (HUMAN) 12.5 G/50ML
12.5 SOLUTION INTRAVENOUS ONCE
Status: COMPLETED | OUTPATIENT
Start: 2024-11-16 | End: 2024-11-16

## 2024-11-16 RX ORDER — CARVEDILOL 3.12 MG/1
3.12 TABLET ORAL 2 TIMES DAILY WITH MEALS
Status: DISCONTINUED | OUTPATIENT
Start: 2024-11-16 | End: 2024-11-16 | Stop reason: HOSPADM

## 2024-11-16 RX ORDER — POTASSIUM CHLORIDE 1500 MG/1
20 TABLET, EXTENDED RELEASE ORAL ONCE
Status: COMPLETED | OUTPATIENT
Start: 2024-11-16 | End: 2024-11-16

## 2024-11-16 RX ADMIN — POTASSIUM CHLORIDE 20 MEQ: 1500 TABLET, EXTENDED RELEASE ORAL at 08:29

## 2024-11-16 RX ADMIN — PANTOPRAZOLE SODIUM 40 MG: 40 TABLET, DELAYED RELEASE ORAL at 06:11

## 2024-11-16 RX ADMIN — ALBUMIN (HUMAN) 12.5 G: 0.25 INJECTION, SOLUTION INTRAVENOUS at 08:30

## 2024-11-16 RX ADMIN — CARVEDILOL 3.12 MG: 3.12 TABLET, FILM COATED ORAL at 09:23

## 2024-11-16 NOTE — DISCHARGE SUMMARY
Discharge Summary - Hospitalist   Name: Raymundo Weller 64 y.o. male I MRN: [id number]  Unit/Bed#: ICU 05-01 I Date of Admission: 11/11/2024   Date of Service: 11/16/2024 I Hospital Day: 4     Assessment & Plan  GI bleed  Four episodes of coffee-ground emesis and maroon/black stools starting 11/11   and BP 97/58 on arrival. BP improved to 112/62 with 1 liter IVF.   Baseline hgb 15, at 9.1 on admission and today at 7.1 - will receive 1 unit PRBC  GI consulted and started empiric tx with PPI, octreotide, ceftriaxone.   EGD completed 11/12 showing small varices, barretts esophagus, severe gastropathy   Octreotide stopped, patient transitioned to coreg now. Previously started on coreg 6.25 however w/ labile BP  Coreg 3.125 once daily x3 days then transition to BID dosing on discharge   Continue on ceftriaxone 2g for GI bleed as well as questionable SBP  Tolerating regular diet without difficulty  Acute blood loss anemia  Hgb baseline 15 > 9.1 on admission > 7.1 > 7.6  S/p 2U PRBC  Continue to frequently monitor blood counts  Stable hgb now  Alcoholic cirrhosis of liver (HCC)  Esophageal varices screening on 1/26/24 with only one small varix.   HCC Screening - CT w/contrast done 4/2024 without mass  AFP elevated in past, normal today.  US abdomen does show ascites  Paracentesis 11/14 w/ 4200 ml clear yellow fluid removed   11/15 patient with labile blood pressures. Coreg given however spironolactone held - see hypotension for plan  He does have an appointment w/ hepatology set up for 12/3    MELD 3.0: 13 at 11/16/2024  6:21 AM  MELD-Na: 9 at 11/16/2024  6:21 AM  Calculated from:  Serum Creatinine: 0.56 mg/dL (Using min of 1 mg/dL) at 11/16/2024  6:21 AM  Serum Sodium: 133 mmol/L at 11/16/2024  6:21 AM  Total Bilirubin: 0.87 mg/dL (Using min of 1 mg/dL) at 11/16/2024  6:21 AM  Serum Albumin: 2.6 g/dL at 11/16/2024  6:21 AM  INR(ratio): 1.31 at 11/15/2024  5:33 AM  Age at listing (hypothetical): 64 years  Sex: Male  at 11/16/2024  6:21 AM    SIRS (systemic inflammatory response syndrome) (HCC)  Meeting SIRS with leukocytosis and tachycardia  Most likely reactive to GI bleed  However covering with abx given varices and ascites   Leukocytosis resolved   Hypotension  11/15 Bps are labile  Discussed with GI - will hold spironolactone on discharge for now and decrease coreg to 3.125 once daily until 11/17, then will increase to BID dosing   Abdominal aortic aneurysm (AAA) without rupture (HCC)  3.7 cm infrarenal abdominal aortic ectasia seen on CT 04/08/2024  Squamous cell carcinoma of tongue (HCC)  S/p revision with positive margins in September 2024. Patient elected to closely monitor.  Elevated troponin  Likely nonischemic elevation due to demand from tachycardia due to GI bleed  Hyperkalemia  Mildly elevated at 5.6 on arrival.   Now normalized  BMI 39.0-39.9,adult  Lifestyle modifications recommended once acute illness improved  YUNIOR (acute kidney injury) (HCC)  Presenting w/ Cr 0.97  Baseline Cr 0.6  Has returned to baseline   Continue to monitor on BMP daily   CKD (chronic kidney disease) stage 2, GFR 60-89 ml/min  Lab Results   Component Value Date    EGFR 107 11/16/2024    EGFR 103 11/15/2024    EGFR 98 11/14/2024    CREATININE 0.56 (L) 11/16/2024    CREATININE 0.62 11/15/2024    CREATININE 0.70 11/14/2024     Appreciate nephro input     Medical Problems       Resolved Problems  Date Reviewed: 9/10/2024   None       Discharging Physician / Practitioner: Maye Limon PA-C  PCP: Lisandro Gauthier DO  Admission Date:   Admission Orders (From admission, onward)       Ordered        11/12/24 0053  INPATIENT ADMISSION  Once                          Discharge Date: 11/16/24    Consultations During Hospital Stay:  GI, nephrology    Procedures Performed:   EGD 11/12  IR paracentesis 11/14  Significant Findings / Test Results:   IR INPATIENT Paracentesis 11/15/2024  Impression: Therapeutic paracentesis.     US abdomen complete  11/13/2024  Impression: Limited study secondary to difficult penetration. Poor visualization of the portal veins for which the presence of portal venous thrombosis is not entirely excluded. Apparent pulsatile flow within the main portal vein. Consider contrast-enhanced CT of the abdomen for further evaluation of the portal veins. Cirrhosis with evidence of ascites.     EGD 11/12/2024  IMPRESSION: Multiple small varices in the lower third of the esophagus C2M3 Nelson's esophagus Severe, friable and mosaic portal hypertensive gastropathy in the body of the stomach, greater curve of the stomach and lesser curve of the stomach The duodenal bulb, 1st part of the duodenum and 2nd part of the duodenum appeared normal. RECOMMENDATION: Continue with Octreotide for total 24 hours Can transition to oral PPI 40 mg once a day Clear liquid diet today Extensive counseling for alcohol cessation Repeat EGD in 1 year    Miranda Hernandez DO   Impression: Multiple small varices in the lower third of the esophagus C2M3 Nelson's esophagus Severe, friable and mosaic portal hypertensive gastropathy in the body of the stomach, greater curve of the stomach and lesser curve of the stomach The duodenal bulb, 1st part of the duodenum and 2nd part of the duodenum appeared normal. RECOMMENDATION: Continue with Octreotide for total 24 hours Can transition to oral PPI 40 mg once a day Clear liquid diet today Extensive counseling for alcohol cessation Repeat EGD in 1 year        XR chest 1 view portable 11/12/2024  Impression: No acute cardiopulmonary disease.     Incidental Findings:   None     Test Results Pending at Discharge (will require follow up):   None      Outpatient Tests Requested:  None     Complications:  None     Reason for Admission: GI bleed    Hospital Course:   Raymundo Weller is a 64 y.o. male patient PMH DOT, hypertension, liver cirrhosis, COPD, hyperlipidemia, who originally presented to the hospital on 11/11/2024 due  to 4 episodes of coffee-ground emesis and black stools.  He was evaluated by GI who started a PPI drip, octreotide drip, and ceftriaxone for empiric treatment of SBP.  He underwent an EGD that showed multiple small varices in the lower third of the esophagus, C2 M3 Nelson's esophagus, and severe friable and mosaic portal hypertensive gastropathy in the body of the stomach.  Patient thankfully remained hemodynamically stable.  Octreotide was discontinued, and patient was started on carvedilol and spironolactone.  IR was also consulted for paracentesis and patient had 4 L removed. Antibiotics were discontinued as fluid studies without evidence of SBP.   Hospital stay complicated by episodes of hypotension with recommended carvedilol 6.25.  It was decided to have patient hold spironolactone on discharge, and continue with carvedilol 3.125 once daily on discharge with possible titration in the outpatient setting.  Hemoglobin has remained stable, and patient is stable for discharge at this time.  He has follow-up with hepatology 12/3, and it was recommended to follow-up with his PCP in 1 to 2 weeks of discharge.  Discussed extensively importance of alcohol cessation. He has follow-up appointment and was given drug and alcohol resources on discharge.    Please see above list of diagnoses and related plan for additional information.     Condition at Discharge: stable    Discharge Day Visit / Exam:   Subjective:  Seen and examined.  No acute events overnight.  Patient reports intermittent lightheadedness, however overall feeling well and eager for discharge.  Patient was encouraged to take blood pressures at home.  He has follow-up appointment with hepatology within the next 3 weeks, and will follow-up with PCP in 1 to 2 weeks.  Eating and drinking without difficulty.  Offers no additional complaints at this time.  Vitals: Blood Pressure: 117/59 (11/16/24 1117)  Pulse: 72 (11/16/24 1117)  Temperature: 98.4 °F (36.9 °C)  "(11/16/24 0800)  Temp Source: Tympanic (11/16/24 0800)  Respirations: (!) 23 (11/16/24 0830)  Height: 5' 10\" (177.8 cm) (11/12/24 1401)  Weight - Scale: 123 kg (272 lb 0.8 oz) (11/15/24 0545)  SpO2: 95 % (11/16/24 1117)  Physical Exam  Constitutional:       General: He is not in acute distress.     Appearance: He is obese. He is ill-appearing.   HENT:      Mouth/Throat:      Mouth: Mucous membranes are moist.   Eyes:      Conjunctiva/sclera: Conjunctivae normal.   Cardiovascular:      Heart sounds: Normal heart sounds.   Pulmonary:      Breath sounds: Normal breath sounds. No wheezing, rhonchi or rales.   Abdominal:      General: There is distension.      Palpations: Abdomen is soft.      Tenderness: There is no abdominal tenderness.   Musculoskeletal:      Right lower leg: No edema.      Left lower leg: No edema.   Skin:     General: Skin is warm.   Neurological:      Mental Status: Mental status is at baseline.   Psychiatric:         Mood and Affect: Mood normal.        Discussion with Family: Updated  (wife) at bedside.    Discharge instructions/Information to patient and family:   See after visit summary for information provided to patient and family.      Provisions for Follow-Up Care:  See after visit summary for information related to follow-up care and any pertinent home health orders.      Mobility at time of Discharge:   Basic Mobility Inpatient Raw Score: 23  JH-HLM Goal: 7: Walk 25 feet or more  JH-HLM Achieved: 8: Walk 250 feet ot more  HLM Goal achieved. Continue to encourage appropriate mobility.     Disposition:   Home    Planned Readmission: None     Discharge Medications:  See after visit summary for reconciled discharge medications provided to patient and/or family.      Administrative Statements   Discharge Statement:  I have spent a total time of 45 minutes in caring for this patient on the day of the visit/encounter. >30 minutes of time was spent on: Diagnostic results, Risks and " benefits of tx options, Patient and family education, Counseling / Coordination of care, Documenting in the medical record, Reviewing / ordering tests, medicine, procedures  , and Communicating with other healthcare professionals .    **Please Note: This note may have been constructed using a voice recognition system**

## 2024-11-16 NOTE — NURSING NOTE
Patient discharged to home. VSS, IV's removed. Patient and wife verbalized understanding of AVS and discharge instructions. All belongings returned to patient upon discharge.

## 2024-11-16 NOTE — DISCHARGE INSTR - AVS FIRST PAGE
Dear Raymundo Weller,     It was our pleasure to care for you here at [facility].  It is our hope that we were always able to meet and exceed the expected standards for your care during your stay.  You were hospitalized due to GI bleed .  You were cared for on the 2nd floor under the service of Maye Limon PA-C with the [facility] who covers for your primary care physician (PCP), Lisandro Gauthier DO, while you were hospitalized.  If you have any questions or concerns related to this hospitalization, you may contact us at .  For follow up, we recommend that you follow up with your primary care physician.  Please review this entire discharge summary as additional general instructions may be provided later as well.  However, at this time we provide for you here, the most important instructions / recommendations at discharge:     Please continue Coreg 3.125 daily -please be mindful that you have picked up prescriptions for spironolactone and Coreg 6.25 which are to be held due to lower blood pressures.   You have an appt scheduled with hepatology 12/3  Please follow-up with your PCP in 1 to 2 weeks of discharge      Sincerely,     Maye Limon PA-C

## 2024-11-16 NOTE — ASSESSMENT & PLAN NOTE
Esophageal varices screening on 1/26/24 with only one small varix.   HCC Screening - CT w/contrast done 4/2024 without mass  AFP elevated in past, normal today.  US abdomen does show ascites  Paracentesis 11/14 w/ 4200 ml clear yellow fluid removed   11/15 patient with labile blood pressures. Coreg given however spironolactone held - see hypotension for plan  He does have an appointment w/ hepatology set up for 12/3    MELD 3.0: 13 at 11/16/2024  6:21 AM  MELD-Na: 9 at 11/16/2024  6:21 AM  Calculated from:  Serum Creatinine: 0.56 mg/dL (Using min of 1 mg/dL) at 11/16/2024  6:21 AM  Serum Sodium: 133 mmol/L at 11/16/2024  6:21 AM  Total Bilirubin: 0.87 mg/dL (Using min of 1 mg/dL) at 11/16/2024  6:21 AM  Serum Albumin: 2.6 g/dL at 11/16/2024  6:21 AM  INR(ratio): 1.31 at 11/15/2024  5:33 AM  Age at listing (hypothetical): 64 years  Sex: Male at 11/16/2024  6:21 AM

## 2024-11-16 NOTE — ASSESSMENT & PLAN NOTE
Lab Results   Component Value Date    EGFR 107 11/16/2024    EGFR 103 11/15/2024    EGFR 98 11/14/2024    CREATININE 0.56 (L) 11/16/2024    CREATININE 0.62 11/15/2024    CREATININE 0.70 11/14/2024     Appreciate nephro input

## 2024-11-16 NOTE — PLAN OF CARE
Problem: PAIN - ADULT  Goal: Verbalizes/displays adequate comfort level or baseline comfort level  Description: Interventions:  - Encourage patient to monitor pain and request assistance  - Assess pain using appropriate pain scale  - Administer analgesics based on type and severity of pain and evaluate response  - Implement non-pharmacological measures as appropriate and evaluate response  - Consider cultural and social influences on pain and pain management  - Notify physician/advanced practitioner if interventions unsuccessful or patient reports new pain  Outcome: Progressing     Problem: INFECTION - ADULT  Goal: Absence or prevention of progression during hospitalization  Description: INTERVENTIONS:  - Assess and monitor for signs and symptoms of infection  - Monitor lab/diagnostic results  - Monitor all insertion sites, i.e. indwelling lines, tubes, and drains  - Monitor endotracheal if appropriate and nasal secretions for changes in amount and color  - [place] appropriate cooling/warming therapies per order  - Administer medications as ordered  - Instruct and encourage patient and family to use good hand hygiene technique  - Identify and instruct in appropriate isolation precautions for identified infection/condition  Outcome: Progressing  Goal: Absence of fever/infection during neutropenic period  Description: INTERVENTIONS:  - Monitor WBC    Outcome: Progressing     Problem: SAFETY ADULT  Goal: Patient will remain free of falls  Description: INTERVENTIONS:  - Educate patient/family on patient safety including physical limitations  - Instruct patient to call for assistance with activity   - Consult OT/PT to assist with strengthening/mobility   - Keep Call bell within reach  - Keep bed low and locked with side rails adjusted as appropriate  - Keep care items and personal belongings within reach  - Initiate and maintain comfort rounds  - Make Fall Risk Sign visible to staff  - Offer Toileting every 2 Hours,  in advance of need  - Initiate/Maintain bed alarm  - Obtain necessary fall risk management equipment  - Apply yellow socks and bracelet for high fall risk patients  - Consider moving patient to room near nurses station  Outcome: Progressing  Goal: Maintain or return to baseline ADL function  Description: INTERVENTIONS:  -  Assess patient's ability to carry out ADLs; assess patient's baseline for ADL function and identify physical deficits which impact ability to perform ADLs (bathing, care of mouth/teeth, toileting, grooming, dressing, etc.)  - Assess/evaluate cause of self-care deficits   - Assess range of motion  - Assess patient's mobility; develop plan if impaired  - Assess patient's need for assistive devices and provide as appropriate  - Encourage maximum independence but intervene and supervise when necessary  - Involve family in performance of ADLs  - Assess for home care needs following discharge   - Consider OT consult to assist with ADL evaluation and planning for discharge  - Provide patient education as appropriate  Outcome: Progressing  Goal: Maintains/Returns to pre admission functional level  Description: INTERVENTIONS:  - Perform AM-PAC 6 Click Basic Mobility/ Daily Activity assessment daily.  - Set and communicate daily mobility goal to care team and patient/family/caregiver.   - Collaborate with rehabilitation services on mobility goals if consulted  - Perform Range of Motion 3 times a day.  - Reposition patient every 2 hours.  - Dangle patient 3 times a day  - Stand patient 3 times a day  - Ambulate patient 3 times a day  - Out of bed to chair 3 times a day   - Out of bed for meals 3 times a day  - Out of bed for toileting  - Record patient progress and toleration of activity level   Outcome: Progressing     Problem: DISCHARGE PLANNING  Goal: Discharge to home or other facility with appropriate resources  Description: INTERVENTIONS:  - Identify barriers to discharge w/patient and caregiver  -  Arrange for needed discharge resources and transportation as appropriate  - Identify discharge learning needs (meds, wound care, etc.)  - Arrange for interpretive services to assist at discharge as needed  - Refer to Case Management Department for coordinating discharge planning if the patient needs post-hospital services based on physician/advanced practitioner order or complex needs related to functional status, cognitive ability, or social support system  Outcome: Progressing     Problem: Knowledge Deficit  Goal: Patient/family/caregiver demonstrates understanding of disease process, treatment plan, medications, and discharge instructions  Description: Complete learning assessment and assess knowledge base.  Interventions:  - Provide teaching at level of understanding  - Provide teaching via preferred learning methods  Outcome: Progressing     Problem: RESPIRATORY - ADULT  Goal: Achieves optimal ventilation and oxygenation  Description: INTERVENTIONS:  - Assess for changes in respiratory status  - Assess for changes in mentation and behavior  - Position to facilitate oxygenation and minimize respiratory effort  - Oxygen administered by appropriate delivery if ordered  - Initiate smoking cessation education as indicated  - Encourage broncho-pulmonary hygiene including cough, deep breathe, Incentive Spirometry  - Assess the need for suctioning and aspirate as needed  - Assess and instruct to report SOB or any respiratory difficulty  - Respiratory Therapy support as indicated  Outcome: Progressing     Problem: GASTROINTESTINAL - ADULT  Goal: Minimal or absence of nausea and/or vomiting  Description: INTERVENTIONS:  - Administer IV fluids if ordered to ensure adequate hydration  - Maintain NPO status until nausea and vomiting are resolved  - Nasogastric tube if ordered  - Administer ordered antiemetic medications as needed  - Provide nonpharmacologic comfort measures as appropriate  - Advance diet as tolerated, if  ordered  - Consider nutrition services referral to assist patient with adequate nutrition and appropriate food choices  Outcome: Progressing  Goal: Maintains or returns to baseline bowel function  Description: INTERVENTIONS:  - Assess bowel function  - Encourage oral fluids to ensure adequate hydration  - Administer IV fluids if ordered to ensure adequate hydration  - Administer ordered medications as needed  - Encourage mobilization and activity  - Consider nutritional services referral to assist patient with adequate nutrition and appropriate food choices  Outcome: Progressing  Goal: Maintains adequate nutritional intake  Description: INTERVENTIONS:  - Monitor percentage of each meal consumed  - Identify factors contributing to decreased intake, treat as appropriate  - Assist with meals as needed  - Monitor I&O, weight, and lab values if indicated  - Obtain nutrition services referral as needed  Outcome: Progressing  Goal: Establish and maintain optimal ostomy function  Description: INTERVENTIONS:  - Assess bowel function  - Encourage oral fluids to ensure adequate hydration  - Administer IV fluids if ordered to ensure adequate hydration   - Administer ordered medications as needed  - Encourage mobilization and activity  - Nutrition services referral to assist patient with appropriate food choices  - Assess stoma site  - Consider wound care consult   Outcome: Progressing  Goal: Oral mucous membranes remain intact  Description: INTERVENTIONS  - Assess oral mucosa and hygiene practices  - Implement preventative oral hygiene regimen  - Implement oral medicated treatments as ordered  - Initiate Nutrition services referral as needed  Outcome: Progressing     Problem: HEMATOLOGIC - ADULT  Goal: Maintains hematologic stability  Description: INTERVENTIONS  - Assess for signs and symptoms of bleeding or hemorrhage  - Monitor labs  - Administer supportive blood products/factors as ordered and appropriate  Outcome:  Progressing

## 2024-11-16 NOTE — PLAN OF CARE
Problem: PAIN - ADULT  Goal: Verbalizes/displays adequate comfort level or baseline comfort level  Description: Interventions:  - Encourage patient to monitor pain and request assistance  - Assess pain using appropriate pain scale  - Administer analgesics based on type and severity of pain and evaluate response  - Implement non-pharmacological measures as appropriate and evaluate response  - Consider cultural and social influences on pain and pain management  - Notify physician/advanced practitioner if interventions unsuccessful or patient reports new pain  Outcome: Progressing     Problem: INFECTION - ADULT  Goal: Absence or prevention of progression during hospitalization  Description: INTERVENTIONS:  - Assess and monitor for signs and symptoms of infection  - Monitor lab/diagnostic results  - Monitor all insertion sites, i.e. indwelling lines, tubes, and drains  - Monitor endotracheal if appropriate and nasal secretions for changes in amount and color  - [place] appropriate cooling/warming therapies per order  - Administer medications as ordered  - Instruct and encourage patient and family to use good hand hygiene technique  - Identify and instruct in appropriate isolation precautions for identified infection/condition  Outcome: Progressing     Problem: SAFETY ADULT  Goal: Patient will remain free of falls  Description: INTERVENTIONS:  - Educate patient/family on patient safety including physical limitations  - Instruct patient to call for assistance with activity   - Consult OT/PT to assist with strengthening/mobility   - Keep Call bell within reach  - Keep bed low and locked with side rails adjusted as appropriate  - Keep care items and personal belongings within reach  - Initiate and maintain comfort rounds  - Make Fall Risk Sign visible to staff  - Offer Toileting every 2 Hours, in advance of need  - Initiate/Maintain bed alarm  - Obtain necessary fall risk management equipment  - Apply yellow socks and  bracelet for high fall risk patients  - Consider moving patient to room near nurses station  Outcome: Progressing     Problem: DISCHARGE PLANNING  Goal: Discharge to home or other facility with appropriate resources  Description: INTERVENTIONS:  - Identify barriers to discharge w/patient and caregiver  - Arrange for needed discharge resources and transportation as appropriate  - Identify discharge learning needs (meds, wound care, etc.)  - Arrange for interpretive services to assist at discharge as needed  - Refer to Case Management Department for coordinating discharge planning if the patient needs post-hospital services based on physician/advanced practitioner order or complex needs related to functional status, cognitive ability, or social support system  Outcome: Progressing     Problem: RESPIRATORY - ADULT  Goal: Achieves optimal ventilation and oxygenation  Description: INTERVENTIONS:  - Assess for changes in respiratory status  - Assess for changes in mentation and behavior  - Position to facilitate oxygenation and minimize respiratory effort  - Oxygen administered by appropriate delivery if ordered  - Initiate smoking cessation education as indicated  - Encourage broncho-pulmonary hygiene including cough, deep breathe, Incentive Spirometry  - Assess the need for suctioning and aspirate as needed  - Assess and instruct to report SOB or any respiratory difficulty  - Respiratory Therapy support as indicated  Outcome: Progressing     Problem: HEMATOLOGIC - ADULT  Goal: Maintains hematologic stability  Description: INTERVENTIONS  - Assess for signs and symptoms of bleeding or hemorrhage  - Monitor labs  - Administer supportive blood products/factors as ordered and appropriate  Outcome: Progressing

## 2024-11-17 LAB
BACTERIA SPEC BFLD CULT: NO GROWTH
GRAM STN SPEC: NORMAL
GRAM STN SPEC: NORMAL

## 2024-11-18 ENCOUNTER — TRANSITIONAL CARE MANAGEMENT (OUTPATIENT)
Dept: INTERNAL MEDICINE CLINIC | Facility: CLINIC | Age: 64
End: 2024-11-18

## 2024-11-18 PROCEDURE — 88112 CYTOPATH CELL ENHANCE TECH: CPT | Performed by: STUDENT IN AN ORGANIZED HEALTH CARE EDUCATION/TRAINING PROGRAM

## 2024-11-18 PROCEDURE — 88305 TISSUE EXAM BY PATHOLOGIST: CPT | Performed by: STUDENT IN AN ORGANIZED HEALTH CARE EDUCATION/TRAINING PROGRAM

## 2024-11-18 NOTE — UTILIZATION REVIEW
NOTIFICATION OF ADMISSION DISCHARGE   This is a Notification of Discharge from [facility]. Please be advised that this patient has been discharge from our facility. Below you will find the admission and discharge date and time including the patient’s disposition.   UTILIZATION REVIEW CONTACT:  Sofya Rouse  Utilization   Network Utilization Review Department  Phone: [phone] x carefully listen to the prompts. All voicemails are confidential.  Email: [email or web address]     ADMISSION INFORMATION  PRESENTATION DATE: 11/11/2024 11:03 PM  OBERVATION ADMISSION DATE: N/A  INPATIENT ADMISSION DATE: 11/12/24 12:53 AM   DISCHARGE DATE: 11/16/2024 12:36 PM   DISPOSITION:Home/Self Care    Network Utilization Review Department  ATTENTION: Please call with any questions or concerns to [phone] and carefully listen to the prompts so that you are directed to the right person. All voicemails are confidential.   For Discharge needs, contact Care Management DC Support Team at [phone] opt. 2  Send all requests for admission clinical reviews, approved or denied determinations and any other requests to dedicated fax number below belonging to the campus where the patient is receiving treatment. List of dedicated fax numbers for the Facilities:  FACILITY NAME UR FAX NUMBER   ADMISSION DENIALS (Administrative/Medical Necessity) [phone]   DISCHARGE SUPPORT TEAM ([facility]) [phone]   PARENT CHILD HEALTH (Maternity/NICU/Pediatrics) [phone]   [facility] [phone]   [facility] [phone]   [facility] [phone]   [facility] [phone]   [facility] [phone]   [facility] [phone]   [facility] [phone]   [facility] [phone]    [facility] [phone]   [facility] [phone]   [facility] [phone]   [facility] [phone]

## 2024-11-20 ENCOUNTER — TELEPHONE (OUTPATIENT)
Dept: INTERNAL MEDICINE CLINIC | Facility: CLINIC | Age: 64
End: 2024-11-20

## 2024-11-20 ENCOUNTER — OFFICE VISIT (OUTPATIENT)
Dept: INTERNAL MEDICINE CLINIC | Facility: CLINIC | Age: 64
End: 2024-11-20
Payer: COMMERCIAL

## 2024-11-20 ENCOUNTER — APPOINTMENT (OUTPATIENT)
Dept: LAB | Facility: HOSPITAL | Age: 64
End: 2024-11-20
Payer: COMMERCIAL

## 2024-11-20 VITALS
TEMPERATURE: 99.2 F | HEART RATE: 69 BPM | OXYGEN SATURATION: 99 % | HEIGHT: 70 IN | WEIGHT: 288.2 LBS | DIASTOLIC BLOOD PRESSURE: 72 MMHG | BODY MASS INDEX: 41.26 KG/M2 | SYSTOLIC BLOOD PRESSURE: 126 MMHG

## 2024-11-20 DIAGNOSIS — N17.9 AKI (ACUTE KIDNEY INJURY) (HCC): ICD-10-CM

## 2024-11-20 DIAGNOSIS — I85.11 SECONDARY ESOPHAGEAL VARICES WITH BLEEDING (HCC): ICD-10-CM

## 2024-11-20 DIAGNOSIS — Z12.2 SCREENING FOR LUNG CANCER: Primary | ICD-10-CM

## 2024-11-20 DIAGNOSIS — I95.9 HYPOTENSION, UNSPECIFIED HYPOTENSION TYPE: ICD-10-CM

## 2024-11-20 DIAGNOSIS — D62 ACUTE BLOOD LOSS ANEMIA: ICD-10-CM

## 2024-11-20 DIAGNOSIS — R65.10 SIRS (SYSTEMIC INFLAMMATORY RESPONSE SYNDROME) (HCC): ICD-10-CM

## 2024-11-20 DIAGNOSIS — Z98.890 S/P ABDOMINAL PARACENTESIS: ICD-10-CM

## 2024-11-20 DIAGNOSIS — Z92.89 HISTORY OF RECENT BLOOD TRANSFUSION: ICD-10-CM

## 2024-11-20 DIAGNOSIS — R79.89 ELEVATED LFTS: ICD-10-CM

## 2024-11-20 DIAGNOSIS — R60.0 LOCALIZED EDEMA: ICD-10-CM

## 2024-11-20 DIAGNOSIS — R16.0 HEPATOMEGALY: ICD-10-CM

## 2024-11-20 DIAGNOSIS — K70.31 ALCOHOLIC CIRRHOSIS OF LIVER WITH ASCITES (HCC): ICD-10-CM

## 2024-11-20 DIAGNOSIS — R79.89 ELEVATED TROPONIN: ICD-10-CM

## 2024-11-20 DIAGNOSIS — F10.21 HISTORY OF ALCOHOLISM (HCC): ICD-10-CM

## 2024-11-20 DIAGNOSIS — R74.8 ELEVATED ALKALINE PHOSPHATASE LEVEL: ICD-10-CM

## 2024-11-20 DIAGNOSIS — K92.2 GASTROINTESTINAL HEMORRHAGE, UNSPECIFIED GASTROINTESTINAL HEMORRHAGE TYPE: ICD-10-CM

## 2024-11-20 DIAGNOSIS — R17 ELEVATED BILIRUBIN: ICD-10-CM

## 2024-11-20 LAB
ALBUMIN SERPL BCG-MCNC: 3 G/DL (ref 3.5–5)
ALP SERPL-CCNC: 95 U/L (ref 34–104)
ALT SERPL W P-5'-P-CCNC: 24 U/L (ref 7–52)
AMMONIA PLAS-SCNC: 45 UMOL/L (ref 18–72)
ANION GAP SERPL CALCULATED.3IONS-SCNC: 5 MMOL/L (ref 4–13)
AST SERPL W P-5'-P-CCNC: 46 U/L (ref 13–39)
BASOPHILS # BLD AUTO: 0.03 THOUSANDS/ÂΜL (ref 0–0.1)
BASOPHILS NFR BLD AUTO: 0 % (ref 0–1)
BILIRUB SERPL-MCNC: 0.94 MG/DL (ref 0.2–1)
BUN SERPL-MCNC: 12 MG/DL (ref 5–25)
CALCIUM ALBUM COR SERPL-MCNC: 8.6 MG/DL (ref 8.3–10.1)
CALCIUM SERPL-MCNC: 7.8 MG/DL (ref 8.4–10.2)
CHLORIDE SERPL-SCNC: 108 MMOL/L (ref 96–108)
CO2 SERPL-SCNC: 23 MMOL/L (ref 21–32)
CREAT SERPL-MCNC: 0.56 MG/DL (ref 0.6–1.3)
EOSINOPHIL # BLD AUTO: 0.22 THOUSAND/ÂΜL (ref 0–0.61)
EOSINOPHIL NFR BLD AUTO: 3 % (ref 0–6)
ERYTHROCYTE [DISTWIDTH] IN BLOOD BY AUTOMATED COUNT: 17.6 % (ref 11.6–15.1)
FERRITIN SERPL-MCNC: 100 NG/ML (ref 24–336)
GFR SERPL CREATININE-BSD FRML MDRD: 107 ML/MIN/1.73SQ M
GLUCOSE SERPL-MCNC: 127 MG/DL (ref 65–140)
HCT VFR BLD AUTO: 25.8 % (ref 36.5–49.3)
HGB BLD-MCNC: 8.1 G/DL (ref 12–17)
IMM GRANULOCYTES # BLD AUTO: 0.03 THOUSAND/UL (ref 0–0.2)
IMM GRANULOCYTES NFR BLD AUTO: 0 % (ref 0–2)
INR PPP: 1.05 (ref 0.85–1.19)
IRON SATN MFR SERPL: 7 % (ref 15–50)
IRON SERPL-MCNC: 20 UG/DL (ref 50–212)
LYMPHOCYTES # BLD AUTO: 1.47 THOUSANDS/ÂΜL (ref 0.6–4.47)
LYMPHOCYTES NFR BLD AUTO: 21 % (ref 14–44)
MCH RBC QN AUTO: 33.5 PG (ref 26.8–34.3)
MCHC RBC AUTO-ENTMCNC: 31.4 G/DL (ref 31.4–37.4)
MCV RBC AUTO: 107 FL (ref 82–98)
MONOCYTES # BLD AUTO: 1.32 THOUSAND/ÂΜL (ref 0.17–1.22)
MONOCYTES NFR BLD AUTO: 19 % (ref 4–12)
NEUTROPHILS # BLD AUTO: 3.93 THOUSANDS/ÂΜL (ref 1.85–7.62)
NEUTS SEG NFR BLD AUTO: 57 % (ref 43–75)
NRBC BLD AUTO-RTO: 0 /100 WBCS
PLATELET # BLD AUTO: 140 THOUSANDS/UL (ref 149–390)
PMV BLD AUTO: 10.5 FL (ref 8.9–12.7)
POTASSIUM SERPL-SCNC: 4.4 MMOL/L (ref 3.5–5.3)
PROT SERPL-MCNC: 6 G/DL (ref 6.4–8.4)
PROTHROMBIN TIME: 14.2 SECONDS (ref 12.3–15)
RBC # BLD AUTO: 2.42 MILLION/UL (ref 3.88–5.62)
SODIUM SERPL-SCNC: 136 MMOL/L (ref 135–147)
TIBC SERPL-MCNC: 288 UG/DL (ref 250–450)
UIBC SERPL-MCNC: 268 UG/DL (ref 155–355)
WBC # BLD AUTO: 7 THOUSAND/UL (ref 4.31–10.16)

## 2024-11-20 PROCEDURE — 85025 COMPLETE CBC W/AUTO DIFF WBC: CPT

## 2024-11-20 PROCEDURE — 36415 COLL VENOUS BLD VENIPUNCTURE: CPT

## 2024-11-20 PROCEDURE — 82140 ASSAY OF AMMONIA: CPT

## 2024-11-20 PROCEDURE — 85610 PROTHROMBIN TIME: CPT

## 2024-11-20 PROCEDURE — 99496 TRANSJ CARE MGMT HIGH F2F 7D: CPT | Performed by: INTERNAL MEDICINE

## 2024-11-20 PROCEDURE — 83540 ASSAY OF IRON: CPT

## 2024-11-20 PROCEDURE — 83550 IRON BINDING TEST: CPT

## 2024-11-20 PROCEDURE — 80053 COMPREHEN METABOLIC PANEL: CPT

## 2024-11-20 PROCEDURE — 82728 ASSAY OF FERRITIN: CPT

## 2024-11-20 RX ORDER — BACLOFEN 5 MG/1
5 TABLET ORAL 3 TIMES DAILY
Qty: 100 TABLET | Refills: 1 | Status: SHIPPED | OUTPATIENT
Start: 2024-11-20

## 2024-11-20 RX ORDER — SPIRONOLACTONE 50 MG/1
25 TABLET, FILM COATED ORAL DAILY
Qty: 90 TABLET | Refills: 3 | Status: SHIPPED | OUTPATIENT
Start: 2024-11-20

## 2024-11-20 NOTE — PROGRESS NOTES
Transition of Care Visit  Name: Raymundo Weller      : 1960      MRN: [id number]  Encounter Provider: Lisandro Gauthier DO  Encounter Date: 2024   Encounter department: [facility]    Assessment & Plan  Screening for lung cancer  I discussed with him that he is a candidate for lung cancer CT screening.     The following Shared Decision-Making points were covered:  Benefits of screening were discussed, including the rates of reduction in death from lung cancer and other causes.  Harms of screening were reviewed, including false positive tests, radiation exposure levels, risks of invasive procedures, risks of complications of screening, and risk of overdiagnosis.  I counseled on the importance of adherence to annual lung cancer LDCT screening, impact of co-morbidities, and ability or willingness to undergo diagnosis and treatment.  I counseled on the importance of maintaining abstinence as a former smoker or was counseled on the importance of smoking cessation if a current smoker    Review of Eligibility Criteria: He meets all of the criteria for Lung Cancer Screening.   He is 64 y.o.   He has 20 pack year tobacco history and is a current smoker or has quit within the past 15 years  He presents no signs or symptoms of lung cancer    After discussion, the patient decided to elect lung cancer screening.         Gastrointestinal hemorrhage, unspecified gastrointestinal hemorrhage type         Alcoholic cirrhosis of liver with ascites (HCC)    Orders:    spironolactone (ALDACTONE) 50 mg tablet; Take 0.5 tablets (25 mg total) by mouth daily    CBC and differential; Future    Comprehensive metabolic panel; Future    Ammonia; Future    Baclofen 5 MG TABS; Take 5 mg by mouth 3 (three) times a day    Iron Panel (Includes Ferritin, Iron Sat%, Iron, and TIBC); Future    Hepatomegaly         YUNIOR (acute kidney injury) (HCC)         Acute blood loss anemia         Elevated troponin         Elevated  LFTs         Elevated bilirubin         Elevated alkaline phosphatase level         History of alcoholism (HCC)         Secondary esophageal varices with bleeding (HCC)    Orders:    spironolactone (ALDACTONE) 50 mg tablet; Take 0.5 tablets (25 mg total) by mouth daily    History of recent blood transfusion         S/P abdominal paracentesis         SIRS (systemic inflammatory response syndrome) (HCC)         Hypotension, unspecified hypotension type            A/P: Stable and VSS with better BP, but wt rising. PE stable, but edema and ascites starting to return. Tolerating diet, meds, and activity. Will recheck labs. Will try to restart spironolactone at  a lower dose.. Same coreg. Discussed ETOH avoidence and vivitrol thearpy. Will start baclofen low and titrate for the ETOH cirrhosis.  Will consider. Recommend AA. Continue current treatment and RTC two weeks for f/u BP, edema, ascites, ETOH, and to titrate coreg, spironolactone and baclofen. . Keep f/u with GI as well.      History of Present Illness     Transitional Care Management Review:   Raymundo Weller is a 64 y.o. male here for TCM follow up.     During the TCM phone call patient stated:  TCM Call       Date and time call was made  11/18/2024 10:57 AM    Hospital care reviewed  Records reviewed    Patient was hospitial[facility] at  [facility]    Date of Admission  11/11/24    Date of discharge  11/16/24    Diagnosis  GI bleed    Disposition  Home    Were the patients medications reviewed and updated  Yes    Current Symptoms  None          TCM Call       Post hospital issues  None    Should patient be enrolled in anticoag monitoring?  No    Scheduled for follow up?  Yes    Did you obtain your prescribed medications  Yes    Do you need help managing your prescriptions or medications  No    Is transportation to your appointment needed  No    I have advised the patient to call PCP with any new or worsening symptoms  Yolanda Lindsey MA    Have you fallen in  "the last 12 months  No    Interperter language line needed  No    Counseling  Patient    Counseling topics  Diagnostic results; instructions for management; Risk factor reduction; patient and family education; Prognosis; Importance of RX compliance; Activities of daily living          WM RTC for f/u short, but intense admission for hematoemsis. Admitted after found to have YUNIOR, anemia, ascites, elevated troponin, elevate LFT's, and met the criteria for SIRS. Seen by GI and transfused pRBC and underwent EGD. Found to have varices and ascites. Underwent paracenteses. Started on PPI and other meds. Stablized. Started on coreg and spironolactone, but had to be stopped due to hypotension. Improved and D/c to home. Since d/c, doing a little better. Avoiding ETOH. No fever or chills. No n/v. No CP, SOB, or palpitations. Edema returning. Activity level increasing. Appetite improving. Tolerating the meds.         Review of Systems   Constitutional:  Positive for activity change, appetite change and fatigue. Negative for chills, diaphoresis and fever.   HENT: Negative.     Eyes:  Negative for visual disturbance.   Respiratory:  Negative for cough, chest tightness, shortness of breath and wheezing.    Cardiovascular:  Negative for chest pain, palpitations and leg swelling.   Gastrointestinal:  Negative for abdominal distention, abdominal pain, anal bleeding, blood in stool, constipation, diarrhea, nausea, rectal pain and vomiting.   Genitourinary:  Negative for difficulty urinating, dysuria and frequency.   Musculoskeletal:  Negative for arthralgias, gait problem and myalgias.   Neurological:  Negative for dizziness, seizures, weakness, light-headedness and headaches.   Psychiatric/Behavioral:  Negative for confusion, dysphoric mood and sleep disturbance. The patient is not nervous/anxious.      Objective   /72   Pulse 69   Temp 99.2 °F (37.3 °C)   Ht 5' 10\" (1.778 m)   Wt 131 kg (288 lb 3.2 oz)   SpO2 99%   BMI " 41.35 kg/m²     Physical Exam  Vitals and nursing note reviewed.   Constitutional:       Appearance: Normal appearance.   HENT:      Head: Normocephalic and atraumatic.      Mouth/Throat:      Mouth: Mucous membranes are dry.   Eyes:      General: No scleral icterus.     Extraocular Movements: Extraocular movements intact.      Pupils: Pupils are equal, round, and reactive to light.      Comments: [place] sclera   Neck:      Vascular: No carotid bruit.   Cardiovascular:      Rate and Rhythm: Normal rate and regular rhythm.      Heart sounds: Normal heart sounds. No murmur heard.  Pulmonary:      Effort: Pulmonary effort is normal. No respiratory distress.      Breath sounds: Normal breath sounds. No wheezing, rhonchi or rales.   Abdominal:      General: Bowel sounds are normal. There is distension.      Palpations: Abdomen is soft.      Tenderness: There is no abdominal tenderness.   Musculoskeletal:      Cervical back: Neck supple.      Right lower leg: Edema present.      Left lower leg: Edema present.   Neurological:      General: No focal deficit present.      Mental Status: He is oriented to person, place, and time. Mental status is at baseline.      Comments: No asterixis   Psychiatric:         Mood and Affect: Mood normal.         Behavior: Behavior normal.         Thought Content: Thought content normal.         Judgment: Judgment normal.       Medications have been reviewed by provider in current encounter

## 2024-11-20 NOTE — ASSESSMENT & PLAN NOTE
Orders:    spironolactone (ALDACTONE) 50 mg tablet; Take 0.5 tablets (25 mg total) by mouth daily    CBC and differential; Future    Comprehensive metabolic panel; Future    Ammonia; Future    Baclofen 5 MG TABS; Take 5 mg by mouth 3 (three) times a day    Iron Panel (Includes Ferritin, Iron Sat%, Iron, and TIBC); Future

## 2024-11-20 NOTE — TELEPHONE ENCOUNTER
----- Message from Lisandro Gauthier DO sent at 11/20/2024  1:12 PM EST -----  Call pt labs so far showing over all improvement.

## 2024-11-21 ENCOUNTER — RESULTS FOLLOW-UP (OUTPATIENT)
Dept: INTERNAL MEDICINE CLINIC | Facility: CLINIC | Age: 64
End: 2024-11-21

## 2024-11-21 ENCOUNTER — RESULTS FOLLOW-UP (OUTPATIENT)
Age: 64
End: 2024-11-21

## 2024-11-21 ENCOUNTER — OFFICE VISIT (OUTPATIENT)
Dept: SLEEP CENTER | Facility: CLINIC | Age: 64
End: 2024-11-21
Payer: COMMERCIAL

## 2024-11-21 VITALS
HEIGHT: 70 IN | BODY MASS INDEX: 41.8 KG/M2 | RESPIRATION RATE: 16 BRPM | OXYGEN SATURATION: 99 % | DIASTOLIC BLOOD PRESSURE: 75 MMHG | SYSTOLIC BLOOD PRESSURE: 136 MMHG | WEIGHT: 292 LBS | TEMPERATURE: 98.5 F | HEART RATE: 83 BPM

## 2024-11-21 DIAGNOSIS — I10 ESSENTIAL HYPERTENSION: ICD-10-CM

## 2024-11-21 DIAGNOSIS — G47.33 OBSTRUCTIVE SLEEP APNEA: Primary | ICD-10-CM

## 2024-11-21 DIAGNOSIS — E66.9 OBESITY (BMI 30-39.9): ICD-10-CM

## 2024-11-21 PROCEDURE — 99214 OFFICE O/P EST MOD 30 MIN: CPT | Performed by: STUDENT IN AN ORGANIZED HEALTH CARE EDUCATION/TRAINING PROGRAM

## 2024-11-21 NOTE — PROGRESS NOTES
[facility]  Sleep Medicine Follow up/ Established Patient Visit      Assessment/Plan:  1. Obstructive sleep apnea  Cpap DME      2. Essential hypertension  Cpap DME      3. Obesity (BMI 30-39.9)  Cpap DME          Gaston is a very pleasant 64-year-old gentleman with a PMHx of HTN, AAA without rupture, COPD, left atrial enlargement, cervical radiculopathy, history of alcoholic cirrhosis of the liver, CKD stage II, obesity, HLD who presents in follow up for DOT (RDI 15.4, O2 eulogio 82% 1/2006).  He continues to endorse substantial benefit from PAP therapy, with no major issues or concerns for me today.  His compliance report does reveal a therapeutic AHI with minimal mask leak.    At this time, recommend that he continue PAP therapy at present settings of 17-20 cm H2O  Given that he is eligible for a new device and that [facility] has shut down their sleep department and thus he will need a new device within the next few years anyway, we will place an order for a new device at the following settings: AutoPAP 17-20 cmH2O with a fullface mask..   Advised the patient that he should be receiving a call to schedule with their DME in ~2 weeks to receive the device.  Reviewed CMS/insurance criteria and resupply guidelines  Advised patient to avoid activities that could harm self or others when tired/sleepy, including driving.  Encouraged maintaining normal weight  Discussed importance of good sleep hygiene.  Reviewed the importance of treating his DOT on blood pressure control; formal management of HTN per his PCPs recommendations.    He will Return in about 2 months (around 1/21/2025) for Compliance Visit.  If he is doing well at this appointment, he would likely be able to resume yearly follow-ups thereafter.      ________________________________________________________________________________________________    Per Last Visit Note (Date: 11/13/2023):  PLAN:   1. I reviewed results of the Sleep studies  with the patient.   2. With respect to above conditions, I counseled on pathophysiology, diagnosis, treatment options, risks and benefits; inter-relationship and effects on symptoms and comorbidities; risks of no treatment; costs and insurance aspects.   3. Patient elected to continue positive airway pressure therapy and it is medically necessary.  Pressure setting 17-20 cm H2O.  4. Need for compliance with therapy, weight reduction and smoking cessation were emphasized.  5. Follow-up to be scheduled in 1 year to monitor compliance, progress and to adjust therapy.      Sleep Studies:  -PAP titration study 2017: BMI 35.3.  PAP titrated up to 17 cm H2O, best pressure setting noted to be 17 cm H2O.    -PAP titration study 2006: PAP initiated at 5 cm H2O, titrated up to 13 cm H2O.  Best pressure setting noted to be 13 cm H2O.    -PSG 2006: Sleep efficiency 79%, sleep latency 25 minutes.  RDI 15.4, O2 eulogio 82%, 5% of the night was spent with saturations less than 89%.    ________________________________________________________________________________________________      Interval History: Raymundo Weller is a 64 y.o. male with a PMHx of HTN, AAA without rupture, COPD, left atrial enlargement, cervical radiculopathy, history of alcoholic cirrhosis of the liver, CKD stage II, obesity, HLD who presents in follow up for DOT (RDI 15.4, O2 eulogio 82% 2006).    SDB:  -Current experience with PAP Therapy: Very beneficial  -Mask type: Full-face mask  -Difficulties with mask: Not super comfortable, tube always in the way  -Device: Franck Dreamstation 2; replacement. Original received 2017.  -Difficulties with device: Denies  -DME: [facility]  -Compliance:          [place] Sleepiness Scale:  What are your chances of dozing?   0= no chance  1= slight chance  2= moderate chance  3= high chance    Sitting and readin   Watching TV: 0  Sitting, inactive in a public place (e.g. a theatre or a meeting):0  As  "a passenger in a car for an hour without a break: 0  Lying down to rest in the afternoon when circumstances permit: 1   Sitting and talking to someone: 0  Sitting quietly after a lunch without alcohol: 0  In a car, while stopped for a few minutes in the traffic: 0       TOTAL  2/24  Greater or equal to 10 is positive for excessive daytime sleepiness        SLEEP HYGIENE QUESTIONS:  Bedtime: 2330   Time it takes to fall sleep: ~30 minutes  Wake up Time: 0600/0700   Number of times patient wakes up per night: Very rarely  Reason (s) why patient wakes up during the night: n/a   Naps: 0  Estimated total sleep time (in a 24 hour period of time): 6-7 hours       Changes to PMH, PSH, : GI Bleed early November, 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     SLEEP RELATED ROS  Review of Systems  No Known Allergies    CURRENT MEDICATIONS:  Current Outpatient Medications   Medication Instructions    Baclofen 5 mg, Oral, 3 times daily    carvedilol (COREG) 3.125 mg, Oral, Daily    multivitamin (THERAGRAN) TABS 1 tablet, Oral, Daily    pantoprazole (PROTONIX) 40 mg, Oral, Daily (early morning)    spironolactone (ALDACTONE) 25 mg, Oral, Daily           PHYSICAL EXAMINATION:  Vital Signs: /75 (BP Location: Left arm, Patient Position: Sitting, Cuff Size: Large)   Pulse 83   Temp 98.5 °F (36.9 °C) (Temporal)   Resp 16   Ht 5' 10\" (1.778 m)   Wt 132 kg (292 lb)   SpO2 99%   BMI 41.90 kg/m²     Constitutional: NAD, well appearing   Mental Status: AAOx3  Skin: Warm, dry, no rashes noted   Eyes: PERRL, normal conjunctiva  ENT: Nasal congestion absent  Posterior Airspace:   Royal Tongue Position: 4  Retrognathia: absent  Overbite: present  High Arched Palate: absent  Tongue Scalloping/Ridging: absent  Uvula: enlarged  Chest: No evidence of respiratory distress, no accessory muscle use; no evidence of peripheral cyanosis  Abdomen: Soft, NT/ND  Extremities: No digital clubbing or pedal edema  Neuro: Strength 5/5 " throughout, sensation grossly intact          Electronically signed by:    Jose Alvarez DO  Board-Certified Neurology and Sleep Medicine  [facility]  11/21/24

## 2024-11-21 NOTE — PATIENT INSTRUCTIONS
Continue PAP Therapy  Continue AutoPAP at settings of 17-20 cmH2O  Remember to clean your mask and equipment regularly, as directed.  I am ordering a new device, as we discussed, as [facility] has shut down their sleep department. See below.   You should be eligible for new supplies approximately every 3-6 months, depending on your insurance coverage. Contact your Durable Medical Equipment (DME) company for new supplies as needed.  Practice good Sleep Hygiene, as outlined below.    PAP Therapy Initiation  I will place a prescription for AutoPAP 17-20 cmH2O with a fullface mask.  You will ultimately receive your machine and regular supplies from a DME (durable medical equipment) company.   After your visit today, at the  you will discuss the DME options and select your preferred supplier. My prescription will then be sent to the DME of your choice, and they should be reaching out to you within a few weeks to schedule initial device set-up.  If not done after your visit today, please call our [place] office ([phone] option 1, then option 3) to do so.  You should be eligible for new supplies approximately every 3-6 months, depending on your insurance coverage.  If your mask doesn't fit well, call our office to schedule a formal mask fitting.  Insurance requires regular usage and periodic office follow ups for PAP therapy, to continue to cover supplies.  Insurance requires a follow-up in the office within 90 days of starting your new PAP device.  This should have been scheduled today, otherwise can be scheduled when you call the office number listed above.      CMS/Insurance Requirements    Your insurance requires a face-to-face follow up visit within a 31-90 day period after starting CPAP.  Your insurance requires compliance with CPAP, which is at least 4 hours per night for 70% of the time. This must be done over a 30 day period and must occur within the initial 31-90 day period after starting  CPAP.  Your insurance also requires at least yearly follow ups to continue to pay for CPAP supplies.    PAP Supply Guidelines    Below are the guidelines for reordering your supplies. You will be responsible for your deductible, co payments, and out of pocket expenses.    Item Frequency   Nasal Mask (no headgear) 1 every 3 months   Nasal Mask Cushion 1 every 2 weeks   Full Face Mask (no headgear) 1 every 3 months   Full Face Mask Cushion 1 every month   Nasal Pillows 1 every 2 weeks   Headgear 1 every 6 months   hin Strap 1 every 6 months   issac 1 every 3 months   Filters: Reusable 1 every 6 months   Filters: Disposable 1 every 2 weeks   Humidifier Chamber(disposable) 1 every 6 months       About Your PAP Therapy    Continuous Positive Airway Pressure/Bilevel Therapy  You have been prescribed Positive Airway Pressure (PAP) therapy to treat sleep apnea. The most common type of sleep apnea is obstructive sleep apnea (DOT), a condition in which the upper airway collapses during sleep. This obstruction keeps air from getting into your lungs. The prescribed PAP machine (CPAP or Bilevel or ASV) will smoothly blow air into your airway to prevent it from closing. The machine will use a mask to deliver the air through your nose and/or mouth. These devices are available in various sizes and styles.    It will take some time for you to get used to the new equipment and it may take a while for you to begin to feel the benefits of PAP therapy. Please be patient. If you are having problems adjusting to your machine, please contact us for help.    Beginning your PAP Therapy  Assembly:  Place your PAP machine on a level surface near your bed.  To prevent injury, do not place the machine higher than your head.  Keep the machine at least 12 inches away from anything that may block the vents.  Plug the machine into a properly grounded electrical outlet. It is better to avoid using an extension cord. If necessary, use a heavy-duty  "one.  If you are NOT using a humidifier with you PAP equipment:  Attach one end of your 6-foot tubing to the PAP unit outlet and the other end to your mask. (If your mask does not have a built-in exhalation port, a special exhalation valve should be used between the mask and the 6-foot tubing.)  Attach the headgear to your mask.  If you are using a humidifier with your PAP equipment:  Fill the humidifier with DISTILLED water to the maximum fill line.  Attach the humidifier to the PAP unit's outlet as instructed by the respiratory therapist with your home care company. Attach one end of your 6-foot tubing to the humidifier outlet and the other end to your mask. (If your mask does not have a built-in exhalation port, a special exhalation valve should be used between the mask and the 6-foot tubing.)  If you have been prescribed oxygen to be used with the PAP machine, you will be instructed on how to attach your oxygen tubing. Always turn off the oxygen tank before turning off your PAP machine.    Getting Started:  Wash your face and apply the mask and headgear as instructed by the sleep technologist or respiratory therapist. The mask should fit snugly to prevent air leaks, but not so tight as to cause skin irritation or discomfort.  Turn the PAP power switch to the ON position. You should feel air blowing through the mask. Breathe normally and adjust the mask gently if you feel an air leak.  If there are no leaks, activate the \"ramp\" feature on your PAP machine. The ramp allows a lower pressure to be delivered for a designated period of time (usually 5 to 45 minutes) to allow you to relax and  fall asleep more easily. The pressure will then gradually increase to the prescribed pressure that controls your apnea.  Remember to turn OFF your PAP unit when it is not in use.    Care and Maintenance    Headgear should be washed as needed. Daily inspection and weekly washings are recommended. Do not disassemble the straps. " Machine wash in warm water, making sure to attach Velcro hooks and tabs before washing. Line dry or machine dry on a low setting.  Masks should be washed every day. Daily inspection is recommended. Leave the mask and tubing attached. Gently wash the mask with a soft cloth using warm water and mild detergent, concentrating on the mask cushion flaps. DO NOT use alcohol or bleach. Rinse thoroughly and air dry.  Tubing and headgear should be washed weekly. Daily inspection is recommended. Wash in warm water and mild detergent and rinse thoroughly. Hook the tubing to the machine and blow until dry.  Humidifier should be washed daily and filled with DISTILLED water before use. Wash with warm water and mild detergent. Disinfect weekly by soaking with a solution of 1 part white vinegar and 3 parts water for 30 minutes. Rinse thoroughly and air dry.  Disposable filters should be replaced once a month. Wash reusable foam filters with warm water and mild detergent at least once a month. Rinse thoroughly and dry with paper towels.  Avoid  that contain fragrance or conditioners, as these will leave a residue.  NEVER iron any soft goods.    Troubleshooting    If the machine fails to turn on:  Make sure that the PAP machine is plugged into a grounded outlet.  Make sure that the ON/OFF switch is in the ON position.  Make sure that the wall outlet has power.    If there is no pressure coming from your machine:  Make sure that the inlet filter is clean and unblocked.  Make sure that the cooling fan is unblocked and that air is flowing freely.  Make sure that all tubing is securely connected.    If your PAP machine still fails to operate, please call your DME for assistance.    What You Should Know About Insurance    There are many different insurance policies and coverage for PAP equipment will vary. Find out what your coverage provides and what your responsibilities are as a consumer. PAP therapy equipment is considered  Durable Medical Equipment (DME). Here are a few questions to ask your insurance provider:  What benefits do I have for DME? Do I have a deductible and/or co pay for DME?  Is there a repair or replacement plan for durable medical equipment?  How often can I receive a replacement mask, tubing, headgear and filters?  Do I have to demonstrate that I am using my PAP device?  o How do I do that?    Important Information    It is very important to keep your PAP equipment and supplies clean. The life of the supplies depends on the care they receive. Under normal circumstances, expect the mask and headgear to last 9-12 months. Refer to the owner's manual for more information.    Important Safety Reminders    Keep equipment free from obstruction.  Use your PAP equipment as directed.  DO NOT try to adjust your pressure setting.  DO NOT block the exhalation port or valve.  Keep filters clean to prevent overheating.  If a humidifier is being used, place it at a level lower than your head.  If using a heated humidifier, allow unit to cool before cleaning or refilling.  Follow safety guidelines regarding oxygen equipment.DO NOT operate multiple electrical devices from one outlet.  If you have a medical emergency, contact the Emergency Medical Services.      Good Sleep Hygiene    Wake up at the same time every day, even on the weekends.  Use your bed for sleep and intimacy only.  If you have been in bed awake for 30 minutes, get up and leave the bedroom. Choose a dull activity not involving a blue screen (TV, computer, handheld devices). Go back to bed when you feel sleepy.  Avoid caffeine, nicotine and alcohol before you go to bed.  Avoid large meals before you go to bed.  Avoid using screens (computers, tablets, smartphones, etc.) for at least 1 hour before bedtime  Exercise regularly, but do not exercise right before you go to bed.  Avoid daytime naps. If you do take a nap, sleep for 20-40 minutes, and not after dinner.

## 2024-11-22 ENCOUNTER — TELEPHONE (OUTPATIENT)
Dept: INTERNAL MEDICINE CLINIC | Facility: CLINIC | Age: 64
End: 2024-11-22

## 2024-11-22 DIAGNOSIS — R16.0 HEPATOMEGALY: ICD-10-CM

## 2024-11-22 DIAGNOSIS — K70.31 ALCOHOLIC CIRRHOSIS OF LIVER WITH ASCITES (HCC): Primary | ICD-10-CM

## 2024-11-22 DIAGNOSIS — Z98.890 S/P ABDOMINAL PARACENTESIS: ICD-10-CM

## 2024-11-22 DIAGNOSIS — R60.0 LOCALIZED EDEMA: ICD-10-CM

## 2024-11-22 NOTE — RESULT ENCOUNTER NOTE
LVM with patient making him aware of this, as well as giving him the number to schedule if he does not hear from them within the next few days. Advised to also call us with further concerns/issues.

## 2024-11-22 NOTE — TELEPHONE ENCOUNTER
Raymundo called back, he understands to increase spironolactone and to contact Hepatology. States OK to go for paracentesis. Is off of work for a few weeks so he is OK with this. Please advise.

## 2024-11-29 ENCOUNTER — HOSPITAL ENCOUNTER (OUTPATIENT)
Dept: INTERVENTIONAL RADIOLOGY/VASCULAR | Facility: HOSPITAL | Age: 64
End: 2024-11-29
Attending: INTERNAL MEDICINE
Payer: COMMERCIAL

## 2024-11-29 VITALS
HEART RATE: 76 BPM | OXYGEN SATURATION: 100 % | SYSTOLIC BLOOD PRESSURE: 136 MMHG | DIASTOLIC BLOOD PRESSURE: 63 MMHG | RESPIRATION RATE: 18 BRPM

## 2024-11-29 DIAGNOSIS — R60.0 LOCALIZED EDEMA: ICD-10-CM

## 2024-11-29 DIAGNOSIS — Z98.890 S/P ABDOMINAL PARACENTESIS: ICD-10-CM

## 2024-11-29 DIAGNOSIS — R16.0 HEPATOMEGALY: ICD-10-CM

## 2024-11-29 DIAGNOSIS — K70.31 ALCOHOLIC CIRRHOSIS OF LIVER WITH ASCITES (HCC): ICD-10-CM

## 2024-11-29 PROCEDURE — 49083 ABD PARACENTESIS W/IMAGING: CPT

## 2024-11-29 PROCEDURE — 49083 ABD PARACENTESIS W/IMAGING: CPT | Performed by: RADIOLOGY

## 2024-11-29 RX ORDER — LIDOCAINE WITH 8.4% SOD BICARB 0.9%(10ML)
SYRINGE (ML) INJECTION AS NEEDED
Status: COMPLETED | OUTPATIENT
Start: 2024-11-29 | End: 2024-11-29

## 2024-11-29 RX ADMIN — Medication 10 ML: at 08:45

## 2024-11-29 NOTE — BRIEF OP NOTE (RAD/CATH)
IR PARACENTESIS Procedure Note    PATIENT NAME: Raymundo Weller  : 1960  MRN: [id number]    Pre-op Diagnosis:   1. Alcoholic cirrhosis of liver with ascites (HCC)    2. Hepatomegaly    3. S/P abdominal paracentesis    4. Localized edema      Post-op Diagnosis:   1. Alcoholic cirrhosis of liver with ascites (HCC)    2. Hepatomegaly    3. S/P abdominal paracentesis    4. Localized edema        Provider:   Delano Cabrera PA-C    Estimated Blood Loss: none    Findings: LLQ paracenteiss, 6200cc clear yellow    Specimens: none    Complications:  none immediate    Anesthesia: local    Delano Cabrera PA-C     Date: 2024  Time: 10:45 AM

## 2024-11-29 NOTE — DISCHARGE INSTRUCTIONS
[facility]  Interventional Radiology  [phone]    Abdominal Paracentesis     WHAT YOU NEED TO KNOW:   Abdominal paracentesis is a procedure to remove abnormal fluid buildup in your abdomen. Fluid builds up because of liver problems, such as swelling and scarring. Heart failure, kidney disease, a mass, or problems with your pancreas may also cause fluid buildup.     DISCHARGE INSTRUCTIONS:     Follow up with your healthcare provider as directed: Write down your questions so you remember to ask them during your visits.     Wound care: Remove dressing after 24 hours. Leave glue in place.    Return to your normal activities    Contact Interventional Radiology at [phone] if:  You have a fever and your wound is red and swollen.   You have yellow, green, or bad-smelling discharge coming from your wound.   You have pain or swelling in your abdomen.   You have an upset stomach or you vomit.   You have sudden, sharp pain in your abdomen.   You urinate very little or not at all.   You feel confused and more tired than usual.   Your arm or leg feels warm, tender, and painful. It may look swollen and red.   You suddenly feel lightheaded and have trouble breathing.

## 2024-11-29 NOTE — SEDATION DOCUMENTATION
Paracentesis completed by Abdulkadir Cabrera PA-C. 6200 ml clear yellow fluid removed. Pt tolerated well. Band aid applied to site. Pt refused albumin.

## 2024-12-03 ENCOUNTER — OFFICE VISIT (OUTPATIENT)
Dept: GASTROENTEROLOGY | Facility: CLINIC | Age: 64
End: 2024-12-03
Payer: COMMERCIAL

## 2024-12-03 VITALS
HEART RATE: 97 BPM | TEMPERATURE: 98.9 F | HEIGHT: 70 IN | DIASTOLIC BLOOD PRESSURE: 62 MMHG | SYSTOLIC BLOOD PRESSURE: 130 MMHG | WEIGHT: 294 LBS | BODY MASS INDEX: 42.09 KG/M2 | OXYGEN SATURATION: 98 %

## 2024-12-03 DIAGNOSIS — K31.89 PORTAL HYPERTENSIVE GASTROPATHY  (HCC): ICD-10-CM

## 2024-12-03 DIAGNOSIS — K70.31 ALCOHOLIC CIRRHOSIS OF LIVER WITH ASCITES (HCC): Primary | ICD-10-CM

## 2024-12-03 DIAGNOSIS — K92.2 UGIB (UPPER GASTROINTESTINAL BLEED): ICD-10-CM

## 2024-12-03 DIAGNOSIS — K76.6 PORTAL HYPERTENSIVE GASTROPATHY  (HCC): ICD-10-CM

## 2024-12-03 DIAGNOSIS — K22.70 BARRETT'S ESOPHAGUS DETERMINED BY BIOPSY: ICD-10-CM

## 2024-12-03 DIAGNOSIS — I85.10 SECONDARY ESOPHAGEAL VARICES WITHOUT BLEEDING (HCC): ICD-10-CM

## 2024-12-03 DIAGNOSIS — F10.90 ALCOHOL USE DISORDER: ICD-10-CM

## 2024-12-03 DIAGNOSIS — R60.0 LOWER EXTREMITY EDEMA: ICD-10-CM

## 2024-12-03 PROCEDURE — 99214 OFFICE O/P EST MOD 30 MIN: CPT | Performed by: FAMILY MEDICINE

## 2024-12-03 RX ORDER — FUROSEMIDE 20 MG/1
20 TABLET ORAL DAILY
Qty: 30 TABLET | Refills: 5 | Status: SHIPPED | OUTPATIENT
Start: 2024-12-03

## 2024-12-03 RX ORDER — CARVEDILOL 3.12 MG/1
3.12 TABLET ORAL 2 TIMES DAILY WITH MEALS
Qty: 60 TABLET | Refills: 5 | Status: SHIPPED | OUTPATIENT
Start: 2024-12-03

## 2024-12-03 NOTE — Clinical Note
Bryanna! Should Mr. Weller have a repeat EGD with his colonoscopy in January. I believe the EGD was for surveillance of Nelson's although he had one in January 2024 and recently had a UGIB secondary to portal hypertensive gastropathy. I wanted to make sure it would be appropriate for you to biopsy for Nelson's given his recent UGIB and small varices. Thanks!

## 2024-12-03 NOTE — PATIENT INSTRUCTIONS
Below is a summary of our recommendations from today's visit.   Begin taking Lasix 20 mg once daily in addition to your Aldactone 50 mg once daily.  1 week after starting Lasix you may begin taking carvedilol 3.125 mg once daily.  If your blood pressure tolerates you may increase your carvedilol to 3.125 mg twice daily.  You may also begin taking baclofen for relapse prevention.  You have standing orders for a paracentesis every 2 weeks as needed.  You are due for labs 1 week after starting Lasix to check your kidney function.  You will be due for a colonoscopy in January 2025.  You will be due for an MRI in March 2025.    Please review this general information in regards to cirrhosis, as discussed during your office visit:  The four main complications of cirrhosis that you may develop include a buildup of fluid in the abdomen, dilated blood vessels in the esophagus that have the potential to bleed, confusion, and an increased risk of developing liver cancer - Please contact our office immediately for any new or worsening symptoms.  Seek immediate medical attention for symptoms including, but not limited to, fevers over 101° F, new or worsening confusion, yellowing of the eyes or skin, or evidence of GI bleeding such as vomiting red blood or coffee-ground appearing material and/or rectal bleeding to include red blood or black/tarry appearing stools.  Avoid alcohol use and tobacco containing products entirely.  Avoid consuming raw seafood or shellfish and swimming in unchlorinated [place] due to increased risk of certain infections.  Avoid all NSAID use (ibuprofen, Advil, Aleve, Motrin, etc).  Do not exceed more than 2 g of acetaminophen (Tylenol) within a 24 hour period.  Remain up-to-date on all age-appropriate vaccinations, as well as, annual influenza and COVID-19 booster vaccines.   Please call our offices and inform provider if you were recently seen in the emergency department or admitted to the hospital, prior  to starting new medications (prescription or over-the-counter), and prior to any surgical intervention.    Please feel free to contact our offices at any time with your questions or concerns.  Thank you for allowing me to participate in your care today.

## 2024-12-03 NOTE — PROGRESS NOTES
[facility] - Outpatient Follow-up  Raymundo Weller 64 y.o. male MRN: [id number]  Encounter: [id number]          ASSESSMENT AND PLAN:      1. Alcoholic cirrhosis of liver with ascites (HCC) (Primary)  2. Secondary esophageal varices without bleeding (HCC)  3. Portal hypertensive gastropathy  (HCC)  4. UGIB (upper gastrointestinal bleed)  5. Lower extremity edema  6. Alcohol use disorder    Summary: Patient is a 64 y.o. male with cirrhosis secondary to EtOH d/b ascites and non-bleeding esophageal varices c/b UGI from [facility]. Current MELD-3.0 (8).    Patient was diagnosed with cirrhosis in 2023 during a hospitalization for seizure-like activity thought to be related to alcohol withdrawal and was found to have cirrhotic morphology with ascites on imaging as well as thrombocytopenia. US elastography confirmed F4 disease. His liver disease was decompensated by both ascites and lower extremity edema which was managed with diuretics. Unfortunately, he has struggled to remain sober and, since his last appointment, was hospitalized for a UGIB in the setting of active EtOH use. He had an EGD notable for small esophageal varices without high risk stigmata, short segment Nelson's esophagus and severe portal hypertensive gastropathy which was suspected to be the culprit. No further bleeding and improved Hgb since hospital discharge.    His diuretics were held during his hospitalization which was resulted in the need for paracentesis q2 weeks and significant bilateral lower extremity edema as witnessed on exam today. His PCP resumed Aldactone 50 mg once daily and will also start him on Lasix 20 mg once daily today. Low suspicion that carvedilol was contributing but will add back slowly.  He was instructed to start Lasix 20 mg once daily then after 1 week begin carvedilol 3.125 mg once daily then if his blood pressure tolerates he is not having worsening edema he can increase his carvedilol  to 3.125 mg once daily. He will have labs drawn x1 week to monitor his renal function, lytes and Hgb.     Stressed the importance of complete EtOH abstinence to help slow the progression of his liver disease and prevent additional decompensating events. He will begin taking baclofen for relapse prevention. He and his wife are also strongly considering inpatient vs outpatient rehab.    He is otherwise up-to-date with routine healthcare maintenance for cirrhosis. Additional management as below.    Ascites   - Begin Lasix 20 mg once daily.   - Continue Aldactone 50 mg once daily.   - Repeat labs x1 week to check renal function and lytes.   - Standing orders for paracentesis q2 weeks PRN for comfort.   - Encouraged to adhere to a low-sodium (<2000 mg daily) diet.     Esophageal Varices   - EGD (11/12) with small varices without high risk stigmata and severe PHG.   - Resume carvedilol 1 week after beginning Lasix with close monitoring of BP and/or worsening b/l NEREYDA.   - Repeat EGD x1 year if ongoing EtOH use.     Hepatic Encephalopathy   - No history or evidence of HE on exam today.   - Patient aware of signs/sxs's of HE and advised to promptly inform provider if experiencing such.    HCC Screening   - US (11/13) without focal liver lesion although study was limited; AFP (8/2024) normal.   - Plan for MRI in 3 rather than 6 months (March 2025) for hepatoma screening.   - Continue imaging with an AFP level q6 months.     Nutritional Status  - No sarcopenia noted on exam today.   - Encouraged high-protein diet in addition to a high-protein snack qHS to prevent prolonged fasting.     Transplant Candidacy   - Defer given recent EtOH use and hx of oropharyngeal SCC.     CRC Screening  - Colonoscopy (1/2024) with an inadequate bowel prep showed diverticulosis, 3 subcentimeter polyps and an angioectasia in the ascending colon.  - Repeat x1 year; Next due 1/2025.   - 2-day GoLytely/MiraLAX bowel prep.    - furosemide (LASIX) 20  mg tablet; Take 1 tablet (20 mg total) by mouth daily  Dispense: 30 tablet; Refill: 5  - carvedilol (COREG) 3.125 mg tablet; Take 1 tablet (3.125 mg total) by mouth 2 (two) times a day with meals  Dispense: 60 tablet; Refill: 5  - CBC and differential; Future  - Comprehensive metabolic panel; Future  - Protime-INR; Future  - IR AMB Paracentesis; Standing  - MRI abdomen w wo contrast; Future    7. Nelson's esophagus determined by biopsy  Continue pantoprazole 40 mg once daily.  TBW with scoping provider to determine timing of his next EGD in light of his recent UGIB.      Follow-up in 6 months or sooner if necessary.    ______________________________________________________________________    HPI: Patient is a 64 y.o. male with PMH significant for obesity, HTN, HLD, DOT, AAA, COPD,oropharyngeal SCC and alcohol use disorder who presents today for a follow-up regarding cirrhosis secondary to EtOH.    Gaston is familiar to [facility] hepatology last seen by Dr. Bonilla and Dr. Tim on 5/30/2024. In short, he was originally seen by gastroenterology in 2017 for elevated LFTs and had a serologic evalaution notable for a ferritin of >700. Subseqeunt HFE testing was negative but he underwent a liver biopsy demonstrating steatohepatitis and zone 3 perisinusoidal fibrosis. Iron quant was minimal. He was lost to follow-up until he was admitted to the hospital in 2023 for seizure-like activity thought to be related to alcohol withdrawal and was found to have cirrhotic morphology with ascites and thrombocytopenia on imaging. He also developed bilateral lower extremity edema and was started on diuretics. He had an ultrasound elastography confirming F4 disease. He also had subsequent cross-sectional imaging redemonstrating cirrhotic morphology as well as tiny hepatic cysts but no other concerning liver mass/lesion and a patent portal vein.    Since his last appointment, he had surgery to excise a tongue lesion c/w SCC.  Unfortunately, he began drinking again and was hospitalized (11/11) for CGE and maroon/black stools with an associated 6g hemoglobin drop. He underwent an EGD notable for small esophageal varices without high risk stigmata, short segment Nelson's esophagus and severe portal hypertensive gastropathy. It was suspected that his bleeding was from PHG and he was started on carvedilol 3.125 mg twice daily. Most recent Hgb 8.1 improved from hospital discharge. Denies further overt GI bleeding.     His main complaint today is b/l NEREYDA. Currently taking Aldactone 50 mg once daily. His last paracentesis 11/29 with 6L removed. He has also stopped taking his carvedilol because it was thought this was contributing to his swelling and has had mild improvement.     MELD 3.0: 8 at 11/20/2024 12:28 PM  MELD-Na: 7 at 11/20/2024 12:28 PM  Calculated from:  Serum Creatinine: 0.56 mg/dL (Using min of 1 mg/dL) at 11/20/2024 12:28 PM  Serum Sodium: 136 mmol/L at 11/20/2024 12:28 PM  Total Bilirubin: 0.94 mg/dL (Using min of 1 mg/dL) at 11/20/2024 12:28 PM  Serum Albumin: 3 g/dL at 11/20/2024 12:28 PM  INR(ratio): 1.05 at 11/20/2024 12:28 PM  Age at listing (hypothetical): 64 years  Sex: Male at 11/20/2024 12:28 PM      REVIEW OF SYSTEMS:    CONSTITUTIONAL: Denies any fever, chills, rigors, and weight loss.  HEENT: No earache or tinnitus. Denies hearing loss or visual disturbances.  CARDIOVASCULAR: No chest pain or palpitations.   RESPIRATORY: Denies any cough, hemoptysis, shortness of breath or dyspnea on exertion.  GASTROINTESTINAL: As noted in the History of Present Illness.   GENITOURINARY: No problems with urination. Denies any hematuria or dysuria.  NEUROLOGIC: No dizziness or vertigo, denies headaches.   MUSCULOSKELETAL: Denies any muscle or joint pain.   SKIN: Denies skin rashes or itching.   ENDOCRINE: Denies excessive thirst. Denies intolerance to heat or cold.  PSYCHOSOCIAL: Denies depression or anxiety. Denies any recent memory  "loss.       Historical Information   Past Medical History:   Diagnosis Date    AAA (abdominal aortic aneurysm) (HCC)     sees  Vascular    Abnormal kidney function     last assessed: Feb 25, 2016    Allergic rhinitis     Arthritis     Benign colon polyp     Bruises easily     \"not new\"    Cervical radiculopathy     last assessed: Feb 11, 2015    Chronic fatigue     \"not chronic'    Chronic pain disorder     Cirrhosis (HCC)     COPD (chronic obstructive pulmonary disease) (HCC)     COVID 01/05/2024    CPAP (continuous positive airway pressure) dependence     Depression 04/07/2023    per pt in the past    Edema     Elevated liver enzymes     Gout     Herniated cervical disc     \"bulging\"    Hyperlipidemia     Hypertension     Hypotension     last assessed: March 22, 2017    Left ventricular hypertrophy     Low platelet count (HCC)     \"usually runs low\"    Lumbar herniated disc     Obesity (BMI 30-39.9) 09/20/2018    Sleep apnea     Tongue lesion      Past Surgical History:   Procedure Laterality Date    BUCCAL MASS EXCISION Right 8/20/2024    Procedure: EXCISION BX LESION, TONGUE; FROZEN SECTION; flex hd grafting;  Surgeon: Sylvester Luis DO;  Location: [facility];  Service: ENT    CARPAL TUNNEL RELEASE Bilateral     COLONOSCOPY      HEMORRHOID SURGERY      IR PARACENTESIS  11/14/2024    LIVER BIOPSY       Social History   Social History     Substance and Sexual Activity   Alcohol Use Yes    Alcohol/week: 6.0 standard drinks of alcohol    Types: 6 Cans of beer per week    Comment: per pt not drinking currently. last drank 8/16- glass of wine     Social History     Substance and Sexual Activity   Drug Use No     Social History     Tobacco Use   Smoking Status Every Day    Current packs/day: 0.50    Average packs/day: 0.5 packs/day for 40.9 years (20.5 ttl pk-yrs)    Types: Cigarettes    Start date: 1/2/1988    Passive exposure: Current   Smokeless Tobacco Never   Tobacco Comments    Given [employer] \"how to quit\" " "info --has cut back.. last smoked 8/18     Family History   Problem Relation Age of Onset    Cancer Mother     No Known Problems Father     No Known Problems Sister     No Known Problems Brother     No Known Problems Maternal Grandmother     No Known Problems Maternal Grandfather     No Known Problems Paternal Grandmother     No Known Problems Paternal Grandfather        Meds/Allergies       Current Outpatient Medications:     carvedilol (COREG) 3.125 mg tablet    furosemide (LASIX) 20 mg tablet    multivitamin (THERAGRAN) TABS    pantoprazole (PROTONIX) 40 mg tablet    spironolactone (ALDACTONE) 50 mg tablet    Baclofen 5 MG TABS    No Known Allergies        Objective     Blood pressure 130/62, pulse 97, temperature 98.9 °F (37.2 °C), temperature source Temporal, height 5' 10\" (1.778 m), weight 133 kg (294 lb), SpO2 98%. Body mass index is 42.18 kg/m².        PHYSICAL EXAM:      General Appearance:   Alert, cooperative, no distress; AAOx3, no asterixis   HEENT:   Normocephalic, atraumatic, anicteric; No scleral icterus     Neck:  Supple, symmetrical, trachea midline   Lungs:   Clear to auscultation bilaterally; no rales, rhonchi or wheezing; respirations unlabored    Heart::   Regular rate and rhythm; no murmur, rub, or gallop.   Abdomen:   +Moderate abd distention; Soft, non-tender; normal bowel sounds; no masses, no organomegaly    Genitalia:   Deferred    Rectal:   Deferred    Extremities:  2+ pitting b/l NEREYDA; No palmar erythema, cyanosis, clubbing    Pulses:  2+ and symmetric    Skin:  No spider angiomas, jaundice, rashes, or lesions    Lymph nodes:  No palpable cervical lymphadenopathy        Lab Results:   No visits with results within 1 Day(s) from this visit.   Latest known visit with results is:   Appointment on 11/20/2024   Component Date Value    WBC 11/20/2024 7.00     RBC 11/20/2024 2.42 (L)     Hemoglobin 11/20/2024 8.1 (L)     Hematocrit 11/20/2024 25.8 (L)     MCV 11/20/2024 107 (H)     MCH " 11/20/2024 33.5     MCHC 11/20/2024 31.4     RDW 11/20/2024 17.6 (H)     MPV 11/20/2024 10.5     Platelets 11/20/2024 140 (L)     nRBC 11/20/2024 0     Segmented % 11/20/2024 57     Immature Grans % 11/20/2024 0     Lymphocytes % 11/20/2024 21     Monocytes % 11/20/2024 19 (H)     Eosinophils Relative 11/20/2024 3     Basophils Relative 11/20/2024 0     Absolute Neutrophils 11/20/2024 3.93     Absolute Immature Grans 11/20/2024 0.03     Absolute Lymphocytes 11/20/2024 1.47     Absolute Monocytes 11/20/2024 1.32 (H)     Eosinophils Absolute 11/20/2024 0.22     Basophils Absolute 11/20/2024 0.03     Sodium 11/20/2024 136     Potassium 11/20/2024 4.4     Chloride 11/20/2024 108     CO2 11/20/2024 23     ANION GAP 11/20/2024 5     BUN 11/20/2024 12     Creatinine 11/20/2024 0.56 (L)     Glucose 11/20/2024 127     Calcium 11/20/2024 7.8 (L)     Corrected Calcium 11/20/2024 8.6     AST 11/20/2024 46 (H)     ALT 11/20/2024 24     Alkaline Phosphatase 11/20/2024 95     Total Protein 11/20/2024 6.0 (L)     Albumin 11/20/2024 3.0 (L)     Total Bilirubin 11/20/2024 0.94     eGFR 11/20/2024 107     Protime 11/20/2024 14.2     INR 11/20/2024 1.05     Ammonia 11/20/2024 45     Iron Saturation 11/20/2024 7 (L)     TIBC 11/20/2024 288     Iron 11/20/2024 20 (L)     UIBC 11/20/2024 268     Ferritin 11/20/2024 100          Radiology Results:   IR INPATIENT Paracentesis  Result Date: 11/15/2024  Narrative: PROCEDURE: Paracentesis INDICATION: Symptomatic ascites. COMPLICATIONS: None immediate. LOCAL ANESTHETIC: 1% lidocaine PROCEDURE: Using ultrasound guidance, the right lower quadrant was punctured and a catheter was placed into the peritoneal cavity. A total of 4200 milliliters of fluid was removed. The catheter was then removed. FINDINGS: Massive ascites. Clear yellow ascites was removed.     Impression: Therapeutic paracentesis. Procedure was performed by: Delano Cabrera PA-C Supervising Physician: Dr. Gayle Workstation  performed: [id number]     US abdomen complete  Result Date: 11/13/2024  Narrative: ABDOMEN ULTRASOUND, COMPLETE INDICATION: cirrhosis, abdominal distention. COMPARISON: None TECHNIQUE: Real-time ultrasound of the abdomen was performed with a curvilinear transducer with both volumetric sweeps and still imaging techniques. FINDINGS: PANCREAS: Not visualized AORTA AND IVC: Not visualized LIVER: Size: Large.. The liver measures 21.6 cm in the midclavicular line. Contour: There is lobulated contour Parenchyma: No focal liver lesions are seen. No liver mass identified. There is limited evaluation of the portal veins, poorly visualized on the current examination. However there appears to be pulsatile flow with an the main portal vein. BILIARY: Nonspecific gallbladder wall thickening. There is negative sonographic Galvin sign. No intrahepatic biliary dilatation. CBD is poorly visualized No choledocholithiasis. KIDNEY: Right kidney measures 12.8 x 6.0 x 5.8 cm. Volume 230.9 mL Kidney within normal limits. Left kidney measures 13.1 x 7.3 x 6.5 cm. Volume 323.5 mL Kidney within normal limits. SPLEEN: Measures 12.5 x 11.7 x 6.6 cm. Volume 508.7 mL Within normal limits. ASCITES: There is ascites.     Impression: Limited study secondary to difficult penetration. Poor visualization of the portal veins for which the presence of portal venous thrombosis is not entirely excluded. Apparent pulsatile flow within the main portal vein. Consider contrast-enhanced CT of the abdomen for further evaluation of the portal veins. Cirrhosis with evidence of ascites. Workstation performed: [id number]     EGD  Addendum Date: 11/12/2024  Addendum: Critical access hospital Operating Room [address] [place] [id number] [phone] DATE OF SERVICE: 11/12/24 PHYSICIAN(S): Attending: Miranda Hernandez DO Fellow: Roseanne Beltre MD INDICATION: Alcoholic cirrhosis of liver (HCC), GI bleed POST-OP DIAGNOSIS: See the impression below.  PREPROCEDURE: Informed consent was obtained for the procedure, including sedation.  Risks of perforation, hemorrhage, adverse drug reaction and aspiration were discussed. The patient was placed in the left lateral decubitus position. Patient was explained about the risks and benefits of the procedure. Risks including but not limited to bleeding, infection, and perforation were explained in detail. Also explained about less than 100% sensitivity with the exam and other alternatives. PROCEDURE: EGD DETAILS OF PROCEDURE: Patient was taken to the procedure room where a time out was performed to confirm correct patient and correct procedure. The patient underwent monitored anesthesia care, which was administered by an anesthesia professional. The patient's blood pressure, heart rate, level of consciousness, respirations, oxygen, ECG and ETCO2 were monitored throughout the procedure. The scope was introduced through the mouth and advanced to the second part of the duodenum. Retroflexion was performed in the fundus. The patient experienced no blood loss. The procedure was not difficult. The patient tolerated the procedure well. There were no apparent adverse events. ANESTHESIA INFORMATION: ASA: III Anesthesia Type: General MEDICATIONS: No administrations occurring from 1355 to 1535 on 11/12/24 FINDINGS: Multiple small varices (no red clifford sign) in the lower third of the esophagus; no bleeding was identified. Small varices seen which were fully collapsible with insufflation, no signs of bleeding seen. 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 Significant PHG seen, most likely source of hematemesis The duodenal bulb, 1st part of the duodenum and 2nd part of the duodenum appeared normal. SPECIMENS: * No specimens in log * IMPRESSION: Multiple small varices in the lower third of the  esophagus C2M3 Nelson's esophagus Severe, friable and mosaic portal hypertensive gastropathy in the body of the stomach, greater curve of the stomach and lesser curve of the stomach The duodenal bulb, 1st part of the duodenum and 2nd part of the duodenum appeared normal. RECOMMENDATION: Continue with Octreotide for total 24 hours Can transition to oral PPI 40 mg once a day Clear liquid diet today Extensive counseling for alcohol cessation Repeat EGD in 1 year    Miranda Hernandez DO     Result Date: 11/12/2024  Narrative: Table formatting from the original result was not included. [facility] Operating Room [address] [place] [id number] [phone] DATE OF SERVICE: 11/12/24 PHYSICIAN(S): Attending: Miranda Hernandez DO Fellow: Roseanne Beltre MD INDICATION: Alcoholic cirrhosis of liver (HCC), GI bleed POST-OP DIAGNOSIS: See the impression below. PREPROCEDURE: Informed consent was obtained for the procedure, including sedation.  Risks of perforation, hemorrhage, adverse drug reaction and aspiration were discussed. The patient was placed in the left lateral decubitus position. Patient was explained about the risks and benefits of the procedure. Risks including but not limited to bleeding, infection, and perforation were explained in detail. Also explained about less than 100% sensitivity with the exam and other alternatives. PROCEDURE: EGD DETAILS OF PROCEDURE: Patient was taken to the procedure room where a time out was performed to confirm correct patient and correct procedure. The patient underwent monitored anesthesia care, which was administered by an anesthesia professional. The patient's blood pressure, heart rate, level of consciousness, respirations, oxygen, ECG and ETCO2 were monitored throughout the procedure. The scope was introduced through the mouth and advanced to the second part of the duodenum. Retroflexion was performed in the fundus. The patient experienced no  blood loss. The procedure was not difficult. The patient tolerated the procedure well. There were no apparent adverse events. ANESTHESIA INFORMATION: ASA: III Anesthesia Type: General MEDICATIONS: No administrations occurring from 1355 to 1535 on 11/12/24 FINDINGS: Multiple small varices (no red clifford sign) in the lower third of the esophagus; no bleeding was identified. Small varices seen which were fully collapsible with insufflation, no signs of bleeding seen. 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 Significant PHG seen, most likely source of hematemesis The duodenal bulb, 1st part of the duodenum and 2nd part of the duodenum appeared normal. 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 RECOMMENDATION: Continue with Octreotide for total 24 hours Can transition to oral PPI 40 mg once a day Clear liquid diet today Extensive counseling for alcohol cessation Repeat EGD in 1 year    Miranda Hernandez DO     XR chest 1 view portable  Result Date: 11/12/2024  Narrative: XR CHEST PORTABLE INDICATION: SOB. COMPARISON: 08/12/2024 FINDINGS: Clear lungs. No pneumothorax or pleural effusion. Normal cardiomediastinal silhouette. Bones are unremarkable for age. Normal upper abdomen.     Impression: No acute cardiopulmonary disease. Workstation performed: [id number]       Tamara Hinton PA-C     **Please note: Dictation voice to text software may have been used in the creation of this record. Occasional wrong word or “sound alike” substitutions may have occurred  due to the inherent limitations of voice recognition software. Read the chart carefully and recognize, using context, where substitutions have occurred.**

## 2024-12-04 ENCOUNTER — OFFICE VISIT (OUTPATIENT)
Dept: INTERNAL MEDICINE CLINIC | Facility: CLINIC | Age: 64
End: 2024-12-04
Payer: COMMERCIAL

## 2024-12-04 VITALS
DIASTOLIC BLOOD PRESSURE: 68 MMHG | BODY MASS INDEX: 42.58 KG/M2 | TEMPERATURE: 97.7 F | SYSTOLIC BLOOD PRESSURE: 132 MMHG | OXYGEN SATURATION: 99 % | WEIGHT: 297.4 LBS | HEIGHT: 70 IN | HEART RATE: 99 BPM

## 2024-12-04 DIAGNOSIS — R60.0 LOCALIZED EDEMA: ICD-10-CM

## 2024-12-04 DIAGNOSIS — K70.31 ASCITES DUE TO ALCOHOLIC CIRRHOSIS (HCC): ICD-10-CM

## 2024-12-04 DIAGNOSIS — Z12.2 SCREENING FOR LUNG CANCER: ICD-10-CM

## 2024-12-04 DIAGNOSIS — K70.31 ALCOHOLIC CIRRHOSIS OF LIVER WITH ASCITES (HCC): Primary | ICD-10-CM

## 2024-12-04 DIAGNOSIS — Z12.12 ENCOUNTER FOR COLORECTAL CANCER SCREENING: ICD-10-CM

## 2024-12-04 DIAGNOSIS — Z12.11 ENCOUNTER FOR COLORECTAL CANCER SCREENING: ICD-10-CM

## 2024-12-04 DIAGNOSIS — Z98.890 S/P ABDOMINAL PARACENTESIS: ICD-10-CM

## 2024-12-04 DIAGNOSIS — I85.10 SECONDARY ESOPHAGEAL VARICES WITHOUT BLEEDING (HCC): ICD-10-CM

## 2024-12-04 DIAGNOSIS — I10 ESSENTIAL HYPERTENSION: ICD-10-CM

## 2024-12-04 DIAGNOSIS — Z23 ENCOUNTER FOR IMMUNIZATION: ICD-10-CM

## 2024-12-04 PROCEDURE — 99214 OFFICE O/P EST MOD 30 MIN: CPT | Performed by: INTERNAL MEDICINE

## 2024-12-04 NOTE — PROGRESS NOTES
Name: Raymundo Weller      : 1960      MRN: [id number]  Encounter Provider: Lisandro Gauthier DO  Encounter Date: 2024   Encounter department: [facility]  :  Assessment & Plan  Encounter for immunization         Screening for lung cancer  I discussed with him that he is a candidate for lung cancer CT screening.     The following Shared Decision-Making points were covered:  Benefits of screening were discussed, including the rates of reduction in death from lung cancer and other causes.  Harms of screening were reviewed, including false positive tests, radiation exposure levels, risks of invasive procedures, risks of complications of screening, and risk of overdiagnosis.  I counseled on the importance of adherence to annual lung cancer LDCT screening, impact of co-morbidities, and ability or willingness to undergo diagnosis and treatment.  I counseled on the importance of maintaining abstinence as a former smoker or was counseled on the importance of smoking cessation if a current smoker    Review of Eligibility Criteria: He meets all of the criteria for Lung Cancer Screening.   He is 64 y.o.   He has 20 pack year tobacco history and is a current smoker or has quit within the past 15 years  He presents no signs or symptoms of lung cancer    After discussion, the patient decided to elect lung cancer screening.         Encounter for colorectal cancer screening         Alcoholic cirrhosis of liver with ascites (HCC)         Ascites due to alcoholic cirrhosis (HCC)         S/P abdominal paracentesis         A/P: Doing a little better. Appreciate GI and IR input. BP is better. LE edema less, but still significant. Discussed that the ascites needs to be gone in order for the LE edema to improve due to mechanical compression of the lymphatics. Less ascites, but starting to fill up. To get additional serial paracentesis. To restart his coreg for BP and varices. Continue the baclofen for now  "and hold on increase due to just starting. RTC two months for f/u and keep f/u with GI and IR.        History of Present Illness     WM RTC one week f/u ETOH cirrhosis with ascites. Pt set up for paracentesis and had 6 liters removed. Spironolactone started and also baclofen for ETOH use. Reports tolerating the meds. Pt stopped the coreg due to ongoing edema. Seen by  GI and pt to continue the spironolactone and want him to restart the coreg in one week. GI wants q two week paracentesis. Doing a little better. Abstaining from ETOH. Just started the baclofen. Edema less. BP has been good.       Review of Systems   Constitutional:  Positive for activity change. Negative for chills, diaphoresis, fatigue and fever.   Respiratory:  Negative for cough, chest tightness, shortness of breath and wheezing.    Cardiovascular:  Positive for leg swelling. Negative for chest pain and palpitations.   Gastrointestinal:  Negative for abdominal distention, abdominal pain, anal bleeding, blood in stool, constipation, diarrhea, nausea, rectal pain and vomiting.   Genitourinary:  Negative for difficulty urinating, dysuria and frequency.   Musculoskeletal:  Negative for arthralgias, gait problem and myalgias.   Neurological:  Negative for dizziness, seizures, syncope, weakness, light-headedness and headaches.   Psychiatric/Behavioral:  Negative for confusion, dysphoric mood and sleep disturbance. The patient is not nervous/anxious.           Objective   /68   Pulse 99   Temp 97.7 °F (36.5 °C) (Tympanic)   Ht 5' 10\" (1.778 m)   Wt 135 kg (297 lb 6.4 oz)   SpO2 99%   BMI 42.67 kg/m²      Physical Exam  Vitals and nursing note reviewed.   Constitutional:       General: He is not in acute distress.     Appearance: Normal appearance. He is not ill-appearing.   HENT:      Head: Normocephalic and atraumatic.      Mouth/Throat:      Mouth: Mucous membranes are moist.   Eyes:      General: No scleral icterus.     Extraocular " Movements: Extraocular movements intact.      Conjunctiva/sclera: Conjunctivae normal.      Pupils: Pupils are equal, round, and reactive to light.   Cardiovascular:      Rate and Rhythm: Normal rate and regular rhythm.      Heart sounds: Normal heart sounds. No murmur heard.  Pulmonary:      Effort: Pulmonary effort is normal. No respiratory distress.      Breath sounds: Normal breath sounds. No wheezing, rhonchi or rales.   Abdominal:      General: Bowel sounds are normal. There is distension.      Palpations: Abdomen is soft.      Tenderness: There is no abdominal tenderness.   Musculoskeletal:      Right lower leg: Edema present.      Left lower leg: Edema present.   Neurological:      General: No focal deficit present.      Mental Status: He is alert and oriented to person, place, and time. Mental status is at baseline.   Psychiatric:         Mood and Affect: Mood normal.         Behavior: Behavior normal.         Thought Content: Thought content normal.         Judgment: Judgment normal.

## 2024-12-12 ENCOUNTER — APPOINTMENT (OUTPATIENT)
Dept: LAB | Facility: CLINIC | Age: 64
End: 2024-12-12
Payer: COMMERCIAL

## 2024-12-12 ENCOUNTER — HOSPITAL ENCOUNTER (OUTPATIENT)
Dept: INTERVENTIONAL RADIOLOGY/VASCULAR | Facility: HOSPITAL | Age: 64
End: 2024-12-12
Payer: COMMERCIAL

## 2024-12-12 VITALS
OXYGEN SATURATION: 100 % | DIASTOLIC BLOOD PRESSURE: 69 MMHG | RESPIRATION RATE: 18 BRPM | SYSTOLIC BLOOD PRESSURE: 156 MMHG | HEART RATE: 91 BPM

## 2024-12-12 DIAGNOSIS — K70.31 ALCOHOLIC CIRRHOSIS OF LIVER WITH ASCITES (HCC): ICD-10-CM

## 2024-12-12 LAB
ALBUMIN SERPL BCG-MCNC: 3.2 G/DL (ref 3.5–5)
ALP SERPL-CCNC: 96 U/L (ref 34–104)
ALT SERPL W P-5'-P-CCNC: 14 U/L (ref 7–52)
ANION GAP SERPL CALCULATED.3IONS-SCNC: 11 MMOL/L (ref 4–13)
AST SERPL W P-5'-P-CCNC: 39 U/L (ref 13–39)
BASOPHILS # BLD AUTO: 0.07 THOUSANDS/ÂΜL (ref 0–0.1)
BASOPHILS NFR BLD AUTO: 1 % (ref 0–1)
BILIRUB SERPL-MCNC: 1.23 MG/DL (ref 0.2–1)
BUN SERPL-MCNC: 11 MG/DL (ref 5–25)
CALCIUM ALBUM COR SERPL-MCNC: 9.5 MG/DL (ref 8.3–10.1)
CALCIUM SERPL-MCNC: 8.9 MG/DL (ref 8.4–10.2)
CHLORIDE SERPL-SCNC: 102 MMOL/L (ref 96–108)
CO2 SERPL-SCNC: 25 MMOL/L (ref 21–32)
CREAT SERPL-MCNC: 0.77 MG/DL (ref 0.6–1.3)
EOSINOPHIL # BLD AUTO: 0.31 THOUSAND/ÂΜL (ref 0–0.61)
EOSINOPHIL NFR BLD AUTO: 5 % (ref 0–6)
ERYTHROCYTE [DISTWIDTH] IN BLOOD BY AUTOMATED COUNT: 17.7 % (ref 11.6–15.1)
GFR SERPL CREATININE-BSD FRML MDRD: 94 ML/MIN/1.73SQ M
GLUCOSE P FAST SERPL-MCNC: 123 MG/DL (ref 65–99)
HCT VFR BLD AUTO: 27.8 % (ref 36.5–49.3)
HGB BLD-MCNC: 8.6 G/DL (ref 12–17)
IMM GRANULOCYTES # BLD AUTO: 0.04 THOUSAND/UL (ref 0–0.2)
IMM GRANULOCYTES NFR BLD AUTO: 1 % (ref 0–2)
INR PPP: 1.2 (ref 0.85–1.19)
LYMPHOCYTES # BLD AUTO: 1.74 THOUSANDS/ÂΜL (ref 0.6–4.47)
LYMPHOCYTES NFR BLD AUTO: 27 % (ref 14–44)
MCH RBC QN AUTO: 29.6 PG (ref 26.8–34.3)
MCHC RBC AUTO-ENTMCNC: 30.9 G/DL (ref 31.4–37.4)
MCV RBC AUTO: 96 FL (ref 82–98)
MONOCYTES # BLD AUTO: 0.88 THOUSAND/ÂΜL (ref 0.17–1.22)
MONOCYTES NFR BLD AUTO: 14 % (ref 4–12)
NEUTROPHILS # BLD AUTO: 3.41 THOUSANDS/ÂΜL (ref 1.85–7.62)
NEUTS SEG NFR BLD AUTO: 52 % (ref 43–75)
NRBC BLD AUTO-RTO: 0 /100 WBCS
PLATELET # BLD AUTO: 153 THOUSANDS/UL (ref 149–390)
PMV BLD AUTO: 10.4 FL (ref 8.9–12.7)
POTASSIUM SERPL-SCNC: 5 MMOL/L (ref 3.5–5.3)
PROT SERPL-MCNC: 6.8 G/DL (ref 6.4–8.4)
PROTHROMBIN TIME: 15.4 SECONDS (ref 12.3–15)
RBC # BLD AUTO: 2.91 MILLION/UL (ref 3.88–5.62)
SODIUM SERPL-SCNC: 138 MMOL/L (ref 135–147)
WBC # BLD AUTO: 6.45 THOUSAND/UL (ref 4.31–10.16)

## 2024-12-12 PROCEDURE — 49083 ABD PARACENTESIS W/IMAGING: CPT

## 2024-12-12 PROCEDURE — 85610 PROTHROMBIN TIME: CPT

## 2024-12-12 PROCEDURE — 80053 COMPREHEN METABOLIC PANEL: CPT

## 2024-12-12 PROCEDURE — 85025 COMPLETE CBC W/AUTO DIFF WBC: CPT

## 2024-12-12 PROCEDURE — 36415 COLL VENOUS BLD VENIPUNCTURE: CPT

## 2024-12-12 PROCEDURE — 49083 ABD PARACENTESIS W/IMAGING: CPT | Performed by: PHYSICIAN ASSISTANT

## 2024-12-12 RX ORDER — LIDOCAINE WITH 8.4% SOD BICARB 0.9%(10ML)
SYRINGE (ML) INJECTION AS NEEDED
Status: COMPLETED | OUTPATIENT
Start: 2024-12-12 | End: 2024-12-12

## 2024-12-12 RX ADMIN — Medication 10 ML: at 10:55

## 2024-12-12 NOTE — SEDATION DOCUMENTATION
Paracentesis completed by Abdulkadir Cabrera PA-C. 7700 ml clear yellow fluid removed. Pt tolerated well. Band aid applied to site. Albumin offered, pt refused. Encouraged po protein intake. Pt verbalized understanding.

## 2024-12-12 NOTE — DISCHARGE INSTRUCTIONS
[facility]  Interventional Radiology  [phone]    Abdominal Paracentesis     WHAT YOU NEED TO KNOW:   Abdominal paracentesis is a procedure to remove abnormal fluid buildup in your abdomen. Fluid builds up because of liver problems, such as swelling and scarring. Heart failure, kidney disease, a mass, or problems with your pancreas may also cause fluid buildup.     DISCHARGE INSTRUCTIONS:     Follow up with your healthcare provider as directed: Write down your questions so you remember to ask them during your visits.     Wound care: Remove dressing after 24 hours. Leave glue in place.    Return to your normal activities    Contact Interventional Radiology at [phone] if:  You have a fever and your wound is red and swollen.   You have yellow, green, or bad-smelling discharge coming from your wound.   You have pain or swelling in your abdomen.   You have an upset stomach or you vomit.   You have sudden, sharp pain in your abdomen.   You urinate very little or not at all.   You feel confused and more tired than usual.   Your arm or leg feels warm, tender, and painful. It may look swollen and red.   You suddenly feel lightheaded and have trouble breathing.

## 2024-12-16 DIAGNOSIS — K92.2 GI BLEED: ICD-10-CM

## 2024-12-17 RX ORDER — PANTOPRAZOLE SODIUM 40 MG/1
40 TABLET, DELAYED RELEASE ORAL
Qty: 30 TABLET | Refills: 0 | Status: SHIPPED | OUTPATIENT
Start: 2024-12-17

## 2024-12-18 ENCOUNTER — TELEPHONE (OUTPATIENT)
Dept: INTERNAL MEDICINE CLINIC | Facility: CLINIC | Age: 64
End: 2024-12-18

## 2024-12-18 NOTE — TELEPHONE ENCOUNTER
----- Message from Lisandro Gauthier DO sent at 12/4/2024  9:04 AM EST -----  Call pt in two weeks and see how the edema and ascites are doing.

## 2024-12-18 NOTE — TELEPHONE ENCOUNTER
Please call pt    Knee XRAy ok    Back shows degenerative changes as before    Foot XRAys shows fracture(s)    Please have pt obtain postop shoe, sent to [facility]    Pls see [facility] ASAP for consult    IMPRESSION:      1.   Mildly offset oblique fracture through the base of the fourth proximal  phalanx. Question nondisplaced fracture of the fifth proximal phalanx.   Pt states that the swelling has gone down no concerns at this time

## 2024-12-19 ENCOUNTER — HOSPITAL ENCOUNTER (OUTPATIENT)
Dept: INTERVENTIONAL RADIOLOGY/VASCULAR | Facility: HOSPITAL | Age: 64
Discharge: HOME | End: 2024-12-19
Admitting: RADIOLOGY
Payer: COMMERCIAL

## 2024-12-19 ENCOUNTER — TELEPHONE (OUTPATIENT)
Age: 64
End: 2024-12-19

## 2024-12-19 VITALS
HEART RATE: 87 BPM | DIASTOLIC BLOOD PRESSURE: 63 MMHG | RESPIRATION RATE: 18 BRPM | OXYGEN SATURATION: 99 % | SYSTOLIC BLOOD PRESSURE: 133 MMHG

## 2024-12-19 DIAGNOSIS — K70.31 ALCOHOLIC CIRRHOSIS OF LIVER WITH ASCITES (HCC): ICD-10-CM

## 2024-12-19 DIAGNOSIS — K70.31 ALCOHOLIC CIRRHOSIS OF LIVER WITH ASCITES (HCC): Primary | ICD-10-CM

## 2024-12-19 PROCEDURE — 49083 ABD PARACENTESIS W/IMAGING: CPT

## 2024-12-19 PROCEDURE — 49083 ABD PARACENTESIS W/IMAGING: CPT | Performed by: PHYSICIAN ASSISTANT

## 2024-12-19 NOTE — TELEPHONE ENCOUNTER
Patients GI provider:  Dr. Bonilla    Number to return call: [phone]    Reason for call: Pt called asking for his paracentesis to be scheduled every week rather than every other week as he said they are taking a lot of fluid off. He said last time they took off 8 litres. Pt would like a new order placed if possible and for his schedule to be adjusted. Please reach out to pt     Scheduled procedure/appointment date if applicable: Apt/procedure 12/19/24

## 2024-12-19 NOTE — BRIEF OP NOTE (RAD/CATH)
IR PARACENTESIS Procedure Note    PATIENT NAME: Raymundo Weller  : 1960  MRN: [id number]    Pre-op Diagnosis:   1. Alcoholic cirrhosis of liver with ascites (HCC)      Post-op Diagnosis:   1. Alcoholic cirrhosis of liver with ascites (HCC)        Provider:   Delano Cabrera PA-C    Estimated Blood Loss: minimal    Findings: LLQ paracentesis, 4550cc clear yellow    Specimens: none    Complications:  none immediat    Anesthesia: local    Delano Cabrera PA-C     Date: 2024  Time: 12:29 PM

## 2024-12-19 NOTE — DISCHARGE INSTRUCTIONS
[facility]  Interventional Radiology  [phone]    Abdominal Paracentesis     WHAT YOU NEED TO KNOW:   Abdominal paracentesis is a procedure to remove abnormal fluid buildup in your abdomen. Fluid builds up because of liver problems, such as swelling and scarring. Heart failure, kidney disease, a mass, or problems with your pancreas may also cause fluid buildup.     DISCHARGE INSTRUCTIONS:     Follow up with your healthcare provider as directed: Write down your questions so you remember to ask them during your visits.     Wound care: Remove dressing after 24 hours. Leave glue in place.    Return to your normal activities    Contact Interventional Radiology at [phone] ([facility] PATIENTS: Contact Interventional Radiology at [phone]) ([facility] PATIENTS: Contact Interventional Radiology at [phone]) if:  You have a fever and your wound is red and swollen.   You have yellow, green, or bad-smelling discharge coming from your wound.   You have pain or swelling in your abdomen.   You have an upset stomach or you vomit.   You have sudden, sharp pain in your abdomen.   You urinate very little or not at all.   You feel confused and more tired than usual.   Your arm or leg feels warm, tender, and painful. It may look swollen and red.   You suddenly feel lightheaded and have trouble breathing.

## 2024-12-20 NOTE — TELEPHONE ENCOUNTER
Spoke with pt. Advised of change in paracentesis order from every 2 weeks to weekly. Pt will call IR to schedule.

## 2024-12-22 DIAGNOSIS — I85.11 SECONDARY ESOPHAGEAL VARICES WITH BLEEDING (HCC): ICD-10-CM

## 2024-12-22 DIAGNOSIS — R60.0 LOCALIZED EDEMA: ICD-10-CM

## 2024-12-22 DIAGNOSIS — K70.31 ALCOHOLIC CIRRHOSIS OF LIVER WITH ASCITES (HCC): ICD-10-CM

## 2024-12-23 RX ORDER — SPIRONOLACTONE 50 MG/1
25 TABLET, FILM COATED ORAL DAILY
Qty: 90 TABLET | Refills: 1 | Status: SHIPPED | OUTPATIENT
Start: 2024-12-23

## 2024-12-26 DIAGNOSIS — K70.31 ALCOHOLIC CIRRHOSIS OF LIVER WITH ASCITES (HCC): ICD-10-CM

## 2024-12-27 ENCOUNTER — HOSPITAL ENCOUNTER (OUTPATIENT)
Dept: INTERVENTIONAL RADIOLOGY/VASCULAR | Facility: HOSPITAL | Age: 64
End: 2024-12-27
Attending: INTERNAL MEDICINE
Payer: COMMERCIAL

## 2024-12-27 VITALS
DIASTOLIC BLOOD PRESSURE: 65 MMHG | OXYGEN SATURATION: 99 % | HEART RATE: 86 BPM | SYSTOLIC BLOOD PRESSURE: 133 MMHG | RESPIRATION RATE: 18 BRPM

## 2024-12-27 DIAGNOSIS — K70.31 ALCOHOLIC CIRRHOSIS OF LIVER WITH ASCITES (HCC): ICD-10-CM

## 2024-12-27 PROCEDURE — 49083 ABD PARACENTESIS W/IMAGING: CPT | Performed by: PHYSICIAN ASSISTANT

## 2024-12-27 PROCEDURE — 49083 ABD PARACENTESIS W/IMAGING: CPT

## 2024-12-27 RX ORDER — FUROSEMIDE 20 MG/1
20 TABLET ORAL DAILY
Qty: 30 TABLET | Refills: 5 | Status: SHIPPED | OUTPATIENT
Start: 2024-12-27

## 2024-12-27 NOTE — DISCHARGE INSTRUCTIONS
[facility]  Interventional Radiology  [phone]    Abdominal Paracentesis     WHAT YOU NEED TO KNOW:   Abdominal paracentesis is a procedure to remove abnormal fluid buildup in your abdomen. Fluid builds up because of liver problems, such as swelling and scarring. Heart failure, kidney disease, a mass, or problems with your pancreas may also cause fluid buildup.     DISCHARGE INSTRUCTIONS:     Follow up with your healthcare provider as directed: Write down your questions so you remember to ask them during your visits.     Wound care: Remove dressing after 24 hours. Leave glue in place.    Return to your normal activities    Contact Interventional Radiology at [phone] if:  You have a fever and your wound is red and swollen.   You have yellow, green, or bad-smelling discharge coming from your wound.   You have pain or swelling in your abdomen.   You have an upset stomach or you vomit.   You have sudden, sharp pain in your abdomen.   You urinate very little or not at all.   You feel confused and more tired than usual.   Your arm or leg feels warm, tender, and painful. It may look swollen and red.   You suddenly feel lightheaded and have trouble breathing.

## 2024-12-27 NOTE — SEDATION DOCUMENTATION
Paracentesis completed by Abdulkadir Cabrera PA-C. 4500 ml clear yellow fluid removed. Pt tolerated well. Band aid applied to site.

## 2025-01-01 ENCOUNTER — RESULTS FOLLOW-UP (OUTPATIENT)
Age: 65
End: 2025-01-01

## 2025-01-01 DIAGNOSIS — R79.89 ELEVATED SERUM CREATININE: ICD-10-CM

## 2025-01-01 DIAGNOSIS — K70.31 ALCOHOLIC CIRRHOSIS OF LIVER WITH ASCITES (HCC): Primary | ICD-10-CM

## 2025-01-03 ENCOUNTER — HOSPITAL ENCOUNTER (OUTPATIENT)
Dept: INTERVENTIONAL RADIOLOGY/VASCULAR | Facility: HOSPITAL | Age: 65
End: 2025-01-03
Attending: INTERNAL MEDICINE
Payer: COMMERCIAL

## 2025-01-03 VITALS
DIASTOLIC BLOOD PRESSURE: 60 MMHG | SYSTOLIC BLOOD PRESSURE: 128 MMHG | OXYGEN SATURATION: 98 % | HEART RATE: 86 BPM | RESPIRATION RATE: 18 BRPM

## 2025-01-03 DIAGNOSIS — K70.31 ALCOHOLIC CIRRHOSIS OF LIVER WITH ASCITES (HCC): ICD-10-CM

## 2025-01-03 PROCEDURE — 49083 ABD PARACENTESIS W/IMAGING: CPT

## 2025-01-03 PROCEDURE — 49083 ABD PARACENTESIS W/IMAGING: CPT | Performed by: PHYSICIAN ASSISTANT

## 2025-01-03 RX ORDER — LIDOCAINE WITH 8.4% SOD BICARB 0.9%(10ML)
SYRINGE (ML) INJECTION AS NEEDED
Status: COMPLETED | OUTPATIENT
Start: 2025-01-03 | End: 2025-01-03

## 2025-01-03 RX ADMIN — Medication 10 ML: at 13:15

## 2025-01-03 NOTE — SEDATION DOCUMENTATION
Left sided paracentesis complete. 4300ml of clear yellow fluid removed. Patient offering no complaints. Bandage applied.

## 2025-01-03 NOTE — DISCHARGE INSTRUCTIONS
[facility]  Interventional Radiology  [phone]    Abdominal Paracentesis     WHAT YOU NEED TO KNOW:   Abdominal paracentesis is a procedure to remove abnormal fluid buildup in your abdomen. Fluid builds up because of liver problems, such as swelling and scarring. Heart failure, kidney disease, a mass, or problems with your pancreas may also cause fluid buildup.     DISCHARGE INSTRUCTIONS:     Follow up with your healthcare provider as directed: Write down your questions so you remember to ask them during your visits.     Wound care: Remove dressing after 24 hours. Leave glue in place.    Return to your normal activities    Contact Interventional Radiology at [phone] ([facility]: Contact Interventional Radiology at [phone]) ([facility] PATIENTS: Contact Interventional Radiology at [phone]) if:  You have a fever and your wound is red and swollen.   You have yellow, green, or bad-smelling discharge coming from your wound.   You have pain or swelling in your abdomen.   You have an upset stomach or you vomit.   You have sudden, sharp pain in your abdomen.   You urinate very little or not at all.   You feel confused and more tired than usual.   Your arm or leg feels warm, tender, and painful. It may look swollen and red.   You suddenly feel lightheaded and have trouble breathing.

## 2025-01-10 ENCOUNTER — HOSPITAL ENCOUNTER (OUTPATIENT)
Dept: INTERVENTIONAL RADIOLOGY/VASCULAR | Facility: HOSPITAL | Age: 65
End: 2025-01-10
Attending: INTERNAL MEDICINE
Payer: COMMERCIAL

## 2025-01-10 VITALS
RESPIRATION RATE: 18 BRPM | DIASTOLIC BLOOD PRESSURE: 71 MMHG | HEART RATE: 94 BPM | OXYGEN SATURATION: 100 % | SYSTOLIC BLOOD PRESSURE: 154 MMHG

## 2025-01-10 DIAGNOSIS — K70.31 ALCOHOLIC CIRRHOSIS OF LIVER WITH ASCITES (HCC): ICD-10-CM

## 2025-01-10 PROCEDURE — 49083 ABD PARACENTESIS W/IMAGING: CPT

## 2025-01-10 PROCEDURE — 49083 ABD PARACENTESIS W/IMAGING: CPT | Performed by: PHYSICIAN ASSISTANT

## 2025-01-10 RX ORDER — LIDOCAINE WITH 8.4% SOD BICARB 0.9%(10ML)
SYRINGE (ML) INJECTION AS NEEDED
Status: COMPLETED | OUTPATIENT
Start: 2025-01-10 | End: 2025-01-10

## 2025-01-10 RX ADMIN — Medication 10 ML: at 08:44

## 2025-01-10 NOTE — SEDATION DOCUMENTATION
Paracentesis completed by Abdulkadir Cabrera PA-C. 4550 ml clear yellow fluid removed. Pt tolerated well. Band aid applied to site.

## 2025-01-10 NOTE — DISCHARGE INSTRUCTIONS
[facility]  Interventional Radiology  [phone]      Abdominal Paracentesis     WHAT YOU NEED TO KNOW:   Abdominal paracentesis is a procedure to remove abnormal fluid buildup in your abdomen. Fluid builds up because of liver problems, such as swelling and scarring. Heart failure, kidney disease, a mass, or problems with your pancreas may also cause fluid buildup.     DISCHARGE INSTRUCTIONS:     Follow up with your healthcare provider as directed: Write down your questions so you remember to ask them during your visits.     Wound care: Remove dressing after 24 hours. Leave glue in place.    Return to your normal activities    Contact Interventional Radiology at [phone] if:  You have a fever and your wound is red and swollen.   You have yellow, green, or bad-smelling discharge coming from your wound.   You have pain or swelling in your abdomen.   You have an upset stomach or you vomit.   You have sudden, sharp pain in your abdomen.   You urinate very little or not at all.   You feel confused and more tired than usual.   Your arm or leg feels warm, tender, and painful. It may look swollen and red.   You suddenly feel lightheaded and have trouble breathing.

## 2025-01-12 DIAGNOSIS — K92.2 GI BLEED: ICD-10-CM

## 2025-01-14 RX ORDER — PANTOPRAZOLE SODIUM 40 MG/1
TABLET, DELAYED RELEASE ORAL
Qty: 30 TABLET | Refills: 0 | Status: SHIPPED | OUTPATIENT
Start: 2025-01-14

## 2025-01-15 ENCOUNTER — TELEPHONE (OUTPATIENT)
Dept: GASTROENTEROLOGY | Facility: CLINIC | Age: 65
End: 2025-01-15

## 2025-01-17 ENCOUNTER — HOSPITAL ENCOUNTER (OUTPATIENT)
Dept: INTERVENTIONAL RADIOLOGY/VASCULAR | Facility: HOSPITAL | Age: 65
End: 2025-01-17
Attending: INTERNAL MEDICINE
Payer: COMMERCIAL

## 2025-01-17 VITALS
OXYGEN SATURATION: 99 % | DIASTOLIC BLOOD PRESSURE: 63 MMHG | SYSTOLIC BLOOD PRESSURE: 139 MMHG | HEART RATE: 81 BPM | RESPIRATION RATE: 18 BRPM

## 2025-01-17 DIAGNOSIS — K70.31 ALCOHOLIC CIRRHOSIS OF LIVER WITH ASCITES (HCC): ICD-10-CM

## 2025-01-17 PROCEDURE — 49083 ABD PARACENTESIS W/IMAGING: CPT | Performed by: PHYSICIAN ASSISTANT

## 2025-01-17 PROCEDURE — 49083 ABD PARACENTESIS W/IMAGING: CPT

## 2025-01-17 RX ORDER — LIDOCAINE WITH 8.4% SOD BICARB 0.9%(10ML)
SYRINGE (ML) INJECTION AS NEEDED
Status: COMPLETED | OUTPATIENT
Start: 2025-01-17 | End: 2025-01-17

## 2025-01-17 RX ADMIN — Medication 10 ML: at 09:52

## 2025-01-17 NOTE — SEDATION DOCUMENTATION
Paracentesis completed by Abdulkadir Cabrera PA-C. 4300 ml clear yellow fluid removed. Pt tolerated well. Band aid applied to site.

## 2025-01-17 NOTE — DISCHARGE INSTRUCTIONS
[facility]  Interventional Radiology  [phone]    Abdominal Paracentesis     WHAT YOU NEED TO KNOW:   Abdominal paracentesis is a procedure to remove abnormal fluid buildup in your abdomen. Fluid builds up because of liver problems, such as swelling and scarring. Heart failure, kidney disease, a mass, or problems with your pancreas may also cause fluid buildup.     DISCHARGE INSTRUCTIONS:     Follow up with your healthcare provider as directed: Write down your questions so you remember to ask them during your visits.     Wound care: Remove dressing after 24 hours. Leave glue in place.    Return to your normal activities    Contact Interventional Radiology at [phone] if:  You have a fever and your wound is red and swollen.   You have yellow, green, or bad-smelling discharge coming from your wound.   You have pain or swelling in your abdomen.   You have an upset stomach or you vomit.   You have sudden, sharp pain in your abdomen.   You urinate very little or not at all.   You feel confused and more tired than usual.   Your arm or leg feels warm, tender, and painful. It may look swollen and red.   You suddenly feel lightheaded and have trouble breathing.

## 2025-01-17 NOTE — BRIEF OP NOTE (RAD/CATH)
IR PARACENTESIS Procedure Note    PATIENT NAME: Raymundo Weller  : 1960  MRN: [id number]    Pre-op Diagnosis:   1. Alcoholic cirrhosis of liver with ascites (HCC)      Post-op Diagnosis:   1. Alcoholic cirrhosis of liver with ascites (HCC)        Provider:   Delano Cabrera PA-C    Estimated Blood Loss: minimal    Findings: LLQ paracentesis, 4300cc clear yellow    Specimens: none    Complications:  none immediate    Anesthesia: local    Delano Cabrera PA-C     Date: 2025  Time: 2:19 PM

## 2025-01-21 ENCOUNTER — TELEPHONE (OUTPATIENT)
Age: 65
End: 2025-01-21

## 2025-01-21 DIAGNOSIS — Z86.0100 HISTORY OF COLON POLYPS: Primary | ICD-10-CM

## 2025-01-21 NOTE — TELEPHONE ENCOUNTER
Patients GI provider:  Dr. Boyer    Number to return call: [phone]    Reason for call: Pt calling to have the [place] prep sent to his [facility] pharmacy on file. I sent the prep instructions to his my chart for the 2 day prep     Scheduled procedure/appointment date if applicable:/procedure 1/29/25

## 2025-01-21 NOTE — LETTER
Bryanna Feldman,    Attached are your instructions for your upcoming procedure on 1/29/25. If you have any questions, please give us a call at [phone].    Thank you,    [facility]                  Medicine Instructions for Adults with Diabetes who Need a Bowel Prep       Follow these instructions when a BOWEL PREP is required for your procedure or surgery!    NOTE:   GLP-1 Agonists taken weekly: do not take in the 7 days before your procedure   SGLT-2 Inhibitors: do not take in the 4 days before your procedure     On the Day Before Surgery/Procedure  If you are having a procedure (e.g. Colonoscopy) or surgery that requires a bowel prep and you may have at least a clear liquid diet, follow the directions below based on the type of medicine you take for your diabetes.     Type of Medicine You Take Examples What to do   Pre-Mixed Insulin - Intermediate Acting Humalog® 75/25, Humulin® 70/30, Novolog® 70/30, Regular Insulin Take ½ your regular dose the evening before your procedure   Rapid/Fast Acting Insulin Humalog® U200, NovoLog®, Apidra®, Fiasp® Take ½ your regular dose the evening before your procedure.   Long-Acting Insulin Lantus®, Levemir®, Tresiba®, Toujeo®, Basaglar® Take your FULL regular dose the day before procedure   Oral Sulfonylurea Glipizide/Glimepiride/Glucotrol® Take ½ your regular dose the evening before your procedure   Other Oral Diabetes Medicines Metformin®, Glucophage®, Glucophage XR®, Riomet®, Glumetza®), Actose®, Avandia®, Glyset®, Prandin® Take your regular dose with dinner in the evening before your procedure   GLP-1 Agonists AdlyxinÒ, ByettaÒ, BydureonÒ, OzempicÒ, SoliquaÒ, TanzeumÒ, TrulicityÒ, VictozaÒ, Saxenda®, Rybelsus® If taken daily, take as normal    If taken weekly, do not take this medicine for 7 days before your procedure including the day of the procedure (resume taking after the procedure)   SGLT-2 Inhibitors Jardiance®,  Invokana®, Farxiga®,   Steglatro®, Brenzavvy®, Qtern®, Segluromet®, Glyxambi®, Synjardy®, Synjardy XR®, Invokamet®, Invokamet XR®, Trijary XR®, Xigduo XR®, Steglujan® Do not take for 4 days before your procedure including the day of the procedure (resume taking after the procedure)                More information continued on back                    Medicine Instructions for Adults with Diabetes who Need a Bowel Prep  Page 2      On the Day of Surgery/Procedure  Follow the directions below based on the type of medicine you take for your diabetes.     Type of Medicine You Take Examples What to do   Long-Acting Insulin Lantus®, Levemir®, Tresiba®, Toujeo®, Basaglar®, Semglee®   If you usually take your Long-Acting Insulin in the morning, take the full dose as scheduled.   GLP-1 Agonists AdlyxinÒ, ByettaÒ, BydureonÒ, OzempicÒ, SoliquaÒ, TanzeumÒ, TrulicityÒ, VictozaÒ, Saxenda®, Rybelsus® Do NOT take this medicine on the day of your procedure (resume taking after the procedure)       On the Day of Surgery/Procedure (continued)  Except for the morning Long-Acting Insulin, DO NOT take ANY diabetic medicine on the day of your procedure unless you were instructed by the doctor who manages your diabetes medicines.    Continue to check your blood sugars.  If you have an insulin pump, ask your endocrinologist for instructions at least 3 days before your procedure. NOTE: If you are not able to ask your endocrinologist in advance, on the day of the procedure set your insulin pump to your basal rate only. Bring your insulin pump supplies to the hospital.     If you have any questions about taking your diabetes medicines prior to your procedure, please contact the doctor who manages your diabetes medicines.

## 2025-01-24 ENCOUNTER — HOSPITAL ENCOUNTER (OUTPATIENT)
Dept: INTERVENTIONAL RADIOLOGY/VASCULAR | Facility: HOSPITAL | Age: 65
End: 2025-01-24
Attending: INTERNAL MEDICINE
Payer: COMMERCIAL

## 2025-01-24 VITALS
DIASTOLIC BLOOD PRESSURE: 69 MMHG | HEART RATE: 89 BPM | RESPIRATION RATE: 18 BRPM | SYSTOLIC BLOOD PRESSURE: 142 MMHG | OXYGEN SATURATION: 98 %

## 2025-01-24 DIAGNOSIS — K70.31 ALCOHOLIC CIRRHOSIS OF LIVER WITH ASCITES (HCC): ICD-10-CM

## 2025-01-24 PROCEDURE — 49083 ABD PARACENTESIS W/IMAGING: CPT

## 2025-01-24 PROCEDURE — 49083 ABD PARACENTESIS W/IMAGING: CPT | Performed by: PHYSICIAN ASSISTANT

## 2025-01-24 RX ORDER — LIDOCAINE WITH 8.4% SOD BICARB 0.9%(10ML)
SYRINGE (ML) INJECTION AS NEEDED
Status: COMPLETED | OUTPATIENT
Start: 2025-01-24 | End: 2025-01-24

## 2025-01-24 RX ADMIN — Medication 10 ML: at 09:18

## 2025-01-24 NOTE — DISCHARGE INSTRUCTIONS
[facility]  Interventional Radiology  [phone]    Abdominal Paracentesis     WHAT YOU NEED TO KNOW:   Abdominal paracentesis is a procedure to remove abnormal fluid buildup in your abdomen. Fluid builds up because of liver problems, such as swelling and scarring. Heart failure, kidney disease, a mass, or problems with your pancreas may also cause fluid buildup.     DISCHARGE INSTRUCTIONS:     Follow up with your healthcare provider as directed: Write down your questions so you remember to ask them during your visits.     Wound care: Remove dressing after 24 hours. Leave glue in place.    Return to your normal activities    Contact Interventional Radiology at [phone] ([facility] PAT[facility]: Contact Interventional Radiology at [phone]) ([place] PATIENTS: Contact Interventional Radiology at [phone]) if:  You have a fever and your wound is red and swollen.   You have yellow, green, or bad-smelling discharge coming from your wound.   You have pain or swelling in your abdomen.   You have an upset stomach or you vomit.   You have sudden, sharp pain in your abdomen.   You urinate very little or not at all.   You feel confused and more tired than usual.   Your arm or leg feels warm, tender, and painful. It may look swollen and red.   You suddenly feel lightheaded and have trouble breathing.

## 2025-01-24 NOTE — SEDATION DOCUMENTATION
Patient had a paracentesis performed by KARLIE Mills with IR. A total of 4,100 ml of clear yellow fluid was removed, no labs needed. Patient offers no complaints at this time.

## 2025-01-29 ENCOUNTER — ANESTHESIA EVENT (OUTPATIENT)
Dept: GASTROENTEROLOGY | Facility: HOSPITAL | Age: 65
End: 2025-01-29
Payer: COMMERCIAL

## 2025-01-29 ENCOUNTER — ANESTHESIA (OUTPATIENT)
Dept: GASTROENTEROLOGY | Facility: HOSPITAL | Age: 65
End: 2025-01-29
Payer: COMMERCIAL

## 2025-01-29 ENCOUNTER — HOSPITAL ENCOUNTER (OUTPATIENT)
Dept: GASTROENTEROLOGY | Facility: HOSPITAL | Age: 65
Setting detail: OUTPATIENT SURGERY
Discharge: HOME | End: 2025-01-29
Attending: INTERNAL MEDICINE
Payer: COMMERCIAL

## 2025-01-29 VITALS
TEMPERATURE: 99.4 F | SYSTOLIC BLOOD PRESSURE: 113 MMHG | OXYGEN SATURATION: 98 % | DIASTOLIC BLOOD PRESSURE: 56 MMHG | RESPIRATION RATE: 19 BRPM | HEART RATE: 76 BPM

## 2025-01-29 DIAGNOSIS — K63.5 POLYP OF COLON, UNSPECIFIED PART OF COLON, UNSPECIFIED TYPE: ICD-10-CM

## 2025-01-29 PROCEDURE — 45385 COLONOSCOPY W/LESION REMOVAL: CPT | Performed by: STUDENT IN AN ORGANIZED HEALTH CARE EDUCATION/TRAINING PROGRAM

## 2025-01-29 PROCEDURE — 88305 TISSUE EXAM BY PATHOLOGIST: CPT | Performed by: SPECIALIST

## 2025-01-29 PROCEDURE — 45380 COLONOSCOPY AND BIOPSY: CPT | Performed by: STUDENT IN AN ORGANIZED HEALTH CARE EDUCATION/TRAINING PROGRAM

## 2025-01-29 RX ORDER — SODIUM CHLORIDE, SODIUM LACTATE, POTASSIUM CHLORIDE, CALCIUM CHLORIDE 600; 310; 30; 20 MG/100ML; MG/100ML; MG/100ML; MG/100ML
125 INJECTION, SOLUTION INTRAVENOUS CONTINUOUS
Status: DISCONTINUED | OUTPATIENT
Start: 2025-01-29 | End: 2025-02-02 | Stop reason: HOSPADM

## 2025-01-29 RX ORDER — PROPOFOL 10 MG/ML
INJECTION, EMULSION INTRAVENOUS AS NEEDED
Status: DISCONTINUED | OUTPATIENT
Start: 2025-01-29 | End: 2025-01-29

## 2025-01-29 RX ORDER — LIDOCAINE HYDROCHLORIDE 20 MG/ML
INJECTION, SOLUTION EPIDURAL; INFILTRATION; INTRACAUDAL; PERINEURAL AS NEEDED
Status: DISCONTINUED | OUTPATIENT
Start: 2025-01-29 | End: 2025-01-29

## 2025-01-29 RX ORDER — PROPOFOL 10 MG/ML
INJECTION, EMULSION INTRAVENOUS CONTINUOUS PRN
Status: DISCONTINUED | OUTPATIENT
Start: 2025-01-29 | End: 2025-01-29

## 2025-01-29 RX ADMIN — PROPOFOL 50 MG: 10 INJECTION, EMULSION INTRAVENOUS at 07:51

## 2025-01-29 RX ADMIN — SODIUM CHLORIDE, SODIUM LACTATE, POTASSIUM CHLORIDE, AND CALCIUM CHLORIDE 125 ML/HR: .6; .31; .03; .02 INJECTION, SOLUTION INTRAVENOUS at 07:21

## 2025-01-29 RX ADMIN — PROPOFOL 150 MG: 10 INJECTION, EMULSION INTRAVENOUS at 07:49

## 2025-01-29 RX ADMIN — LIDOCAINE HYDROCHLORIDE 100 MG: 20 INJECTION, SOLUTION EPIDURAL; INFILTRATION; INTRACAUDAL; PERINEURAL at 07:49

## 2025-01-29 RX ADMIN — PROPOFOL 100 MCG/KG/MIN: 10 INJECTION, EMULSION INTRAVENOUS at 07:55

## 2025-01-29 NOTE — H&P
"History and Physical -  [facility]  Raymundo Weller 64 y.o. male MRN: [id number]                  HPI: Raymundo Weller is a 64 y.o. year old male who presents for history of colon polyps      REVIEW OF SYSTEMS: Per the HPI, and otherwise unremarkable.    Historical Information   Past Medical History:   Diagnosis Date    AAA (abdominal aortic aneurysm) (HCC)     sees  Vascular    Abnormal kidney function     last assessed: Feb 25, 2016    Allergic rhinitis     Arthritis     Benign colon polyp     Bruises easily     \"not new\"    Cervical radiculopathy     last assessed: Feb 11, 2015    Chronic fatigue     \"not chronic'    Chronic pain disorder     Cirrhosis (HCC)     COPD (chronic obstructive pulmonary disease) (HCC)     COVID 01/05/2024    CPAP (continuous positive airway pressure) dependence     Depression 04/07/2023    per pt in the past    Edema     Elevated liver enzymes     Gout     Herniated cervical disc     \"bulging\"    Hyperlipidemia     Hypertension     Hypotension     last assessed: March 22, 2017    Left ventricular hypertrophy     Low platelet count (HCC)     \"usually runs low\"    Lumbar herniated disc     Obesity (BMI 30-39.9) 09/20/2018    Sleep apnea     Tongue lesion      Past Surgical History:   Procedure Laterality Date    BUCCAL MASS EXCISION Right 8/20/2024    Procedure: EXCISION BX LESION, TONGUE; FROZEN SECTION; flex hd grafting;  Surgeon: Sylvester Luis DO;  Location: [facility];  Service: ENT    CARPAL TUNNEL RELEASE Bilateral     COLONOSCOPY      HEMORRHOID SURGERY      IR PARACENTESIS  11/14/2024    IR PARACENTESIS  11/29/2024    IR PARACENTESIS  12/12/2024    IR PARACENTESIS  12/19/2024    IR PARACENTESIS  12/27/2024    IR PARACENTESIS  1/3/2025    IR PARACENTESIS  1/10/2025    IR PARACENTESIS  1/17/2025    IR PARACENTESIS  1/24/2025    LIVER BIOPSY       Social History   Social History     Substance and Sexual Activity   Alcohol Use Yes    Alcohol/week: 6.0 " "standard drinks of alcohol    Types: 6 Cans of beer per week    Comment: last drink 10/12/2024     Social History     Substance and Sexual Activity   Drug Use No     Social History     Tobacco Use   Smoking Status Every Day    Current packs/day: 0.50    Average packs/day: 0.5 packs/day for 41.1 years (20.5 ttl pk-yrs)    Types: Cigarettes    Start date: 1/2/1988    Passive exposure: Current   Smokeless Tobacco Never   Tobacco Comments    Given [employer] \"how to quit\" info --has cut back.. last smoked 8/18     Family History   Problem Relation Age of Onset    Cancer Mother     No Known Problems Father     No Known Problems Sister     No Known Problems Brother     No Known Problems Maternal Grandmother     No Known Problems Maternal Grandfather     No Known Problems Paternal Grandmother     No Known Problems Paternal Grandfather        Meds/Allergies       Current Outpatient Medications:     Baclofen 5 MG TABS    carvedilol (COREG) 3.125 mg tablet    furosemide (LASIX) 20 mg tablet    multivitamin (THERAGRAN) TABS    pantoprazole (PROTONIX) 40 mg tablet    spironolactone (ALDACTONE) 50 mg tablet    polyethylene glycol (GOLYTELY) 4000 mL solution    Current Facility-Administered Medications:     lactated ringers infusion, 125 mL/hr, Intravenous, Continuous, 125 mL/hr at 01/29/25 0721    No Known Allergies    Objective     /56   Pulse 91   Temp 97.8 °F (36.6 °C) (Temporal)   Resp 18   SpO2 100%       PHYSICAL EXAM    Gen: NAD  Head: NCAT  CV: RRR  CHEST: Clear  ABD: soft, NT/ND  EXT: no edema      ASSESSMENT/PLAN:  This is a 64 y.o. year old male here for colonoscopy, and he is stable and optimized for his procedure.        "

## 2025-01-29 NOTE — ANESTHESIA PREPROCEDURE EVALUATION
"Procedure:  COLONOSCOPY    Relevant Problems   ANESTHESIA (within normal limits)      CARDIO   (+) Abdominal aortic aneurysm (AAA) without rupture (HCC)   (+) Benign essential hypertension   (+) Essential hypertension   (+) Hyperlipidemia   (+) Mild mitral regurgitation      ENDO (within normal limits)      GI/HEPATIC  NPO confirmed  BMI 42.6   (+) Alcoholic cirrhosis of liver (HCC)   (+) GI bleed   (+) Hepatomegaly      /RENAL   (+) CKD (chronic kidney disease) stage 2, GFR 60-89 ml/min   (+) Renal cyst, right      HEMATOLOGY   (+) Thrombocytopenia (HCC)      MUSCULOSKELETAL   (+) Gout      NEURO/PSYCH   (+) Depression   (+) Pain syndrome, chronic      PULMONARY   (+) COPD (chronic obstructive pulmonary disease) (HCC)   (+) Obstructive sleep apnea (+CPAP QHS)   (-) URI (upper respiratory infection)      Other   (+) Splenomegaly     No Known Allergies  Social History     Tobacco Use    Smoking status: Every Day     Current packs/day: 0.50     Average packs/day: 0.5 packs/day for 41.1 years (20.5 ttl pk-yrs)     Types: Cigarettes     Start date: 1/2/1988     Passive exposure: Current    Smokeless tobacco: Never    Tobacco comments:     Given [employer] assistant \"how to quit\" info --has cut back.. last smoked 8/18   Vaping Use    Vaping status: Never Used   Substance Use Topics    Alcohol use: Yes     Alcohol/week: 6.0 standard drinks of alcohol     Types: 6 Cans of beer per week     Comment: last drink 10/12/2024    Drug use: No     Current Outpatient Medications   Medication Instructions    Baclofen 5 mg, Oral, 3 times daily    carvedilol (COREG) 3.125 mg, Oral, 2 times daily with meals    furosemide (LASIX) 20 mg, Oral, Daily    multivitamin (THERAGRAN) TABS 1 tablet, Oral, Daily    pantoprazole (PROTONIX) 40 mg tablet TAKE 1 TABLET BY MOUTH ONCE DAILY IN  THE  EARLY  MORNING    polyethylene glycol (GOLYTELY) 4000 mL solution 4,000 mL, Oral, Once, As directed for prep    spironolactone (ALDACTONE) 25 mg, Oral, Daily     Lab " Results   Component Value Date    WBC 6.45 12/12/2024    HGB 8.6 (L) 12/12/2024    HCT 27.8 (L) 12/12/2024     12/12/2024    SODIUM 138 12/12/2024    K 5.0 12/12/2024     12/12/2024    CO2 25 12/12/2024    BUN 11 12/12/2024    CREATININE 0.77 12/12/2024    GLUC 127 11/20/2024    HGBA1C 5.5 05/18/2024    AST 39 12/12/2024    ALT 14 12/12/2024     (H) 11/11/2023    ALKPHOS 96 12/12/2024    TBILI 1.23 (H) 12/12/2024    ALB 3.2 (L) 12/12/2024    PROTIME 15.4 (H) 12/12/2024    PTT 32 11/11/2024    INR 1.20 (H) 12/12/2024     Vitals:    01/29/25 0717   BP: 119/56   Pulse: 91   Resp: 18   Temp: 97.8 °F (36.6 °C)   SpO2: 100%     MELD 3.0: 9 at 12/12/2024  9:10 AM  MELD-Na: 9 at 12/12/2024  9:10 AM  Calculated from:  Serum Creatinine: 0.77 mg/dL (Using min of 1 mg/dL) at 12/12/2024  9:10 AM  Serum Sodium: 138 mmol/L (Using max of 137 mmol/L) at 12/12/2024  9:10 AM  Total Bilirubin: 1.23 mg/dL at 12/12/2024  9:10 AM  Serum Albumin: 3.2 g/dL at 12/12/2024  9:10 AM  INR(ratio): 1.2 at 12/12/2024  9:10 AM  Age at listing (hypothetical): 64 years  Sex: Male at 12/12/2024  9:10 AM    EKG 11/12/24  Sinus tachycardia  Otherwise normal ECG  When compared with ECG of 11-Nov-2024 23:07, (unconfirmed)  No significant change was found  Confirmed by Rasta Spain ([id number]) on 11/12/2024 9:13:41 AM    Echo 4/4/23    Left Ventricle: Left ventricular cavity size is normal. Wall thickness is mildly increased. There is mild concentric hypertrophy. The left ventricular ejection fraction is 60%. Systolic function is normal. Wall motion is normal. Diastolic function is normal.    Left Atrium: The atrium is mildly dilated (35-41 mL/m2).    Mitral Valve: There is mild annular calcification.     Physical Exam    Airway    Mallampati score: III  TM Distance: >3 FB  Neck ROM: full     Dental       Cardiovascular  Rhythm: regular, Rate: abnormal    Pulmonary  Pulmonary exam normal     Other Findings        Anesthesia Plan  ASA  Score- 3     Anesthesia Type- IV sedation with anesthesia with ASA Monitors.         Additional Monitors:     Airway Plan:     Comment: O2 mask, natural airway, EtCO2 monitor. Risks discussed including awareness, aspiration, drug reactions and conversion to GA..       Plan Factors-Exercise tolerance (METS): >4 METS.    Chart reviewed. EKG reviewed.  Existing labs reviewed. Patient summary reviewed.          Obstructive sleep apnea risk education given perioperatively.        Induction- intravenous and rapid sequence induction.    Postoperative Plan- . Planned trial extubation    Perioperative Resuscitation Plan - Level 1 - Full Code.       Informed Consent- Anesthetic plan and risks discussed with patient.  I personally reviewed this patient with the CRNA. Discussed and agreed on the Anesthesia Plan with the CRNA..

## 2025-01-29 NOTE — ANESTHESIA POSTPROCEDURE EVALUATION
Post-Op Assessment Note    CV Status:  Stable  Pain Score: 0    Pain management: adequate       Mental Status:  Sleepy   Hydration Status:  Euvolemic   PONV Controlled:  Controlled   Airway Patency:  Patent     Post Op Vitals Reviewed: Yes    No anethesia notable event occurred.    Staff: CRNA       Last Filed PACU Vitals:  Vitals Value Taken Time   Temp     Pulse     BP     Resp     SpO2

## 2025-01-29 NOTE — ANESTHESIA POSTPROCEDURE EVALUATION
Post-Op Assessment Note    Last Filed PACU Vitals:  Vitals Value Taken Time   Temp     Pulse 74    BP 89/52    Resp 12    SpO2 99

## 2025-01-30 ENCOUNTER — RESULTS FOLLOW-UP (OUTPATIENT)
Dept: GASTROENTEROLOGY | Facility: CLINIC | Age: 65
End: 2025-01-30

## 2025-01-30 ENCOUNTER — APPOINTMENT (OUTPATIENT)
Dept: LAB | Facility: CLINIC | Age: 65
End: 2025-01-30
Payer: COMMERCIAL

## 2025-01-30 DIAGNOSIS — R79.89 ELEVATED SERUM CREATININE: ICD-10-CM

## 2025-01-30 DIAGNOSIS — K70.31 ALCOHOLIC CIRRHOSIS OF LIVER WITH ASCITES (HCC): ICD-10-CM

## 2025-01-30 LAB
ANION GAP SERPL CALCULATED.3IONS-SCNC: 8 MMOL/L (ref 4–13)
BUN SERPL-MCNC: 11 MG/DL (ref 5–25)
CALCIUM SERPL-MCNC: 8.7 MG/DL (ref 8.4–10.2)
CHLORIDE SERPL-SCNC: 101 MMOL/L (ref 96–108)
CO2 SERPL-SCNC: 25 MMOL/L (ref 21–32)
CREAT SERPL-MCNC: 0.7 MG/DL (ref 0.6–1.3)
GFR SERPL CREATININE-BSD FRML MDRD: 98 ML/MIN/1.73SQ M
GLUCOSE P FAST SERPL-MCNC: 137 MG/DL (ref 65–99)
POTASSIUM SERPL-SCNC: 4.2 MMOL/L (ref 3.5–5.3)
SODIUM SERPL-SCNC: 134 MMOL/L (ref 135–147)

## 2025-01-30 PROCEDURE — 80048 BASIC METABOLIC PNL TOTAL CA: CPT

## 2025-01-30 PROCEDURE — 36415 COLL VENOUS BLD VENIPUNCTURE: CPT

## 2025-01-30 PROCEDURE — 88305 TISSUE EXAM BY PATHOLOGIST: CPT | Performed by: SPECIALIST

## 2025-01-31 ENCOUNTER — HOSPITAL ENCOUNTER (OUTPATIENT)
Dept: INTERVENTIONAL RADIOLOGY/VASCULAR | Facility: HOSPITAL | Age: 65
End: 2025-01-31
Attending: INTERNAL MEDICINE
Payer: COMMERCIAL

## 2025-01-31 VITALS
SYSTOLIC BLOOD PRESSURE: 145 MMHG | HEART RATE: 82 BPM | OXYGEN SATURATION: 98 % | RESPIRATION RATE: 18 BRPM | DIASTOLIC BLOOD PRESSURE: 69 MMHG

## 2025-01-31 PROCEDURE — 49083 ABD PARACENTESIS W/IMAGING: CPT

## 2025-01-31 RX ORDER — LIDOCAINE WITH 8.4% SOD BICARB 0.9%(10ML)
SYRINGE (ML) INJECTION AS NEEDED
Status: COMPLETED | OUTPATIENT
Start: 2025-01-31 | End: 2025-01-31

## 2025-01-31 RX ADMIN — Medication 10 ML: at 10:26

## 2025-01-31 NOTE — SEDATION DOCUMENTATION
Paracentesis completed by Abdulkadir Cabrera PA-C. 4150 ml clear yellow fluid removed. Pt tolerated well. Band aid applied to site.

## 2025-01-31 NOTE — BRIEF OP NOTE (RAD/CATH)
IR PARACENTESIS Procedure Note    PATIENT NAME: Raymundo Weller  : 1960  MRN: [id number]    Pre-op Diagnosis: No diagnosis found.  Post-op Diagnosis: No diagnosis found.    Surgeon:   JACQUELINE Castro  Assistants:     No qualified resident was available, Resident is only observing    Estimated Blood Loss: minimal  Findings: 4150 ml clear yellow ascites fluid, LLQ.    Specimens: none    Complications:  none immediate    Anesthesia: local    JACQUELINE Castro     Date: 2025  Time: 11:08 AM

## 2025-02-05 ENCOUNTER — OFFICE VISIT (OUTPATIENT)
Dept: INTERNAL MEDICINE CLINIC | Facility: CLINIC | Age: 65
End: 2025-02-05
Payer: COMMERCIAL

## 2025-02-05 VITALS
SYSTOLIC BLOOD PRESSURE: 138 MMHG | OXYGEN SATURATION: 99 % | HEIGHT: 70 IN | BODY MASS INDEX: 43.32 KG/M2 | WEIGHT: 302.6 LBS | DIASTOLIC BLOOD PRESSURE: 64 MMHG | HEART RATE: 88 BPM | TEMPERATURE: 98.6 F

## 2025-02-05 DIAGNOSIS — E66.01 MORBID OBESITY WITH BMI OF 40.0-44.9, ADULT (HCC): ICD-10-CM

## 2025-02-05 DIAGNOSIS — E87.1 HYPONATREMIA: ICD-10-CM

## 2025-02-05 DIAGNOSIS — R18.8 OTHER ASCITES: ICD-10-CM

## 2025-02-05 DIAGNOSIS — M1A.9XX0 CHRONIC GOUT WITHOUT TOPHUS, UNSPECIFIED CAUSE, UNSPECIFIED SITE: ICD-10-CM

## 2025-02-05 DIAGNOSIS — J42 CHRONIC BRONCHITIS, UNSPECIFIED CHRONIC BRONCHITIS TYPE (HCC): ICD-10-CM

## 2025-02-05 DIAGNOSIS — Z12.5 SCREENING FOR PROSTATE CANCER: ICD-10-CM

## 2025-02-05 DIAGNOSIS — F17.210 SMOKING GREATER THAN 20 PACK YEARS: ICD-10-CM

## 2025-02-05 DIAGNOSIS — N18.2 CKD (CHRONIC KIDNEY DISEASE) STAGE 2, GFR 60-89 ML/MIN: ICD-10-CM

## 2025-02-05 DIAGNOSIS — I10 BENIGN ESSENTIAL HYPERTENSION: Primary | ICD-10-CM

## 2025-02-05 DIAGNOSIS — Z72.0 TOBACCO USE: ICD-10-CM

## 2025-02-05 DIAGNOSIS — Z23 ENCOUNTER FOR IMMUNIZATION: ICD-10-CM

## 2025-02-05 DIAGNOSIS — R80.9 MICROALBUMINURIA: ICD-10-CM

## 2025-02-05 DIAGNOSIS — C02.9 SQUAMOUS CELL CARCINOMA OF TONGUE (HCC): ICD-10-CM

## 2025-02-05 DIAGNOSIS — E83.19 IRON OVERLOAD: ICD-10-CM

## 2025-02-05 DIAGNOSIS — G47.33 OBSTRUCTIVE SLEEP APNEA: ICD-10-CM

## 2025-02-05 DIAGNOSIS — K70.31 ALCOHOLIC CIRRHOSIS OF LIVER WITH ASCITES (HCC): ICD-10-CM

## 2025-02-05 DIAGNOSIS — D69.6 THROMBOCYTOPENIA (HCC): ICD-10-CM

## 2025-02-05 DIAGNOSIS — E78.1 PURE HYPERTRIGLYCERIDEMIA: ICD-10-CM

## 2025-02-05 DIAGNOSIS — F32.A DEPRESSION, UNSPECIFIED DEPRESSION TYPE: ICD-10-CM

## 2025-02-05 PROBLEM — R14.0 BLOATING: Status: RESOLVED | Noted: 2023-11-26 | Resolved: 2025-02-05

## 2025-02-05 PROBLEM — K92.2 GI BLEED: Status: RESOLVED | Noted: 2024-11-12 | Resolved: 2025-02-05

## 2025-02-05 PROBLEM — D62 ACUTE BLOOD LOSS ANEMIA: Status: RESOLVED | Noted: 2024-11-12 | Resolved: 2025-02-05

## 2025-02-05 PROBLEM — R65.10 SIRS (SYSTEMIC INFLAMMATORY RESPONSE SYNDROME) (HCC): Status: RESOLVED | Noted: 2024-11-13 | Resolved: 2025-02-05

## 2025-02-05 PROCEDURE — 99214 OFFICE O/P EST MOD 30 MIN: CPT | Performed by: INTERNAL MEDICINE

## 2025-02-05 RX ORDER — BACLOFEN 5 MG/1
5 TABLET ORAL 3 TIMES DAILY
Qty: 100 TABLET | Refills: 1 | Status: SHIPPED | OUTPATIENT
Start: 2025-02-05

## 2025-02-05 NOTE — ASSESSMENT & PLAN NOTE
Orders:    LDL cholesterol, direct; Future    Triglycerides; Future     Central called for confirmation artic sun machine has been transferred to ICU with patient.

## 2025-02-05 NOTE — PROGRESS NOTES
Name: Raymundo Weller      : 1960      MRN: [id number]  Encounter Provider: Lisandro Gauthier DO  Encounter Date: 2025   Encounter department: [facility]  :  Assessment & Plan  Smoking greater than 20 pack years         Encounter for immunization         Benign essential hypertension    Orders:    Albumin / creatinine urine ratio; Future    Chronic bronchitis, unspecified chronic bronchitis type (HCC)         Obstructive sleep apnea         Alcoholic cirrhosis of liver with ascites (HCC)    Orders:    Baclofen 5 MG TABS; Take 5 mg by mouth 3 (three) times a day    Squamous cell carcinoma of tongue (HCC)         CKD (chronic kidney disease) stage 2, GFR 60-89 ml/min  Lab Results   Component Value Date    EGFR 98 2025    EGFR 94 2024    EGFR 107 2024    CREATININE 0.70 2025    CREATININE 0.77 2024    CREATININE 0.56 (L) 2024       Orders:    Albumin / creatinine urine ratio; Future    CBC and differential; Future    Iron Panel (Includes Ferritin, Iron Sat%, Iron, and TIBC); Future    LDL cholesterol, direct; Future    Triglycerides; Future    Thrombocytopenia (HCC)         Depression, unspecified depression type           Tobacco use         Chronic gout without tophus, unspecified cause, unspecified site         Pure hypertriglyceridemia    Orders:    LDL cholesterol, direct; Future    Triglycerides; Future    Hyponatremia         Microalbuminuria         Other ascites         Morbid obesity with BMI of 40.0-44.9, adult (HCC)           Screening for prostate cancer    Orders:    PSA, Total Screen; Future    Iron overload    Orders:    Iron Panel (Includes Ferritin, Iron Sat%, Iron, and TIBC); Future    A/P: Doing ok and appreciate specialist's input. Will check labs. Discussed vaccines and reports having his second HZV done. Discussed lung ca screening and defers. Continue current treatment and keep f/u with the specialists. Will see if pt qualifies  "for a permeant access for paracentesis. Wean tobacco and continue to abstain from ETOH. RTC four months for routine.        History of Present Illness   WM RTC for f/u HTN, cirrhosis, etc. Doing ok and no new issues. Continues to work with GI and getting frequent paracentesis. Still off ETOH and less  smoking, but has cut way back. Chronic pain is manageable. SCCA of the tongue is in remission. COPD is controlled and limited rescue MDI use. Due for labs, vaccines, and lung ca screening.       Review of Systems   Constitutional:  Negative for activity change, chills, diaphoresis, fatigue and fever.   HENT: Negative.     Eyes:  Negative for visual disturbance.   Respiratory:  Negative for cough, chest tightness, shortness of breath and wheezing.    Cardiovascular:  Negative for chest pain, palpitations and leg swelling.   Gastrointestinal:  Positive for abdominal distention. Negative for abdominal pain, constipation, diarrhea, nausea and vomiting.   Endocrine: Negative for cold intolerance and heat intolerance.   Genitourinary:  Negative for difficulty urinating, dysuria and frequency.   Musculoskeletal:  Negative for arthralgias, gait problem and myalgias.   Neurological:  Negative for dizziness, seizures, syncope, weakness, light-headedness and headaches.   Psychiatric/Behavioral:  Negative for confusion. The patient is not nervous/anxious.        Objective   /64   Pulse 88   Temp 98.6 °F (37 °C)   Ht 5' 10\" (1.778 m)   Wt (!) 137 kg (302 lb 9.6 oz)   SpO2 99%   BMI 43.42 kg/m²      Physical Exam  Vitals and nursing note reviewed.   Constitutional:       General: He is not in acute distress.     Appearance: Normal appearance. He is not ill-appearing.   HENT:      Head: Normocephalic and atraumatic.      Mouth/Throat:      Mouth: Mucous membranes are moist.   Eyes:      Extraocular Movements: Extraocular movements intact.      Conjunctiva/sclera: Conjunctivae normal.      Pupils: Pupils are equal, round, " and reactive to light.   Neck:      Vascular: No carotid bruit.   Cardiovascular:      Rate and Rhythm: Normal rate and regular rhythm.      Heart sounds: Normal heart sounds. No murmur heard.  Pulmonary:      Effort: Pulmonary effort is normal. No respiratory distress.      Breath sounds: Normal breath sounds. No wheezing, rhonchi or rales.   Abdominal:      General: Bowel sounds are normal. There is no distension.      Palpations: Abdomen is soft.      Tenderness: There is no abdominal tenderness.   Musculoskeletal:      Cervical back: Neck supple.      Right lower leg: Edema present.      Left lower leg: Edema present.   Neurological:      General: No focal deficit present.      Mental Status: He is alert and oriented to person, place, and time. Mental status is at baseline.   Psychiatric:         Mood and Affect: Mood normal.         Behavior: Behavior normal.         Thought Content: Thought content normal.         Judgment: Judgment normal.

## 2025-02-05 NOTE — PATIENT INSTRUCTIONS
"Patient Education     High blood pressure in adults   The Basics   Written by the doctors and editors at [facility]   What is high blood pressure? -- High blood pressure is a condition that puts you at risk for heart attack, stroke, and kidney disease. It does not usually cause symptoms. But it can be serious.  When your doctor or nurse tells you your blood pressure, they say 2 numbers. For instance, your doctor or nurse might say that your blood pressure is \"130 over 80.\" The top number is the pressure inside your arteries when your heart is evert. The bottom number is the pressure inside your arteries when your heart is relaxed.  \"Elevated blood pressure\" is a term doctors or nurses use as a warning. People with elevated blood pressure do not yet have high blood pressure. But their blood pressure is not as low as it should be for good health.  Many experts define high, elevated, and normal blood pressure as follows:   High - Top number of 130 or above and/or bottom number of 80 or above.   Elevated - Top number between 120 and 129 and bottom number of 79 or below.   Normal - Top number of 119 or below and bottom number of 79 or below.  This information is also in the table (table 1).  How can I lower my blood pressure? -- If your doctor or nurse prescribed blood pressure medicine, the most important thing you can do is to take it. If it causes side effects, do not just stop taking it. Instead, talk to your doctor or nurse about the problems it causes. They might be able to lower your dose or switch you to another medicine. If cost is a problem, mention that, too. They might be able to put you on a less expensive medicine. Taking your blood pressure medicine can keep you from having a heart attack or stroke, and it can save your life!  Can I do anything on my own? -- You have a lot of control over your blood pressure. To lower it:   Lose weight (if you are overweight).   Choose a diet low in fat and rich in " "fruits, vegetables, and low-fat dairy products.   Eat less salt.   Do something active for at least 30 minutes a day on most days of the week.   Drink less alcohol (if you drink more than 2 alcoholic drinks per day).  It's also a good idea to get a home blood pressure meter. People who check their own blood pressure at home do better at keeping it low and can sometimes even reduce the amount of medicine they take.  All topics are updated as new evidence becomes available and our peer review process is complete.  This topic retrieved from [employer] on: Feb 26, 2024.  Topic 90550 Version 23.0  Release: 32.2.4 - C32.56  © 2024 UpToDate, Inc. and/or its affiliates. All rights reserved.  table 1: Definition of normal and high blood pressure  Level  Top number  Bottom number    High 130 or above 80 or above   Elevated 120 to 129 79 or below   Normal 119 or below 79 or below   These definitions are from the American College of Cardiology/American Heart Association. Other expert groups might use slightly different definitions.  \"Elevated blood pressure\" is a term doctor or nurses use as a warning. It means you do not yet have high blood pressure, but your blood pressure is not as low as it should be for good health.  Graphic 03625 Version 6.0  Consumer Information Use and Disclaimer   Disclaimer: This generalized information is a limited summary of diagnosis, treatment, and/or medication information. It is not meant to be comprehensive and should be used as a tool to help the user understand and/or assess potential diagnostic and treatment options. It does NOT include all information about conditions, treatments, medications, side effects, or risks that may apply to a specific patient. It is not intended to be medical advice or a substitute for the medical advice, diagnosis, or treatment of a health care provider based on the health care provider's examination and assessment of a patient's specific and unique circumstances. " Patients must speak with a health care provider for complete information about their health, medical questions, and treatment options, including any risks or benefits regarding use of medications. This information does not endorse any treatments or medications as safe, effective, or approved for treating a specific patient. UpToDate, Inc. and its affiliates disclaim any warranty or liability relating to this information or the use thereof.The use of this information is governed by the Terms of Use, available at https://www.[employer].com/en/know/clinical-effectiveness-terms. 2024© UpToDate, Inc. and its affiliates and/or licensors. All rights reserved.  Copyright   © 2024 UpToDate, Inc. and/or its affiliates. All rights reserved.

## 2025-02-05 NOTE — ASSESSMENT & PLAN NOTE
Lab Results   Component Value Date    EGFR 98 01/30/2025    EGFR 94 12/12/2024    EGFR 107 11/20/2024    CREATININE 0.70 01/30/2025    CREATININE 0.77 12/12/2024    CREATININE 0.56 (L) 11/20/2024       Orders:    Albumin / creatinine urine ratio; Future    CBC and differential; Future    Iron Panel (Includes Ferritin, Iron Sat%, Iron, and TIBC); Future    LDL cholesterol, direct; Future    Triglycerides; Future

## 2025-02-07 ENCOUNTER — HOSPITAL ENCOUNTER (OUTPATIENT)
Dept: INTERVENTIONAL RADIOLOGY/VASCULAR | Facility: HOSPITAL | Age: 65
End: 2025-02-07
Payer: COMMERCIAL

## 2025-02-07 VITALS
RESPIRATION RATE: 18 BRPM | HEART RATE: 79 BPM | OXYGEN SATURATION: 100 % | DIASTOLIC BLOOD PRESSURE: 72 MMHG | SYSTOLIC BLOOD PRESSURE: 154 MMHG

## 2025-02-07 DIAGNOSIS — K70.31 ALCOHOLIC CIRRHOSIS OF LIVER WITH ASCITES (HCC): ICD-10-CM

## 2025-02-07 PROCEDURE — 49083 ABD PARACENTESIS W/IMAGING: CPT | Performed by: PHYSICIAN ASSISTANT

## 2025-02-07 PROCEDURE — 49083 ABD PARACENTESIS W/IMAGING: CPT

## 2025-02-07 RX ORDER — LIDOCAINE WITH 8.4% SOD BICARB 0.9%(10ML)
SYRINGE (ML) INJECTION AS NEEDED
Status: COMPLETED | OUTPATIENT
Start: 2025-02-07 | End: 2025-02-07

## 2025-02-07 RX ADMIN — Medication 10 ML: at 09:07

## 2025-02-07 NOTE — BRIEF OP NOTE (RAD/CATH)
IR PARACENTESIS Procedure Note    PATIENT NAME: Raymundo Weller  : 1960  MRN: [id number]    Pre-op Diagnosis:   1. Alcoholic cirrhosis of liver with ascites (HCC)      Post-op Diagnosis:   1. Alcoholic cirrhosis of liver with ascites (HCC)        Provider:   Delano Cabrera PA-C    Estimated Blood Loss: none    Findings: LLQ paracentesis, 4800cc clear yellow    Specimens: none    Complications:  none immediate    Anesthesia: local    Delano Cabrera PA-C     Date: 2025  Time: 10:46 AM

## 2025-02-07 NOTE — SEDATION DOCUMENTATION
Paracentesis completed by Abdulkadir Cabrera PA-C. 4800 ml clear yellow fluid removed. Pt tolerated well. Band aid applied to site.

## 2025-02-10 DIAGNOSIS — K92.2 GI BLEED: ICD-10-CM

## 2025-02-11 ENCOUNTER — HOSPITAL ENCOUNTER (OUTPATIENT)
Dept: INTERVENTIONAL RADIOLOGY/VASCULAR | Facility: HOSPITAL | Age: 65
Discharge: HOME | End: 2025-02-11
Attending: INTERNAL MEDICINE
Payer: COMMERCIAL

## 2025-02-11 VITALS
SYSTOLIC BLOOD PRESSURE: 136 MMHG | OXYGEN SATURATION: 98 % | DIASTOLIC BLOOD PRESSURE: 69 MMHG | RESPIRATION RATE: 18 BRPM | HEART RATE: 78 BPM

## 2025-02-11 DIAGNOSIS — K70.31 ALCOHOLIC CIRRHOSIS OF LIVER WITH ASCITES (HCC): ICD-10-CM

## 2025-02-11 PROCEDURE — 49083 ABD PARACENTESIS W/IMAGING: CPT | Performed by: RADIOLOGY

## 2025-02-11 PROCEDURE — 49083 ABD PARACENTESIS W/IMAGING: CPT

## 2025-02-11 RX ORDER — PANTOPRAZOLE SODIUM 40 MG/1
40 TABLET, DELAYED RELEASE ORAL EVERY MORNING
Qty: 30 TABLET | Refills: 5 | Status: SHIPPED | OUTPATIENT
Start: 2025-02-11

## 2025-02-11 RX ORDER — LIDOCAINE WITH 8.4% SOD BICARB 0.9%(10ML)
SYRINGE (ML) INJECTION AS NEEDED
Status: COMPLETED | OUTPATIENT
Start: 2025-02-11 | End: 2025-02-11

## 2025-02-11 RX ADMIN — Medication 10 ML: at 11:16

## 2025-02-11 NOTE — SEDATION DOCUMENTATION
Left sided paracentesis complete. 3750ml clear yellow fluid removed. Patient offering no complaints. Bandage applied.

## 2025-02-11 NOTE — DISCHARGE INSTRUCTIONS
[facility]  Interventional Radiology  [phone]    Abdominal Paracentesis     WHAT YOU NEED TO KNOW:   Abdominal paracentesis is a procedure to remove abnormal fluid buildup in your abdomen. Fluid builds up because of liver problems, such as swelling and scarring. Heart failure, kidney disease, a mass, or problems with your pancreas may also cause fluid buildup.     DISCHARGE INSTRUCTIONS:     Follow up with your healthcare provider as directed: Write down your questions so you remember to ask them during your visits.     Wound care: Remove dressing after 24 hours. Leave glue in place.    Return to your normal activities    Contact Interventional Radiology at [phone] ([facility] PATIENTS: Contact Interventional Radiology at [phone]) ([facility] PATIENTS: Contact Interventional Radiology at [phone]) if:  You have a fever and your wound is red and swollen.   You have yellow, green, or bad-smelling discharge coming from your wound.   You have pain or swelling in your abdomen.   You have an upset stomach or you vomit.   You have sudden, sharp pain in your abdomen.   You urinate very little or not at all.   You feel confused and more tired than usual.   Your arm or leg feels warm, tender, and painful. It may look swollen and red.   You suddenly feel lightheaded and have trouble breathing.

## 2025-02-12 ENCOUNTER — HOSPITAL ENCOUNTER (OUTPATIENT)
Dept: MRI IMAGING | Facility: HOSPITAL | Age: 65
Discharge: HOME | End: 2025-02-12
Payer: COMMERCIAL

## 2025-02-12 DIAGNOSIS — K70.31 ALCOHOLIC CIRRHOSIS OF LIVER WITH ASCITES (HCC): ICD-10-CM

## 2025-02-12 PROCEDURE — A9585 GADOBUTROL INJECTION: HCPCS | Performed by: FAMILY MEDICINE

## 2025-02-12 PROCEDURE — 74183 MRI ABD W/O CNTR FLWD CNTR: CPT

## 2025-02-12 RX ORDER — GADOBUTROL 604.72 MG/ML
13 INJECTION INTRAVENOUS
Status: COMPLETED | OUTPATIENT
Start: 2025-02-12 | End: 2025-02-12

## 2025-02-12 RX ADMIN — GADOBUTROL 13 ML: 604.72 INJECTION INTRAVENOUS at 11:04

## 2025-02-20 ENCOUNTER — RESULTS FOLLOW-UP (OUTPATIENT)
Age: 65
End: 2025-02-20

## 2025-02-20 DIAGNOSIS — K70.31 ALCOHOLIC CIRRHOSIS OF LIVER WITH ASCITES (HCC): Primary | ICD-10-CM

## 2025-02-21 ENCOUNTER — HOSPITAL ENCOUNTER (OUTPATIENT)
Dept: INTERVENTIONAL RADIOLOGY/VASCULAR | Facility: HOSPITAL | Age: 65
End: 2025-02-21
Payer: COMMERCIAL

## 2025-02-21 VITALS
SYSTOLIC BLOOD PRESSURE: 133 MMHG | HEART RATE: 81 BPM | OXYGEN SATURATION: 99 % | RESPIRATION RATE: 18 BRPM | DIASTOLIC BLOOD PRESSURE: 65 MMHG

## 2025-02-21 DIAGNOSIS — K70.31 ALCOHOLIC CIRRHOSIS OF LIVER WITH ASCITES (HCC): ICD-10-CM

## 2025-02-21 PROCEDURE — 49083 ABD PARACENTESIS W/IMAGING: CPT | Performed by: PHYSICIAN ASSISTANT

## 2025-02-21 PROCEDURE — 49083 ABD PARACENTESIS W/IMAGING: CPT

## 2025-02-21 RX ORDER — LIDOCAINE WITH 8.4% SOD BICARB 0.9%(10ML)
SYRINGE (ML) INJECTION AS NEEDED
Status: COMPLETED | OUTPATIENT
Start: 2025-02-21 | End: 2025-02-21

## 2025-02-21 RX ADMIN — Medication 10 ML: at 08:48

## 2025-02-21 NOTE — BRIEF OP NOTE (RAD/CATH)
IR PARACENTESIS Procedure Note    PATIENT NAME: Raymundo Weller  : 1960  MRN: [id number]    Pre-op Diagnosis:   1. Alcoholic cirrhosis of liver with ascites (HCC)      Post-op Diagnosis:   1. Alcoholic cirrhosis of liver with ascites (HCC)        Provider:   Delano Cabrera PA-C    Estimated Blood Loss: none    Findings: LLQ paracentesis, 4450cc clear yellow    Specimens: none    Complications:  none immediate    Anesthesia: local    Delano Cabrera PA-C     Date: 2025  Time: 1:09 PM

## 2025-02-21 NOTE — SEDATION DOCUMENTATION
Left sided paracentesis completed by Abdulkadir Cabrera PA-C, 4450ml clear yellow fluid removed. Bandaid applied

## 2025-02-26 ENCOUNTER — TELEPHONE (OUTPATIENT)
Age: 65
End: 2025-02-26

## 2025-02-26 NOTE — TELEPHONE ENCOUNTER
Patient had an office visit with Dr. Alvarez on 11/21/24 and was told he would be able to get a new CPAP machine   Patient spoke with [facility] they do not have a script for a new machine  Patient would like to know if that could be resent to [facility] and then for someone to call him to confirm it was sent over  # [phone]

## 2025-02-27 NOTE — TELEPHONE ENCOUNTER
Call to patient, voicemail message left and MyChart message sent regarding CPAP replacement machine.     CPAP script and other clinicals sent to [email or web address] via [employer].    Compliance appointment scheduled with Dr. Alvarez for 5/1/2025 at [facility].

## 2025-02-28 ENCOUNTER — HOSPITAL ENCOUNTER (OUTPATIENT)
Dept: INTERVENTIONAL RADIOLOGY/VASCULAR | Facility: HOSPITAL | Age: 65
End: 2025-02-28
Payer: COMMERCIAL

## 2025-02-28 VITALS
RESPIRATION RATE: 18 BRPM | SYSTOLIC BLOOD PRESSURE: 149 MMHG | OXYGEN SATURATION: 98 % | HEART RATE: 74 BPM | DIASTOLIC BLOOD PRESSURE: 67 MMHG

## 2025-02-28 DIAGNOSIS — K70.31 ALCOHOLIC CIRRHOSIS OF LIVER WITH ASCITES (HCC): ICD-10-CM

## 2025-02-28 LAB
DME PARACHUTE DELIVERY DATE REQUESTED: NORMAL
DME PARACHUTE ITEM DESCRIPTION: NORMAL
DME PARACHUTE ORDER STATUS: NORMAL
DME PARACHUTE SUPPLIER NAME: NORMAL
DME PARACHUTE SUPPLIER PHONE: NORMAL

## 2025-02-28 PROCEDURE — 49083 ABD PARACENTESIS W/IMAGING: CPT | Performed by: RADIOLOGY

## 2025-02-28 PROCEDURE — 49083 ABD PARACENTESIS W/IMAGING: CPT

## 2025-02-28 RX ORDER — LIDOCAINE WITH 8.4% SOD BICARB 0.9%(10ML)
SYRINGE (ML) INJECTION AS NEEDED
Status: COMPLETED | OUTPATIENT
Start: 2025-02-28 | End: 2025-02-28

## 2025-02-28 RX ADMIN — Medication 20 ML: at 08:59

## 2025-02-28 NOTE — SEDATION DOCUMENTATION
Paracentesis completed by Dr Nelson. 3250 ml clear yellow fluid removed. Pt tolerated well. Band aid applied to site.

## 2025-02-28 NOTE — DISCHARGE INSTRUCTIONS
[facility]  Interventional Radiology  [phone]    Abdominal Paracentesis     WHAT YOU NEED TO KNOW:   Abdominal paracentesis is a procedure to remove abnormal fluid buildup in your abdomen. Fluid builds up because of liver problems, such as swelling and scarring. Heart failure, kidney disease, a mass, or problems with your pancreas may also cause fluid buildup.     DISCHARGE INSTRUCTIONS:     Follow up with your healthcare provider as directed: Write down your questions so you remember to ask them during your visits.     Wound care: Remove dressing after 24 hours. Leave glue in place.    Return to your normal activities    Contact Interventional Radiology at [phone] if:  You have a fever and your wound is red and swollen.   You have yellow, green, or bad-smelling discharge coming from your wound.   You have pain or swelling in your abdomen.   You have an upset stomach or you vomit.   You have sudden, sharp pain in your abdomen.   You urinate very little or not at all.   You feel confused and more tired than usual.   Your arm or leg feels warm, tender, and painful. It may look swollen and red.   You suddenly feel lightheaded and have trouble breathing.

## 2025-03-03 ENCOUNTER — TELEPHONE (OUTPATIENT)
Dept: INTERNAL MEDICINE CLINIC | Facility: CLINIC | Age: 65
End: 2025-03-03

## 2025-03-03 NOTE — TELEPHONE ENCOUNTER
Patient returned call. Advised of Dr. Gauthier's message regarding MRI results.     Patient said at this time he does not want to consult with vascular.

## 2025-03-03 NOTE — TELEPHONE ENCOUNTER
----- Message from Lisandro Gauthier DO sent at 3/3/2025  6:31 AM EST -----  Call pt, MRI unchanged. Continued widening of th aorta. See if he wants to see vascular for second opinion.

## 2025-03-06 LAB

## 2025-03-07 ENCOUNTER — HOSPITAL ENCOUNTER (OUTPATIENT)
Dept: INTERVENTIONAL RADIOLOGY/VASCULAR | Facility: HOSPITAL | Age: 65
End: 2025-03-07
Attending: INTERNAL MEDICINE
Payer: COMMERCIAL

## 2025-03-07 VITALS
RESPIRATION RATE: 18 BRPM | HEART RATE: 72 BPM | OXYGEN SATURATION: 99 % | SYSTOLIC BLOOD PRESSURE: 132 MMHG | DIASTOLIC BLOOD PRESSURE: 60 MMHG

## 2025-03-07 DIAGNOSIS — K70.31 ALCOHOLIC CIRRHOSIS OF LIVER WITH ASCITES (HCC): ICD-10-CM

## 2025-03-07 PROCEDURE — 49083 ABD PARACENTESIS W/IMAGING: CPT

## 2025-03-07 PROCEDURE — 49083 ABD PARACENTESIS W/IMAGING: CPT | Performed by: RADIOLOGY

## 2025-03-07 RX ORDER — LIDOCAINE WITH 8.4% SOD BICARB 0.9%(10ML)
SYRINGE (ML) INJECTION AS NEEDED
Status: COMPLETED | OUTPATIENT
Start: 2025-03-07 | End: 2025-03-07

## 2025-03-07 RX ADMIN — Medication 20 ML: at 08:50

## 2025-03-07 NOTE — SEDATION DOCUMENTATION
Patient had a paracentesis performed by Dr. Nelson with IR. A total of 2,350 ml of clear yellow fluid was removed, no labs needed. Patient offers no complaints at this time.

## 2025-03-10 LAB

## 2025-03-14 ENCOUNTER — HOSPITAL ENCOUNTER (OUTPATIENT)
Dept: INTERVENTIONAL RADIOLOGY/VASCULAR | Facility: HOSPITAL | Age: 65
End: 2025-03-14
Attending: INTERNAL MEDICINE
Payer: COMMERCIAL

## 2025-03-14 VITALS
OXYGEN SATURATION: 98 % | SYSTOLIC BLOOD PRESSURE: 176 MMHG | DIASTOLIC BLOOD PRESSURE: 78 MMHG | RESPIRATION RATE: 16 BRPM | HEART RATE: 89 BPM

## 2025-03-14 DIAGNOSIS — K70.31 ALCOHOLIC CIRRHOSIS OF LIVER WITH ASCITES (HCC): ICD-10-CM

## 2025-03-14 PROCEDURE — 49083 ABD PARACENTESIS W/IMAGING: CPT | Performed by: PHYSICIAN ASSISTANT

## 2025-03-14 PROCEDURE — 49083 ABD PARACENTESIS W/IMAGING: CPT

## 2025-03-14 RX ORDER — LIDOCAINE WITH 8.4% SOD BICARB 0.9%(10ML)
SYRINGE (ML) INJECTION AS NEEDED
Status: COMPLETED | OUTPATIENT
Start: 2025-03-14 | End: 2025-03-14

## 2025-03-14 RX ADMIN — Medication 10 ML: at 09:08

## 2025-03-14 NOTE — BRIEF OP NOTE (RAD/CATH)
IR PARACENTESIS Procedure Note    PATIENT NAME: Raymundo Weller  : 1960  MRN: [id number]    Pre-op Diagnosis:   1. Alcoholic cirrhosis of liver with ascites (HCC)      Post-op Diagnosis:   1. Alcoholic cirrhosis of liver with ascites (HCC)        Provider:   Delano Cabrera PA-C    Estimated Blood Loss: minimal    Findings: 2750cc clear yellow LLQ paracentesis    Specimens: none    Complications:  none immediate    Anesthesia: local    Delano Cabrera PA-C     Date: 3/14/2025  Time: 12:09 PM

## 2025-03-21 ENCOUNTER — HOSPITAL ENCOUNTER (OUTPATIENT)
Dept: INTERVENTIONAL RADIOLOGY/VASCULAR | Facility: HOSPITAL | Age: 65
End: 2025-03-21
Attending: INTERNAL MEDICINE
Payer: COMMERCIAL

## 2025-03-21 VITALS
OXYGEN SATURATION: 98 % | DIASTOLIC BLOOD PRESSURE: 65 MMHG | RESPIRATION RATE: 18 BRPM | SYSTOLIC BLOOD PRESSURE: 123 MMHG | HEART RATE: 74 BPM

## 2025-03-21 DIAGNOSIS — K70.31 ALCOHOLIC CIRRHOSIS OF LIVER WITH ASCITES (HCC): ICD-10-CM

## 2025-03-21 PROCEDURE — C1729 CATH, DRAINAGE: HCPCS | Performed by: PHYSICIAN ASSISTANT

## 2025-03-21 PROCEDURE — 49083 ABD PARACENTESIS W/IMAGING: CPT | Performed by: PHYSICIAN ASSISTANT

## 2025-03-21 PROCEDURE — 49083 ABD PARACENTESIS W/IMAGING: CPT

## 2025-03-21 RX ORDER — LIDOCAINE WITH 8.4% SOD BICARB 0.9%(10ML)
SYRINGE (ML) INJECTION AS NEEDED
Status: COMPLETED | OUTPATIENT
Start: 2025-03-21 | End: 2025-03-21

## 2025-03-21 RX ADMIN — Medication 10 ML: at 08:56

## 2025-03-21 NOTE — DISCHARGE INSTRUCTIONS
[facility]  Interventional Radiology  [phone]    Abdominal Paracentesis     WHAT YOU NEED TO KNOW:   Abdominal paracentesis is a procedure to remove abnormal fluid buildup in your abdomen. Fluid builds up because of liver problems, such as swelling and scarring. Heart failure, kidney disease, a mass, or problems with your pancreas may also cause fluid buildup.     DISCHARGE INSTRUCTIONS:     Follow up with your healthcare provider as directed: Write down your questions so you remember to ask them during your visits.     Wound care: Remove dressing after 24 hours. Leave glue in place.    Return to your normal activities    Contact Interventional Radiology at [phone] if:  You have a fever and your wound is red and swollen.   You have yellow, green, or bad-smelling discharge coming from your wound.   You have pain or swelling in your abdomen.   You have an upset stomach or you vomit.   You have sudden, sharp pain in your abdomen.   You urinate very little or not at all.   You feel confused and more tired than usual.   Your arm or leg feels warm, tender, and painful. It may look swollen and red.   You suddenly feel lightheaded and have trouble breathing.

## 2025-03-28 ENCOUNTER — HOSPITAL ENCOUNTER (OUTPATIENT)
Dept: INTERVENTIONAL RADIOLOGY/VASCULAR | Facility: HOSPITAL | Age: 65
End: 2025-03-28
Attending: INTERNAL MEDICINE
Payer: COMMERCIAL

## 2025-03-28 VITALS
SYSTOLIC BLOOD PRESSURE: 131 MMHG | HEART RATE: 71 BPM | DIASTOLIC BLOOD PRESSURE: 60 MMHG | RESPIRATION RATE: 18 BRPM | OXYGEN SATURATION: 98 %

## 2025-03-28 DIAGNOSIS — K70.31 ALCOHOLIC CIRRHOSIS OF LIVER WITH ASCITES (HCC): ICD-10-CM

## 2025-03-28 PROCEDURE — 49083 ABD PARACENTESIS W/IMAGING: CPT

## 2025-03-28 PROCEDURE — 49083 ABD PARACENTESIS W/IMAGING: CPT | Performed by: PHYSICIAN ASSISTANT

## 2025-03-28 RX ORDER — LIDOCAINE WITH 8.4% SOD BICARB 0.9%(10ML)
SYRINGE (ML) INJECTION AS NEEDED
Status: COMPLETED | OUTPATIENT
Start: 2025-03-28 | End: 2025-03-28

## 2025-03-28 RX ADMIN — Medication 10 ML: at 08:42

## 2025-03-28 NOTE — BRIEF OP NOTE (RAD/CATH)
IR PARACENTESIS Procedure Note    PATIENT NAME: Raymundo Weller  : 1960  MRN: [id number]    Pre-op Diagnosis:   1. Alcoholic cirrhosis of liver with ascites (HCC)      Post-op Diagnosis:   1. Alcoholic cirrhosis of liver with ascites (HCC)        Provider:   Delano Cabrear PA-C    Estimated Blood Loss: none    Findings: LLQ paracentesis, 2700cc clear yellow    Specimens: none    Complications:  none immediate    Anesthesia: local    Delano Cabrera PA-C     Date: 3/28/2025  Time: 12:47 PM

## 2025-03-28 NOTE — SEDATION DOCUMENTATION
Patient had a paracentesis performed by KARLIE Mills with IR. A total of 2,700 ml of clear yellow fluid was removed, no labs needed. Patient offers no complaints at this time.

## 2025-03-31 ENCOUNTER — RESULTS FOLLOW-UP (OUTPATIENT)
Dept: INTERNAL MEDICINE CLINIC | Facility: CLINIC | Age: 65
End: 2025-03-31

## 2025-03-31 ENCOUNTER — APPOINTMENT (OUTPATIENT)
Dept: LAB | Facility: HOSPITAL | Age: 65
End: 2025-03-31
Payer: COMMERCIAL

## 2025-03-31 DIAGNOSIS — Z12.5 SCREENING FOR PROSTATE CANCER: ICD-10-CM

## 2025-03-31 DIAGNOSIS — E83.19 IRON OVERLOAD: ICD-10-CM

## 2025-03-31 DIAGNOSIS — I10 BENIGN ESSENTIAL HYPERTENSION: ICD-10-CM

## 2025-03-31 DIAGNOSIS — N18.2 CKD (CHRONIC KIDNEY DISEASE) STAGE 2, GFR 60-89 ML/MIN: ICD-10-CM

## 2025-03-31 DIAGNOSIS — K70.31 ALCOHOLIC CIRRHOSIS OF LIVER WITH ASCITES (HCC): ICD-10-CM

## 2025-03-31 DIAGNOSIS — E78.1 PURE HYPERTRIGLYCERIDEMIA: ICD-10-CM

## 2025-03-31 DIAGNOSIS — D50.9 IRON DEFICIENCY ANEMIA, UNSPECIFIED IRON DEFICIENCY ANEMIA TYPE: Primary | ICD-10-CM

## 2025-03-31 LAB
ANION GAP SERPL CALCULATED.3IONS-SCNC: 8 MMOL/L (ref 4–13)
BASOPHILS # BLD AUTO: 0.06 THOUSANDS/ÂΜL (ref 0–0.1)
BASOPHILS NFR BLD AUTO: 1 % (ref 0–1)
BUN SERPL-MCNC: 11 MG/DL (ref 5–25)
CALCIUM SERPL-MCNC: 8.5 MG/DL (ref 8.4–10.2)
CHLORIDE SERPL-SCNC: 103 MMOL/L (ref 96–108)
CO2 SERPL-SCNC: 24 MMOL/L (ref 21–32)
CREAT SERPL-MCNC: 0.72 MG/DL (ref 0.6–1.3)
CREAT UR-MCNC: 92.1 MG/DL
EOSINOPHIL # BLD AUTO: 0.31 THOUSAND/ÂΜL (ref 0–0.61)
EOSINOPHIL NFR BLD AUTO: 6 % (ref 0–6)
ERYTHROCYTE [DISTWIDTH] IN BLOOD BY AUTOMATED COUNT: 22.1 % (ref 11.6–15.1)
FERRITIN SERPL-MCNC: 11 NG/ML (ref 24–336)
GFR SERPL CREATININE-BSD FRML MDRD: 97 ML/MIN/1.73SQ M
GLUCOSE P FAST SERPL-MCNC: 112 MG/DL (ref 65–99)
HCT VFR BLD AUTO: 31.3 % (ref 36.5–49.3)
HGB BLD-MCNC: 8.8 G/DL (ref 12–17)
IMM GRANULOCYTES # BLD AUTO: 0.01 THOUSAND/UL (ref 0–0.2)
IMM GRANULOCYTES NFR BLD AUTO: 0 % (ref 0–2)
IRON SATN MFR SERPL: 8 % (ref 15–50)
IRON SERPL-MCNC: 37 UG/DL (ref 50–212)
LDLC SERPL DIRECT ASSAY-MCNC: 82 MG/DL (ref 0–100)
LYMPHOCYTES # BLD AUTO: 1.58 THOUSANDS/ÂΜL (ref 0.6–4.47)
LYMPHOCYTES NFR BLD AUTO: 30 % (ref 14–44)
MCH RBC QN AUTO: 22.6 PG (ref 26.8–34.3)
MCHC RBC AUTO-ENTMCNC: 28.1 G/DL (ref 31.4–37.4)
MCV RBC AUTO: 81 FL (ref 82–98)
MICROALBUMIN UR-MCNC: <7 MG/L
MONOCYTES # BLD AUTO: 0.72 THOUSAND/ÂΜL (ref 0.17–1.22)
MONOCYTES NFR BLD AUTO: 14 % (ref 4–12)
NEUTROPHILS # BLD AUTO: 2.59 THOUSANDS/ÂΜL (ref 1.85–7.62)
NEUTS SEG NFR BLD AUTO: 49 % (ref 43–75)
NRBC BLD AUTO-RTO: 0 /100 WBCS
PLATELET # BLD AUTO: 161 THOUSANDS/UL (ref 149–390)
PMV BLD AUTO: 10 FL (ref 8.9–12.7)
POTASSIUM SERPL-SCNC: 4.2 MMOL/L (ref 3.5–5.3)
PSA SERPL-MCNC: 0.39 NG/ML (ref 0–4)
RBC # BLD AUTO: 3.89 MILLION/UL (ref 3.88–5.62)
SODIUM SERPL-SCNC: 135 MMOL/L (ref 135–147)
TIBC SERPL-MCNC: 453.6 UG/DL (ref 250–450)
TRANSFERRIN SERPL-MCNC: 324 MG/DL (ref 203–362)
TRIGL SERPL-MCNC: 117 MG/DL (ref ?–150)
UIBC SERPL-MCNC: 417 UG/DL (ref 155–355)
WBC # BLD AUTO: 5.27 THOUSAND/UL (ref 4.31–10.16)

## 2025-03-31 PROCEDURE — 85025 COMPLETE CBC W/AUTO DIFF WBC: CPT

## 2025-03-31 PROCEDURE — 82043 UR ALBUMIN QUANTITATIVE: CPT

## 2025-03-31 PROCEDURE — 82728 ASSAY OF FERRITIN: CPT

## 2025-03-31 PROCEDURE — 82570 ASSAY OF URINE CREATININE: CPT

## 2025-03-31 PROCEDURE — 36415 COLL VENOUS BLD VENIPUNCTURE: CPT

## 2025-03-31 PROCEDURE — 84478 ASSAY OF TRIGLYCERIDES: CPT

## 2025-03-31 PROCEDURE — 83540 ASSAY OF IRON: CPT

## 2025-03-31 PROCEDURE — 80048 BASIC METABOLIC PNL TOTAL CA: CPT

## 2025-03-31 PROCEDURE — G0103 PSA SCREENING: HCPCS

## 2025-03-31 PROCEDURE — 83721 ASSAY OF BLOOD LIPOPROTEIN: CPT

## 2025-03-31 PROCEDURE — 83550 IRON BINDING TEST: CPT

## 2025-04-01 RX ORDER — FERROUS SULFATE 324(65)MG
324 TABLET, DELAYED RELEASE (ENTERIC COATED) ORAL
Qty: 180 TABLET | Refills: 1 | Status: SHIPPED | OUTPATIENT
Start: 2025-04-01

## 2025-04-04 ENCOUNTER — HOSPITAL ENCOUNTER (OUTPATIENT)
Dept: INTERVENTIONAL RADIOLOGY/VASCULAR | Facility: HOSPITAL | Age: 65
End: 2025-04-04
Attending: INTERNAL MEDICINE
Payer: COMMERCIAL

## 2025-04-04 ENCOUNTER — OFFICE VISIT (OUTPATIENT)
Dept: GASTROENTEROLOGY | Facility: CLINIC | Age: 65
End: 2025-04-04
Payer: COMMERCIAL

## 2025-04-04 VITALS
HEIGHT: 70 IN | SYSTOLIC BLOOD PRESSURE: 134 MMHG | DIASTOLIC BLOOD PRESSURE: 70 MMHG | BODY MASS INDEX: 42.23 KG/M2 | OXYGEN SATURATION: 97 % | WEIGHT: 295 LBS | HEART RATE: 88 BPM | RESPIRATION RATE: 18 BRPM | TEMPERATURE: 98.2 F

## 2025-04-04 VITALS
DIASTOLIC BLOOD PRESSURE: 65 MMHG | SYSTOLIC BLOOD PRESSURE: 142 MMHG | RESPIRATION RATE: 18 BRPM | HEART RATE: 60 BPM | OXYGEN SATURATION: 97 %

## 2025-04-04 DIAGNOSIS — R60.0 LOCALIZED EDEMA: ICD-10-CM

## 2025-04-04 DIAGNOSIS — I85.11 SECONDARY ESOPHAGEAL VARICES WITH BLEEDING (HCC): ICD-10-CM

## 2025-04-04 DIAGNOSIS — K70.31 ALCOHOLIC CIRRHOSIS OF LIVER WITH ASCITES (HCC): Primary | ICD-10-CM

## 2025-04-04 DIAGNOSIS — Z86.0100 HISTORY OF COLON POLYPS: ICD-10-CM

## 2025-04-04 DIAGNOSIS — K70.31 ALCOHOLIC CIRRHOSIS OF LIVER WITH ASCITES (HCC): ICD-10-CM

## 2025-04-04 DIAGNOSIS — R18.8 OTHER ASCITES: ICD-10-CM

## 2025-04-04 PROCEDURE — 99214 OFFICE O/P EST MOD 30 MIN: CPT | Performed by: INTERNAL MEDICINE

## 2025-04-04 PROCEDURE — 49083 ABD PARACENTESIS W/IMAGING: CPT

## 2025-04-04 PROCEDURE — 49083 ABD PARACENTESIS W/IMAGING: CPT | Performed by: PHYSICIAN ASSISTANT

## 2025-04-04 RX ORDER — FUROSEMIDE 20 MG/1
40 TABLET ORAL DAILY
Qty: 180 TABLET | Refills: 3 | Status: SHIPPED | OUTPATIENT
Start: 2025-04-04

## 2025-04-04 RX ORDER — CARVEDILOL 3.12 MG/1
3.12 TABLET ORAL 2 TIMES DAILY WITH MEALS
Qty: 180 TABLET | Refills: 3 | Status: SHIPPED | OUTPATIENT
Start: 2025-04-04

## 2025-04-04 RX ORDER — SPIRONOLACTONE 50 MG/1
100 TABLET, FILM COATED ORAL DAILY
Qty: 180 TABLET | Refills: 3 | Status: SHIPPED | OUTPATIENT
Start: 2025-04-04

## 2025-04-04 RX ORDER — LIDOCAINE WITH 8.4% SOD BICARB 0.9%(10ML)
SYRINGE (ML) INJECTION AS NEEDED
Status: COMPLETED | OUTPATIENT
Start: 2025-04-04 | End: 2025-04-04

## 2025-04-04 RX ADMIN — Medication 10 ML: at 08:47

## 2025-04-04 NOTE — ASSESSMENT & PLAN NOTE
64M with HTN, AAA, COPD, DOT, colon polyps, SCC tongue, CKD2, tobacco use, chronic LE edmea,  etoh related cirrhosis d/b ascites, nonbleeding EV, PHG who presents for follow up.     In interim, has stopped drinking alcohol. Commended him on his efforts. Needs updated MELD labs, will check in 1 week after diuretic adjustment as below.     Ascites: continue with q weekly paracentesis; will increase spironolactone to 100mg, continue lasix 40mg daily; recheck BMP in 1 week to assess renal function and electrolytes     EV: small on last EGD, remains on NSBB, will uptitrate in future as able    HE: none currently and no h/o; monitor for s/s     Hepatoma screening: recent MRI negative in 02/2025; he knows to f/u with PCP regarding renal cysts and known AAA. Repeat US due 08/2025 can be ordered at next visit with AFP     Transplant: no current indication, low MELD     Nutrition: no sarcopenia, advised low Na high protein diet; discussed staying well hydrated, no indication for fluid restriction at this time as Na normal    Orders:    furosemide (LASIX) 20 mg tablet; Take 2 tablets (40 mg total) by mouth daily    spironolactone (ALDACTONE) 50 mg tablet; Take 2 tablets (100 mg total) by mouth daily    carvedilol (COREG) 3.125 mg tablet; Take 1 tablet (3.125 mg total) by mouth 2 (two) times a day with meals    Comprehensive metabolic panel; Future    Protime-INR; Future    CBC and differential; Future

## 2025-04-04 NOTE — PROGRESS NOTES
Name: Raymundo Weller      : 1960      MRN: [id number]  Encounter Provider: Harley Bonilla MD  Encounter Date: 2025   Encounter department: [facility] [place]  :  Assessment & Plan  Alcoholic cirrhosis of liver with ascites (HCC)  64M with HTN, AAA, COPD, DOT, colon polyps, SCC tongue, CKD2, tobacco use, chronic LE edmea,  etoh related cirrhosis d/b ascites, nonbleeding EV, PHG who presents for follow up.     In interim, has stopped drinking alcohol. Commended him on his efforts. Needs updated MELD labs, will check in 1 week after diuretic adjustment as below.     Ascites: continue with q weekly paracentesis; will increase spironolactone to 100mg, continue lasix 40mg daily; recheck BMP in 1 week to assess renal function and electrolytes     EV: small on last EGD, remains on NSBB, will uptitrate in future as able    HE: none currently and no h/o; monitor for s/s     Hepatoma screening: recent MRI negative in 2025; he knows to f/u with PCP regarding renal cysts and known AAA. Repeat US due 2025 can be ordered at next visit with AFP     Transplant: no current indication, low MELD     Nutrition: no sarcopenia, advised low Na high protein diet; discussed staying well hydrated, no indication for fluid restriction at this time as Na normal    Orders:    furosemide (LASIX) 20 mg tablet; Take 2 tablets (40 mg total) by mouth daily    spironolactone (ALDACTONE) 50 mg tablet; Take 2 tablets (100 mg total) by mouth daily    carvedilol (COREG) 3.125 mg tablet; Take 1 tablet (3.125 mg total) by mouth 2 (two) times a day with meals    Comprehensive metabolic panel; Future    Protime-INR; Future    CBC and differential; Future    Other ascites  As above        Secondary esophageal varices with bleeding (HCC)  Continue coreg, will keep at 3.125mg for now due to changing diuretics. Consider uptitration in future as hemodynamics allow   Orders:    spironolactone (ALDACTONE) 50 mg  tablet; Take 2 tablets (100 mg total) by mouth daily    Localized edema  Diuretics as above   Orders:    spironolactone (ALDACTONE) 50 mg tablet; Take 2 tablets (100 mg total) by mouth daily    History of colon polyps  Due for repeat COY 2028 for h/o TA        RTC  3 months for follow up     History of Present Illness   HPI  Raymundo Weller is a 64 y.o. male who presents for follow up for etoh cirrhosis.     Last visit with Tamara Hinton 12/2024. At that time, had had recent admission for GIB attributed to PHG, small nonbleeding varices. Restarted on lasix and continued on aldactone due to volume overload. Restarted on coreg for variceal ppx. Underwent COY in interim with 4 subcm polyps removed (all TA), found to have diverticulosis and hemorrhoids. Recommended repeat in 3 years (due 2028).     Since last visit, has had to undergo weekly IR para. Last 4/4 with removal of 3L. Last before this 3/28 with removal of 2.7L fluid. Last MRI 2/21/2025 for hepatoma screening without liver lesion. Last AFP 08/2024 wnl.     Hasn't had a drink since last visit. Rare cigarette. No drug use. Baclofen helping. Needs refills on some medications. Taking 40mg lasix, 50mg spironolactone. Coreg 3.125mg without side effects. Still has LE edema. States para amounts have been decreasing. No bleeding, confusion, fatigue. Checks stool regularly.       MELD 3.0: 9 at 12/12/2024  9:10 AM  MELD-Na: 9 at 12/12/2024  9:10 AM  Calculated from:  Serum Creatinine: 0.77 mg/dL (Using min of 1 mg/dL) at 12/12/2024  9:10 AM  Serum Sodium: 138 mmol/L (Using max of 137 mmol/L) at 12/12/2024  9:10 AM  Total Bilirubin: 1.23 mg/dL at 12/12/2024  9:10 AM  Serum Albumin: 3.2 g/dL at 12/12/2024  9:10 AM  INR(ratio): 1.2 at 12/12/2024  9:10 AM  Age at listing (hypothetical): 64 years  Sex: Male at 12/12/2024  9:10 AM      History obtained from: patient    Review of Systems  Medical History Reviewed by provider this encounter:  Tobacco  Allergies  Meds   "Problems  Med Hx  Surg Hx  Fam Hx     .     Objective   /70   Pulse 88   Temp 98.2 °F (36.8 °C) (Temporal)   Resp 18   Ht 5' 10\" (1.778 m)   Wt 134 kg (295 lb)   SpO2 97%   BMI 42.33 kg/m²      Physical Exam  Vitals and nursing note reviewed.   Constitutional:       Appearance: He is obese.   HENT:      Mouth/Throat:      Mouth: Mucous membranes are moist.   Eyes:      Conjunctiva/sclera: Conjunctivae normal.   Cardiovascular:      Rate and Rhythm: Normal rate.   Pulmonary:      Effort: Pulmonary effort is normal. No respiratory distress.   Abdominal:      Palpations: Abdomen is soft.      Tenderness: There is no abdominal tenderness.      Comments: Protuberant, para site with bandage c/d/I    Musculoskeletal:         General: Swelling (chronic LE edema) present.   Skin:     General: Skin is warm and dry.      Coloration: Skin is not jaundiced.   Neurological:      General: No focal deficit present.      Mental Status: He is alert.   Psychiatric:         Mood and Affect: Mood normal.         "

## 2025-04-04 NOTE — SEDATION DOCUMENTATION
Paracentesis completed by Abdulkadir Cabrera PA-C. 3050 ml clear yellow fluid removed. Pt tolerated well. Band aid applied to site.

## 2025-04-07 ENCOUNTER — RESULTS FOLLOW-UP (OUTPATIENT)
Age: 65
End: 2025-04-07

## 2025-04-09 DIAGNOSIS — K92.2 GI BLEED: ICD-10-CM

## 2025-04-10 RX ORDER — PANTOPRAZOLE SODIUM 40 MG/1
40 TABLET, DELAYED RELEASE ORAL EVERY MORNING
Qty: 90 TABLET | Refills: 1 | Status: SHIPPED | OUTPATIENT
Start: 2025-04-10

## 2025-04-11 ENCOUNTER — HOSPITAL ENCOUNTER (OUTPATIENT)
Dept: INTERVENTIONAL RADIOLOGY/VASCULAR | Facility: HOSPITAL | Age: 65
End: 2025-04-11
Attending: INTERNAL MEDICINE
Payer: COMMERCIAL

## 2025-04-11 VITALS
RESPIRATION RATE: 18 BRPM | HEART RATE: 82 BPM | OXYGEN SATURATION: 100 % | DIASTOLIC BLOOD PRESSURE: 70 MMHG | SYSTOLIC BLOOD PRESSURE: 147 MMHG

## 2025-04-11 DIAGNOSIS — K70.31 ALCOHOLIC CIRRHOSIS OF LIVER WITH ASCITES (HCC): ICD-10-CM

## 2025-04-11 PROCEDURE — 49083 ABD PARACENTESIS W/IMAGING: CPT

## 2025-04-11 PROCEDURE — 49083 ABD PARACENTESIS W/IMAGING: CPT | Performed by: PHYSICIAN ASSISTANT

## 2025-04-11 RX ORDER — LIDOCAINE WITH 8.4% SOD BICARB 0.9%(10ML)
SYRINGE (ML) INJECTION AS NEEDED
Status: COMPLETED | OUTPATIENT
Start: 2025-04-11 | End: 2025-04-11

## 2025-04-11 RX ADMIN — Medication 10 ML: at 09:05

## 2025-04-11 NOTE — SEDATION DOCUMENTATION
Patient had a paracentesis performed by KARLIE Mills with IR, a total of 2,650 ml of clear yellow fluid was removed, patient offers no complaints at this time.

## 2025-04-18 ENCOUNTER — HOSPITAL ENCOUNTER (OUTPATIENT)
Dept: INTERVENTIONAL RADIOLOGY/VASCULAR | Facility: HOSPITAL | Age: 65
Discharge: HOME | End: 2025-04-18
Attending: INTERNAL MEDICINE
Payer: COMMERCIAL

## 2025-04-18 VITALS
DIASTOLIC BLOOD PRESSURE: 79 MMHG | OXYGEN SATURATION: 98 % | RESPIRATION RATE: 18 BRPM | HEART RATE: 92 BPM | SYSTOLIC BLOOD PRESSURE: 169 MMHG

## 2025-04-18 DIAGNOSIS — K70.31 ALCOHOLIC CIRRHOSIS OF LIVER WITH ASCITES (HCC): ICD-10-CM

## 2025-04-18 PROCEDURE — 49083 ABD PARACENTESIS W/IMAGING: CPT

## 2025-04-18 PROCEDURE — 49083 ABD PARACENTESIS W/IMAGING: CPT | Performed by: PHYSICIAN ASSISTANT

## 2025-04-18 RX ORDER — LIDOCAINE WITH 8.4% SOD BICARB 0.9%(10ML)
SYRINGE (ML) INJECTION AS NEEDED
Status: COMPLETED | OUTPATIENT
Start: 2025-04-18 | End: 2025-04-18

## 2025-04-18 RX ADMIN — Medication 10 ML: at 08:55

## 2025-04-18 NOTE — BRIEF OP NOTE (RAD/CATH)
IR PARACENTESIS Procedure Note    PATIENT NAME: Raymundo Weller  : 1960  MRN: [id number]    Pre-op Diagnosis:   1. Alcoholic cirrhosis of liver with ascites (HCC)      Post-op Diagnosis:   1. Alcoholic cirrhosis of liver with ascites (HCC)        Provider:   Delano Cabrera PA-C    Estimated Blood Loss: minimal    Findings: LLQ paracentesis, 2600cc clear yellow    Specimens: none    Complications:  none immediate    Anesthesia: local    Delano Cabrera PA-C     Date: 2025  Time: 12:13 PM

## 2025-04-18 NOTE — SEDATION DOCUMENTATION
Patient had a paracentesis performed by KARLIE Mills with IR. A total of 2,600 ml of clear yellow fluid was removed, no labs needed. Patient offers no complaints at this time.

## 2025-04-21 ENCOUNTER — APPOINTMENT (OUTPATIENT)
Dept: LAB | Facility: HOSPITAL | Age: 65
End: 2025-04-21
Payer: COMMERCIAL

## 2025-04-21 ENCOUNTER — RESULTS FOLLOW-UP (OUTPATIENT)
Age: 65
End: 2025-04-21

## 2025-04-21 DIAGNOSIS — K70.31 ALCOHOLIC CIRRHOSIS OF LIVER WITH ASCITES (HCC): ICD-10-CM

## 2025-04-21 LAB
ALBUMIN SERPL BCG-MCNC: 3.3 G/DL (ref 3.5–5)
ALP SERPL-CCNC: 96 U/L (ref 34–104)
ALT SERPL W P-5'-P-CCNC: 17 U/L (ref 7–52)
ANION GAP SERPL CALCULATED.3IONS-SCNC: 7 MMOL/L (ref 4–13)
ANISOCYTOSIS BLD QL SMEAR: PRESENT
AST SERPL W P-5'-P-CCNC: 46 U/L (ref 13–39)
BASOPHILS # BLD AUTO: 0.05 THOUSANDS/ÂΜL (ref 0–0.1)
BASOPHILS NFR BLD AUTO: 1 % (ref 0–1)
BILIRUB SERPL-MCNC: 1.11 MG/DL (ref 0.2–1)
BUN SERPL-MCNC: 13 MG/DL (ref 5–25)
CALCIUM ALBUM COR SERPL-MCNC: 9.4 MG/DL (ref 8.3–10.1)
CALCIUM SERPL-MCNC: 8.8 MG/DL (ref 8.4–10.2)
CHLORIDE SERPL-SCNC: 102 MMOL/L (ref 96–108)
CO2 SERPL-SCNC: 25 MMOL/L (ref 21–32)
CREAT SERPL-MCNC: 0.8 MG/DL (ref 0.6–1.3)
EOSINOPHIL # BLD AUTO: 0.32 THOUSAND/ÂΜL (ref 0–0.61)
EOSINOPHIL NFR BLD AUTO: 6 % (ref 0–6)
ERYTHROCYTE [DISTWIDTH] IN BLOOD BY AUTOMATED COUNT: 22.2 % (ref 11.6–15.1)
GFR SERPL CREATININE-BSD FRML MDRD: 93 ML/MIN/1.73SQ M
GLUCOSE P FAST SERPL-MCNC: 126 MG/DL (ref 65–99)
HCT VFR BLD AUTO: 32.6 % (ref 36.5–49.3)
HGB BLD-MCNC: 9.2 G/DL (ref 12–17)
IMM GRANULOCYTES # BLD AUTO: 0.02 THOUSAND/UL (ref 0–0.2)
IMM GRANULOCYTES NFR BLD AUTO: 0 % (ref 0–2)
INR PPP: 1.11 (ref 0.85–1.19)
LYMPHOCYTES # BLD AUTO: 1.55 THOUSANDS/ÂΜL (ref 0.6–4.47)
LYMPHOCYTES NFR BLD AUTO: 27 % (ref 14–44)
MCH RBC QN AUTO: 23.1 PG (ref 26.8–34.3)
MCHC RBC AUTO-ENTMCNC: 28.2 G/DL (ref 31.4–37.4)
MCV RBC AUTO: 82 FL (ref 82–98)
MONOCYTES # BLD AUTO: 0.77 THOUSAND/ÂΜL (ref 0.17–1.22)
MONOCYTES NFR BLD AUTO: 13 % (ref 4–12)
NEUTROPHILS # BLD AUTO: 3.05 THOUSANDS/ÂΜL (ref 1.85–7.62)
NEUTS SEG NFR BLD AUTO: 53 % (ref 43–75)
NRBC BLD AUTO-RTO: 0 /100 WBCS
PLATELET # BLD AUTO: 98 THOUSANDS/UL (ref 149–390)
PLATELET BLD QL SMEAR: ABNORMAL
PMV BLD AUTO: 9.9 FL (ref 8.9–12.7)
POTASSIUM SERPL-SCNC: 4.3 MMOL/L (ref 3.5–5.3)
PROT SERPL-MCNC: 7.2 G/DL (ref 6.4–8.4)
PROTHROMBIN TIME: 14.8 SECONDS (ref 12.3–15)
RBC # BLD AUTO: 3.99 MILLION/UL (ref 3.88–5.62)
RBC MORPH BLD: PRESENT
SODIUM SERPL-SCNC: 134 MMOL/L (ref 135–147)
WBC # BLD AUTO: 5.76 THOUSAND/UL (ref 4.31–10.16)

## 2025-04-21 PROCEDURE — 85025 COMPLETE CBC W/AUTO DIFF WBC: CPT

## 2025-04-21 PROCEDURE — 36415 COLL VENOUS BLD VENIPUNCTURE: CPT

## 2025-04-21 PROCEDURE — 85610 PROTHROMBIN TIME: CPT

## 2025-04-21 PROCEDURE — 80053 COMPREHEN METABOLIC PANEL: CPT

## 2025-04-25 ENCOUNTER — HOSPITAL ENCOUNTER (OUTPATIENT)
Dept: INTERVENTIONAL RADIOLOGY/VASCULAR | Facility: HOSPITAL | Age: 65
End: 2025-04-25
Attending: INTERNAL MEDICINE
Payer: COMMERCIAL

## 2025-04-25 VITALS
RESPIRATION RATE: 18 BRPM | HEART RATE: 83 BPM | SYSTOLIC BLOOD PRESSURE: 147 MMHG | DIASTOLIC BLOOD PRESSURE: 71 MMHG | OXYGEN SATURATION: 99 %

## 2025-04-25 DIAGNOSIS — K70.31 ALCOHOLIC CIRRHOSIS OF LIVER WITH ASCITES (HCC): ICD-10-CM

## 2025-04-25 PROCEDURE — 49083 ABD PARACENTESIS W/IMAGING: CPT

## 2025-04-25 PROCEDURE — 49083 ABD PARACENTESIS W/IMAGING: CPT | Performed by: PHYSICIAN ASSISTANT

## 2025-04-25 RX ORDER — LIDOCAINE WITH 8.4% SOD BICARB 0.9%(10ML)
SYRINGE (ML) INJECTION AS NEEDED
Status: COMPLETED | OUTPATIENT
Start: 2025-04-25 | End: 2025-04-25

## 2025-04-25 RX ADMIN — Medication 10 ML: at 08:45

## 2025-04-25 NOTE — BRIEF OP NOTE (RAD/CATH)
IR PARACENTESIS Procedure Note    PATIENT NAME: Raymundo Weller  : 1960  MRN: [id number]    Pre-op Diagnosis:   1. Alcoholic cirrhosis of liver with ascites (HCC)      Post-op Diagnosis:   1. Alcoholic cirrhosis of liver with ascites (HCC)        Provider:   Delano Cabrera PA-C    Estimated Blood Loss: minimal    Findings: LLQ paracentesis, 2000cc clear yellow    Specimens: none    Complications:  none immediate    Anesthesia: local    Delano Cabrera PA-C     Date: 2025  Time: 9:23 AM

## 2025-04-25 NOTE — DISCHARGE INSTRUCTIONS
[facility]  Interventional Radiology  [phone]    Abdominal Paracentesis     WHAT YOU NEED TO KNOW:   Abdominal paracentesis is a procedure to remove abnormal fluid buildup in your abdomen. Fluid builds up because of liver problems, such as swelling and scarring. Heart failure, kidney disease, a mass, or problems with your pancreas may also cause fluid buildup.     DISCHARGE INSTRUCTIONS:     Follow up with your healthcare provider as directed: Write down your questions so you remember to ask them during your visits.     Wound care: Remove dressing after 24 hours. Leave glue in place.    Return to your normal activities    Contact Interventional Radiology at [phone] ([facility] PATIENTS: Contact Interventional Radiology at [phone]) ([place] PATIENTS: Contact Interventional Radiology at [phone]) if:  You have a fever and your wound is red and swollen.   You have yellow, green, or bad-smelling discharge coming from your wound.   You have pain or swelling in your abdomen.   You have an upset stomach or you vomit.   You have sudden, sharp pain in your abdomen.   You urinate very little or not at all.   You feel confused and more tired than usual.   Your arm or leg feels warm, tender, and painful. It may look swollen and red.   You suddenly feel lightheaded and have trouble breathing.

## 2025-05-01 ENCOUNTER — OFFICE VISIT (OUTPATIENT)
Dept: SLEEP CENTER | Facility: CLINIC | Age: 65
End: 2025-05-01
Payer: COMMERCIAL

## 2025-05-01 ENCOUNTER — TELEPHONE (OUTPATIENT)
Dept: SLEEP CENTER | Facility: CLINIC | Age: 65
End: 2025-05-01

## 2025-05-01 VITALS
HEART RATE: 86 BPM | DIASTOLIC BLOOD PRESSURE: 75 MMHG | TEMPERATURE: 98.7 F | HEIGHT: 70 IN | OXYGEN SATURATION: 96 % | RESPIRATION RATE: 16 BRPM | SYSTOLIC BLOOD PRESSURE: 144 MMHG | BODY MASS INDEX: 44.09 KG/M2 | WEIGHT: 308 LBS

## 2025-05-01 DIAGNOSIS — E66.9 OBESITY (BMI 30-39.9): ICD-10-CM

## 2025-05-01 DIAGNOSIS — G47.33 OBSTRUCTIVE SLEEP APNEA: Primary | ICD-10-CM

## 2025-05-01 PROCEDURE — 99214 OFFICE O/P EST MOD 30 MIN: CPT | Performed by: STUDENT IN AN ORGANIZED HEALTH CARE EDUCATION/TRAINING PROGRAM

## 2025-05-01 NOTE — PROGRESS NOTES
Name: Raymundo Weller      : 1960      MRN: [id number]  Encounter Provider: Jose Alvarez DO  Encounter Date: 2025   Encounter department: [facility] SLEEP MEDICINE [place]  :  Assessment & Plan  Obstructive sleep apnea  Gaston is a very pleasant 64-year-old gentleman with a PMHx of HTN, AAA without rupture, COPD, left atrial enlargement, cervical radiculopathy, history of alcoholic cirrhosis of the liver, CKD stage II, obesity, HLD who presents in follow up for DOT (RDI 15.4, O2 eulogio 82% 2006).  He is doing very well with his new device, with no major concerns for me.  While his compliance report does appear to show significant leak, this is not bothersome to him and his AHI remains therapeutic.      Compliance report reviewed and analyzed  Continue AutoPAP at settings of  17-20 cmH2O  Prescription for new supplies ordered today  Reviewed CMS/insurance requirements and resupply guidelines  Information provided on the above as well as general maintenance steps  Recommended maintaining good Sleep Hygiene    Orders:    PAP DME Resupply/Reorder    Obesity (BMI 30-39.9)    Discussed the importance of maintaining a healthy weight on SDB control/management, and vice versa.  Encouraged lifestyle practices to maintain a healthy weight (including diet, exercise).  Reviewed that if patient ever wishes for a formal referral to Weight Management, they can let myself or their PCP know    Orders:    PAP DME Resupply/Reorder          Follow-up:  He will Return in about 1 year (around 2026).      ________________________________________________________________________________________________    Per Last Visit Note (Date: 2024):  Gaston is a very pleasant 64-year-old gentleman with a PMHx of HTN, AAA without rupture, COPD, left atrial enlargement, cervical radiculopathy, history of alcoholic cirrhosis of the liver, CKD stage II, obesity, HLD who presents in follow up for DOT (RDI 15.4, O2 eulogio 82%  1/2006).  He continues to endorse substantial benefit from PAP therapy, with no major issues or concerns for me today.  His compliance report does reveal a therapeutic AHI with minimal mask leak.     At this time, recommend that he continue PAP therapy at present settings of 17-20 cm H2O  Given that he is eligible for a new device and that [facility] has shut down their sleep department and thus he will need a new device within the next few years anyway, we will place an order for a new device at the following settings: AutoPAP 17-20 cmH2O with a fullface mask..   Advised the patient that he should be receiving a call to schedule with their DME in ~2 weeks to receive the device.  Reviewed CMS/insurance criteria and resupply guidelines  Advised patient to avoid activities that could harm self or others when tired/sleepy, including driving.  Encouraged maintaining normal weight  Discussed importance of good sleep hygiene.  Reviewed the importance of treating his DOT on blood pressure control; formal management of HTN per his PCPs recommendations.    He will Return in about 2 months (around 1/21/2025) for Compliance Visit.  If he is doing well at this appointment, he would likely be able to resume yearly follow-ups thereafter.      Sleep Studies:  -PAP titration study 4/14/2017: BMI 35.3.  PAP titrated up to 17 cm H2O, best pressure setting noted to be 17 cm H2O.     -PAP titration study 2/8/2006: PAP initiated at 5 cm H2O, titrated up to 13 cm H2O.  Best pressure setting noted to be 13 cm H2O.     -PSG 1/16/2006: Sleep efficiency 79%, sleep latency 25 minutes.  RDI 15.4, O2 eulogio 82%, 5% of the night was spent with saturations less than 89%.     ________________________________________________________________________________________________      Interval History: Raymundo Weller is a 64 y.o. male with a PMHx of HTN, AAA without rupture, COPD, left atrial enlargement, cervical radiculopathy, history of alcoholic  cirrhosis of the liver, CKD stage II, obesity, HLD who presents in follow up for DOT (RDI 15.4, O2 eulogio 82% 1/2006).    He does tell me that he is retaining water; not sure why.    SDB:  -Current experience with PAP Therapy: Definitely beneficial  -Mask type: Full-face mask  -Difficulties with mask: Denies  -Device: ResMed AirSense 10; received ~3/2025.  -Difficulties with device: Denies  -DME: [facility]  -Compliance:              SLEEP SCHEDULE:  Bedtime: 0000   Time it takes to fall sleep: <15 minutes  Wake up Time: 0830   Number of times patient wakes up per night: 1-2    Estimated total sleep time ( in a 24 hour period of time): ~7-8 hours       Changes to PMH, PSH, SH: See above         Sitting and reading: Would never doze  Watching TV: Slight chance of dozing  Sitting, inactive in a public place (e.g. a theatre or a meeting): Would never doze  As a passenger in a car for an hour without a break: Would never doze  Lying down to rest in the afternoon when circumstances permit: Would never doze  Sitting and talking to someone: Would never doze  Sitting quietly after a lunch without alcohol: Would never doze  In a car, while stopped for a few minutes in traffic: Would never doze  Total score: 1     Review of Systems  Pertinent positives/negatives included in HPI and also as noted:     Current Outpatient Medications on File Prior to Visit   Medication Sig Dispense Refill    Baclofen 5 MG TABS Take 5 mg by mouth 3 (three) times a day 100 tablet 1    carvedilol (COREG) 3.125 mg tablet Take 1 tablet (3.125 mg total) by mouth 2 (two) times a day with meals 180 tablet 3    ferrous sulfate 324 (65 Fe) mg Take 1 tablet (324 mg total) by mouth 2 (two) times a day before meals 180 tablet 1    furosemide (LASIX) 20 mg tablet Take 2 tablets (40 mg total) by mouth daily 180 tablet 3    multivitamin (THERAGRAN) TABS Take 1 tablet by mouth daily      pantoprazole (PROTONIX) 40 mg tablet Take 1 tablet (40 mg total) by mouth  "every morning 90 tablet 1    spironolactone (ALDACTONE) 50 mg tablet Take 2 tablets (100 mg total) by mouth daily 180 tablet 3     No current facility-administered medications on file prior to visit.      Objective   /75 (BP Location: Left arm, Patient Position: Sitting, Cuff Size: Large)   Pulse 86   Temp 98.7 °F (37.1 °C) (Temporal)   Resp 16   Ht 5' 10\" (1.778 m)   Wt (!) 140 kg (308 lb)   SpO2 96%   BMI 44.19 kg/m²     Neck Circumference: 19.5  Physical Exam  PHYSICAL EXAMINATION:  Vital Signs: /75 (BP Location: Left arm, Patient Position: Sitting, Cuff Size: Large)   Pulse 86   Temp 98.7 °F (37.1 °C) (Temporal)   Resp 16   Ht 5' 10\" (1.778 m)   Wt (!) 140 kg (308 lb)   SpO2 96%   BMI 44.19 kg/m²     Constitutional: NAD, well appearing   Mental Status: AAOx3  Skin: Warm, dry, no rashes noted   Eyes: PERRL, normal conjunctiva  Chest: No evidence of respiratory distress, no accessory muscle use; no evidence of peripheral cyanosis  Abdomen: Soft, NT/ND  Extremities: No digital clubbing or pedal edema  Neuro: Strength 5/5 throughout, sensation grossly intact    Data  Lab Results   Component Value Date    HGB 9.2 (L) 04/21/2025    HCT 32.6 (L) 04/21/2025    MCV 82 04/21/2025      Lab Results   Component Value Date    GLUCOSE 84 02/11/2015    CALCIUM 8.8 04/21/2025     02/11/2015    K 4.3 04/21/2025    CO2 25 04/21/2025     04/21/2025    BUN 13 04/21/2025    CREATININE 0.80 04/21/2025     Lab Results   Component Value Date    IRON 37 (L) 03/31/2025    TIBC 453.6 (H) 03/31/2025    FERRITIN 11 (L) 03/31/2025     Lab Results   Component Value Date    AST 46 (H) 04/21/2025    ALT 17 04/21/2025         Electronically signed by:    Jose Alvarez DO  Board-Certified Neurology and Sleep Medicine  [facility]  05/01/25  "

## 2025-05-01 NOTE — PATIENT INSTRUCTIONS
Plan:  Continue AutoPAP at settings of  17-20 cmH2O  Remember to clean your mask and equipment regularly, as directed.  You should be eligible for new supplies approximately every 3-6 months, depending on your insurance coverage. Contact your Durable Medical Equipment (DME) company for new supplies as needed.  Practice good Sleep Hygiene, as outlined below.  Follow up in 12 months.      General PAP Information:  Care and Maintenance  Headgear should be washed as needed. Daily inspection and weekly washings are recommended. Do not disassemble the straps. Machine wash in warm water, making sure to attach Velcro hooks and tabs before washing. Line dry or machine dry on a low setting.  Masks should be washed every day. Daily inspection is recommended. Leave the mask and tubing attached. Gently wash the mask with a soft cloth using warm water and mild detergent, concentrating on the mask cushion flaps. DO NOT use alcohol or bleach. Rinse thoroughly and air dry.  Tubing and headgear should be washed weekly. Daily inspection is recommended. Wash in warm water and mild detergent and rinse thoroughly. Hook the tubing to the machine and blow until dry.  Humidifier should be washed daily and filled with DISTILLED water before use. Wash with warm water and mild detergent. Rinse thoroughly and air dry.  Disposable filters should be replaced once a month. Wash reusable foam filters with warm water and mild detergent at least once a month. Rinse thoroughly and dry with paper towels.  Avoid  that contain fragrance or conditioners, as these will leave a residue.  NEVER iron any soft goods.      CMS Requirements    Your insurance requires a face-to-face follow up visit within a 31-90 day period after starting CPAP.  Your insurance requires compliance with CPAP, which is at least 4 hours per night for 70% of the time. This must be done over a 30 day period and must occur within the initial 31-90 day period after starting  CPAP.  Your insurance also requires at least yearly follow ups to continue to pay for CPAP supplies.       PAP Supply Guidelines    Below are the guidelines for reordering your supplies. You will be responsible for your deductible, co payments, and out of pocket expenses.    Item Frequency   Nasal Mask (no headgear) 1 every 3 months   Nasal Mask Cushion 1 every 2 weeks   Full Face Mask (no headgear) 1 every 3 months   Full Face Mask Cushion 1 every month   Nasal Pillows 1 every 2 weeks   Headgear 1 every 6 months   hin Strap 1 every 6 months   issac 1 every 3 months   Filters: Reusable 1 every 6 months   Filters: Disposable 1 every 2 weeks   Humidifier Chamber(disposable) 1 every 6 months           Good Sleep Hygiene  Wake up at the same time every day, even on the weekends.  Use your bed for sleep and intimacy only.  If you have been in bed awake for 30 minutes, get up and leave the bedroom. Choose a dull activity not involving a blue screen (TV, computer, handheld devices). Go back to bed when you feel sleepy.  Avoid caffeine, nicotine and alcohol before you go to bed.  Avoid large meals before you go to bed.  Avoid using screens (computers, tablets, smartphones, etc.) for at least 1 hour before bedtime  Exercise regularly, but do not exercise right before you go to bed.  Avoid daytime naps. If you do take a nap, sleep for 20-40 minutes, and not after 2pm.

## 2025-05-01 NOTE — ASSESSMENT & PLAN NOTE
Gaston is a very pleasant 64-year-old gentleman with a PMHx of HTN, AAA without rupture, COPD, left atrial enlargement, cervical radiculopathy, history of alcoholic cirrhosis of the liver, CKD stage II, obesity, HLD who presents in follow up for DOT (RDI 15.4, O2 eulogio 82% 1/2006).  He is doing very well with his new device, with no major concerns for me.  While his compliance report does appear to show significant leak, this is not bothersome to him and his AHI remains therapeutic.      Compliance report reviewed and analyzed  Continue AutoPAP at settings of 17-20 cmH2O  Prescription for new supplies ordered today  Reviewed CMS/insurance requirements and resupply guidelines  Information provided on the above as well as general maintenance steps  Recommended maintaining good Sleep Hygiene    Orders:    PAP DME Resupply/Reorder

## 2025-05-01 NOTE — ASSESSMENT & PLAN NOTE
Discussed the importance of maintaining a healthy weight on SDB control/management, and vice versa.  Encouraged lifestyle practices to maintain a healthy weight (including diet, exercise).  Reviewed that if patient ever wishes for a formal referral to Weight Management, they can let myself or their PCP know    Orders:    PAP DME Resupply/Reorder

## 2025-05-02 ENCOUNTER — HOSPITAL ENCOUNTER (OUTPATIENT)
Dept: INTERVENTIONAL RADIOLOGY/VASCULAR | Facility: HOSPITAL | Age: 65
End: 2025-05-02
Attending: FAMILY MEDICINE
Payer: COMMERCIAL

## 2025-05-02 VITALS
DIASTOLIC BLOOD PRESSURE: 69 MMHG | RESPIRATION RATE: 18 BRPM | SYSTOLIC BLOOD PRESSURE: 151 MMHG | HEART RATE: 94 BPM | OXYGEN SATURATION: 98 %

## 2025-05-02 DIAGNOSIS — K70.31 ALCOHOLIC CIRRHOSIS OF LIVER WITH ASCITES (HCC): ICD-10-CM

## 2025-05-02 PROCEDURE — 49083 ABD PARACENTESIS W/IMAGING: CPT

## 2025-05-02 PROCEDURE — 49083 ABD PARACENTESIS W/IMAGING: CPT | Performed by: PHYSICIAN ASSISTANT

## 2025-05-02 RX ORDER — LIDOCAINE WITH 8.4% SOD BICARB 0.9%(10ML)
SYRINGE (ML) INJECTION AS NEEDED
Status: COMPLETED | OUTPATIENT
Start: 2025-05-02 | End: 2025-05-02

## 2025-05-02 RX ADMIN — Medication 10 ML: at 08:47

## 2025-05-02 NOTE — SEDATION DOCUMENTATION
Patient had a paracentesis performed by KARLIE Mills with IR. A total of 3,200 ml of clear yellow fluid was removed, no labs needed. Patient offers no complaints at this time.

## 2025-05-02 NOTE — DISCHARGE INSTRUCTIONS
[facility]  Interventional Radiology  [phone]      Abdominal Paracentesis     WHAT YOU NEED TO KNOW:   Abdominal paracentesis is a procedure to remove abnormal fluid buildup in your abdomen. Fluid builds up because of liver problems, such as swelling and scarring. Heart failure, kidney disease, a mass, or problems with your pancreas may also cause fluid buildup.     DISCHARGE INSTRUCTIONS:     Follow up with your healthcare provider as directed: Write down your questions so you remember to ask them during your visits.     Wound care: Remove dressing after 24 hours. Leave glue in place.    Return to your normal activities    Contact Interventional Radiology at [phone] if:  You have a fever and your wound is red and swollen.   You have yellow, green, or bad-smelling discharge coming from your wound.   You have pain or swelling in your abdomen.   You have an upset stomach or you vomit.   You have sudden, sharp pain in your abdomen.   You urinate very little or not at all.   You feel confused and more tired than usual.   Your arm or leg feels warm, tender, and painful. It may look swollen and red.   You suddenly feel lightheaded and have trouble breathing.

## 2025-05-07 DIAGNOSIS — K70.31 ALCOHOLIC CIRRHOSIS OF LIVER WITH ASCITES (HCC): ICD-10-CM

## 2025-05-08 RX ORDER — BACLOFEN 5 MG/1
5 TABLET ORAL 3 TIMES DAILY
Qty: 100 TABLET | Refills: 0 | Status: SHIPPED | OUTPATIENT
Start: 2025-05-08

## 2025-05-09 ENCOUNTER — HOSPITAL ENCOUNTER (OUTPATIENT)
Dept: INTERVENTIONAL RADIOLOGY/VASCULAR | Facility: HOSPITAL | Age: 65
End: 2025-05-09
Attending: FAMILY MEDICINE
Payer: COMMERCIAL

## 2025-05-09 VITALS
DIASTOLIC BLOOD PRESSURE: 63 MMHG | OXYGEN SATURATION: 99 % | RESPIRATION RATE: 18 BRPM | HEART RATE: 78 BPM | SYSTOLIC BLOOD PRESSURE: 134 MMHG

## 2025-05-09 DIAGNOSIS — K70.31 ALCOHOLIC CIRRHOSIS OF LIVER WITH ASCITES (HCC): ICD-10-CM

## 2025-05-09 PROCEDURE — 49083 ABD PARACENTESIS W/IMAGING: CPT

## 2025-05-09 PROCEDURE — 49083 ABD PARACENTESIS W/IMAGING: CPT | Performed by: RADIOLOGY

## 2025-05-09 RX ORDER — LIDOCAINE WITH 8.4% SOD BICARB 0.9%(10ML)
SYRINGE (ML) INJECTION AS NEEDED
Status: COMPLETED | OUTPATIENT
Start: 2025-05-09 | End: 2025-05-09

## 2025-05-09 RX ADMIN — Medication 10 ML: at 08:44

## 2025-05-09 NOTE — SEDATION DOCUMENTATION
Patient had a paracentesis performed by Dr. Simms with IR. A total of 3,800 ml of clear yellow fluid was removed, no labs needed. Patient offers no complaints at this time.

## 2025-05-09 NOTE — BRIEF OP NOTE (RAD/CATH)
INTERVENTIONAL RADIOLOGY PROCEDURE NOTE    Date: 5/9/2025    Procedure:   Procedure Summary       Date: 05/09/25 Room / Location: [facility] Interventional Radiology    Anesthesia Start:  Anesthesia Stop:     Procedure: IR PARACENTESIS Diagnosis:       Alcoholic cirrhosis of liver with ascites (HCC)      (Cirrhosis with ascites)    Scheduled Providers:  Responsible Provider:     Anesthesia Type: Not recorded ASA Status: Not recorded            Preoperative diagnosis:   1. Alcoholic cirrhosis of liver with ascites (HCC)         Postoperative diagnosis: Same.    Surgeon: Madison Simms MD     Assistant: None. No qualified resident was available.    Blood loss: 0    Specimens: 0     Findings: Left paracentesis 3.8 L    Complications: None immediate.    Anesthesia: local

## 2025-05-16 ENCOUNTER — HOSPITAL ENCOUNTER (OUTPATIENT)
Dept: INTERVENTIONAL RADIOLOGY/VASCULAR | Facility: HOSPITAL | Age: 65
End: 2025-05-16
Attending: FAMILY MEDICINE
Payer: COMMERCIAL

## 2025-05-16 VITALS
OXYGEN SATURATION: 98 % | DIASTOLIC BLOOD PRESSURE: 63 MMHG | RESPIRATION RATE: 15 BRPM | HEART RATE: 80 BPM | SYSTOLIC BLOOD PRESSURE: 137 MMHG

## 2025-05-16 DIAGNOSIS — K70.31 ALCOHOLIC CIRRHOSIS OF LIVER WITH ASCITES (HCC): ICD-10-CM

## 2025-05-16 PROCEDURE — 49083 ABD PARACENTESIS W/IMAGING: CPT

## 2025-05-16 PROCEDURE — 49083 ABD PARACENTESIS W/IMAGING: CPT | Performed by: PHYSICIAN ASSISTANT

## 2025-05-16 RX ORDER — LIDOCAINE WITH 8.4% SOD BICARB 0.9%(10ML)
SYRINGE (ML) INJECTION AS NEEDED
Status: COMPLETED | OUTPATIENT
Start: 2025-05-16 | End: 2025-05-16

## 2025-05-16 RX ADMIN — Medication 10 ML: at 09:09

## 2025-05-23 ENCOUNTER — HOSPITAL ENCOUNTER (OUTPATIENT)
Dept: INTERVENTIONAL RADIOLOGY/VASCULAR | Facility: HOSPITAL | Age: 65
End: 2025-05-23
Attending: FAMILY MEDICINE
Payer: COMMERCIAL

## 2025-05-23 VITALS
DIASTOLIC BLOOD PRESSURE: 62 MMHG | RESPIRATION RATE: 18 BRPM | HEART RATE: 78 BPM | SYSTOLIC BLOOD PRESSURE: 135 MMHG | OXYGEN SATURATION: 99 %

## 2025-05-23 DIAGNOSIS — K70.31 ALCOHOLIC CIRRHOSIS OF LIVER WITH ASCITES (HCC): ICD-10-CM

## 2025-05-23 PROCEDURE — 49083 ABD PARACENTESIS W/IMAGING: CPT | Performed by: RADIOLOGY

## 2025-05-23 PROCEDURE — 49083 ABD PARACENTESIS W/IMAGING: CPT

## 2025-05-23 RX ORDER — LIDOCAINE WITH 8.4% SOD BICARB 0.9%(10ML)
SYRINGE (ML) INJECTION AS NEEDED
Status: COMPLETED | OUTPATIENT
Start: 2025-05-23 | End: 2025-05-23

## 2025-05-23 RX ADMIN — Medication 10 ML: at 08:50

## 2025-05-23 NOTE — BRIEF OP NOTE (RAD/CATH)
INTERVENTIONAL RADIOLOGY PROCEDURE NOTE    Date: 5/23/2025    Procedure:   Procedure Summary       Date: 05/23/25 Room / Location: [facility] Interventional Radiology    Anesthesia Start:  Anesthesia Stop:     Procedure: IR PARACENTESIS Diagnosis:       Alcoholic cirrhosis of liver with ascites (HCC)      (Cirrhosis with ascites)    Scheduled Providers:  Responsible Provider:     Anesthesia Type: Not recorded ASA Status: Not recorded            Preoperative diagnosis:   1. Alcoholic cirrhosis of liver with ascites (HCC)         Postoperative diagnosis: Same.    Surgeon: Madison Simms MD     Assistant: None. No qualified resident was available.    Blood loss: 0    Specimens: 0-     Findings: 1750 left paracentesis    Complications: None immediate.    Anesthesia: local

## 2025-05-23 NOTE — SEDATION DOCUMENTATION
Patient had a paracentesis performed by KARLIE Arriaza with IR. A total of 1,750 ml of clear yellow fluid was removed, no labs needed. Patient offers no complaints at this time.

## 2025-05-30 ENCOUNTER — HOSPITAL ENCOUNTER (OUTPATIENT)
Dept: INTERVENTIONAL RADIOLOGY/VASCULAR | Facility: HOSPITAL | Age: 65
Discharge: HOME | End: 2025-05-30
Attending: FAMILY MEDICINE
Payer: COMMERCIAL

## 2025-05-30 VITALS
SYSTOLIC BLOOD PRESSURE: 146 MMHG | OXYGEN SATURATION: 98 % | HEART RATE: 87 BPM | RESPIRATION RATE: 18 BRPM | DIASTOLIC BLOOD PRESSURE: 65 MMHG

## 2025-05-30 DIAGNOSIS — K70.31 ALCOHOLIC CIRRHOSIS OF LIVER WITH ASCITES (HCC): ICD-10-CM

## 2025-05-30 PROCEDURE — 76705 ECHO EXAM OF ABDOMEN: CPT

## 2025-05-30 PROCEDURE — 76705 ECHO EXAM OF ABDOMEN: CPT | Performed by: RADIOLOGY

## 2025-06-05 ENCOUNTER — OFFICE VISIT (OUTPATIENT)
Dept: INTERNAL MEDICINE CLINIC | Facility: CLINIC | Age: 65
End: 2025-06-05
Payer: COMMERCIAL

## 2025-06-05 VITALS
DIASTOLIC BLOOD PRESSURE: 60 MMHG | TEMPERATURE: 98.8 F | SYSTOLIC BLOOD PRESSURE: 118 MMHG | HEART RATE: 78 BPM | HEIGHT: 70 IN | BODY MASS INDEX: 43.23 KG/M2 | WEIGHT: 302 LBS | OXYGEN SATURATION: 96 %

## 2025-06-05 DIAGNOSIS — C02.9 SQUAMOUS CELL CARCINOMA OF TONGUE (HCC): ICD-10-CM

## 2025-06-05 DIAGNOSIS — Z00.00 WELL ADULT EXAM: ICD-10-CM

## 2025-06-05 DIAGNOSIS — R80.9 MICROALBUMINURIA: ICD-10-CM

## 2025-06-05 DIAGNOSIS — G47.33 OBSTRUCTIVE SLEEP APNEA: ICD-10-CM

## 2025-06-05 DIAGNOSIS — E78.1 PURE HYPERTRIGLYCERIDEMIA: ICD-10-CM

## 2025-06-05 DIAGNOSIS — I10 ESSENTIAL HYPERTENSION: Primary | ICD-10-CM

## 2025-06-05 DIAGNOSIS — D75.89 MACROCYTOSIS: ICD-10-CM

## 2025-06-05 DIAGNOSIS — Z72.0 TOBACCO USE: ICD-10-CM

## 2025-06-05 DIAGNOSIS — J42 CHRONIC BRONCHITIS, UNSPECIFIED CHRONIC BRONCHITIS TYPE (HCC): ICD-10-CM

## 2025-06-05 DIAGNOSIS — M1A.9XX0 CHRONIC GOUT WITHOUT TOPHUS, UNSPECIFIED CAUSE, UNSPECIFIED SITE: ICD-10-CM

## 2025-06-05 DIAGNOSIS — Z02.89 ENCOUNTER FOR PHYSICAL EXAMINATION RELATED TO EMPLOYMENT: ICD-10-CM

## 2025-06-05 DIAGNOSIS — R53.81 PHYSICAL DECONDITIONING: ICD-10-CM

## 2025-06-05 DIAGNOSIS — Z00.00 ANNUAL PHYSICAL EXAM: ICD-10-CM

## 2025-06-05 DIAGNOSIS — Z13.1 SCREENING FOR DIABETES MELLITUS: ICD-10-CM

## 2025-06-05 DIAGNOSIS — F17.210 SMOKING GREATER THAN 20 PACK YEARS: ICD-10-CM

## 2025-06-05 DIAGNOSIS — E66.01 MORBID OBESITY WITH BMI OF 40.0-44.9, ADULT (HCC): ICD-10-CM

## 2025-06-05 DIAGNOSIS — Z12.5 SCREENING FOR PROSTATE CANCER: ICD-10-CM

## 2025-06-05 DIAGNOSIS — N18.2 CKD (CHRONIC KIDNEY DISEASE) STAGE 2, GFR 60-89 ML/MIN: ICD-10-CM

## 2025-06-05 DIAGNOSIS — K70.31 ALCOHOLIC CIRRHOSIS OF LIVER WITH ASCITES (HCC): ICD-10-CM

## 2025-06-05 DIAGNOSIS — Z23 ENCOUNTER FOR IMMUNIZATION: ICD-10-CM

## 2025-06-05 DIAGNOSIS — R18.8 OTHER ASCITES: ICD-10-CM

## 2025-06-05 PROBLEM — I95.9 HYPOTENSION: Status: RESOLVED | Noted: 2024-11-15 | Resolved: 2025-06-05

## 2025-06-05 PROCEDURE — 99397 PER PM REEVAL EST PAT 65+ YR: CPT | Performed by: INTERNAL MEDICINE

## 2025-06-05 NOTE — ASSESSMENT & PLAN NOTE
Lab Results   Component Value Date    EGFR 93 04/21/2025    EGFR 97 03/31/2025    EGFR 98 01/30/2025    CREATININE 0.80 04/21/2025    CREATININE 0.72 03/31/2025    CREATININE 0.70 01/30/2025

## 2025-06-05 NOTE — PATIENT INSTRUCTIONS
"Patient Education     High blood pressure in adults   The Basics   Written by the doctors and editors at [facility]   What is high blood pressure? -- High blood pressure is a condition that puts you at risk for heart attack, stroke, and kidney disease. It does not usually cause symptoms. But it can be serious.  When your doctor or nurse tells you your blood pressure, they say 2 numbers. For instance, your doctor or nurse might say that your blood pressure is \"130 over 80.\" The top number is the pressure inside your arteries when your heart is evert. The bottom number is the pressure inside your arteries when your heart is relaxed.  \"Elevated blood pressure\" is a term doctors or nurses use as a warning. People with elevated blood pressure do not yet have high blood pressure. But their blood pressure is not as low as it should be for good health.  Many experts define high, elevated, and normal blood pressure as follows:   High - Top number of 130 or above and/or bottom number of 80 or above.   Elevated - Top number between 120 and 129 and bottom number of 79 or below.   Normal - Top number of 119 or below and bottom number of 79 or below.  This information is also in the table (table 1).  How can I lower my blood pressure? -- If your doctor or nurse prescribed blood pressure medicine, the most important thing you can do is to take it. If it causes side effects, do not just stop taking it. Instead, talk to your doctor or nurse about the problems it causes. They might be able to lower your dose or switch you to another medicine. If cost is a problem, mention that, too. They might be able to put you on a less expensive medicine. Taking your blood pressure medicine can keep you from having a heart attack or stroke, and it can save your life!  Can I do anything on my own? -- You have a lot of control over your blood pressure. To lower it:   Lose weight (if you are overweight).   Choose a diet low in fat and rich in " "fruits, vegetables, and low-fat dairy products.   Eat less salt.   Do something active for at least 30 minutes a day on most days of the week.   Drink less alcohol (if you drink more than 2 alcoholic drinks per day).  It's also a good idea to get a home blood pressure meter. People who check their own blood pressure at home do better at keeping it low and can sometimes even reduce the amount of medicine they take.  All topics are updated as new evidence becomes available and our peer review process is complete.  This topic retrieved from [employer] on: Feb 26, 2024.  Topic 50775 Version 23.0  Release: 32.2.4 - C32.56  © 2024 UpToDate, Inc. and/or its affiliates. All rights reserved.  table 1: Definition of normal and high blood pressure  Level  Top number  Bottom number    High 130 or above 80 or above   Elevated 120 to 129 79 or below   Normal 119 or below 79 or below   These definitions are from the American College of Cardiology/American Heart Association. Other expert groups might use slightly different definitions.  \"Elevated blood pressure\" is a term doctor or nurses use as a warning. It means you do not yet have high blood pressure, but your blood pressure is not as low as it should be for good health.  Graphic 70548 Version 6.0  Consumer Information Use and Disclaimer   Disclaimer: This generalized information is a limited summary of diagnosis, treatment, and/or medication information. It is not meant to be comprehensive and should be used as a tool to help the user understand and/or assess potential diagnostic and treatment options. It does NOT include all information about conditions, treatments, medications, side effects, or risks that may apply to a specific patient. It is not intended to be medical advice or a substitute for the medical advice, diagnosis, or treatment of a health care provider based on the health care provider's examination and assessment of a patient's specific and unique circumstances. " "Patients must speak with a health care provider for complete information about their health, medical questions, and treatment options, including any risks or benefits regarding use of medications. This information does not endorse any treatments or medications as safe, effective, or approved for treating a specific patient. UpToDate, Inc. and its affiliates disclaim any warranty or liability relating to this information or the use thereof.The use of this information is governed by the Terms of Use, available at https://www.[employer].com/en/know/clinical-effectiveness-terms. 2024© UpToDate, Inc. and its affiliates and/or licensors. All rights reserved.  Copyright   © 2024 UpToDate, Inc. and/or its affiliates. All rights reserved.    Patient Education     Routine physical for adults   The Basics   Written by the doctors and editors at [employer] [facility] [employer]   What is a physical? -- A physical is a routine visit, or \"check-up,\" with your doctor. You might also hear it called a \"wellness visit\" or \"preventive visit.\"  During each visit, the doctor will:   Ask about your physical and mental health   Ask about your habits, behaviors, and lifestyle   Do an exam   Give you vaccines if needed   Talk to you about any medicines you take   Give advice about your health   Answer your questions  Getting regular check-ups is an important part of taking care of your health. It can help your doctor find and treat any problems you have. But it's also important for preventing health problems.  A routine physical is different from a \"sick visit.\" A sick visit is when you see a doctor because of a health concern or problem. Since physicals are scheduled ahead of time, you can think about what you want to ask the doctor.  How often should I get a physical? -- It depends on your age and health. In general, for people age 21 years and older:   If you are younger than 50 years, you might be able to get a physical every 3 years.   If you are 50 years " "or older, your doctor might recommend a physical every year.  If you have an ongoing health condition, like diabetes or high blood pressure, your doctor will probably want to see you more often.  What happens during a physical? -- In general, each visit will include:   Physical exam - The doctor or nurse will check your height, weight, heart rate, and blood pressure. They will also look at your eyes and ears. They will ask about how you are feeling and whether you have any symptoms that bother you.   Medicines - It's a good idea to bring a list of all the medicines you take to each doctor visit. Your doctor will talk to you about your medicines and answer any questions. Tell them if you are having any side effects that bother you. You should also tell them if you are having trouble paying for any of your medicines.   Habits and behaviors - This includes:   Your diet   Your exercise habits   Whether you smoke, drink alcohol, or use drugs   Whether you are sexually active   Whether you feel safe at home  Your doctor will talk to you about things you can do to improve your health and lower your risk of health problems. They will also offer help and support. For example, if you want to quit smoking, they can give you advice and might prescribe medicines. If you want to improve your diet or get more physical activity, they can help you with this, too.   Lab tests, if needed - The tests you get will depend on your age and situation. For example, your doctor might want to check your:   Cholesterol   Blood sugar   Iron level   Vaccines - The recommended vaccines will depend on your age, health, and what vaccines you already had. Vaccines are very important because they can prevent certain serious or deadly infections.   Discussion of screening - \"Screening\" means checking for diseases or other health problems before they cause symptoms. Your doctor can recommend screening based on your age, risk, and preferences. This might " include tests to check for:   Cancer, such as breast, prostate, cervical, ovarian, colorectal, prostate, lung, or skin cancer   Sexually transmitted infections, such as chlamydia and gonorrhea   Mental health conditions like depression and anxiety  Your doctor will talk to you about the different types of screening tests. They can help you decide which screenings to have. They can also explain what the results might mean.   Answering questions - The physical is a good time to ask the doctor or nurse questions about your health. If needed, they can refer you to other doctors or specialists, too.  Adults older than 65 years often need other care, too. As you get older, your doctor will talk to you about:   How to prevent falling at home   Hearing or vision tests   Memory testing   How to take your medicines safely   Making sure that you have the help and support you need at home  All topics are updated as new evidence becomes available and our peer review process is complete.  This topic retrieved from [employer] on: May 02, 2024.  Topic 539604 Version 1.0  Release: 32.4.3 - C32.122  © 2024 UpToDate, Inc. and/or its affiliates. All rights reserved.  Consumer Information Use and Disclaimer   Disclaimer: This generalized information is a limited summary of diagnosis, treatment, and/or medication information. It is not meant to be comprehensive and should be used as a tool to help the user understand and/or assess potential diagnostic and treatment options. It does NOT include all information about conditions, treatments, medications, side effects, or risks that may apply to a specific patient. It is not intended to be medical advice or a substitute for the medical advice, diagnosis, or treatment of a health care provider based on the health care provider's examination and assessment of a patient's specific and unique circumstances. Patients must speak with a health care provider for complete information about their  health, medical questions, and treatment options, including any risks or benefits regarding use of medications. This information does not endorse any treatments or medications as safe, effective, or approved for treating a specific patient. UpToDate, Inc. and its affiliates disclaim any warranty or liability relating to this information or the use thereof.The use of this information is governed by the Terms of Use, available at https://www.[employer].com/en/know/clinical-effectiveness-terms. 2024© UpToDate, Inc. and its affiliates and/or licensors. All rights reserved.  Copyright   © 2024 UpToDate, Inc. and/or its affiliates. All rights reserved.

## 2025-06-05 NOTE — PROGRESS NOTES
Adult Annual Physical  Name: Raymundo Weller      : 1960      MRN: [id number]  Encounter Provider: Lisandro Gauthier DO  Encounter Date: 2025   Encounter department: [facility]    :  Assessment & Plan  Smoking greater than 20 pack years    Orders:    CT lung screening program; Future    Encounter for immunization         Essential hypertension    Orders:    Albumin / creatinine urine ratio; Future    Chronic bronchitis, unspecified chronic bronchitis type (HCC)         Obstructive sleep apnea         Alcoholic cirrhosis of liver with ascites (HCC)         Squamous cell carcinoma of tongue (HCC)         CKD (chronic kidney disease) stage 2, GFR 60-89 ml/min  Lab Results   Component Value Date    EGFR 93 2025    EGFR 97 2025    EGFR 98 2025    CREATININE 0.80 2025    CREATININE 0.72 2025    CREATININE 0.70 2025            Tobacco use         Macrocytosis    Orders:    Hemoglobin and hematocrit, blood; Future    Chronic gout without tophus, unspecified cause, unspecified site    Orders:    Uric acid; Future    Pure hypertriglyceridemia         Microalbuminuria         Morbid obesity with BMI of 40.0-44.9, adult (HCC)      Orders:    Ambulatory referral to Physical Therapy; Future    Other ascites    Orders:    Ambulatory referral to Physical Therapy; Future    Encounter for physical examination related to employment    Orders:    Hemoglobin A1C; Future    Lipid Panel with Direct LDL reflex; Future    Screening for prostate cancer    Orders:    PSA, Total Screen; Future    Screening for diabetes mellitus    Orders:    Hemoglobin A1C; Future    Well adult exam    Orders:    PSA, Total Screen; Future    Hemoglobin A1C; Future    Lipid Panel with Direct LDL reflex; Future    Annual physical exam         Physical deconditioning    Orders:    Ambulatory referral to Physical Therapy; Future        Preventive Screenings:  - Diabetes Screening: screening  up-to-date and orders placed  - Cholesterol Screening: screening not indicated, has hyperlipidemia and orders placed   - Hepatitis C screening: screening up-to-date   - Colon cancer screening: screening up-to-date   - Lung cancer screening: orders placed   - Prostate cancer screening: screening up-to-date   - AAA screening: has history of AAA     Immunizations:  - Immunizations due: Zoster (Shingrix), Hepatitis A and Hepatitis B    Counseling/Anticipatory Guidance:    - Diet: discussed recommendations for a healthy/well-balanced diet.   - Exercise: the importance of regular exercise/physical activity was discussed. Recommend exercise 3-5 times per week for at least 30 minutes.       Tobacco Cessation Counseling: The patient is sincerely urged to quit consumption of tobacco. He is ready to quit tobacco. Medication options discussed. Side effects of medication not discussed. Patient refused medication.     A/P: Doing well and co-morbidities are stable.  Labs will be ordered. Appreciate GI input. Discussed vaccines and defers all. Wean tobacco. Refer to PT for conditioning. Order CT lung ca screening. Order work labs. . No mental or physical restrictions. No evidence of communicable diseases. Continue current treatment and RTC one year for annual and four months for routine.       History of Present Illness     Adult Annual Physical:  Patient presents for annual physical. WM presents for a well exam. Doing ok and no c/o's. Continues with frequent paracentesis,but decreased amounts removed.. COPD is controlled and limited rescue MDI. Still smoking. Tongue ca is in remission. Chronic pain is manageable. MDD/DEYSI are controlled. Remains active w/o difficulty and no falls. Denies depresion. No suicidal or violent ideations. Not working at this time.. No recent travel. No fever, chills, or sweats. No unexplained wt change. Denies CP, SOB, palpitations, edema, orthopnea, or PND. No sz or syncope. No changes in bowel or  "bladder habits. No trouble swallowing. Appetite and sleep are good. Sees a DDS, but needs to see an eye doctor. No change in PMH, PSH, ALL, OH, SH, or Fhx. Currently due for labs, vaccines and CT lung ca screening.  .      .     Diet and Physical Activity:  - Diet/Nutrition: no special diet.  - Exercise: walking.    General Health:  - Sleep: sleeps well and uses CPAP machine.  - Hearing: decreased hearing bilateral ears.  - Vision: no vision problems and most recent eye exam > 1 year ago.  - Dental: regular dental visits.    /GYN Health:  - Follows with GYN: no.   - History of STDs: no     Health:  - History of STDs: no.   - Urinary symptoms: none.     Advanced Care Planning:  - Has an advanced directive?: yes    - Has a durable medical POA?: yes    - ACP document given to patient?: no      Review of Systems   Constitutional:  Negative for activity change, chills, diaphoresis, fatigue and fever.   HENT: Negative.     Eyes:  Negative for visual disturbance.   Respiratory:  Negative for cough, chest tightness, shortness of breath and wheezing.    Cardiovascular:  Negative for chest pain, palpitations and leg swelling.   Gastrointestinal:  Negative for abdominal pain, constipation, diarrhea, nausea and vomiting.   Genitourinary:  Negative for difficulty urinating, dysuria and frequency.   Musculoskeletal:  Negative for arthralgias, gait problem and myalgias.   Neurological:  Negative for dizziness, seizures, syncope, weakness, light-headedness and headaches.   Psychiatric/Behavioral:  Negative for confusion and dysphoric mood. The patient is not nervous/anxious.          Objective   /60 (BP Location: Left arm, Patient Position: Sitting)   Pulse 78   Temp 98.8 °F (37.1 °C) (Tympanic)   Ht 5' 10\" (1.778 m)   Wt (!) 137 kg (302 lb)   SpO2 96%   BMI 43.33 kg/m²     Physical Exam  Vitals and nursing note reviewed.   Constitutional:       General: He is not in acute distress.     Appearance: Normal " appearance. He is not ill-appearing.   HENT:      Head: Normocephalic and atraumatic.      Nose: Nose normal.      Mouth/Throat:      Mouth: Mucous membranes are moist.     Eyes:      Extraocular Movements: Extraocular movements intact.      Conjunctiva/sclera: Conjunctivae normal.      Pupils: Pupils are equal, round, and reactive to light.     Neck:      Vascular: No carotid bruit.     Cardiovascular:      Rate and Rhythm: Normal rate and regular rhythm.      Heart sounds: Normal heart sounds. No murmur heard.  Pulmonary:      Effort: Pulmonary effort is normal. No respiratory distress.      Breath sounds: Normal breath sounds. No wheezing, rhonchi or rales.   Abdominal:      General: There is no distension.      Palpations: Abdomen is soft.      Tenderness: There is no abdominal tenderness.     Musculoskeletal:      Cervical back: Neck supple.      Right lower leg: Edema present.      Left lower leg: Edema present.     Neurological:      General: No focal deficit present.      Mental Status: He is alert and oriented to person, place, and time. Mental status is at baseline.     Psychiatric:         Mood and Affect: Mood normal.         Behavior: Behavior normal.         Thought Content: Thought content normal.         Judgment: Judgment normal.

## 2025-06-10 ENCOUNTER — HOSPITAL ENCOUNTER (OUTPATIENT)
Dept: INTERVENTIONAL RADIOLOGY/VASCULAR | Facility: HOSPITAL | Age: 65
Discharge: HOME | End: 2025-06-10
Attending: INTERNAL MEDICINE
Payer: COMMERCIAL

## 2025-06-10 VITALS
SYSTOLIC BLOOD PRESSURE: 148 MMHG | HEART RATE: 75 BPM | DIASTOLIC BLOOD PRESSURE: 65 MMHG | OXYGEN SATURATION: 96 % | RESPIRATION RATE: 18 BRPM

## 2025-06-10 DIAGNOSIS — K70.31 ALCOHOLIC CIRRHOSIS OF LIVER WITH ASCITES (HCC): ICD-10-CM

## 2025-06-10 PROCEDURE — 49083 ABD PARACENTESIS W/IMAGING: CPT | Performed by: PHYSICIAN ASSISTANT

## 2025-06-10 PROCEDURE — 49083 ABD PARACENTESIS W/IMAGING: CPT

## 2025-06-10 RX ORDER — LIDOCAINE WITH 8.4% SOD BICARB 0.9%(10ML)
SYRINGE (ML) INJECTION AS NEEDED
Status: COMPLETED | OUTPATIENT
Start: 2025-06-10 | End: 2025-06-10

## 2025-06-10 RX ADMIN — Medication 10 ML: at 10:27

## 2025-06-10 NOTE — BRIEF OP NOTE (RAD/CATH)
IR PARACENTESIS Procedure Note    PATIENT NAME: Raymundo Weller  : 1960  MRN: [id number]    Pre-op Diagnosis:   1. Alcoholic cirrhosis of liver with ascites (HCC)      Post-op Diagnosis:   1. Alcoholic cirrhosis of liver with ascites (HCC)        Provider:   Delano Cabrera PA-C    Estimated Blood Loss: minimal    Findings: LLQ paracentesis, 2700cc clear yellow    Specimens: none     Complications:  none immediate    Anesthesia: local    Delano Cabrera PA-C     Date: 6/10/2025  Time: 11:08 AM

## 2025-06-16 ENCOUNTER — HOSPITAL ENCOUNTER (OUTPATIENT)
Dept: INTERVENTIONAL RADIOLOGY/VASCULAR | Facility: HOSPITAL | Age: 65
Discharge: HOME | End: 2025-06-16
Attending: INTERNAL MEDICINE
Payer: COMMERCIAL

## 2025-06-16 ENCOUNTER — APPOINTMENT (OUTPATIENT)
Dept: LAB | Facility: CLINIC | Age: 65
End: 2025-06-16
Attending: INTERNAL MEDICINE
Payer: COMMERCIAL

## 2025-06-16 VITALS
OXYGEN SATURATION: 100 % | RESPIRATION RATE: 18 BRPM | HEART RATE: 75 BPM | SYSTOLIC BLOOD PRESSURE: 138 MMHG | DIASTOLIC BLOOD PRESSURE: 63 MMHG

## 2025-06-16 DIAGNOSIS — M1A.9XX0 CHRONIC GOUT WITHOUT TOPHUS, UNSPECIFIED CAUSE, UNSPECIFIED SITE: ICD-10-CM

## 2025-06-16 DIAGNOSIS — Z02.89 ENCOUNTER FOR PHYSICAL EXAMINATION RELATED TO EMPLOYMENT: ICD-10-CM

## 2025-06-16 DIAGNOSIS — Z00.00 WELL ADULT EXAM: ICD-10-CM

## 2025-06-16 DIAGNOSIS — Z12.5 SCREENING FOR PROSTATE CANCER: ICD-10-CM

## 2025-06-16 DIAGNOSIS — K70.31 ALCOHOLIC CIRRHOSIS OF LIVER WITH ASCITES (HCC): ICD-10-CM

## 2025-06-16 DIAGNOSIS — D75.89 MACROCYTOSIS: ICD-10-CM

## 2025-06-16 DIAGNOSIS — Z13.1 SCREENING FOR DIABETES MELLITUS: ICD-10-CM

## 2025-06-16 DIAGNOSIS — I10 ESSENTIAL HYPERTENSION: ICD-10-CM

## 2025-06-16 LAB
CHOLEST SERPL-MCNC: 123 MG/DL (ref ?–200)
CREAT UR-MCNC: 32.6 MG/DL
EST. AVERAGE GLUCOSE BLD GHB EST-MCNC: 131 MG/DL
HBA1C MFR BLD: 6.2 %
HCT VFR BLD AUTO: 34.1 % (ref 36.5–49.3)
HDLC SERPL-MCNC: 24 MG/DL
HGB BLD-MCNC: 10.1 G/DL (ref 12–17)
LDLC SERPL CALC-MCNC: 74 MG/DL (ref 0–100)
MICROALBUMIN UR-MCNC: <7 MG/L
PSA SERPL-MCNC: 0.29 NG/ML (ref 0–4)
TRIGL SERPL-MCNC: 124 MG/DL (ref ?–150)
URATE SERPL-MCNC: 7.2 MG/DL (ref 3.5–8.5)

## 2025-06-16 PROCEDURE — G0103 PSA SCREENING: HCPCS

## 2025-06-16 PROCEDURE — 84550 ASSAY OF BLOOD/URIC ACID: CPT

## 2025-06-16 PROCEDURE — 36415 COLL VENOUS BLD VENIPUNCTURE: CPT

## 2025-06-16 PROCEDURE — 80061 LIPID PANEL: CPT

## 2025-06-16 PROCEDURE — 83036 HEMOGLOBIN GLYCOSYLATED A1C: CPT

## 2025-06-16 PROCEDURE — 82043 UR ALBUMIN QUANTITATIVE: CPT

## 2025-06-16 PROCEDURE — 82570 ASSAY OF URINE CREATININE: CPT

## 2025-06-16 PROCEDURE — 85014 HEMATOCRIT: CPT

## 2025-06-16 PROCEDURE — 85018 HEMOGLOBIN: CPT

## 2025-06-16 PROCEDURE — 49083 ABD PARACENTESIS W/IMAGING: CPT

## 2025-06-16 PROCEDURE — 49083 ABD PARACENTESIS W/IMAGING: CPT | Performed by: PHYSICIAN ASSISTANT

## 2025-06-16 RX ORDER — LIDOCAINE WITH 8.4% SOD BICARB 0.9%(10ML)
SYRINGE (ML) INJECTION AS NEEDED
Status: COMPLETED | OUTPATIENT
Start: 2025-06-16 | End: 2025-06-16

## 2025-06-16 RX ADMIN — Medication 10 ML: at 08:51

## 2025-06-16 NOTE — SEDATION DOCUMENTATION
Paracentesis completed by Abdulkadir Cabrera PA-C. 1600 ml clear yellow fluid removed. Pt tolerated well. Band aid applied to site.

## 2025-06-16 NOTE — BRIEF OP NOTE (RAD/CATH)
IR PARACENTESIS Procedure Note    PATIENT NAME: Raymundo Weller  : 1960  MRN: [id number]    Pre-op Diagnosis:   1. Alcoholic cirrhosis of liver with ascites (HCC)      Post-op Diagnosis:   1. Alcoholic cirrhosis of liver with ascites (HCC)        Provider:   Delano Cabrera PA-C    Estimated Blood Loss: minimal    Findings: LLQ paracentesis, 1600cc clear yellow    Specimens: none    Complications:  none immediate    Anesthesia: local    Dleano Cabrera PA-C     Date: 2025  Time: 9:12 AM

## 2025-06-17 ENCOUNTER — RESULTS FOLLOW-UP (OUTPATIENT)
Dept: INTERNAL MEDICINE CLINIC | Facility: CLINIC | Age: 65
End: 2025-06-17

## 2025-06-24 ENCOUNTER — HOSPITAL ENCOUNTER (OUTPATIENT)
Dept: INTERVENTIONAL RADIOLOGY/VASCULAR | Facility: HOSPITAL | Age: 65
Discharge: HOME | End: 2025-06-24
Attending: INTERNAL MEDICINE | Admitting: RADIOLOGY
Payer: COMMERCIAL

## 2025-06-24 VITALS
OXYGEN SATURATION: 96 % | DIASTOLIC BLOOD PRESSURE: 63 MMHG | SYSTOLIC BLOOD PRESSURE: 134 MMHG | RESPIRATION RATE: 18 BRPM | HEART RATE: 81 BPM

## 2025-06-24 DIAGNOSIS — K70.31 ALCOHOLIC CIRRHOSIS OF LIVER WITH ASCITES (HCC): ICD-10-CM

## 2025-06-24 PROCEDURE — 49083 ABD PARACENTESIS W/IMAGING: CPT

## 2025-06-24 PROCEDURE — 49083 ABD PARACENTESIS W/IMAGING: CPT | Performed by: PHYSICIAN ASSISTANT

## 2025-06-24 RX ORDER — LIDOCAINE WITH 8.4% SOD BICARB 0.9%(10ML)
SYRINGE (ML) INJECTION AS NEEDED
Status: COMPLETED | OUTPATIENT
Start: 2025-06-24 | End: 2025-06-24

## 2025-06-24 RX ADMIN — Medication 10 ML: at 11:28

## 2025-06-25 DIAGNOSIS — K70.31 ALCOHOLIC CIRRHOSIS OF LIVER WITH ASCITES (HCC): ICD-10-CM

## 2025-06-25 NOTE — PROGRESS NOTES
PT Evaluation     Today's date: 2025  Patient name: Raymundo Weller  : 1960  MRN: [id number]  Referring provider: Lisandro Gauthier DO  Dx:   Encounter Diagnosis     ICD-10-CM    1. Morbid obesity with BMI of 40.0-44.9, adult (HCC)  E66.01 Ambulatory referral to Physical Therapy    Z68.41       2. Other ascites  R18.8 Ambulatory referral to Physical Therapy      3. Physical deconditioning  R53.81 Ambulatory referral to Physical Therapy          Start Time: 1530  Stop Time: 1615  Total time in clinic (min): 45 minutes    Assessment  Impairments: abnormal gait, activity intolerance, impaired balance, impaired physical strength, lacks appropriate home exercise program, pain with function, participation limitations and activity limitations    Assessment details: Raymundo Weller is a 64 y.o. male presenting to outpatient physical therapy with chief diagnosis of general conditioning with noted impairments including occasional B knee pain, reduced BLE strength, impaired endurance, reduced postural awareness, impaired static/dynamic balance, and reduced activity tolerance. Increased time needed to perform 5x STS due to LE weakness and fatigue. Significant LE and CV fatigue after performance of 6MWT that required seated rest break afterwards. Impaired somatosensory input with performance of MCTSIB, especially when on compliant surface. Also noting occasional LOB with self correction with stepping strategies when performing 6MWT, specifically when performing turns. Noting significant decreased LE strength and deconditioning. Due to noted impairments, the patient's present functional limitations include difficulty with ADLs/IADLs, reduced tolerance for functional mobility and activity, impaired safety, and difficulty completing HH responsibilities and hobbies. Patient to benefit from skilled outpatient physical therapy 1-2x/week for 6-8 weeks in order to reduce pain, maximize pain free range of motion, increase  "strength and stability, improve static/dynamic balance, and improve functional mobility/functional activity in order to maximize function with reduced limitations. Home exercise program was provided and all questions answered to patient's level of satisfaction. Also discussed use of compression stockings and elevating BLE above heart level for improvement of fluid retention. Pt understanding. Thank you for your referral.       Understanding of Dx/Px/POC: good     Prognosis: good    Goals  STGs to be achieved in 4 weeks:  1. Pt to demonstrate reduced subjective pain rating \"at worst\" by at least 2-3 points from Initial Eval in order to allow for reduced pain with ADLs and improved functional activity tolerance.   2. Pt to improve 5 x STS time by at least 2-3 seconds in order to demonstrate improving functional LE strength to aid with ADLs/IADLs.   3. Pt to demonstrate increased MMT of BLE in all deficient planes by at least 1/2-1 grade in order to improve safety and stability with ADLs and functional mobility.   4. Pt to report at least 25% improvement in performance of ambulating on inclines/declines to demo improving safety and function.       LTGs to be achieved by discharge:  1. Pt will be I with HEP in order to continue to improve quality of life and independence and reduce risk for re-injury.   2. Pt to demonstrate return to performance of ascending/descending stairs without limitations or restrictions.   3. Pt to report at least 75% improvement in reported symptoms with performance of ADLs/IADLs and hobbies to demo improvement in QOL.   4. Pt to demonstrate ability to complete 5x STS time in at least 9-10 seconds to demo improvement in functional LE strength.   5. Pt to demonstrate at least 100-150 feet improvement in 6 MWT to demo improving endurance.       Plan  Patient would benefit from: skilled physical therapy  Planned modality interventions: cryotherapy and thermotherapy: hydrocollator packs    Planned " therapy interventions: abdominal trunk stabilization, IASTM, joint mobilization, manual therapy, motor coordination training, neuromuscular re-education, patient/caregiver education, postural training, strengthening, stretching, therapeutic activities, therapeutic exercise, home exercise program, graded activity, flexibility, coordination, breathing training and balance    Frequency: 1-2x week  Duration in weeks: 8  Plan of Care beginning date: 6/26/2025  Plan of Care expiration date: 9/3/2025  Treatment plan discussed with: patient      Subjective Evaluation    History of Present Illness  Mechanism of injury: Pt is presenting to OPPT with chief complaint of  LE weakness and fluid retention. Reports that knees feel weak and he has trouble performing steps. Reports that going up steps is more difficult then going down. Reports he feels weak performing these. Reports that hills are difficult; reports difficulty with inclines. Reports that overall endurance also feels decreased with activities. Reports that he does not experience any dizziness. Reports that fluid retention increases throughout the day and is decreased in the morning. Reports that in the morning fluid retention is better. Denies any significant pain; slight ache in the knees. Reports that he is retired. Reports that he likes to golf but has not been out yet. Reports that he has tried to wear compression stockings but have not helped. Reports that he elevates legs in recliner but not above chest level. Reports that he takes water pills for the retention. Reports just general weakness and decreased endurance and would like to improve this.           Recurrent probem    Quality of life: good    Patient Goals  Patient goals for therapy: decreased pain, improved balance, increased motion, increased strength, independence with ADLs/IADLs and return to sport/leisure activities  Patient goal: be able to do steps easier  Pain  No pain reported    Social  Support  Steps to enter house: yes  Stairs in house: yes   Lives in: multiple-level home    Employment status: not working  Treatments  Previous treatment: physical therapy  Current treatment: physical therapy        Objective    Vitals:     Balance Test 6/26/25   6 Minute Walk Test (ft): 1099.98 ft with significant fatigue afterwards   2 Minute Walk Test (ft):    FGA:    Gait Speed (ft/s):    5x Sit To Stand (s): 13.49 sec   TUG:              Manual Muscle Testing - Hip Left Right   Flexion 4 4   Extension     Abduction 4 4   External Rotation 4+ 4+     Manual Muscle Testing - Knee Left Right   Flexion 4 4   Extension 4- 4-     Manual Muscle Testing - Ankle Left Right   Doriflexion 4 4   Plantarflexion 4 4             MCTSIB Number of Seconds   Feet Together, Eyes Open 30 sec   Feet Together, Eyes Closed 30 sec   Feet Together, Eyes Open Foam 30 sec   Feet Together, Eyes Closed Foam 10.1 sec                Precautions:       Re-eval Date: 8/8/25    Date 6/26/2025        Visit Count 1       FOTO 6/26/2025        Pain In See IE       Pain Out See IE           Manuals 6/26/2025                                        Neuro Re-Ed        FTEO foam    FTEC firm-foam        Tandem stance     Tandem walking        Side stepping    Backwards ambulation        hurdles        Step ups    Step downs        Amb with HT/HN        HKM progressing to bolster hold        STS Reviewed HEP       Single leg balance                                        Ther Ex        Nustep for warm up        Leg ext/flx machine        Leg press         Lateral lunges/fwd lunges         squats Reviewed HEP       HR/TR Reviewed HEP       SLR Reviewed HEP standing       Standing marches Reviewed HEP       Hip abd/add        Bridges                                 Ther Activity                        Gait Training                        Modalities                              6/26/2025  - HEP was issued and reviewed this date for above noted exercises.  Pt demonstrated understanding without incident and without questions/concerns. Will continue to update upcoming.

## 2025-06-26 ENCOUNTER — EVALUATION (OUTPATIENT)
Dept: PHYSICAL THERAPY | Facility: CLINIC | Age: 65
End: 2025-06-26
Attending: INTERNAL MEDICINE
Payer: COMMERCIAL

## 2025-06-26 DIAGNOSIS — E66.01 MORBID OBESITY WITH BMI OF 40.0-44.9, ADULT (HCC): ICD-10-CM

## 2025-06-26 DIAGNOSIS — R53.81 PHYSICAL DECONDITIONING: ICD-10-CM

## 2025-06-26 DIAGNOSIS — R18.8 OTHER ASCITES: ICD-10-CM

## 2025-06-26 PROCEDURE — 97110 THERAPEUTIC EXERCISES: CPT

## 2025-06-26 PROCEDURE — 97161 PT EVAL LOW COMPLEX 20 MIN: CPT

## 2025-06-26 RX ORDER — BACLOFEN 5 MG/1
5 TABLET ORAL 3 TIMES DAILY
Qty: 100 TABLET | Refills: 0 | Status: SHIPPED | OUTPATIENT
Start: 2025-06-26

## 2025-06-30 ENCOUNTER — HOSPITAL ENCOUNTER (OUTPATIENT)
Dept: INTERVENTIONAL RADIOLOGY/VASCULAR | Facility: HOSPITAL | Age: 65
Discharge: HOME | End: 2025-06-30
Attending: INTERNAL MEDICINE | Admitting: RADIOLOGY
Payer: COMMERCIAL

## 2025-06-30 ENCOUNTER — TRANSCRIBE ORDERS (OUTPATIENT)
Dept: RADIOLOGY | Facility: HOSPITAL | Age: 65
End: 2025-06-30

## 2025-06-30 VITALS
HEART RATE: 82 BPM | OXYGEN SATURATION: 98 % | SYSTOLIC BLOOD PRESSURE: 183 MMHG | RESPIRATION RATE: 18 BRPM | DIASTOLIC BLOOD PRESSURE: 79 MMHG

## 2025-06-30 DIAGNOSIS — K70.31 ALCOHOLIC CIRRHOSIS OF LIVER WITH ASCITES (HCC): ICD-10-CM

## 2025-06-30 DIAGNOSIS — R18.8 OTHER ASCITES: ICD-10-CM

## 2025-06-30 DIAGNOSIS — K70.31 ALCOHOLIC CIRRHOSIS OF LIVER WITH ASCITES (HCC): Primary | ICD-10-CM

## 2025-06-30 PROCEDURE — 76705 ECHO EXAM OF ABDOMEN: CPT

## 2025-06-30 PROCEDURE — 76705 ECHO EXAM OF ABDOMEN: CPT | Performed by: RADIOLOGY

## 2025-06-30 NOTE — PROCEDURES
Limited ultrasound shows a small amount of fluid on the left side.  I did not perform a paracentesis.  The fluid is asymptomatic.

## 2025-07-01 ENCOUNTER — OFFICE VISIT (OUTPATIENT)
Dept: PHYSICAL THERAPY | Facility: CLINIC | Age: 65
End: 2025-07-01
Attending: INTERNAL MEDICINE
Payer: COMMERCIAL

## 2025-07-01 DIAGNOSIS — R18.8 OTHER ASCITES: ICD-10-CM

## 2025-07-01 DIAGNOSIS — E66.01 MORBID OBESITY WITH BMI OF 40.0-44.9, ADULT (HCC): Primary | ICD-10-CM

## 2025-07-01 DIAGNOSIS — R53.81 PHYSICAL DECONDITIONING: ICD-10-CM

## 2025-07-01 PROCEDURE — 97112 NEUROMUSCULAR REEDUCATION: CPT | Performed by: PHYSICAL THERAPIST

## 2025-07-01 PROCEDURE — 97110 THERAPEUTIC EXERCISES: CPT | Performed by: PHYSICAL THERAPIST

## 2025-07-01 NOTE — PROGRESS NOTES
"Daily Note     Today's date: 2025  Patient name: Raymundo Weller  : 1960  MRN: [id number]  Referring provider: Lisandro Gauthier DO  Dx:   Encounter Diagnosis     ICD-10-CM    1. Morbid obesity with BMI of 40.0-44.9, adult (HCC)  E66.01     Z68.41       2. Other ascites  R18.8       3. Physical deconditioning  R53.81                      Subjective: Having hard time with the humidity.       Objective: See treatment diary below      Assessment: Initiated POC which pt tolerated well and completed POC without incident. Challenged with lateral step ups due to glute med weakness and rest break needed post. Muscular fatigue reported at the end of the session. Patient would benefit from continued PT      Plan: Progress treatment as tolerated.       Precautions:       Re-eval Date: 25    Date 2025      Visit Count 1 2      FOTO 2025        Pain In See IE       Pain Out See IE           Manuals 2025                                      Neuro Re-Ed        FTEO foam    FTEC firm-foam        Tandem stance     Tandem walking        Side stepping    Backwards ambulation        hurdles        Step ups    Step downs  6\" 15x fw/lat       Amb with HT/HN        HKM progressing to bolster hold  30' x 4     TT NV      STS Reviewed HEP       Single leg balance                                        Ther Ex        Nustep for warm up  L4, 10 min   SPM > 60       Leg ext/flx machine  LAQ 33# 2 x 10   HS curls 55# 2 x 10       Leg press   110# 2 x 10     110# 30x HR       Lateral lunges/fwd lunges         squats Reviewed HEP       HR/TR Reviewed HEP       SLR Reviewed HEP standing       Standing marches Reviewed HEP       Hip abd/add        Bridges                                 Ther Activity                        Gait Training                        Modalities                              2025  - HEP was issued and reviewed this date for above noted exercises. Pt demonstrated " understanding without incident and without questions/concerns. Will continue to update upcoming.

## 2025-07-03 ENCOUNTER — HOSPITAL ENCOUNTER (OUTPATIENT)
Dept: CT IMAGING | Facility: HOSPITAL | Age: 65
End: 2025-07-03
Attending: INTERNAL MEDICINE
Payer: COMMERCIAL

## 2025-07-03 DIAGNOSIS — F17.210 SMOKING GREATER THAN 20 PACK YEARS: ICD-10-CM

## 2025-07-03 PROCEDURE — 71271 CT THORAX LUNG CANCER SCR C-: CPT

## 2025-07-05 DIAGNOSIS — K70.31 ALCOHOLIC CIRRHOSIS OF LIVER WITH ASCITES (HCC): ICD-10-CM

## 2025-07-07 ENCOUNTER — OFFICE VISIT (OUTPATIENT)
Dept: PHYSICAL THERAPY | Facility: CLINIC | Age: 65
End: 2025-07-07
Attending: INTERNAL MEDICINE
Payer: COMMERCIAL

## 2025-07-07 DIAGNOSIS — E66.01 MORBID OBESITY WITH BMI OF 40.0-44.9, ADULT (HCC): Primary | ICD-10-CM

## 2025-07-07 DIAGNOSIS — R53.81 PHYSICAL DECONDITIONING: ICD-10-CM

## 2025-07-07 DIAGNOSIS — R18.8 OTHER ASCITES: ICD-10-CM

## 2025-07-07 PROCEDURE — 97110 THERAPEUTIC EXERCISES: CPT

## 2025-07-07 NOTE — PROGRESS NOTES
"Daily Note     Today's date: 2025  Patient name: Raymundo Weller  : 1960  MRN: [id number]  Referring provider: Lisandro Gauthier DO  Dx:   Encounter Diagnosis     ICD-10-CM    1. Morbid obesity with BMI of 40.0-44.9, adult (HCC)  E66.01     Z68.41       2. Other ascites  R18.8       3. Physical deconditioning  R53.81           Start Time: 1430  Stop Time: 1510  Total time in clinic (min): 40 minutes    Subjective: \"I have a cold and not feeling great.\"      Objective: See treatment diary below      Assessment: Tolerated treatment well. Able to complete program without incident.  Quick muscle fatigue and decreased endurance; seated rest periods needed.  Will continue to monitor and progress as able. Patient demonstrated fatigue post treatment and would benefit from continued PT      Plan: Continue per plan of care.  Progress treatment as tolerated.       Precautions:       Re-eval Date: 25    Date 2025     Visit Count 1 2 3     FOTO 2025        Pain In See IE       Pain Out See IE           Manuals 2025                                     Neuro Re-Ed        FTEO foam    FTEC firm-foam        Tandem stance     Tandem walking        Side stepping    Backwards ambulation        hurdles        Step ups    Step downs  6\" 15x fw/lat  6\" 20x fw/lat      Amb with HT/HN        HKM progressing to bolster hold  30' x 4     TT NV TT 4x30'     STS Reviewed HEP       Single leg balance                                        Ther Ex        Nustep for warm up  L4, 10 min   SPM > 60  L4, 10 min   SPM > 60      Leg ext/flx machine  LAQ 33# 2 x 10   HS curls 55# 2 x 10  LAQ 33# 2 x 10   HS curls 55# 2 x 10      Leg press   110# 2 x 10     110# 30x HR  110# 2 x 10     110# 30x HR      Lateral lunges/fwd lunges         squats Reviewed HEP       HR/TR Reviewed HEP       SLR Reviewed HEP standing  Flex standing  2x10     Standing marches Reviewed HEP       Hip abd/add        Bridges    "                              Ther Activity                        Gait Training                        Modalities                              6/26/2025  - HEP was issued and reviewed this date for above noted exercises. Pt demonstrated understanding without incident and without questions/concerns. Will continue to update upcoming.

## 2025-07-08 ENCOUNTER — HOSPITAL ENCOUNTER (OUTPATIENT)
Dept: INTERVENTIONAL RADIOLOGY/VASCULAR | Facility: HOSPITAL | Age: 65
Discharge: HOME | End: 2025-07-08
Attending: INTERNAL MEDICINE
Payer: COMMERCIAL

## 2025-07-08 VITALS
DIASTOLIC BLOOD PRESSURE: 67 MMHG | OXYGEN SATURATION: 99 % | SYSTOLIC BLOOD PRESSURE: 146 MMHG | HEART RATE: 88 BPM | RESPIRATION RATE: 16 BRPM

## 2025-07-08 DIAGNOSIS — K70.31 ALCOHOLIC CIRRHOSIS OF LIVER WITH ASCITES (HCC): ICD-10-CM

## 2025-07-08 DIAGNOSIS — R18.8 OTHER ASCITES: ICD-10-CM

## 2025-07-08 PROCEDURE — 76937 US GUIDE VASCULAR ACCESS: CPT

## 2025-07-08 PROCEDURE — 76705 ECHO EXAM OF ABDOMEN: CPT | Performed by: PHYSICIAN ASSISTANT

## 2025-07-08 RX ORDER — BACLOFEN 5 MG/1
5 TABLET ORAL 3 TIMES DAILY
Qty: 100 TABLET | Refills: 0 | Status: SHIPPED | OUTPATIENT
Start: 2025-07-08

## 2025-07-10 ENCOUNTER — OFFICE VISIT (OUTPATIENT)
Dept: PHYSICAL THERAPY | Facility: CLINIC | Age: 65
End: 2025-07-10
Attending: INTERNAL MEDICINE
Payer: COMMERCIAL

## 2025-07-10 DIAGNOSIS — R18.8 OTHER ASCITES: ICD-10-CM

## 2025-07-10 DIAGNOSIS — E66.01 MORBID OBESITY WITH BMI OF 40.0-44.9, ADULT (HCC): Primary | ICD-10-CM

## 2025-07-10 DIAGNOSIS — R53.81 PHYSICAL DECONDITIONING: ICD-10-CM

## 2025-07-10 PROCEDURE — 97110 THERAPEUTIC EXERCISES: CPT

## 2025-07-10 NOTE — PROGRESS NOTES
"Daily Note     Today's date: 7/10/2025  Patient name: Raymundo Weller  : 1960  MRN: [id number]  Referring provider: Lisandro Gauthier DO  Dx:   Encounter Diagnosis     ICD-10-CM    1. Morbid obesity with BMI of 40.0-44.9, adult (HCC)  E66.01     Z68.41       2. Other ascites  R18.8       3. Physical deconditioning  R53.81           Start Time: 1515  Stop Time: 1555  Total time in clinic (min): 40 minutes    Subjective: \"I am feeling a little better.\"      Objective: See treatment diary below      Assessment: Tolerated treatment well. Able to complete program without incident.  Pt continues to fatigue quickly needing standing rest periods for recovery.  Will continue to monitor and progress as able.  Patient demonstrated fatigue post treatment and would benefit from continued PT      Plan: Continue per plan of care.  Progress treatment as tolerated.       Precautions:       Re-eval Date: 25    Date 2025  7/1/25 7/7 7/10    Visit Count 1 2 3 4    FOTO 2025        Pain In See IE       Pain Out See IE           Manuals 2025  7/1/25 7/7 7/10                                    Neuro Re-Ed        FTEO foam    FTEC firm-foam        Tandem stance     Tandem walking        Side stepping    Backwards ambulation        hurdles        Step ups    Step downs  6\" 15x fw/lat  6\" 20x fw/lat  6\" 20x fw/lat     Amb with HT/HN        HKM progressing to bolster hold  30' x 4     TT NV TT 4x30' TT 4x30'    STS Reviewed HEP       Single leg balance                                        Ther Ex        Nustep for warm up  L4, 10 min   SPM > 60  L4, 10 min   SPM > 60  L4, 10 min   SPM > 60     Leg ext/flx machine  LAQ 33# 2 x 10   HS curls 55# 2 x 10  LAQ 33# 2 x 10   HS curls 55# 2 x 10  LAQ 33# 2 x 10   HS curls 55# 2 x 10     Leg press   110# 2 x 10     110# 30x HR  110# 2 x 10     110# 30x HR  110# 2 x 10     110# 30x HR     Lateral lunges/fwd lunges         squats Reviewed HEP       HR/TR Reviewed HEP     "   SLR Reviewed HEP standing  Flex standing  2x10 Flex standing  2x10    Standing marches Reviewed HEP       Hip abd/add        Bridges                                 Ther Activity                        Gait Training                        Modalities                              6/26/2025  - HEP was issued and reviewed this date for above noted exercises. Pt demonstrated understanding without incident and without questions/concerns. Will continue to update upcoming.

## 2025-07-14 ENCOUNTER — OFFICE VISIT (OUTPATIENT)
Dept: PHYSICAL THERAPY | Facility: CLINIC | Age: 65
End: 2025-07-14
Attending: INTERNAL MEDICINE
Payer: COMMERCIAL

## 2025-07-14 DIAGNOSIS — R18.8 OTHER ASCITES: ICD-10-CM

## 2025-07-14 DIAGNOSIS — E66.01 MORBID OBESITY WITH BMI OF 40.0-44.9, ADULT (HCC): Primary | ICD-10-CM

## 2025-07-14 DIAGNOSIS — R53.81 PHYSICAL DECONDITIONING: ICD-10-CM

## 2025-07-14 PROCEDURE — 97110 THERAPEUTIC EXERCISES: CPT

## 2025-07-14 NOTE — PROGRESS NOTES
"Daily Note     Today's date: 2025  Patient name: Raymundo Weller  : 1960  MRN: [id number]  Referring provider: Lisandro Gauthier DO  Dx:   Encounter Diagnosis     ICD-10-CM    1. Morbid obesity with BMI of 40.0-44.9, adult (HCC)  E66.01     Z68.41       2. Other ascites  R18.8       3. Physical deconditioning  R53.81           Start Time: 1015  Stop Time: 1100  Total time in clinic (min): 45 minutes    Subjective: \"My cold is getting better.  I think my endurance is better.\"      Objective: See treatment diary below      Assessment: Tolerated treatment well. Improved endurance noted.  Able to complete exercises with decreased rest periods needed for recovery.  Added sit-stand with appropriate fatigue.  Will continue to monitor and progress as able.  Patient demonstrated fatigue post treatment and would benefit from continued PT      Plan: Continue per plan of care.  Progress treatment as tolerated.       Precautions:       Re-eval Date: 25    Date 2025  7/1/25 7/7 7/10 7/14   Visit Count 1 2 3 4 5   FOTO 2025        Pain In See IE       Pain Out See IE           Manuals 2025  7/1/25 7/7 7/10 7/14                                   Neuro Re-Ed        FTEO foam    FTEC firm-foam        Tandem stance     Tandem walking        Side stepping    Backwards ambulation        hurdles     Fwd/lat  4 laps   Step ups    Step downs  6\" 15x fw/lat  6\" 20x fw/lat  6\" 20x fw/lat     Amb with HT/HN        HKM progressing to bolster hold  30' x 4     TT NV TT 4x30' TT 4x30'    STS Reviewed HEP    2x10   Single leg balance                                        Ther Ex        Nustep for warm up  L4, 10 min   SPM > 60  L4, 10 min   SPM > 60  L4, 10 min   SPM > 60  L4, 10 min   SPM > 60    Leg ext/flx machine  LAQ 33# 2 x 10   HS curls 55# 2 x 10  LAQ 33# 2 x 10   HS curls 55# 2 x 10  LAQ 33# 2 x 10   HS curls 55# 2 x 10  LAQ   33# 3 x 10   HS curls   55# 3x 10    Leg press   110# 2 x 10     110# 30x HR  " 110# 2 x 10     110# 30x HR  110# 2 x 10     110# 30x HR  110#  3x10     110# 30x HR    Lateral lunges/fwd lunges         squats Reviewed HEP    2x10   HR/TR Reviewed HEP       SLR Reviewed HEP standing  Flex standing  2x10 Flex standing  2x10 Flex standing  2x10   Standing marches Reviewed HEP       Hip abd/add        Bridges                                 Ther Activity                        Gait Training                        Modalities                              6/26/2025  - HEP was issued and reviewed this date for above noted exercises. Pt demonstrated understanding without incident and without questions/concerns. Will continue to update upcoming.

## 2025-07-17 ENCOUNTER — HOSPITAL ENCOUNTER (OUTPATIENT)
Dept: INTERVENTIONAL RADIOLOGY/VASCULAR | Facility: HOSPITAL | Age: 65
End: 2025-07-17
Attending: INTERNAL MEDICINE
Payer: COMMERCIAL

## 2025-07-17 VITALS
RESPIRATION RATE: 18 BRPM | DIASTOLIC BLOOD PRESSURE: 63 MMHG | OXYGEN SATURATION: 95 % | HEART RATE: 80 BPM | SYSTOLIC BLOOD PRESSURE: 141 MMHG

## 2025-07-17 DIAGNOSIS — R18.8 OTHER ASCITES: ICD-10-CM

## 2025-07-17 DIAGNOSIS — K70.31 ALCOHOLIC CIRRHOSIS OF LIVER WITH ASCITES (HCC): ICD-10-CM

## 2025-07-17 PROCEDURE — 76937 US GUIDE VASCULAR ACCESS: CPT

## 2025-07-17 PROCEDURE — 76705 ECHO EXAM OF ABDOMEN: CPT | Performed by: PHYSICIAN ASSISTANT

## 2025-07-18 ENCOUNTER — OFFICE VISIT (OUTPATIENT)
Dept: PHYSICAL THERAPY | Facility: CLINIC | Age: 65
End: 2025-07-18
Attending: INTERNAL MEDICINE
Payer: COMMERCIAL

## 2025-07-18 DIAGNOSIS — R53.81 PHYSICAL DECONDITIONING: ICD-10-CM

## 2025-07-18 DIAGNOSIS — E66.01 MORBID OBESITY WITH BMI OF 40.0-44.9, ADULT (HCC): Primary | ICD-10-CM

## 2025-07-18 DIAGNOSIS — R18.8 OTHER ASCITES: ICD-10-CM

## 2025-07-18 PROCEDURE — 97112 NEUROMUSCULAR REEDUCATION: CPT

## 2025-07-18 PROCEDURE — 97110 THERAPEUTIC EXERCISES: CPT

## 2025-07-18 NOTE — PROGRESS NOTES
"Daily Note     Today's date: 2025  Patient name: Raymundo Weller  : 1960  MRN: [id number]  Referring provider: Lisandro Gauthier DO  Dx:   Encounter Diagnosis     ICD-10-CM    1. Morbid obesity with BMI of 40.0-44.9, adult (HCC)  E66.01     Z68.41       2. Other ascites  R18.8       3. Physical deconditioning  R53.81           Start Time: 1000  Stop Time: 1045  Total time in clinic (min): 45 minutes    Subjective: \"I feel like my strength and endurance is getting better\"       Objective: See treatment diary below      Assessment: Tolerated treatment well. Able to complete POC without incident. Continues to complete exercises with decreased rest periods needed for recovery demo improving endurance. Continues with quick fatigue with performance of STS. Able to progress PREs with appropriate fatigue. Appropriate righting reactions with performance of hurdles. Progressed static balance activities with performance of FTEC and tandem stance on varying surfaces with noted challenge and increased sway/need for external A for balance recovery. Will continue to monitor and progress as able. Patient demonstrated fatigue post treatment and would benefit from continued PT      Plan: Continue per plan of care.  Progress treatment as tolerated.       Precautions:       Re-eval Date: 25    Date 7/18 7/1/25 7/7 7/10 7/14   Visit Count 6 2 3 4 5   FOTO        Pain In        Pain Out            Manuals 7/18 7/1/25 7/7 7/10 7/14                                   Neuro Re-Ed        FTEO foam    FTEC firm-foam       FTEC foam 2x30\"     Tandem stance firm 2x30\"    Tandem stance foam 2x30\"        Tandem stance     Tandem walking        Side stepping    Backwards ambulation        hurdles Fwd/lat  4 laps low hurdles     Fwd/lat  4 laps   Step ups    Step downs  6\" 15x fw/lat  6\" 20x fw/lat  6\" 20x fw/lat     Amb with HT/HN        HKM progressing to bolster hold  30' x 4     TT NV TT 4x30' TT 4x30'    STS 2x10    2x10 "   Single leg balance                                        Ther Ex        Nustep for warm up L4, 10 min   SPM > 60  L4, 10 min   SPM > 60  L4, 10 min   SPM > 60  L4, 10 min   SPM > 60  L4, 10 min   SPM > 60    Leg ext/flx machine LAQ   33# 3 x 10   HS curls   55# 3x 10  LAQ 33# 2 x 10   HS curls 55# 2 x 10  LAQ 33# 2 x 10   HS curls 55# 2 x 10  LAQ 33# 2 x 10   HS curls 55# 2 x 10  LAQ   33# 3 x 10   HS curls   55# 3x 10    Leg press  115#  3x10     115# 30x HR  110# 2 x 10     110# 30x HR  110# 2 x 10     110# 30x HR  110# 2 x 10     110# 30x HR  110#  3x10     110# 30x HR    Lateral lunges/fwd lunges         squats 2x10    2x10   HR/TR        SLR Flex standing  2x10 2# AW  Flex standing  2x10 Flex standing  2x10 Flex standing  2x10   Standing marches        Hip abd/add        Bridges                                 Ther Activity                        Gait Training                        Modalities                              6/26/2025  - HEP was issued and reviewed this date for above noted exercises. Pt demonstrated understanding without incident and without questions/concerns. Will continue to update upcoming.

## 2025-07-21 ENCOUNTER — OFFICE VISIT (OUTPATIENT)
Dept: PHYSICAL THERAPY | Facility: CLINIC | Age: 65
End: 2025-07-21
Attending: INTERNAL MEDICINE
Payer: COMMERCIAL

## 2025-07-21 DIAGNOSIS — R18.8 OTHER ASCITES: ICD-10-CM

## 2025-07-21 DIAGNOSIS — R53.81 PHYSICAL DECONDITIONING: ICD-10-CM

## 2025-07-21 DIAGNOSIS — E66.01 MORBID OBESITY WITH BMI OF 40.0-44.9, ADULT (HCC): Primary | ICD-10-CM

## 2025-07-21 PROCEDURE — 97112 NEUROMUSCULAR REEDUCATION: CPT

## 2025-07-21 PROCEDURE — 97110 THERAPEUTIC EXERCISES: CPT

## 2025-07-21 NOTE — PROGRESS NOTES
"Daily Note     Today's date: 2025  Patient name: Raymundo Weller  : 1960  MRN: [id number]  Referring provider: Lisandro Gauthier DO  Dx:   Encounter Diagnosis     ICD-10-CM    1. Morbid obesity with BMI of 40.0-44.9, adult (HCC)  E66.01     Z68.41       2. Other ascites  R18.8       3. Physical deconditioning  R53.81           Start Time: 0945  Stop Time: 1030  Total time in clinic (min): 45 minutes    Subjective: \"My legs were sore after last time\"       Objective: See treatment diary below      Assessment: Tolerated treatment well. Able to complete POC without incident. Noting improvement in performance of STS speed demo improving strength, righting reactions, and endurance. Requiring shorter and less rest breaks throughout session demo improving endurance. Still does continue with quick muscular fatigue but able to complete PREs for appropriate reps. Appropriately challenged with addition of foam with hurdles. Pt reporting knee pain after second set of STS but able to complete other interventions despite slight discomfort. Will cont to progress as tolerated. Patient demonstrated fatigue post treatment and would benefit from continued PT      Plan: Continue per plan of care.  Progress treatment as tolerated.       Precautions:       Re-eval Date: 25    Date 7/18 7/21/25 7/7 7/10 7/14   Visit Count 6 7 3 4 5   FOTO        Pain In        Pain Out            Manuals 7/18 7/21/25 7/7 7/10 7/14                                   Neuro Re-Ed        FTEO foam    FTEC firm-foam       FTEC foam 2x30\"     Tandem stance firm 2x30\"    Tandem stance foam 2x30\"              Tandem stance     Tandem walking        Side stepping    Backwards ambulation        hurdles Fwd/lat  4 laps low hurdles  Fwd/lat  4 laps low hurdles foam in between ea   Fwd/lat  4 laps   Step ups    Step downs   6\" 20x fw/lat  6\" 20x fw/lat     Amb with HT/HN        HKM progressing to bolster hold  HKM TT overhead NV    TT rowers next visit "  TT 4x30' TT 4x30'    STS 2x10 2x10   2x10   Single leg balance                                        Ther Ex        Nustep for warm up L4, 10 min   SPM > 60  L4, 10 min   SPM > 60  L4, 10 min   SPM > 60  L4, 10 min   SPM > 60  L4, 10 min   SPM > 60    Leg ext/flx machine LAQ   33# 3 x 10   HS curls   55# 3x 10  LAQ 33# 3 x 10   HS curls 55# 3 x 10  LAQ 33# 2 x 10   HS curls 55# 2 x 10  LAQ 33# 2 x 10   HS curls 55# 2 x 10  LAQ   33# 3 x 10   HS curls   55# 3x 10    Leg press  115#  3x10     115# 30x HR  115# 3x10     115# 30x HR  110# 2 x 10     110# 30x HR  110# 2 x 10     110# 30x HR  110#  3x10     110# 30x HR    Lateral lunges/fwd lunges         squats 2x10 2x10   2x10   HR/TR        SLR Flex standing  2x10 2# AW Flex standing  2x10 2# AW Flex standing  2x10 Flex standing  2x10 Flex standing  2x10   Standing marches        Hip abd/add        Bridges           Lunges NV                      Ther Activity                        Gait Training                        Modalities                              6/26/2025  - HEP was issued and reviewed this date for above noted exercises. Pt demonstrated understanding without incident and without questions/concerns. Will continue to update upcoming.

## 2025-07-25 ENCOUNTER — OFFICE VISIT (OUTPATIENT)
Dept: PHYSICAL THERAPY | Facility: CLINIC | Age: 65
End: 2025-07-25
Attending: INTERNAL MEDICINE
Payer: COMMERCIAL

## 2025-07-25 DIAGNOSIS — E66.01 MORBID OBESITY WITH BMI OF 40.0-44.9, ADULT (HCC): Primary | ICD-10-CM

## 2025-07-25 DIAGNOSIS — R18.8 OTHER ASCITES: ICD-10-CM

## 2025-07-25 DIAGNOSIS — R53.81 PHYSICAL DECONDITIONING: ICD-10-CM

## 2025-07-25 PROCEDURE — 97112 NEUROMUSCULAR REEDUCATION: CPT

## 2025-07-25 PROCEDURE — 97110 THERAPEUTIC EXERCISES: CPT

## 2025-07-25 NOTE — PROGRESS NOTES
"Daily Note     Today's date: 2025  Patient name: Raymundo Weller  : 1960  MRN: [id number]  Referring provider: Lisandro Gauthier DO  Dx:   Encounter Diagnosis     ICD-10-CM    1. Morbid obesity with BMI of 40.0-44.9, adult (HCC)  E66.01     Z68.41       2. Other ascites  R18.8       3. Physical deconditioning  R53.81           Start Time: 1045  Stop Time: 1130  Total time in clinic (min): 45 minutes    Subjective: \"I can't breathe the best because my sinuses are bad today\"       Objective: See treatment diary below      Assessment: Tolerated treatment well. Able to complete POC without incident. Noting quicker speed with performance of STS and able to complete with decreased time spent resting between sets demo improving endurance. Progressed dynamic balance activities with appropriate righting reactions. Progressed core stability exercises with quick fatigue noted and required short rest breaks between exercises; appropriately challenged. Appropriate fatigue with performance of lunges; slight knee discomfort noted. Overall requiring less seated rest breaks throughout session. Will cont to progress as tolerated.  Patient demonstrated fatigue post treatment and would benefit from continued PT      Plan: Continue per plan of care.  Progress treatment as tolerated.       Precautions:       Re-eval Date: 25    Date 7/18 7/21/25 7/25 7/10 7/14   Visit Count 6 7 8 4 5   FOTO        Pain In        Pain Out            Manuals 7/18 7/21/25 7/25 7/10 7/14                                   Neuro Re-Ed        FTEO foam    FTEC firm-foam       FTEC foam 2x30\"     Tandem stance firm 2x30\"    Tandem stance foam 2x30\"              Tandem stance     Tandem walking        Side stepping    Backwards ambulation        hurdles Fwd/lat  4 laps low hurdles  Fwd/lat  4 laps low hurdles foam in between ea   Fwd/lat  4 laps   Step ups    Step downs    6\" 20x fw/lat     Amb with HT/HN        HKM progressing to bolster hold  " HKM TT overhead NV    TT rowers next visit  HKM with TT overhead x4 laps       Split stance rowers with TT x20 ea TT 4x30'    STS 2x10 2x10 2x10  2x10   Single leg balance                                        Ther Ex        Nustep for warm up L4, 10 min   SPM > 60  L4, 10 min   SPM > 60  L4, 10 min   SPM > 60  L4, 10 min   SPM > 60  L4, 10 min   SPM > 60    Leg ext/flx machine LAQ   33# 3 x 10   HS curls   55# 3x 10  LAQ 33# 3 x 10   HS curls 55# 3 x 10  LAQ 33#  3 x 10   HS curls 55# 3 x 10  LAQ 33# 2 x 10   HS curls 55# 2 x 10  LAQ   33# 3 x 10   HS curls   55# 3x 10    Leg press  115#  3x10     115# 30x HR  115# 3x10     115# 30x HR  115# 3 x 10     115# 30x HR  110# 2 x 10     110# 30x HR  110#  3x10     110# 30x HR    Lateral lunges/fwd lunges         squats 2x10 2x10   2x10   HR/TR        SLR Flex standing  2x10 2# AW Flex standing  2x10 2# AW  Flex standing  2x10 Flex standing  2x10   Standing marches        Hip abd/add        Bridges           Lunges NV X15 ea with 1 HHA                     Ther Activity                        Gait Training                        Modalities                              6/26/2025  - HEP was issued and reviewed this date for above noted exercises. Pt demonstrated understanding without incident and without questions/concerns. Will continue to update upcoming.

## 2025-07-30 ENCOUNTER — OFFICE VISIT (OUTPATIENT)
Dept: PHYSICAL THERAPY | Facility: CLINIC | Age: 65
End: 2025-07-30
Attending: INTERNAL MEDICINE
Payer: COMMERCIAL

## 2025-07-30 DIAGNOSIS — E66.01 MORBID OBESITY WITH BMI OF 40.0-44.9, ADULT (HCC): Primary | ICD-10-CM

## 2025-07-30 DIAGNOSIS — R18.8 OTHER ASCITES: ICD-10-CM

## 2025-07-30 DIAGNOSIS — R53.81 PHYSICAL DECONDITIONING: ICD-10-CM

## 2025-07-30 PROCEDURE — 97110 THERAPEUTIC EXERCISES: CPT

## 2025-07-30 PROCEDURE — 97112 NEUROMUSCULAR REEDUCATION: CPT

## 2025-07-31 ENCOUNTER — HOSPITAL ENCOUNTER (OUTPATIENT)
Dept: INTERVENTIONAL RADIOLOGY/VASCULAR | Facility: HOSPITAL | Age: 65
Discharge: HOME | End: 2025-07-31
Attending: INTERNAL MEDICINE
Payer: COMMERCIAL

## 2025-07-31 VITALS
OXYGEN SATURATION: 96 % | DIASTOLIC BLOOD PRESSURE: 61 MMHG | SYSTOLIC BLOOD PRESSURE: 132 MMHG | HEART RATE: 81 BPM | RESPIRATION RATE: 18 BRPM

## 2025-07-31 DIAGNOSIS — R18.8 OTHER ASCITES: ICD-10-CM

## 2025-07-31 DIAGNOSIS — K70.31 ALCOHOLIC CIRRHOSIS OF LIVER WITH ASCITES (HCC): ICD-10-CM

## 2025-07-31 PROCEDURE — 76705 ECHO EXAM OF ABDOMEN: CPT | Performed by: PHYSICIAN ASSISTANT

## 2025-08-07 ENCOUNTER — OFFICE VISIT (OUTPATIENT)
Dept: PHYSICAL THERAPY | Facility: CLINIC | Age: 65
End: 2025-08-07
Attending: INTERNAL MEDICINE
Payer: COMMERCIAL

## 2025-08-07 DIAGNOSIS — E66.01 MORBID OBESITY WITH BMI OF 40.0-44.9, ADULT (HCC): Primary | ICD-10-CM

## 2025-08-07 DIAGNOSIS — R18.8 OTHER ASCITES: ICD-10-CM

## 2025-08-07 DIAGNOSIS — R53.81 PHYSICAL DECONDITIONING: ICD-10-CM

## 2025-08-07 PROCEDURE — 97110 THERAPEUTIC EXERCISES: CPT

## 2025-08-13 ENCOUNTER — EVALUATION (OUTPATIENT)
Dept: PHYSICAL THERAPY | Facility: CLINIC | Age: 65
End: 2025-08-13
Attending: INTERNAL MEDICINE
Payer: COMMERCIAL

## (undated) DEVICE — NEEDLE 25G X 1 1/2

## (undated) DEVICE — SINGLE PORT MANIFOLD: Brand: NEPTUNE 2

## (undated) DEVICE — 2000CC GUARDIAN II: Brand: GUARDIAN

## (undated) DEVICE — CAUTERY TIP POLISHER: Brand: DEVON

## (undated) DEVICE — INTENDED FOR TISSUE SEPARATION, AND OTHER PROCEDURES THAT REQUIRE A SHARP SURGICAL BLADE TO PUNCTURE OR CUT.: Brand: BARD-PARKER SAFETY BLADES SIZE 15, STERILE

## (undated) DEVICE — 10FR FRAZIER SUCTION HANDLE: Brand: CARDINAL HEALTH

## (undated) DEVICE — NEEDLE 18 G X 1 1/2

## (undated) DEVICE — SOLUTION BOWL: Brand: KENDALL

## (undated) DEVICE — DRAPE EQUIPMENT RF WAND

## (undated) DEVICE — PENCIL ELECTROSURG E-Z CLEAN -0035H

## (undated) DEVICE — SCD SEQUENTIAL COMPRESSION COMFORT SLEEVE MEDIUM KNEE LENGTH: Brand: KENDALL SCD

## (undated) DEVICE — SUT VICRYL 5-0 P-S 18 IN J493G

## (undated) DEVICE — 3M™ STERI-STRIP™ REINFORCED ADHESIVE SKIN CLOSURES, R1547, 1/2 IN X 4 IN (12 MM X 100 MM), 6 STRIPS/ENVELOPE: Brand: 3M™ STERI-STRIP™

## (undated) DEVICE — TELFA NON-ADHERENT ABSORBENT DRESSING: Brand: TELFA

## (undated) DEVICE — TUBING SUCTION 5MM X 12 FT

## (undated) DEVICE — SPECIMEN CONTAINER STERILE PEEL PACK

## (undated) DEVICE — STERILE BETHLEHEM T AND A PACK: Brand: CARDINAL HEALTH

## (undated) DEVICE — SYRINGE 10ML LL

## (undated) DEVICE — SUT SILK 2-0 SH 30 IN K833H

## (undated) DEVICE — SUT SILK 0 CT-1 30 IN 424H

## (undated) DEVICE — GLOVE SRG BIOGEL ORTHOPEDIC 7

## (undated) DEVICE — ELECTRODE BLADE MOD E-Z CLEAN  2.75IN 7CM -0012AM